# Patient Record
Sex: MALE | Race: WHITE | NOT HISPANIC OR LATINO | Employment: OTHER | URBAN - METROPOLITAN AREA
[De-identification: names, ages, dates, MRNs, and addresses within clinical notes are randomized per-mention and may not be internally consistent; named-entity substitution may affect disease eponyms.]

---

## 2017-02-09 ENCOUNTER — OFFICE VISIT (OUTPATIENT)
Dept: FAMILY MEDICINE | Facility: CLINIC | Age: 72
End: 2017-02-09
Payer: MEDICARE

## 2017-02-09 VITALS
BODY MASS INDEX: 34.4 KG/M2 | RESPIRATION RATE: 15 BRPM | HEART RATE: 68 BPM | TEMPERATURE: 100 F | HEIGHT: 70 IN | SYSTOLIC BLOOD PRESSURE: 140 MMHG | DIASTOLIC BLOOD PRESSURE: 86 MMHG | WEIGHT: 240.31 LBS

## 2017-02-09 DIAGNOSIS — J40 BRONCHITIS: ICD-10-CM

## 2017-02-09 DIAGNOSIS — E11.42 DIABETIC POLYNEUROPATHY ASSOCIATED WITH TYPE 2 DIABETES MELLITUS: Primary | ICD-10-CM

## 2017-02-09 PROCEDURE — 99499 UNLISTED E&M SERVICE: CPT | Mod: S$GLB,,, | Performed by: FAMILY MEDICINE

## 2017-02-09 PROCEDURE — 3046F HEMOGLOBIN A1C LEVEL >9.0%: CPT | Mod: S$GLB,,, | Performed by: FAMILY MEDICINE

## 2017-02-09 PROCEDURE — 4010F ACE/ARB THERAPY RXD/TAKEN: CPT | Mod: S$GLB,,, | Performed by: FAMILY MEDICINE

## 2017-02-09 PROCEDURE — 1159F MED LIST DOCD IN RCRD: CPT | Mod: S$GLB,,, | Performed by: FAMILY MEDICINE

## 2017-02-09 PROCEDURE — 1157F ADVNC CARE PLAN IN RCRD: CPT | Mod: S$GLB,,, | Performed by: FAMILY MEDICINE

## 2017-02-09 PROCEDURE — 1160F RVW MEDS BY RX/DR IN RCRD: CPT | Mod: S$GLB,,, | Performed by: FAMILY MEDICINE

## 2017-02-09 PROCEDURE — 2022F DILAT RTA XM EVC RTNOPTHY: CPT | Mod: S$GLB,,, | Performed by: FAMILY MEDICINE

## 2017-02-09 PROCEDURE — 99999 PR PBB SHADOW E&M-EST. PATIENT-LVL III: CPT | Mod: PBBFAC,,, | Performed by: FAMILY MEDICINE

## 2017-02-09 PROCEDURE — 1126F AMNT PAIN NOTED NONE PRSNT: CPT | Mod: S$GLB,,, | Performed by: FAMILY MEDICINE

## 2017-02-09 PROCEDURE — 3077F SYST BP >= 140 MM HG: CPT | Mod: S$GLB,,, | Performed by: FAMILY MEDICINE

## 2017-02-09 PROCEDURE — 3079F DIAST BP 80-89 MM HG: CPT | Mod: S$GLB,,, | Performed by: FAMILY MEDICINE

## 2017-02-09 PROCEDURE — 99214 OFFICE O/P EST MOD 30 MIN: CPT | Mod: 25,S$GLB,, | Performed by: FAMILY MEDICINE

## 2017-02-09 PROCEDURE — 96372 THER/PROPH/DIAG INJ SC/IM: CPT | Mod: S$GLB,,, | Performed by: FAMILY MEDICINE

## 2017-02-09 RX ORDER — FUROSEMIDE 40 MG/1
40 TABLET ORAL DAILY
COMMUNITY
Start: 2016-11-14 | End: 2021-06-08

## 2017-02-09 RX ORDER — BENZONATATE 100 MG/1
100 CAPSULE ORAL 3 TIMES DAILY PRN
Qty: 30 CAPSULE | Refills: 0 | Status: SHIPPED | OUTPATIENT
Start: 2017-02-09 | End: 2017-03-11

## 2017-02-09 RX ORDER — AZITHROMYCIN 250 MG/1
TABLET, FILM COATED ORAL
Qty: 6 TABLET | Refills: 0 | Status: SHIPPED | OUTPATIENT
Start: 2017-02-09 | End: 2017-02-14

## 2017-02-09 RX ORDER — METHYLPREDNISOLONE ACETATE 40 MG/ML
40 INJECTION, SUSPENSION INTRA-ARTICULAR; INTRALESIONAL; INTRAMUSCULAR; SOFT TISSUE
Status: COMPLETED | OUTPATIENT
Start: 2017-02-09 | End: 2017-02-09

## 2017-02-09 RX ORDER — FLUTICASONE PROPIONATE 50 MCG
2 SPRAY, SUSPENSION (ML) NASAL DAILY
Qty: 1 BOTTLE | Refills: 0 | Status: SHIPPED | OUTPATIENT
Start: 2017-02-09 | End: 2017-03-11

## 2017-02-09 RX ORDER — PANTOPRAZOLE SODIUM 40 MG/1
TABLET, DELAYED RELEASE ORAL
COMMUNITY
Start: 2016-11-21 | End: 2017-02-09

## 2017-02-09 RX ADMIN — METHYLPREDNISOLONE ACETATE 40 MG: 40 INJECTION, SUSPENSION INTRA-ARTICULAR; INTRALESIONAL; INTRAMUSCULAR; SOFT TISSUE at 03:02

## 2017-02-09 NOTE — PROGRESS NOTES
Subjective:       Patient ID: Nestor Garcia is a 72 y.o. male.    Chief Complaint: Cough (x last night) and Fever    HPI  72 year old male comes in with c/o cough/congestion and PND that started yesterday. He says that because of the cough last night he has some chest pain under his ribs. He has had temp up to 99.6. He has been using some zeina seltzer cold as well as robitussin cold med with an antihistamine which did help. He does have DM and his sugars have been running 120s in the morning. He is walking 2 miles daily on the [facility]. He notes that he did stop his lipitor because of muscle aching.    PMH, PSH, ALLERGIES, SH, FH reviewed in nurse's notes above  Medications reconciled in the nurse's notes      Review of Systems   Constitutional: Negative for chills and fever.   HENT: Positive for congestion and postnasal drip. Negative for ear pain, rhinorrhea, sore throat and trouble swallowing.    Eyes: Negative for redness and itching.   Respiratory: Positive for cough. Negative for shortness of breath and wheezing.    Cardiovascular: Negative for chest pain and palpitations.   Gastrointestinal: Negative for abdominal pain, diarrhea, nausea and vomiting.   Genitourinary: Negative for dysuria and frequency.   Skin: Negative for rash.   Neurological: Negative for weakness and headaches.       Objective:      Physical Exam   Constitutional: He is oriented to person, place, and time. He appears well-developed. No distress.   HENT:   Head: Normocephalic and atraumatic.   Eyes: Conjunctivae are normal. Pupils are equal, round, and reactive to light.   Neck: Normal range of motion. Neck supple. No thyromegaly present.   Cardiovascular: Normal rate, regular rhythm, normal heart sounds and intact distal pulses.    Pulmonary/Chest: Effort normal and breath sounds normal. No respiratory distress. He has no wheezes.   Abdominal: Soft. Bowel sounds are normal. There is no tenderness.   Musculoskeletal: Normal range of  motion. He exhibits no edema.   Lymphadenopathy:     He has no cervical adenopathy.   Neurological: He is alert and oriented to person, place, and time.   Skin: Skin is warm and dry. No rash noted.   Psychiatric: He has a normal mood and affect. His behavior is normal.   Nursing note and vitals reviewed.       Assessment/Plan:       Nestor was seen today for cough and fever.    Diagnoses and all orders for this visit:    Diabetic polyneuropathy associated with type 2 diabetes mellitus    Bronchitis  -     benzonatate (TESSALON) 100 MG capsule; Take 1 capsule (100 mg total) by mouth 3 (three) times daily as needed.  -     methylPREDNISolone acetate injection 40 mg; Inject 1 mL (40 mg total) into the muscle one time.  -     azithromycin (Z-RIMA) 250 MG tablet; Take 2 tablets by mouth on day 1; Take 1 tablet by mouth on days 2-5    Other orders  -     fluticasone (FLONASE) 50 mcg/actuation nasal spray; 2 sprays by Each Nare route once daily.    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 He stopped this.    RTC if condition acutely worsens or any other concerns, otherwise RTC as scheduled

## 2017-02-09 NOTE — MR AVS SNAPSHOT
[address] [address]  Phone: [phone]  Fax: [phone]                  Nestor Garcia   2017 3:30 PM   Office Visit    Description:  Male : 1945   Provider:  Estelle Melgar MD   Department:  [facility]           Reason for Visit     Cough     Fever           Diagnoses this Visit        Comments    Diabetic polyneuropathy associated with type 2 diabetes mellitus    -  Primary     Bronchitis                To Do List           Goals (5 Years of Data)     None       These Medications        Disp Refills Start End    benzonatate (TESSALON) 100 MG capsule 30 capsule 0 2017 3/11/2017    Take 1 capsule (100 mg total) by mouth 3 (three) times daily as needed. - Oral    Pharmacy: [facility] [address] [place] [address] [id number] Ph #: [phone]       azithromycin (Z-RIMA) 250 MG tablet 6 tablet 0 2017    Take 2 tablets by mouth on day 1; Take 1 tablet by mouth on days 2-5    Pharmacy: [facility] [address] [place] [address] [id number] Ph #: [phone]       fluticasone (FLONASE) 50 mcg/actuation nasal spray 1 Bottle 0 2017 3/11/2017    2 sprays by Each Nare route once daily. - Each Nare    Pharmacy: [address] Ph #: [phone]         [facility]     [facility] On Call Nurse Care Line -  Assistance  Registered nurses in the [facility] provide clinical advisement, health education, appointment booking, and other advisory services.  Call for this free service at [phone].             Medications           Message regarding Medications     Verify the changes and/or additions to your medication regime listed below are the same as discussed with your clinician today.  If any of these changes or additions are incorrect, please notify your healthcare provider.        START taking these NEW medications        Refills    benzonatate (TESSALON) 100  MG capsule 0    Sig: Take 1 capsule (100 mg total) by mouth 3 (three) times daily as needed.    Class: Normal    Route: Oral    azithromycin (Z-RIMA) 250 MG tablet 0    Sig: Take 2 tablets by mouth on day 1; Take 1 tablet by mouth on days 2-5    Class: Normal    fluticasone (FLONASE) 50 mcg/actuation nasal spray 0    Si sprays by Each Nare route once daily.    Class: Normal    Route: Each Nare      These medications were administered today        Dose Freq    methylPREDNISolone acetate injection 40 mg 40 mg Clinic/HOD 1 time    Sig: Inject 1 mL (40 mg total) into the muscle one time.    Class: Normal    Route: Intramuscular      STOP taking these medications     acidophilus (LACTINEX) 100 million cell packet Take 1 tablet by mouth 2 (two) times daily.     ascorbic acid (VITAMIN C) 500 MG tablet Take 500 mg by mouth once daily.      gemfibrozil (LOPID) 600 MG tablet Take 1 tablet (600 mg total) by mouth 2 (two) times daily before meals.    pantoprazole (PROTONIX) 40 MG tablet     atorvastatin (LIPITOR) 10 MG tablet Take 1 tablet (10 mg total) by mouth once daily.           Verify that the below list of medications is an accurate representation of the medications you are currently taking.  If none reported, the list may be blank. If incorrect, please contact your healthcare provider. Carry this list with you in case of emergency.           Current Medications     amlodipine (NORVASC) 10 MG tablet Take 1 tablet (10 mg total) by mouth once daily.    coenzyme Q10-vitamin E (CO Q-10) 100-5 mg-unit Cap Take by mouth.      docusate sodium (COLACE) 50 MG capsule Take by mouth every evening.      doxazosin (CARDURA) 2 MG tablet Take 2 tablets (4 mg total) by mouth once daily.    fish oil-omega-3 fatty acids 300-1,000 mg capsule Take 1 g by mouth once daily.     furosemide (LASIX) 40 MG tablet Take 40 mg by mouth daily as needed.     indapamide (LOZOL) 2.5 MG Tab Take 1 tablet (2.5 mg total) by mouth once daily.    insulin  "glargine (LANTUS) 100 unit/mL injection Inject 50 Units into the skin once daily.    insulin NPH (HUMULIN N) 100 unit/mL injection Inject 35 Units into the skin every evening.    insulin syringe-needle U-100 (BD INSULIN SYRINGE ULT-FINE II) 1/2 mL 31 x 5/16" Syrg Inject 1 each into the skin 5 (five) times daily.    lisinopril-hydrochlorothiazide (PRINZIDE,ZESTORETIC) 20-12.5 mg per tablet Take 2 tablets by mouth once daily.    metformin (GLUCOPHAGE-XR) 500 MG 24 hr tablet Take 1 tablet by mouth. For tablets daily    minoxidil (LONITEN) 2.5 MG tablet Take 2 tablets (5 mg total) by mouth once daily.    multivit-iron-min-folic acid (MULTIVITAMIN-IRON-MINERALS-FOLIC ACID) 3,500-18-0.4 unit-mg-mg Chew Take by mouth.      triamcinolone acetonide 0.1% (KENALOG) 0.1 % ointment Apply topically 2 (two) times daily.    azithromycin (Z-RIMA) 250 MG tablet Take 2 tablets by mouth on day 1; Take 1 tablet by mouth on days 2-5    benzonatate (TESSALON) 100 MG capsule Take 1 capsule (100 mg total) by mouth 3 (three) times daily as needed.    fluticasone (FLONASE) 50 mcg/actuation nasal spray 2 sprays by Each Nare route once daily.           Clinical Reference Information           Your Vitals Were     BP Pulse Temp Resp    140/86 (BP Location: Right arm, Patient Position: Sitting, BP Method: Manual) 68 99.5 °F (37.5 °C) (Tympanic) 15    Height Weight BMI    5' 10" (1.778 m) 109 kg (240 lb 4.8 oz) 34.48 kg/m2      Blood Pressure          Most Recent Value    BP  (!)  140/86      Allergies as of 2/9/2017     No Known Allergies      Immunizations Administered on Date of Encounter - 2/9/2017     None      Language Assistance Services     ATTENTION: Language assistance services are available, free of charge. Please call [phone].      ATENCIÓN: Si habla chase, tiene a calderon disposición servicios gratuitos de asistencia lingüística. Llame al [phone].     AFUA Ý: N?u b?n nói Ti?ng Vi?t, có các d?ch v? h? tr? ngôn ng? mi?n phí " dành cho b?n. G?i s? [phone].         [facility] complies with applicable Federal civil rights laws and does not discriminate on the basis of race, color, national origin, age, disability, or sex.

## 2017-05-29 DIAGNOSIS — E11.9 TYPE 2 DIABETES MELLITUS WITHOUT COMPLICATION: ICD-10-CM

## 2017-06-06 ENCOUNTER — OFFICE VISIT (OUTPATIENT)
Dept: FAMILY MEDICINE | Facility: CLINIC | Age: 72
End: 2017-06-06
Payer: MEDICARE

## 2017-06-06 VITALS
HEART RATE: 80 BPM | SYSTOLIC BLOOD PRESSURE: 140 MMHG | DIASTOLIC BLOOD PRESSURE: 80 MMHG | WEIGHT: 239.63 LBS | HEIGHT: 70 IN | RESPIRATION RATE: 18 BRPM | BODY MASS INDEX: 34.3 KG/M2

## 2017-06-06 DIAGNOSIS — Z12.5 SCREENING FOR PROSTATE CANCER: ICD-10-CM

## 2017-06-06 DIAGNOSIS — Z78.9 STATIN INTOLERANCE: ICD-10-CM

## 2017-06-06 DIAGNOSIS — I10 ESSENTIAL HYPERTENSION: Primary | ICD-10-CM

## 2017-06-06 DIAGNOSIS — E78.5 DYSLIPIDEMIA: ICD-10-CM

## 2017-06-06 DIAGNOSIS — E11.9 TYPE 2 DIABETES MELLITUS WITHOUT COMPLICATION, WITH LONG-TERM CURRENT USE OF INSULIN: ICD-10-CM

## 2017-06-06 DIAGNOSIS — Z79.4 TYPE 2 DIABETES MELLITUS WITHOUT COMPLICATION, WITH LONG-TERM CURRENT USE OF INSULIN: ICD-10-CM

## 2017-06-06 PROCEDURE — 99214 OFFICE O/P EST MOD 30 MIN: CPT | Mod: S$GLB,,, | Performed by: FAMILY MEDICINE

## 2017-06-06 PROCEDURE — 99999 PR PBB SHADOW E&M-EST. PATIENT-LVL II: CPT | Mod: PBBFAC,,, | Performed by: FAMILY MEDICINE

## 2017-06-06 PROCEDURE — 1159F MED LIST DOCD IN RCRD: CPT | Mod: S$GLB,,, | Performed by: FAMILY MEDICINE

## 2017-06-06 PROCEDURE — 4010F ACE/ARB THERAPY RXD/TAKEN: CPT | Mod: S$GLB,,, | Performed by: FAMILY MEDICINE

## 2017-06-06 PROCEDURE — 99499 UNLISTED E&M SERVICE: CPT | Mod: S$GLB,,, | Performed by: FAMILY MEDICINE

## 2017-06-06 RX ORDER — LISINOPRIL AND HYDROCHLOROTHIAZIDE 12.5; 2 MG/1; MG/1
2 TABLET ORAL DAILY
Qty: 180 TABLET | Refills: 1 | Status: SHIPPED | OUTPATIENT
Start: 2017-06-06 | End: 2017-12-11 | Stop reason: SDUPTHER

## 2017-06-06 RX ORDER — TRIAMCINOLONE ACETONIDE 1 MG/G
OINTMENT TOPICAL 2 TIMES DAILY
Qty: 1 TUBE | Refills: 2 | Status: ON HOLD | OUTPATIENT
Start: 2017-06-06 | End: 2021-12-22 | Stop reason: HOSPADM

## 2017-06-06 RX ORDER — DOXAZOSIN 2 MG/1
4 TABLET ORAL DAILY
Qty: 180 TABLET | Refills: 1 | Status: SHIPPED | OUTPATIENT
Start: 2017-06-06 | End: 2017-12-11 | Stop reason: SDUPTHER

## 2017-06-06 RX ORDER — AMLODIPINE BESYLATE 10 MG/1
10 TABLET ORAL DAILY
Qty: 90 TABLET | Refills: 1 | Status: SHIPPED | OUTPATIENT
Start: 2017-06-06 | End: 2017-12-11 | Stop reason: SDUPTHER

## 2017-06-06 RX ORDER — FLUCONAZOLE 100 MG/1
TABLET ORAL
COMMUNITY
Start: 2017-05-25 | End: 2017-06-06

## 2017-06-06 RX ORDER — INDAPAMIDE 2.5 MG/1
2.5 TABLET ORAL DAILY
Qty: 90 TABLET | Refills: 1 | Status: SHIPPED | OUTPATIENT
Start: 2017-06-06 | End: 2017-12-11 | Stop reason: SDUPTHER

## 2017-06-06 NOTE — PROGRESS NOTES
Subjective:       Patient ID: Nestor Garcia is a 72 y.o. male.    Chief Complaint: Follow-up    Pt is a 72 y.o. male who presents for evaluation and management of   Encounter Diagnoses   Name Primary?    Essential hypertension Yes    Type 2 diabetes mellitus without complication, with long-term current use of insulin     Statin intolerance     Dyslipidemia     Screening for prostate cancer    .Seeing endocrinology at  for his DM       Doing well on current meds. Denies any side effects. Prevention is up to date.  Review of Systems   Constitutional: Negative for fever.   Respiratory: Negative for shortness of breath.    Cardiovascular: Negative for chest pain.   Gastrointestinal: Negative for anal bleeding and blood in stool.   Genitourinary: Negative for dysuria.   Neurological: Negative for dizziness and light-headedness.       Objective:      Physical Exam   Constitutional: He is oriented to person, place, and time. He appears well-developed and well-nourished.   HENT:   Head: Normocephalic and atraumatic.   Right Ear: External ear normal.   Left Ear: External ear normal.   Nose: Nose normal.   Mouth/Throat: Oropharynx is clear and moist.   Eyes: Conjunctivae and EOM are normal. Pupils are equal, round, and reactive to light. Right eye exhibits no discharge. Left eye exhibits no discharge. No scleral icterus.   Neck: Normal range of motion. Neck supple. No JVD present. No tracheal deviation present. No thyromegaly present.   Cardiovascular: Normal rate, regular rhythm, normal heart sounds and intact distal pulses.    No murmur heard.  Pulmonary/Chest: Effort normal and breath sounds normal. No respiratory distress. He has no wheezes. He has no rales. He exhibits no tenderness.   Abdominal: Soft. Bowel sounds are normal. He exhibits no distension and no mass. There is no tenderness. There is no rebound and no guarding.   Musculoskeletal: Normal range of motion.   Lymphadenopathy:     He has no cervical  adenopathy.   Neurological: He is alert and oriented to person, place, and time. He has normal reflexes. He displays normal reflexes. No cranial nerve deficit. He exhibits normal muscle tone. Coordination normal.   Skin: Skin is warm and dry.   Psychiatric: He has a normal mood and affect. His behavior is normal. Judgment and thought content normal.       Assessment:       1. Essential hypertension    2. Type 2 diabetes mellitus without complication, with long-term current use of insulin    3. Statin intolerance    4. Dyslipidemia    5. Screening for prostate cancer        Plan:   Nestor was seen today for follow-up.    Diagnoses and all orders for this visit:    Essential hypertension  -     Comprehensive metabolic panel; Future  -     lisinopril-hydrochlorothiazide (PRINZIDE,ZESTORETIC) 20-12.5 mg per tablet; Take 2 tablets by mouth once daily.  -     amlodipine (NORVASC) 10 MG tablet; Take 1 tablet (10 mg total) by mouth once daily.  -     doxazosin (CARDURA) 2 MG tablet; Take 2 tablets (4 mg total) by mouth once daily.  -     indapamide (LOZOL) 2.5 MG Tab; Take 1 tablet (2.5 mg total) by mouth once daily.  -     Lipid panel; Future    Type 2 diabetes mellitus without complication, with long-term current use of insulin  -     Hemoglobin A1c; Future    Statin intolerance    Dyslipidemia  -     TSH; Future  -     Lipid panel; Future    Screening for prostate cancer  -     PSA, Screening; Future    Other orders  -     triamcinolone acetonide 0.1% (KENALOG) 0.1 % ointment; Apply topically 2 (two) times daily.    can't tolerate statins. REpatha?    Follow with DRU JUAREZ 6 months     No Follow-up on file.

## 2017-06-07 ENCOUNTER — TELEPHONE (OUTPATIENT)
Dept: FAMILY MEDICINE | Facility: CLINIC | Age: 72
End: 2017-06-07

## 2017-06-07 NOTE — TELEPHONE ENCOUNTER
----- Message from Edgardo Chang sent at 2017  2:44 PM CDT -----  Contact: Daughter - Rossy Garcia  MRN: [id number]  : 1945  PCP: Adithya Krishna  Home Phone      [phone]  Work Phone      Not on file.  Mobile          [phone]      MESSAGE: sees Endocinologist @ [facility] -- liver enzymes are elevated & was told he needed to see Gastroenterologist -- would like recommendation from Dr Krishna on Dr     Call [phone]    PCP: Tomi

## 2017-06-07 NOTE — TELEPHONE ENCOUNTER
I would like to see his labs before I refer him to GI doc. There are a lot of different patterns of liver enzyme elevation, which can represent different problems, thus different treatment. This will determine who I send him to and when. May need further workup here. Please get results to me soon. I leave for vacation tomoro afternoon

## 2017-06-07 NOTE — TELEPHONE ENCOUNTER
sees Endocinologist @ [facility] -- liver enzymes are elevated & was told he needed to see Gastroenterologist -- would like recommendation from Dr Krishna on Dr   (Noted pt has [employer]--she may have to contact them to see who is in network first.)

## 2017-06-08 ENCOUNTER — PATIENT MESSAGE (OUTPATIENT)
Dept: FAMILY MEDICINE | Facility: CLINIC | Age: 72
End: 2017-06-08

## 2017-06-08 DIAGNOSIS — R74.8 ELEVATED LIVER ENZYMES: Primary | ICD-10-CM

## 2017-06-13 ENCOUNTER — CLINICAL SUPPORT (OUTPATIENT)
Dept: FAMILY MEDICINE | Facility: CLINIC | Age: 72
End: 2017-06-13
Payer: MEDICARE

## 2017-06-13 DIAGNOSIS — E78.5 DYSLIPIDEMIA: ICD-10-CM

## 2017-06-13 DIAGNOSIS — I10 ESSENTIAL HYPERTENSION: ICD-10-CM

## 2017-06-13 DIAGNOSIS — R74.8 ELEVATED LIVER ENZYMES: ICD-10-CM

## 2017-06-13 DIAGNOSIS — Z79.4 TYPE 2 DIABETES MELLITUS WITHOUT COMPLICATION, WITH LONG-TERM CURRENT USE OF INSULIN: ICD-10-CM

## 2017-06-13 DIAGNOSIS — E11.9 TYPE 2 DIABETES MELLITUS WITHOUT COMPLICATION, WITH LONG-TERM CURRENT USE OF INSULIN: ICD-10-CM

## 2017-06-13 DIAGNOSIS — Z12.5 SCREENING FOR PROSTATE CANCER: ICD-10-CM

## 2017-06-13 LAB
ALBUMIN SERPL BCP-MCNC: 3.9 G/DL
ALP SERPL-CCNC: 46 U/L
ALT SERPL W/O P-5'-P-CCNC: 103 U/L
ANION GAP SERPL CALC-SCNC: 9 MMOL/L
AST SERPL-CCNC: 69 U/L
BILIRUB SERPL-MCNC: 0.6 MG/DL
BUN SERPL-MCNC: 19 MG/DL
CALCIUM SERPL-MCNC: 9.3 MG/DL
CHLORIDE SERPL-SCNC: 101 MMOL/L
CHOLEST/HDLC SERPL: 6 {RATIO}
CO2 SERPL-SCNC: 32 MMOL/L
COMPLEXED PSA SERPL-MCNC: 1.4 NG/ML
CREAT SERPL-MCNC: 0.9 MG/DL
EST. GFR  (AFRICAN AMERICAN): >60 ML/MIN/1.73 M^2
EST. GFR  (NON AFRICAN AMERICAN): >60 ML/MIN/1.73 M^2
GLUCOSE SERPL-MCNC: 98 MG/DL
HDL/CHOLESTEROL RATIO: 16.6 %
HDLC SERPL-MCNC: 175 MG/DL
HDLC SERPL-MCNC: 29 MG/DL
LDLC SERPL CALC-MCNC: 79.8 MG/DL
NONHDLC SERPL-MCNC: 146 MG/DL
POTASSIUM SERPL-SCNC: 3.7 MMOL/L
PROT SERPL-MCNC: 7.3 G/DL
SODIUM SERPL-SCNC: 142 MMOL/L
TRIGL SERPL-MCNC: 331 MG/DL
TSH SERPL DL<=0.005 MIU/L-ACNC: 1.59 UIU/ML

## 2017-06-13 PROCEDURE — 87340 HEPATITIS B SURFACE AG IA: CPT

## 2017-06-13 PROCEDURE — 80061 LIPID PANEL: CPT

## 2017-06-13 PROCEDURE — 80053 COMPREHEN METABOLIC PANEL: CPT

## 2017-06-13 PROCEDURE — 86704 HEP B CORE ANTIBODY TOTAL: CPT

## 2017-06-13 PROCEDURE — 84443 ASSAY THYROID STIM HORMONE: CPT

## 2017-06-13 PROCEDURE — 83036 HEMOGLOBIN GLYCOSYLATED A1C: CPT

## 2017-06-13 PROCEDURE — 86803 HEPATITIS C AB TEST: CPT

## 2017-06-13 PROCEDURE — 84153 ASSAY OF PSA TOTAL: CPT

## 2017-06-13 PROCEDURE — 36415 COLL VENOUS BLD VENIPUNCTURE: CPT | Mod: S$GLB,,, | Performed by: FAMILY MEDICINE

## 2017-06-14 LAB
ESTIMATED AVG GLUCOSE: 157 MG/DL
HBA1C MFR BLD HPLC: 7.1 %
HBV CORE AB SERPL QL IA: NEGATIVE
HBV SURFACE AG SERPL QL IA: NEGATIVE
HCV AB SERPL QL IA: NEGATIVE

## 2017-06-23 ENCOUNTER — HOSPITAL ENCOUNTER (OUTPATIENT)
Dept: RADIOLOGY | Facility: HOSPITAL | Age: 72
Discharge: HOME | End: 2017-06-23
Attending: FAMILY MEDICINE
Payer: MEDICARE

## 2017-06-23 DIAGNOSIS — R74.8 ELEVATED LIVER ENZYMES: ICD-10-CM

## 2017-06-23 PROCEDURE — 76705 ECHO EXAM OF ABDOMEN: CPT | Mod: 26,,, | Performed by: RADIOLOGY

## 2017-06-23 PROCEDURE — 76705 ECHO EXAM OF ABDOMEN: CPT | Mod: TC

## 2017-09-11 ENCOUNTER — OFFICE VISIT (OUTPATIENT)
Dept: FAMILY MEDICINE | Facility: CLINIC | Age: 72
End: 2017-09-11
Payer: MEDICARE

## 2017-09-11 VITALS
HEART RATE: 80 BPM | WEIGHT: 240.06 LBS | SYSTOLIC BLOOD PRESSURE: 170 MMHG | RESPIRATION RATE: 20 BRPM | HEIGHT: 70 IN | DIASTOLIC BLOOD PRESSURE: 82 MMHG | BODY MASS INDEX: 34.37 KG/M2

## 2017-09-11 DIAGNOSIS — J32.9 SINUSITIS, UNSPECIFIED CHRONICITY, UNSPECIFIED LOCATION: Primary | ICD-10-CM

## 2017-09-11 PROCEDURE — 1159F MED LIST DOCD IN RCRD: CPT | Mod: S$GLB,,, | Performed by: FAMILY MEDICINE

## 2017-09-11 PROCEDURE — 99213 OFFICE O/P EST LOW 20 MIN: CPT | Mod: 25,S$GLB,, | Performed by: FAMILY MEDICINE

## 2017-09-11 PROCEDURE — 3077F SYST BP >= 140 MM HG: CPT | Mod: S$GLB,,, | Performed by: FAMILY MEDICINE

## 2017-09-11 PROCEDURE — 99999 PR PBB SHADOW E&M-EST. PATIENT-LVL II: CPT | Mod: PBBFAC,,, | Performed by: FAMILY MEDICINE

## 2017-09-11 PROCEDURE — 3079F DIAST BP 80-89 MM HG: CPT | Mod: S$GLB,,, | Performed by: FAMILY MEDICINE

## 2017-09-11 PROCEDURE — 96372 THER/PROPH/DIAG INJ SC/IM: CPT | Mod: S$GLB,,, | Performed by: FAMILY MEDICINE

## 2017-09-11 PROCEDURE — 3008F BODY MASS INDEX DOCD: CPT | Mod: S$GLB,,, | Performed by: FAMILY MEDICINE

## 2017-09-11 RX ORDER — METHYLPREDNISOLONE ACETATE 40 MG/ML
40 INJECTION, SUSPENSION INTRA-ARTICULAR; INTRALESIONAL; INTRAMUSCULAR; SOFT TISSUE
Status: COMPLETED | OUTPATIENT
Start: 2017-09-11 | End: 2017-09-11

## 2017-09-11 RX ORDER — BENZONATATE 100 MG/1
200 CAPSULE ORAL 3 TIMES DAILY PRN
Qty: 30 CAPSULE | Refills: 1 | Status: SHIPPED | OUTPATIENT
Start: 2017-09-11 | End: 2017-09-21

## 2017-09-11 RX ORDER — IPRATROPIUM BROMIDE 42 UG/1
2 SPRAY, METERED NASAL 4 TIMES DAILY
Qty: 15 ML | Refills: 2 | Status: ON HOLD | OUTPATIENT
Start: 2017-09-11 | End: 2019-12-08 | Stop reason: HOSPADM

## 2017-09-11 RX ADMIN — METHYLPREDNISOLONE ACETATE 40 MG: 40 INJECTION, SUSPENSION INTRA-ARTICULAR; INTRALESIONAL; INTRAMUSCULAR; SOFT TISSUE at 07:09

## 2017-09-11 NOTE — PROGRESS NOTES
Subjective:       Patient ID: Nestor Garcia is a 72 y.o. male.    Chief Complaint: Nasal Congestion and Sinusitis    Pt is 72 y.o. male who c/o 5 day h/o sinus congestion and h/a. Pos PND and cough.  No relief with OTC antihistamine/decongestant cold preparations. Took a decongestant, despite his HTN.   Severity is moderate.  Review of Systems   Constitutional: Negative for fatigue and fever.   HENT: Positive for congestion, postnasal drip, rhinorrhea, sinus pressure and sneezing.    Eyes: Positive for itching.   Respiratory: Negative for cough and wheezing.    Cardiovascular: Negative for chest pain and palpitations.   Gastrointestinal: Negative for diarrhea, nausea and vomiting.   Genitourinary: Negative.    Musculoskeletal: Negative.    Skin: Negative.    Neurological: Negative.  Negative for headaches.   Hematological: Negative for adenopathy.   Psychiatric/Behavioral: Negative.        Objective:    Physical Exam   Constitutional: He is oriented to person, place, and time. He appears well-developed and well-nourished.   HENT:   Head: Normocephalic and atraumatic.   Right Ear: External ear normal.   Left Ear: External ear normal.   Nose: Nose normal.   Mouth/Throat: Oropharynx is clear and moist.   Clear rhinorrhea  Right ear with cerumen impaction      Eyes: Conjunctivae and EOM are normal. Pupils are equal, round, and reactive to light. Right eye exhibits no discharge. Left eye exhibits no discharge. No scleral icterus.   Clear watery d/c   Neck: Normal range of motion. Neck supple. No JVD present. No tracheal deviation present. No thyromegaly present.   Cardiovascular: Normal rate, regular rhythm, normal heart sounds and intact distal pulses.    No murmur heard.  Pulmonary/Chest: Effort normal and breath sounds normal. No respiratory distress. He has no wheezes. He has no rales. He exhibits no tenderness.   Abdominal: Soft. Bowel sounds are normal. He exhibits no distension and no mass. There is no  tenderness. There is no rebound and no guarding.   Genitourinary: Penis normal. No penile tenderness.   Musculoskeletal: Normal range of motion.   Lymphadenopathy:     He has no cervical adenopathy.   Neurological: He is alert and oriented to person, place, and time. He has normal reflexes. He displays normal reflexes. No cranial nerve deficit. He exhibits normal muscle tone. Coordination normal.   Skin: Skin is warm and dry.   Psychiatric: He has a normal mood and affect. His behavior is normal. Judgment and thought content normal.       Assessment:       1. Sinusitis, unspecified chronicity, unspecified location        Plan:   Nestor was seen today for nasal congestion and sinusitis.    Diagnoses and all orders for this visit:    Sinusitis, unspecified chronicity, unspecified location  -     methylPREDNISolone acetate injection 40 mg; Inject 1 mL (40 mg total) into the muscle one time.  -     ipratropium (ATROVENT) 0.06 % nasal spray; 2 sprays by Nasal route 4 (four) times daily.  -     benzonatate (TESSALON) 100 MG capsule; Take 2 capsules (200 mg total) by mouth 3 (three) times daily as needed for Cough.    Use flonase with above   NO DECONGESTANTS    No Follow-up on file.

## 2017-09-20 ENCOUNTER — TELEPHONE (OUTPATIENT)
Dept: FAMILY MEDICINE | Facility: CLINIC | Age: 72
End: 2017-09-20

## 2017-09-20 NOTE — TELEPHONE ENCOUNTER
----- Message from Sheila Perales sent at 2017  5:22 PM CDT -----  Contact: Self  Nestor Garcia  MRN: [id number]  : 1945  PCP: Adithya Krishna  Home Phone      [phone]  Work Phone      Not on file.  Mobile          [phone]    MESSAGE:   Requesting appointment for tomorrow 17.  He was seen recently for cold and is not getting any better.  Running low grade fever, congestion, and cough    Pharmacy:  [facility] in [place]    Phone:  [phone]

## 2017-12-11 ENCOUNTER — OFFICE VISIT (OUTPATIENT)
Dept: FAMILY MEDICINE | Facility: CLINIC | Age: 72
End: 2017-12-11
Payer: MEDICARE

## 2017-12-11 VITALS
WEIGHT: 240.75 LBS | HEIGHT: 70 IN | BODY MASS INDEX: 34.47 KG/M2 | SYSTOLIC BLOOD PRESSURE: 140 MMHG | DIASTOLIC BLOOD PRESSURE: 80 MMHG

## 2017-12-11 DIAGNOSIS — Z78.9 STATIN INTOLERANCE: ICD-10-CM

## 2017-12-11 DIAGNOSIS — E11.9 TYPE 2 DIABETES MELLITUS WITHOUT COMPLICATION, WITH LONG-TERM CURRENT USE OF INSULIN: Primary | ICD-10-CM

## 2017-12-11 DIAGNOSIS — I10 ESSENTIAL HYPERTENSION: ICD-10-CM

## 2017-12-11 DIAGNOSIS — E78.5 DYSLIPIDEMIA: ICD-10-CM

## 2017-12-11 DIAGNOSIS — Z79.4 TYPE 2 DIABETES MELLITUS WITHOUT COMPLICATION, WITH LONG-TERM CURRENT USE OF INSULIN: Primary | ICD-10-CM

## 2017-12-11 PROCEDURE — 99214 OFFICE O/P EST MOD 30 MIN: CPT | Mod: S$GLB,,, | Performed by: FAMILY MEDICINE

## 2017-12-11 PROCEDURE — 99499 UNLISTED E&M SERVICE: CPT | Mod: S$GLB,,, | Performed by: FAMILY MEDICINE

## 2017-12-11 PROCEDURE — 99999 PR PBB SHADOW E&M-EST. PATIENT-LVL II: CPT | Mod: PBBFAC,,, | Performed by: FAMILY MEDICINE

## 2017-12-11 RX ORDER — FENOFIBRATE 160 MG/1
160 TABLET ORAL DAILY
COMMUNITY
Start: 2017-11-28 | End: 2021-06-08

## 2017-12-11 RX ORDER — LISINOPRIL AND HYDROCHLOROTHIAZIDE 12.5; 2 MG/1; MG/1
2 TABLET ORAL DAILY
Qty: 180 TABLET | Refills: 1 | Status: ON HOLD | OUTPATIENT
Start: 2017-12-11 | End: 2019-12-08 | Stop reason: SDUPTHER

## 2017-12-11 RX ORDER — AMLODIPINE BESYLATE 10 MG/1
10 TABLET ORAL DAILY
Qty: 90 TABLET | Refills: 1 | Status: SHIPPED | OUTPATIENT
Start: 2017-12-11 | End: 2018-01-05

## 2017-12-11 RX ORDER — INDAPAMIDE 2.5 MG/1
2.5 TABLET ORAL DAILY
Qty: 90 TABLET | Refills: 1 | Status: ON HOLD | OUTPATIENT
Start: 2017-12-11 | End: 2019-12-08 | Stop reason: HOSPADM

## 2017-12-11 RX ORDER — DOXAZOSIN 2 MG/1
4 TABLET ORAL DAILY
Qty: 180 TABLET | Refills: 1 | Status: SHIPPED | OUTPATIENT
Start: 2017-12-11 | End: 2018-06-26 | Stop reason: SDUPTHER

## 2017-12-11 NOTE — PROGRESS NOTES
Subjective:       Patient ID: Nestor SAMUELS Garcia is a 72 y.o. male.    Chief Complaint: Nasal Congestion (Follow up) and Sinusitis    Pt is a 72 y.o. male who presents for evaluation and management of   Encounter Diagnoses   Name Primary?    Type 2 diabetes mellitus without complication, with long-term current use of insulin Yes    Essential hypertension     Dyslipidemia     Statin intolerance    Sees endo at ---Isaias Ulloa MD   A1c is 7.2 per pt   Recently started fenofibrate for elevated TRIGS, low HDL.    Doing well on current meds. Denies any side effects. Prevention is up to date.  Review of Systems   Constitutional: Negative for fever.   Respiratory: Negative for shortness of breath.    Cardiovascular: Negative for chest pain.   Gastrointestinal: Negative for anal bleeding and blood in stool.   Genitourinary: Negative for dysuria.   Neurological: Negative for dizziness and light-headedness.   Psychiatric/Behavioral: Negative for sleep disturbance.       Objective:      Physical Exam   Constitutional: He is oriented to person, place, and time. He appears well-developed and well-nourished.   HENT:   Head: Normocephalic and atraumatic.   Right Ear: External ear normal.   Left Ear: External ear normal.   Nose: Nose normal.   Mouth/Throat: Oropharynx is clear and moist.   Eyes: Conjunctivae and EOM are normal. Pupils are equal, round, and reactive to light. Right eye exhibits no discharge. Left eye exhibits no discharge. No scleral icterus.   Neck: Normal range of motion. Neck supple. No JVD present. No tracheal deviation present. No thyromegaly present.   Cardiovascular: Normal rate, regular rhythm, normal heart sounds and intact distal pulses.    No murmur heard.  Pulmonary/Chest: Effort normal and breath sounds normal. No respiratory distress. He has no wheezes. He has no rales. He exhibits no tenderness.   Abdominal: Soft. Bowel sounds are normal. He exhibits no distension and no mass. There is no  tenderness. There is no rebound and no guarding.   Musculoskeletal: Normal range of motion.   Lymphadenopathy:     He has no cervical adenopathy.   Neurological: He is alert and oriented to person, place, and time. He has normal reflexes. He displays normal reflexes. No cranial nerve deficit. He exhibits normal muscle tone. Coordination normal.   Skin: Skin is warm and dry.   Psychiatric: He has a normal mood and affect. His behavior is normal. Judgment and thought content normal.       Assessment:       1. Type 2 diabetes mellitus without complication, with long-term current use of insulin    2. Essential hypertension    3. Dyslipidemia    4. Statin intolerance        Plan:   Nestor was seen today for nasal congestion and sinusitis.    Diagnoses and all orders for this visit:    Type 2 diabetes mellitus without complication, with long-term current use of insulin    Essential hypertension  -     amLODIPine (NORVASC) 10 MG tablet; Take 1 tablet (10 mg total) by mouth once daily.  -     doxazosin (CARDURA) 2 MG tablet; Take 2 tablets (4 mg total) by mouth once daily.  -     indapamide (LOZOL) 2.5 MG Tab; Take 1 tablet (2.5 mg total) by mouth once daily.  -     lisinopril-hydrochlorothiazide (PRINZIDE,ZESTORETIC) 20-12.5 mg per tablet; Take 2 tablets by mouth once daily.    Dyslipidemia    Statin intolerance    continue current rx, see orders   Stop omega red and get High Potency Ocean Blue Fish Oil OTC online   RTC 6 months     No Follow-up on file.

## 2018-01-05 ENCOUNTER — OFFICE VISIT (OUTPATIENT)
Dept: FAMILY MEDICINE | Facility: CLINIC | Age: 73
End: 2018-01-05
Payer: MEDICARE

## 2018-01-05 ENCOUNTER — TELEPHONE (OUTPATIENT)
Dept: FAMILY MEDICINE | Facility: CLINIC | Age: 73
End: 2018-01-05

## 2018-01-05 VITALS
TEMPERATURE: 100 F | HEIGHT: 70 IN | RESPIRATION RATE: 18 BRPM | DIASTOLIC BLOOD PRESSURE: 80 MMHG | WEIGHT: 245 LBS | BODY MASS INDEX: 35.07 KG/M2 | SYSTOLIC BLOOD PRESSURE: 170 MMHG | HEART RATE: 86 BPM

## 2018-01-05 DIAGNOSIS — J32.9 SINUSITIS, UNSPECIFIED CHRONICITY, UNSPECIFIED LOCATION: ICD-10-CM

## 2018-01-05 DIAGNOSIS — J40 BRONCHITIS: ICD-10-CM

## 2018-01-05 DIAGNOSIS — R05.9 COUGH: ICD-10-CM

## 2018-01-05 DIAGNOSIS — R50.9 FEVER, UNSPECIFIED FEVER CAUSE: Primary | ICD-10-CM

## 2018-01-05 PROCEDURE — 96372 THER/PROPH/DIAG INJ SC/IM: CPT | Mod: S$GLB,,, | Performed by: FAMILY MEDICINE

## 2018-01-05 PROCEDURE — 99999 PR PBB SHADOW E&M-EST. PATIENT-LVL IV: CPT | Mod: PBBFAC,,, | Performed by: NURSE PRACTITIONER

## 2018-01-05 PROCEDURE — 99213 OFFICE O/P EST LOW 20 MIN: CPT | Mod: 25,S$GLB,, | Performed by: NURSE PRACTITIONER

## 2018-01-05 RX ORDER — METOPROLOL SUCCINATE 50 MG/1
TABLET, EXTENDED RELEASE ORAL
Status: ON HOLD | COMMUNITY
Start: 2017-12-27 | End: 2019-12-08 | Stop reason: HOSPADM

## 2018-01-05 RX ORDER — METHYLPREDNISOLONE ACETATE 40 MG/ML
40 INJECTION, SUSPENSION INTRA-ARTICULAR; INTRALESIONAL; INTRAMUSCULAR; SOFT TISSUE
Status: COMPLETED | OUTPATIENT
Start: 2018-01-05 | End: 2018-01-05

## 2018-01-05 RX ORDER — CEFTRIAXONE 250 MG/1
250 INJECTION, POWDER, FOR SOLUTION INTRAMUSCULAR; INTRAVENOUS
Status: COMPLETED | OUTPATIENT
Start: 2018-01-05 | End: 2018-01-05

## 2018-01-05 RX ORDER — BENZONATATE 100 MG/1
100 CAPSULE ORAL 3 TIMES DAILY PRN
Qty: 30 CAPSULE | Refills: 0 | Status: SHIPPED | OUTPATIENT
Start: 2018-01-05 | End: 2018-01-15

## 2018-01-05 RX ORDER — LEVOFLOXACIN 500 MG/1
500 TABLET, FILM COATED ORAL DAILY
Qty: 7 TABLET | Refills: 0 | Status: SHIPPED | OUTPATIENT
Start: 2018-01-05 | End: 2018-12-03 | Stop reason: ALTCHOICE

## 2018-01-05 RX ADMIN — METHYLPREDNISOLONE ACETATE 40 MG: 40 INJECTION, SUSPENSION INTRA-ARTICULAR; INTRALESIONAL; INTRAMUSCULAR; SOFT TISSUE at 10:01

## 2018-01-05 RX ADMIN — CEFTRIAXONE 250 MG: 250 INJECTION, POWDER, FOR SOLUTION INTRAMUSCULAR; INTRAVENOUS at 10:01

## 2018-01-05 NOTE — PROGRESS NOTES
Subjective:       Patient ID: Nestor Garcia is a 72 y.o. male.    Chief Complaint: Fever and Cough    Fever    The current episode started yesterday. The maximum temperature noted was 100 to 100.9 F. Associated symptoms include congestion, coughing and a sore throat. Pertinent negatives include no abdominal pain, diarrhea, ear pain, headaches, nausea, rash, vomiting or wheezing.   Cough   The current episode started yesterday. The cough is productive of sputum. Associated symptoms include a fever, nasal congestion, postnasal drip and a sore throat. Pertinent negatives include no ear pain, headaches, rash, shortness of breath or wheezing.     Review of Systems   Constitutional: Positive for appetite change, fatigue and fever.   HENT: Positive for congestion, postnasal drip and sore throat. Negative for ear pain.    Eyes: Negative.  Negative for visual disturbance.   Respiratory: Positive for cough. Negative for shortness of breath and wheezing.    Cardiovascular: Negative.    Gastrointestinal: Negative.  Negative for abdominal pain, diarrhea, nausea and vomiting.   Genitourinary: Negative.  Negative for difficulty urinating.   Musculoskeletal: Negative.    Skin: Negative.  Negative for rash.   Neurological: Negative.  Negative for headaches.   Psychiatric/Behavioral: Negative.  Negative for sleep disturbance. The patient is not nervous/anxious.    All other systems reviewed and are negative.      Objective:      Physical Exam   Constitutional: He appears well-developed and well-nourished.   HENT:   Head: Normocephalic and atraumatic.   Right Ear: External ear normal. A middle ear effusion is present.   Left Ear: External ear normal. A middle ear effusion is present.   Nose: Mucosal edema and rhinorrhea present.   Mouth/Throat: Uvula is midline. Posterior oropharyngeal erythema present.   Eyes: Conjunctivae are normal. Pupils are equal, round, and reactive to light.   Neck: Trachea normal and normal range of motion.  Neck supple. No thyromegaly present.   Cardiovascular: Normal rate, regular rhythm, S1 normal, S2 normal and normal heart sounds.    No murmur heard.  Pulmonary/Chest: Effort normal. No respiratory distress.   Rhonchi throughout, rough cough   Abdominal: Soft. Normal appearance.   Musculoskeletal: Normal range of motion.   Lymphadenopathy:     He has no cervical adenopathy.   Neurological: He is alert.   Skin: Skin is warm, dry and intact.   Psychiatric: He has a normal mood and affect. His speech is normal.   Nursing note and vitals reviewed.      Assessment:       1. Fever, unspecified fever cause    2. Cough    3. Bronchitis    4. Sinusitis, unspecified chronicity, unspecified location        Plan:   Nestor was seen today for fever and cough.    Diagnoses and all orders for this visit:    Fever, unspecified fever cause  -     POCT Influenza A/B  -     methylPREDNISolone acetate injection 40 mg; Inject 1 mL (40 mg total) into the muscle one time.  -     levoFLOXacin (LEVAQUIN) 500 MG tablet; Take 1 tablet (500 mg total) by mouth once daily.  -     cefTRIAXone injection 250 mg; Inject 250 mg into the muscle one time.    Cough  -     methylPREDNISolone acetate injection 40 mg; Inject 1 mL (40 mg total) into the muscle one time.  -     levoFLOXacin (LEVAQUIN) 500 MG tablet; Take 1 tablet (500 mg total) by mouth once daily.  -     benzonatate (TESSALON) 100 MG capsule; Take 1 capsule (100 mg total) by mouth 3 (three) times daily as needed for Cough.  -     cefTRIAXone injection 250 mg; Inject 250 mg into the muscle one time.    Bronchitis  -     methylPREDNISolone acetate injection 40 mg; Inject 1 mL (40 mg total) into the muscle one time.  -     levoFLOXacin (LEVAQUIN) 500 MG tablet; Take 1 tablet (500 mg total) by mouth once daily.  -     cefTRIAXone injection 250 mg; Inject 250 mg into the muscle one time.    Sinusitis, unspecified chronicity, unspecified location  -     methylPREDNISolone acetate injection 40 mg;  Inject 1 mL (40 mg total) into the muscle one time.  -     levoFLOXacin (LEVAQUIN) 500 MG tablet; Take 1 tablet (500 mg total) by mouth once daily.  -     cefTRIAXone injection 250 mg; Inject 250 mg into the muscle one time.    RTC 1 week to follow up with Dr. Krishna

## 2018-01-05 NOTE — TELEPHONE ENCOUNTER
----- Message from Edgardo Chang sent at 2018  9:21 AM CST -----  Contact: Patient  Nestor Garcia  MRN: [id number]  : 1945  PCP: Adithya Krishna  Home Phone      [phone]  Work Phone      Not on file.  Mobile          [phone]      MESSAGE: fever - cough -- requesting appt today    Call [phone]    PCP: Tomi

## 2018-02-09 DIAGNOSIS — E11.9 TYPE 2 DIABETES MELLITUS WITHOUT COMPLICATION: ICD-10-CM

## 2018-03-12 ENCOUNTER — PES CALL (OUTPATIENT)
Dept: ADMINISTRATIVE | Facility: CLINIC | Age: 73
End: 2018-03-12

## 2018-06-26 DIAGNOSIS — I10 ESSENTIAL HYPERTENSION: ICD-10-CM

## 2018-06-26 RX ORDER — DOXAZOSIN 2 MG/1
TABLET ORAL
Qty: 180 TABLET | Refills: 1 | Status: ON HOLD | OUTPATIENT
Start: 2018-06-26 | End: 2019-12-08 | Stop reason: SDUPTHER

## 2018-08-08 ENCOUNTER — PES CALL (OUTPATIENT)
Dept: ADMINISTRATIVE | Facility: CLINIC | Age: 73
End: 2018-08-08

## 2018-09-14 DIAGNOSIS — E11.9 TYPE 2 DIABETES MELLITUS WITHOUT COMPLICATION: ICD-10-CM

## 2018-12-03 ENCOUNTER — TELEPHONE (OUTPATIENT)
Dept: FAMILY MEDICINE | Facility: CLINIC | Age: 73
End: 2018-12-03

## 2018-12-04 ENCOUNTER — TELEPHONE (OUTPATIENT)
Dept: FAMILY MEDICINE | Facility: CLINIC | Age: 73
End: 2018-12-04

## 2018-12-04 NOTE — TELEPHONE ENCOUNTER
Spoke to pt about scheduling an appt for ER f/u. Pt states that he has an appt tomorrow--nothing in system to reflect that. Pt states that his daughter was to set it up. He will contact her about this

## 2019-05-31 ENCOUNTER — PES CALL (OUTPATIENT)
Dept: ADMINISTRATIVE | Facility: CLINIC | Age: 74
End: 2019-05-31

## 2019-06-14 ENCOUNTER — PES CALL (OUTPATIENT)
Dept: ADMINISTRATIVE | Facility: CLINIC | Age: 74
End: 2019-06-14

## 2019-10-25 ENCOUNTER — TELEPHONE (OUTPATIENT)
Dept: FAMILY MEDICINE | Facility: CLINIC | Age: 74
End: 2019-10-25

## 2019-12-08 PROBLEM — R06.02 SOB (SHORTNESS OF BREATH): Status: ACTIVE | Noted: 2019-12-08

## 2019-12-08 PROBLEM — R07.89 ATYPICAL CHEST PAIN: Status: ACTIVE | Noted: 2019-12-08

## 2019-12-08 PROBLEM — R07.89 CHEST HEAVINESS: Status: ACTIVE | Noted: 2019-12-08

## 2019-12-19 ENCOUNTER — TELEPHONE (OUTPATIENT)
Dept: GASTROENTEROLOGY | Facility: CLINIC | Age: 74
End: 2019-12-19

## 2019-12-19 NOTE — TELEPHONE ENCOUNTER
----- Message from Bud Gonzales sent at 12/19/2019  2:03 PM CST -----  Contact: Rudi medina/Dr Mcmahan office  [phone]  Rudi is calling to speak with you about a referral for the above patient   Please call back to assist at [phone]

## 2019-12-19 NOTE — TELEPHONE ENCOUNTER
Spoke with [facility] and no appointments scheduled with GI, explained to them that I will fax over appt info one it is scheduled.

## 2020-10-27 LAB
CHOLEST SERPL-MSCNC: 154 MG/DL (ref 0–200)
CHOLESTEROL/HDL RATIO SCREEN: 5
CREAT UR-MCNC: 80.7 MG/DL
HDLC SERPL-MCNC: 30 MG/DL (ref 35–70)
LDLC SERPL CALC-MCNC: 92 MG/DL (ref 0–160)
MICROALBUMIN URINE RANDOM: 84.9
MICROALBUMIN/CREATININE RATIO: 1052 UG/MG
NON HDL CHOL. (LDL+VLDL): 124
TRIGL SERPL-MCNC: 203 MG/DL (ref 40–160)

## 2021-01-18 LAB — HBA1C MFR BLD: 7.9 % (ref 4–6)

## 2021-02-18 LAB
LEFT EYE DM RETINOPATHY: NEGATIVE
RIGHT EYE DM RETINOPATHY: NEGATIVE

## 2021-04-13 LAB — HBA1C MFR BLD: 7.7 % (ref 4–6)

## 2021-05-31 ENCOUNTER — TELEPHONE (OUTPATIENT)
Dept: FAMILY MEDICINE | Facility: CLINIC | Age: 76
End: 2021-05-31

## 2021-06-08 ENCOUNTER — OFFICE VISIT (OUTPATIENT)
Dept: FAMILY MEDICINE | Facility: CLINIC | Age: 76
End: 2021-06-08
Payer: MEDICARE

## 2021-06-08 VITALS
HEART RATE: 60 BPM | WEIGHT: 241.19 LBS | RESPIRATION RATE: 20 BRPM | HEIGHT: 70 IN | BODY MASS INDEX: 34.53 KG/M2 | TEMPERATURE: 98 F | DIASTOLIC BLOOD PRESSURE: 72 MMHG | SYSTOLIC BLOOD PRESSURE: 130 MMHG

## 2021-06-08 DIAGNOSIS — G47.30 SLEEP APNEA, UNSPECIFIED TYPE: ICD-10-CM

## 2021-06-08 DIAGNOSIS — I10 ESSENTIAL HYPERTENSION: ICD-10-CM

## 2021-06-08 DIAGNOSIS — E11.42 DIABETIC POLYNEUROPATHY ASSOCIATED WITH TYPE 2 DIABETES MELLITUS: Primary | ICD-10-CM

## 2021-06-08 DIAGNOSIS — Z76.89 ENCOUNTER TO ESTABLISH CARE: ICD-10-CM

## 2021-06-08 PROCEDURE — 1126F AMNT PAIN NOTED NONE PRSNT: CPT | Mod: S$GLB,,, | Performed by: STUDENT IN AN ORGANIZED HEALTH CARE EDUCATION/TRAINING PROGRAM

## 2021-06-08 PROCEDURE — 99499 UNLISTED E&M SERVICE: CPT | Mod: S$GLB,,, | Performed by: STUDENT IN AN ORGANIZED HEALTH CARE EDUCATION/TRAINING PROGRAM

## 2021-06-08 PROCEDURE — 3288F FALL RISK ASSESSMENT DOCD: CPT | Mod: CPTII,S$GLB,, | Performed by: STUDENT IN AN ORGANIZED HEALTH CARE EDUCATION/TRAINING PROGRAM

## 2021-06-08 PROCEDURE — 1101F PR PT FALLS ASSESS DOC 0-1 FALLS W/OUT INJ PAST YR: ICD-10-PCS | Mod: CPTII,S$GLB,, | Performed by: STUDENT IN AN ORGANIZED HEALTH CARE EDUCATION/TRAINING PROGRAM

## 2021-06-08 PROCEDURE — 99204 PR OFFICE/OUTPT VISIT, NEW, LEVL IV, 45-59 MIN: ICD-10-PCS | Mod: S$GLB,,, | Performed by: STUDENT IN AN ORGANIZED HEALTH CARE EDUCATION/TRAINING PROGRAM

## 2021-06-08 PROCEDURE — 1159F MED LIST DOCD IN RCRD: CPT | Mod: S$GLB,,, | Performed by: STUDENT IN AN ORGANIZED HEALTH CARE EDUCATION/TRAINING PROGRAM

## 2021-06-08 PROCEDURE — 3075F SYST BP GE 130 - 139MM HG: CPT | Mod: CPTII,S$GLB,, | Performed by: STUDENT IN AN ORGANIZED HEALTH CARE EDUCATION/TRAINING PROGRAM

## 2021-06-08 PROCEDURE — 3078F DIAST BP <80 MM HG: CPT | Mod: CPTII,S$GLB,, | Performed by: STUDENT IN AN ORGANIZED HEALTH CARE EDUCATION/TRAINING PROGRAM

## 2021-06-08 PROCEDURE — 3075F PR MOST RECENT SYSTOLIC BLOOD PRESS GE 130-139MM HG: ICD-10-PCS | Mod: CPTII,S$GLB,, | Performed by: STUDENT IN AN ORGANIZED HEALTH CARE EDUCATION/TRAINING PROGRAM

## 2021-06-08 PROCEDURE — 3078F PR MOST RECENT DIASTOLIC BLOOD PRESSURE < 80 MM HG: ICD-10-PCS | Mod: CPTII,S$GLB,, | Performed by: STUDENT IN AN ORGANIZED HEALTH CARE EDUCATION/TRAINING PROGRAM

## 2021-06-08 PROCEDURE — 99499 RISK ADDL DX/OHS AUDIT: ICD-10-PCS | Mod: S$GLB,,, | Performed by: STUDENT IN AN ORGANIZED HEALTH CARE EDUCATION/TRAINING PROGRAM

## 2021-06-08 PROCEDURE — 3288F PR FALLS RISK ASSESSMENT DOCUMENTED: ICD-10-PCS | Mod: CPTII,S$GLB,, | Performed by: STUDENT IN AN ORGANIZED HEALTH CARE EDUCATION/TRAINING PROGRAM

## 2021-06-08 PROCEDURE — 99999 PR PBB SHADOW E&M-EST. PATIENT-LVL IV: CPT | Mod: PBBFAC,,, | Performed by: STUDENT IN AN ORGANIZED HEALTH CARE EDUCATION/TRAINING PROGRAM

## 2021-06-08 PROCEDURE — 99999 PR PBB SHADOW E&M-EST. PATIENT-LVL IV: ICD-10-PCS | Mod: PBBFAC,,, | Performed by: STUDENT IN AN ORGANIZED HEALTH CARE EDUCATION/TRAINING PROGRAM

## 2021-06-08 PROCEDURE — 1126F PR PAIN SEVERITY QUANTIFIED, NO PAIN PRESENT: ICD-10-PCS | Mod: S$GLB,,, | Performed by: STUDENT IN AN ORGANIZED HEALTH CARE EDUCATION/TRAINING PROGRAM

## 2021-06-08 PROCEDURE — 1101F PT FALLS ASSESS-DOCD LE1/YR: CPT | Mod: CPTII,S$GLB,, | Performed by: STUDENT IN AN ORGANIZED HEALTH CARE EDUCATION/TRAINING PROGRAM

## 2021-06-08 PROCEDURE — 99204 OFFICE O/P NEW MOD 45 MIN: CPT | Mod: S$GLB,,, | Performed by: STUDENT IN AN ORGANIZED HEALTH CARE EDUCATION/TRAINING PROGRAM

## 2021-06-08 PROCEDURE — 1159F PR MEDICATION LIST DOCUMENTED IN MEDICAL RECORD: ICD-10-PCS | Mod: S$GLB,,, | Performed by: STUDENT IN AN ORGANIZED HEALTH CARE EDUCATION/TRAINING PROGRAM

## 2021-06-08 RX ORDER — PREGABALIN 25 MG/1
25 CAPSULE ORAL 2 TIMES DAILY
Qty: 60 CAPSULE | Refills: 5 | Status: SHIPPED | OUTPATIENT
Start: 2021-06-08 | End: 2021-07-07 | Stop reason: SDUPTHER

## 2021-06-14 ENCOUNTER — HOSPITAL ENCOUNTER (OUTPATIENT)
Dept: SLEEP MEDICINE | Facility: HOSPITAL | Age: 76
Discharge: HOME | End: 2021-06-14
Attending: STUDENT IN AN ORGANIZED HEALTH CARE EDUCATION/TRAINING PROGRAM
Payer: MEDICARE

## 2021-06-14 DIAGNOSIS — G47.00 INSOMNIA WITH SLEEP APNEA: ICD-10-CM

## 2021-06-14 DIAGNOSIS — G47.30 INSOMNIA WITH SLEEP APNEA: ICD-10-CM

## 2021-06-14 PROCEDURE — 95810 PR POLYSOMNOGRAPHY, 4 OR MORE: ICD-10-PCS | Mod: 26,,, | Performed by: INTERNAL MEDICINE

## 2021-06-14 PROCEDURE — 95810 POLYSOM 6/> YRS 4/> PARAM: CPT | Mod: 26,,, | Performed by: INTERNAL MEDICINE

## 2021-06-14 PROCEDURE — 95810 POLYSOM 6/> YRS 4/> PARAM: CPT

## 2021-06-16 ENCOUNTER — PATIENT OUTREACH (OUTPATIENT)
Dept: ADMINISTRATIVE | Facility: HOSPITAL | Age: 76
End: 2021-06-16

## 2021-07-07 ENCOUNTER — OFFICE VISIT (OUTPATIENT)
Dept: FAMILY MEDICINE | Facility: CLINIC | Age: 76
End: 2021-07-07
Payer: MEDICARE

## 2021-07-07 VITALS
HEART RATE: 72 BPM | BODY MASS INDEX: 34.69 KG/M2 | SYSTOLIC BLOOD PRESSURE: 126 MMHG | DIASTOLIC BLOOD PRESSURE: 80 MMHG | WEIGHT: 242.31 LBS | RESPIRATION RATE: 20 BRPM | HEIGHT: 70 IN | TEMPERATURE: 98 F

## 2021-07-07 DIAGNOSIS — E11.42 DIABETIC POLYNEUROPATHY ASSOCIATED WITH TYPE 2 DIABETES MELLITUS: ICD-10-CM

## 2021-07-07 DIAGNOSIS — G47.30 SLEEP APNEA, UNSPECIFIED TYPE: Primary | ICD-10-CM

## 2021-07-07 PROCEDURE — 1101F PR PT FALLS ASSESS DOC 0-1 FALLS W/OUT INJ PAST YR: ICD-10-PCS | Mod: CPTII,S$GLB,, | Performed by: STUDENT IN AN ORGANIZED HEALTH CARE EDUCATION/TRAINING PROGRAM

## 2021-07-07 PROCEDURE — 1101F PT FALLS ASSESS-DOCD LE1/YR: CPT | Mod: CPTII,S$GLB,, | Performed by: STUDENT IN AN ORGANIZED HEALTH CARE EDUCATION/TRAINING PROGRAM

## 2021-07-07 PROCEDURE — 99499 RISK ADDL DX/OHS AUDIT: ICD-10-PCS | Mod: HCNC,S$GLB,, | Performed by: STUDENT IN AN ORGANIZED HEALTH CARE EDUCATION/TRAINING PROGRAM

## 2021-07-07 PROCEDURE — 99213 PR OFFICE/OUTPT VISIT, EST, LEVL III, 20-29 MIN: ICD-10-PCS | Mod: S$GLB,,, | Performed by: STUDENT IN AN ORGANIZED HEALTH CARE EDUCATION/TRAINING PROGRAM

## 2021-07-07 PROCEDURE — 1126F PR PAIN SEVERITY QUANTIFIED, NO PAIN PRESENT: ICD-10-PCS | Mod: S$GLB,,, | Performed by: STUDENT IN AN ORGANIZED HEALTH CARE EDUCATION/TRAINING PROGRAM

## 2021-07-07 PROCEDURE — 3051F HG A1C>EQUAL 7.0%<8.0%: CPT | Mod: CPTII,S$GLB,, | Performed by: STUDENT IN AN ORGANIZED HEALTH CARE EDUCATION/TRAINING PROGRAM

## 2021-07-07 PROCEDURE — 3288F FALL RISK ASSESSMENT DOCD: CPT | Mod: CPTII,S$GLB,, | Performed by: STUDENT IN AN ORGANIZED HEALTH CARE EDUCATION/TRAINING PROGRAM

## 2021-07-07 PROCEDURE — 1159F MED LIST DOCD IN RCRD: CPT | Mod: S$GLB,,, | Performed by: STUDENT IN AN ORGANIZED HEALTH CARE EDUCATION/TRAINING PROGRAM

## 2021-07-07 PROCEDURE — 99999 PR PBB SHADOW E&M-EST. PATIENT-LVL IV: ICD-10-PCS | Mod: PBBFAC,,, | Performed by: STUDENT IN AN ORGANIZED HEALTH CARE EDUCATION/TRAINING PROGRAM

## 2021-07-07 PROCEDURE — 99213 OFFICE O/P EST LOW 20 MIN: CPT | Mod: S$GLB,,, | Performed by: STUDENT IN AN ORGANIZED HEALTH CARE EDUCATION/TRAINING PROGRAM

## 2021-07-07 PROCEDURE — 1159F PR MEDICATION LIST DOCUMENTED IN MEDICAL RECORD: ICD-10-PCS | Mod: S$GLB,,, | Performed by: STUDENT IN AN ORGANIZED HEALTH CARE EDUCATION/TRAINING PROGRAM

## 2021-07-07 PROCEDURE — 3288F PR FALLS RISK ASSESSMENT DOCUMENTED: ICD-10-PCS | Mod: CPTII,S$GLB,, | Performed by: STUDENT IN AN ORGANIZED HEALTH CARE EDUCATION/TRAINING PROGRAM

## 2021-07-07 PROCEDURE — 1126F AMNT PAIN NOTED NONE PRSNT: CPT | Mod: S$GLB,,, | Performed by: STUDENT IN AN ORGANIZED HEALTH CARE EDUCATION/TRAINING PROGRAM

## 2021-07-07 PROCEDURE — 99499 UNLISTED E&M SERVICE: CPT | Mod: HCNC,S$GLB,, | Performed by: STUDENT IN AN ORGANIZED HEALTH CARE EDUCATION/TRAINING PROGRAM

## 2021-07-07 PROCEDURE — 99999 PR PBB SHADOW E&M-EST. PATIENT-LVL IV: CPT | Mod: PBBFAC,,, | Performed by: STUDENT IN AN ORGANIZED HEALTH CARE EDUCATION/TRAINING PROGRAM

## 2021-07-07 PROCEDURE — 3051F PR MOST RECENT HEMOGLOBIN A1C LEVEL 7.0 - < 8.0%: ICD-10-PCS | Mod: CPTII,S$GLB,, | Performed by: STUDENT IN AN ORGANIZED HEALTH CARE EDUCATION/TRAINING PROGRAM

## 2021-07-07 RX ORDER — TORSEMIDE 20 MG/1
40 TABLET ORAL 2 TIMES DAILY
Status: ON HOLD | COMMUNITY
Start: 2021-06-24 | End: 2021-12-22 | Stop reason: HOSPADM

## 2021-07-07 RX ORDER — METOPROLOL SUCCINATE 25 MG/1
100 TABLET, EXTENDED RELEASE ORAL DAILY
Status: ON HOLD | COMMUNITY
Start: 2021-06-24 | End: 2022-08-23 | Stop reason: HOSPADM

## 2021-07-07 RX ORDER — ASPIRIN 81 MG/1
81 TABLET ORAL DAILY
COMMUNITY
Start: 2021-01-01

## 2021-07-07 RX ORDER — AMLODIPINE BESYLATE 5 MG/1
TABLET ORAL
COMMUNITY
Start: 2021-06-24 | End: 2022-07-01

## 2021-07-07 RX ORDER — LINAGLIPTIN 5 MG/1
5 TABLET, FILM COATED ORAL DAILY
Status: ON HOLD | COMMUNITY
Start: 2021-04-24 | End: 2021-12-21

## 2021-07-07 RX ORDER — PREGABALIN 50 MG/1
50 CAPSULE ORAL 2 TIMES DAILY
Qty: 60 CAPSULE | Refills: 5 | Status: ON HOLD | OUTPATIENT
Start: 2021-07-07 | End: 2021-12-21

## 2021-07-07 RX ORDER — DOXAZOSIN 8 MG/1
TABLET ORAL EVERY 12 HOURS
Status: ON HOLD | COMMUNITY
Start: 2021-06-24 | End: 2023-04-15 | Stop reason: HOSPADM

## 2021-07-07 RX ORDER — VITAMIN B COMPLEX
1 CAPSULE ORAL DAILY
Status: ON HOLD | COMMUNITY
End: 2021-12-21

## 2021-07-07 RX ORDER — LACTOBACILL 46/B.ANIMAL/INULIN 10B-100 MG
CAPSULE ORAL
Status: ON HOLD | COMMUNITY
Start: 2021-05-24 | End: 2021-12-22 | Stop reason: HOSPADM

## 2021-07-09 ENCOUNTER — PATIENT MESSAGE (OUTPATIENT)
Dept: FAMILY MEDICINE | Facility: CLINIC | Age: 76
End: 2021-07-09

## 2021-07-13 LAB
CHOL/HDLC RATIO: 6
CHOLESTEROL, TOTAL: 169
HDLC SERPL-MCNC: 28 MG/DL (ref 35–70)
LDLC SERPL CALC-MCNC: 102 MG/DL (ref 0–160)
NON HDL CHOL. (LDL+VLDL): 141
TRIGL SERPL-MCNC: 242 MG/DL

## 2021-07-14 ENCOUNTER — HOSPITAL ENCOUNTER (OUTPATIENT)
Dept: SLEEP MEDICINE | Facility: HOSPITAL | Age: 76
Discharge: HOME | End: 2021-07-14
Attending: STUDENT IN AN ORGANIZED HEALTH CARE EDUCATION/TRAINING PROGRAM
Payer: MEDICARE

## 2021-07-14 DIAGNOSIS — G47.00 INSOMNIA WITH SLEEP APNEA: ICD-10-CM

## 2021-07-14 DIAGNOSIS — G47.30 INSOMNIA WITH SLEEP APNEA: ICD-10-CM

## 2021-07-14 PROCEDURE — 95811 POLYSOM 6/>YRS CPAP 4/> PARM: CPT | Mod: 26,,, | Performed by: INTERNAL MEDICINE

## 2021-07-14 PROCEDURE — 95811 PR POLYSOMNOGRAPHY W/CPAP: ICD-10-PCS | Mod: 26,,, | Performed by: INTERNAL MEDICINE

## 2021-07-14 PROCEDURE — 95811 POLYSOM 6/>YRS CPAP 4/> PARM: CPT

## 2021-07-22 ENCOUNTER — PATIENT MESSAGE (OUTPATIENT)
Dept: FAMILY MEDICINE | Facility: CLINIC | Age: 76
End: 2021-07-22

## 2021-07-23 ENCOUNTER — TELEPHONE (OUTPATIENT)
Dept: FAMILY MEDICINE | Facility: CLINIC | Age: 76
End: 2021-07-23

## 2021-07-29 ENCOUNTER — TELEPHONE (OUTPATIENT)
Dept: FAMILY MEDICINE | Facility: CLINIC | Age: 76
End: 2021-07-29

## 2021-12-21 ENCOUNTER — HOSPITAL ENCOUNTER (OUTPATIENT)
Facility: HOSPITAL | Age: 76
Discharge: HOME | End: 2021-12-22
Attending: SURGERY | Admitting: INTERNAL MEDICINE
Payer: MEDICARE

## 2021-12-21 DIAGNOSIS — R07.9 CHEST PAIN: ICD-10-CM

## 2021-12-21 DIAGNOSIS — E87.6 HYPOKALEMIA: Primary | ICD-10-CM

## 2021-12-21 DIAGNOSIS — E11.9 DM (DIABETES MELLITUS): ICD-10-CM

## 2021-12-21 DIAGNOSIS — N17.9 AKI (ACUTE KIDNEY INJURY): ICD-10-CM

## 2021-12-21 DIAGNOSIS — I25.10 CARDIOVASCULAR DISEASE: ICD-10-CM

## 2021-12-21 DIAGNOSIS — E86.0 DEHYDRATION: ICD-10-CM

## 2021-12-21 LAB
ALBUMIN SERPL BCP-MCNC: 3.3 G/DL (ref 3.5–5.2)
ALP SERPL-CCNC: 63 U/L (ref 55–135)
ALT SERPL W/O P-5'-P-CCNC: 20 U/L (ref 10–44)
ANION GAP SERPL CALC-SCNC: 13 MMOL/L (ref 8–16)
AST SERPL-CCNC: 19 U/L (ref 10–40)
BASOPHILS # BLD AUTO: 0.05 K/UL (ref 0–0.2)
BASOPHILS NFR BLD: 0.6 % (ref 0–1.9)
BILIRUB SERPL-MCNC: 0.6 MG/DL (ref 0.1–1)
BNP SERPL-MCNC: 86 PG/ML (ref 0–99)
BUN SERPL-MCNC: 26 MG/DL (ref 8–23)
CALCIUM SERPL-MCNC: 9.1 MG/DL (ref 8.7–10.5)
CHLORIDE SERPL-SCNC: 91 MMOL/L (ref 95–110)
CK MB SERPL-MCNC: 2.3 NG/ML (ref 0.1–6.5)
CK MB SERPL-RTO: 3.2 % (ref 0–5)
CK SERPL-CCNC: 72 U/L (ref 20–200)
CK SERPL-CCNC: 72 U/L (ref 20–200)
CO2 SERPL-SCNC: 34 MMOL/L (ref 23–29)
CREAT SERPL-MCNC: 1.8 MG/DL (ref 0.5–1.4)
D DIMER PPP IA.FEU-MCNC: 1.1 MG/L FEU
DIFFERENTIAL METHOD: ABNORMAL
EOSINOPHIL # BLD AUTO: 0.2 K/UL (ref 0–0.5)
EOSINOPHIL NFR BLD: 2.9 % (ref 0–8)
ERYTHROCYTE [DISTWIDTH] IN BLOOD BY AUTOMATED COUNT: 12.9 % (ref 11.5–14.5)
EST. GFR  (AFRICAN AMERICAN): 41 ML/MIN/1.73 M^2
EST. GFR  (NON AFRICAN AMERICAN): 35 ML/MIN/1.73 M^2
GLUCOSE SERPL-MCNC: 117 MG/DL (ref 70–110)
HCT VFR BLD AUTO: 41.4 % (ref 40–54)
HGB BLD-MCNC: 14.7 G/DL (ref 14–18)
IMM GRANULOCYTES # BLD AUTO: 0.04 K/UL (ref 0–0.04)
IMM GRANULOCYTES NFR BLD AUTO: 0.5 % (ref 0–0.5)
LYMPHOCYTES # BLD AUTO: 1.4 K/UL (ref 1–4.8)
LYMPHOCYTES NFR BLD: 17.7 % (ref 18–48)
MCH RBC QN AUTO: 31.1 PG (ref 27–31)
MCHC RBC AUTO-ENTMCNC: 35.5 G/DL (ref 32–36)
MCV RBC AUTO: 88 FL (ref 82–98)
MONOCYTES # BLD AUTO: 1 K/UL (ref 0.3–1)
MONOCYTES NFR BLD: 13.2 % (ref 4–15)
NEUTROPHILS # BLD AUTO: 5.1 K/UL (ref 1.8–7.7)
NEUTROPHILS NFR BLD: 65.1 % (ref 38–73)
NRBC BLD-RTO: 0 /100 WBC
PLATELET # BLD AUTO: 218 K/UL (ref 150–450)
PMV BLD AUTO: 11.4 FL (ref 9.2–12.9)
POCT GLUCOSE: 167 MG/DL (ref 70–110)
POCT GLUCOSE: 175 MG/DL (ref 70–110)
POCT GLUCOSE: 95 MG/DL (ref 70–110)
POTASSIUM SERPL-SCNC: 3.2 MMOL/L (ref 3.5–5.1)
PROT SERPL-MCNC: 6.4 G/DL (ref 6–8.4)
RBC # BLD AUTO: 4.72 M/UL (ref 4.6–6.2)
SARS-COV-2 RDRP RESP QL NAA+PROBE: NEGATIVE
SODIUM SERPL-SCNC: 138 MMOL/L (ref 136–145)
TROPONIN I SERPL DL<=0.01 NG/ML-MCNC: 0.01 NG/ML (ref 0–0.03)
TROPONIN I SERPL DL<=0.01 NG/ML-MCNC: 0.02 NG/ML (ref 0–0.03)
TROPONIN I SERPL DL<=0.01 NG/ML-MCNC: 0.03 NG/ML (ref 0–0.03)
WBC # BLD AUTO: 7.85 K/UL (ref 3.9–12.7)

## 2021-12-21 PROCEDURE — 84484 ASSAY OF TROPONIN QUANT: CPT | Mod: 91,HCNC | Performed by: SURGERY

## 2021-12-21 PROCEDURE — 93010 ELECTROCARDIOGRAM REPORT: CPT | Mod: HCNC,,, | Performed by: INTERNAL MEDICINE

## 2021-12-21 PROCEDURE — G0378 HOSPITAL OBSERVATION PER HR: HCPCS | Mod: HCNC

## 2021-12-21 PROCEDURE — 25000003 PHARM REV CODE 250: Mod: HCNC | Performed by: SURGERY

## 2021-12-21 PROCEDURE — U0002 COVID-19 LAB TEST NON-CDC: HCPCS | Mod: HCNC | Performed by: SURGERY

## 2021-12-21 PROCEDURE — 94760 N-INVAS EAR/PLS OXIMETRY 1: CPT | Mod: HCNC

## 2021-12-21 PROCEDURE — 82550 ASSAY OF CK (CPK): CPT | Mod: HCNC | Performed by: SURGERY

## 2021-12-21 PROCEDURE — 96365 THER/PROPH/DIAG IV INF INIT: CPT | Mod: HCNC

## 2021-12-21 PROCEDURE — 36415 COLL VENOUS BLD VENIPUNCTURE: CPT | Mod: HCNC | Performed by: SURGERY

## 2021-12-21 PROCEDURE — 83880 ASSAY OF NATRIURETIC PEPTIDE: CPT | Mod: HCNC | Performed by: SURGERY

## 2021-12-21 PROCEDURE — 85025 COMPLETE CBC W/AUTO DIFF WBC: CPT | Mod: HCNC | Performed by: SURGERY

## 2021-12-21 PROCEDURE — C9399 UNCLASSIFIED DRUGS OR BIOLOG: HCPCS | Mod: HCNC | Performed by: SURGERY

## 2021-12-21 PROCEDURE — 83036 HEMOGLOBIN GLYCOSYLATED A1C: CPT | Mod: HCNC | Performed by: SURGERY

## 2021-12-21 PROCEDURE — 63600175 PHARM REV CODE 636 W HCPCS: Mod: HCNC | Performed by: SURGERY

## 2021-12-21 PROCEDURE — 85379 FIBRIN DEGRADATION QUANT: CPT | Mod: HCNC | Performed by: SURGERY

## 2021-12-21 PROCEDURE — 93010 EKG 12-LEAD: ICD-10-PCS | Mod: HCNC,,, | Performed by: INTERNAL MEDICINE

## 2021-12-21 PROCEDURE — 93005 ELECTROCARDIOGRAM TRACING: CPT | Mod: HCNC

## 2021-12-21 PROCEDURE — 99291 CRITICAL CARE FIRST HOUR: CPT | Mod: 25,HCNC

## 2021-12-21 PROCEDURE — 96372 THER/PROPH/DIAG INJ SC/IM: CPT | Mod: 59

## 2021-12-21 PROCEDURE — 82962 GLUCOSE BLOOD TEST: CPT | Mod: HCNC

## 2021-12-21 PROCEDURE — 94761 N-INVAS EAR/PLS OXIMETRY MLT: CPT | Mod: HCNC

## 2021-12-21 PROCEDURE — 96372 THER/PROPH/DIAG INJ SC/IM: CPT | Mod: 59,HCNC

## 2021-12-21 PROCEDURE — 80053 COMPREHEN METABOLIC PANEL: CPT | Mod: HCNC | Performed by: SURGERY

## 2021-12-21 PROCEDURE — 96361 HYDRATE IV INFUSION ADD-ON: CPT | Mod: HCNC

## 2021-12-21 RX ORDER — ACETAMINOPHEN 325 MG/1
650 TABLET ORAL EVERY 8 HOURS PRN
Status: DISCONTINUED | OUTPATIENT
Start: 2021-12-21 | End: 2021-12-22 | Stop reason: HOSPADM

## 2021-12-21 RX ORDER — INSULIN ASPART 100 [IU]/ML
0-5 INJECTION, SOLUTION INTRAVENOUS; SUBCUTANEOUS
Status: DISCONTINUED | OUTPATIENT
Start: 2021-12-21 | End: 2021-12-22 | Stop reason: HOSPADM

## 2021-12-21 RX ORDER — SODIUM CHLORIDE 0.9 % (FLUSH) 0.9 %
10 SYRINGE (ML) INJECTION
Status: DISCONTINUED | OUTPATIENT
Start: 2021-12-21 | End: 2021-12-22 | Stop reason: HOSPADM

## 2021-12-21 RX ORDER — SODIUM CHLORIDE 9 MG/ML
INJECTION, SOLUTION INTRAVENOUS
Status: COMPLETED | OUTPATIENT
Start: 2021-12-21 | End: 2021-12-21

## 2021-12-21 RX ORDER — GLUCAGON 1 MG
1 KIT INJECTION
Status: DISCONTINUED | OUTPATIENT
Start: 2021-12-21 | End: 2021-12-22 | Stop reason: HOSPADM

## 2021-12-21 RX ORDER — ASPIRIN 325 MG
325 TABLET ORAL
Status: COMPLETED | OUTPATIENT
Start: 2021-12-21 | End: 2021-12-21

## 2021-12-21 RX ORDER — SODIUM CHLORIDE AND POTASSIUM CHLORIDE 150; 900 MG/100ML; MG/100ML
INJECTION, SOLUTION INTRAVENOUS CONTINUOUS
Status: DISCONTINUED | OUTPATIENT
Start: 2021-12-21 | End: 2021-12-22 | Stop reason: HOSPADM

## 2021-12-21 RX ORDER — POTASSIUM CHLORIDE 20 MEQ/1
40 TABLET, EXTENDED RELEASE ORAL
Status: COMPLETED | OUTPATIENT
Start: 2021-12-21 | End: 2021-12-21

## 2021-12-21 RX ORDER — FUROSEMIDE 10 MG/ML
40 INJECTION INTRAMUSCULAR; INTRAVENOUS
Status: DISCONTINUED | OUTPATIENT
Start: 2021-12-21 | End: 2021-12-21

## 2021-12-21 RX ORDER — TALC
6 POWDER (GRAM) TOPICAL NIGHTLY PRN
Status: DISCONTINUED | OUTPATIENT
Start: 2021-12-21 | End: 2021-12-22 | Stop reason: HOSPADM

## 2021-12-21 RX ORDER — INSULIN ASPART 100 [IU]/ML
7 INJECTION, SOLUTION INTRAVENOUS; SUBCUTANEOUS
Status: DISCONTINUED | OUTPATIENT
Start: 2021-12-21 | End: 2021-12-22 | Stop reason: HOSPADM

## 2021-12-21 RX ORDER — IBUPROFEN 200 MG
16 TABLET ORAL
Status: DISCONTINUED | OUTPATIENT
Start: 2021-12-21 | End: 2021-12-22 | Stop reason: HOSPADM

## 2021-12-21 RX ORDER — IBUPROFEN 200 MG
24 TABLET ORAL
Status: DISCONTINUED | OUTPATIENT
Start: 2021-12-21 | End: 2021-12-22 | Stop reason: HOSPADM

## 2021-12-21 RX ORDER — POTASSIUM CHLORIDE 7.45 MG/ML
10 INJECTION INTRAVENOUS
Status: COMPLETED | OUTPATIENT
Start: 2021-12-21 | End: 2021-12-21

## 2021-12-21 RX ORDER — ONDANSETRON 2 MG/ML
4 INJECTION INTRAMUSCULAR; INTRAVENOUS EVERY 8 HOURS PRN
Status: DISCONTINUED | OUTPATIENT
Start: 2021-12-21 | End: 2021-12-22 | Stop reason: HOSPADM

## 2021-12-21 RX ADMIN — SODIUM CHLORIDE: 0.9 INJECTION, SOLUTION INTRAVENOUS at 07:12

## 2021-12-21 RX ADMIN — ASPIRIN 325 MG: 325 TABLET ORAL at 05:12

## 2021-12-21 RX ADMIN — POTASSIUM CHLORIDE 40 MEQ: 1500 TABLET, EXTENDED RELEASE ORAL at 07:12

## 2021-12-21 RX ADMIN — INSULIN ASPART 7 UNITS: 100 INJECTION, SOLUTION INTRAVENOUS; SUBCUTANEOUS at 11:12

## 2021-12-21 RX ADMIN — SODIUM CHLORIDE AND POTASSIUM CHLORIDE: .9; .15 SOLUTION INTRAVENOUS at 10:12

## 2021-12-21 RX ADMIN — POTASSIUM CHLORIDE 10 MEQ: 7.46 INJECTION, SOLUTION INTRAVENOUS at 08:12

## 2021-12-21 RX ADMIN — INSULIN ASPART 7 UNITS: 100 INJECTION, SOLUTION INTRAVENOUS; SUBCUTANEOUS at 05:12

## 2021-12-21 RX ADMIN — INSULIN DETEMIR 15 UNITS: 100 INJECTION, SOLUTION SUBCUTANEOUS at 08:12

## 2021-12-21 NOTE — ED NOTES
Received verbal report from Felisha BLACK.  Pt  lying in stretcher.  Stretcher is in low, locked position, side rails up x2.  Call bell within reach of pt, pt instructed on use, pt verbalizes understanding of call bell use, Pt on cardiac monitor.  Family at Bedside.  Awaiting [facility] consult.  Patient updated on plan of care.  Verbalized understanding.  Will continue to monitor.    The patient is Alert, A&Ox4, and in NAD.

## 2021-12-21 NOTE — CONSULTS
"[facility] - Emergency Dept  Cardiology  Consult Note    Patient Name: Nestor Garcia  MRN: [id number]  Admission Date: 12/21/2021  Hospital Length of Stay: 0 days  Code Status: Prior   Attending Provider: Genaro Marsh MD   Consulting Provider: Alyssa Porter NP  Primary Care Physician: Kashmir Conn MD  Principal Problem:<principal problem not specified>      Consults  Subjective:     Chief Complaint:  CP    HPI: Pt is a 76 year old male with a PMH of chronic diastolic CHF, T2DM and HTN who presents to the ER with complaints of CP. BP/HR stable. Labs pertinent for H/H 14/41, BUN/Cr 26/1.8, potassium 3.2, CE negative. Recent increase in metolazone per cardiologist. CXR unremarkable. EKG reveals NSR with PVC's and LBBB. [facility] asked to evaluate.     Past Medical History:   Diagnosis Date    Diabetes mellitus type II     Hypertension        Past Surgical History:   Procedure Laterality Date    APPENDECTOMY      BACK SURGERY      CHOLECYSTECTOMY      INNER EAR SURGERY      KNEE SURGERY         Review of patient's allergies indicates:   Allergen Reactions    Hydralazine analogues Itching       No current facility-administered medications on file prior to encounter.     Current Outpatient Medications on File Prior to Encounter   Medication Sig    amLODIPine (NORVASC) 5 MG tablet     aspirin (ECOTRIN) 81 MG EC tablet Take 81 mg by mouth.    b complex vitamins capsule Take 1 capsule by mouth once daily.    BD INSULIN SYRINGE ULTRA-FINE 1/2 mL 31 gauge x 15/64" Syrg     coenzyme Q10-vitamin E (CO Q-10) 100-5 mg-unit Cap Take by mouth.      doxazosin (CARDURA) 8 MG Tab     fish oil-omega-3 fatty acids 300-1,000 mg capsule Take 1 g by mouth once daily.     insulin glargine (LANTUS) 100 unit/mL injection Inject 45 Units into the skin every evening.    insulin NPH (HUMULIN N NPH U-100 INSULIN) 100 unit/mL injection Inject 50 Units into the skin every evening.    Lactobacill 46-B.animal-inulin (PROBIOTIC-10, WITH " INULIN,) 10 billion cell -100 mg Cap     metoprolol succinate (TOPROL-XL) 25 MG 24 hr tablet     pregabalin (LYRICA) 50 MG capsule Take 1 capsule (50 mg total) by mouth 2 (two) times daily.    torsemide (DEMADEX) 20 MG Tab     TRADJENTA 5 mg Tab tablet Take 5 mg by mouth once daily.    triamcinolone acetonide 0.1% (KENALOG) 0.1 % ointment Apply topically 2 (two) times daily. (Patient taking differently: Apply topically as needed. )     Family History     Problem Relation (Age of Onset)    Alzheimer's disease Mother    Heart disease Father        Tobacco Use    Smoking status: Never Smoker    Smokeless tobacco: Never Used   Substance and Sexual Activity    Alcohol use: No    Drug use: No    Sexual activity: Not on file     ROS     Constitutional : Negative  EENT : Negative  CV : CP  Respiratory : Negative  Gastrointestinal: Negative   Genitourinary: Negative  Musculoskeletal: Negative  Skin : Negative  Neurological : AAO x 3     Objective:     Vital Signs (Most Recent):  Temp: 96.7 °F (35.9 °C) (12/21/21 0534)  Pulse: (!) 58 (12/21/21 0651)  Resp: 14 (12/21/21 0651)  BP: (!) 142/60 (12/21/21 0649)  SpO2: 97 % (12/21/21 0651) Vital Signs (24h Range):  Temp:  [96.7 °F (35.9 °C)] 96.7 °F (35.9 °C)  Pulse:  [57-65] 58  Resp:  [11-22] 14  SpO2:  [94 %-97 %] 97 %  BP: (132-148)/(60-70) 142/60        There is no height or weight on file to calculate BMI.    SpO2: 97 %  O2 Device (Oxygen Therapy): room air    No intake or output data in the 24 hours ending 12/21/21 0854    Lines/Drains/Airways     Peripheral Intravenous Line                 Peripheral IV - Single Lumen 12/21/21 0548 20 G Right Antecubital <1 day                Physical Exam     General appearance: alert, appears stated age and cooperative  Head: Normocephalic, without obvious abnormality, atraumatic  Eyes: conjunctivae/corneas clear. PERRL  Neck: no carotid bruit, no JVD and supple, symmetrical, trachea midline  Lungs: clear to auscultation  bilaterally, normal respiratory effort  Chest Wall: no tenderness  Heart: regular rate and rhythm, S1, S2 normal, no murmur, click, rub or gallop  Abdomen: soft, non-tender; bowel sounds normal; no masses,  no organomegaly  Extremities: Extremities normal, atraumatic, no cyanosis, clubbing, or edema  Pulses: Dorsalis Pedis R: 2+ (normal)/L: 2+ (normal)  Skin: Skin color, texture, turgor normal. No rashes or lesions  Neurologic: Normal mood and affect  Alert and oriented X 3    Significant Labs:   BMP:   Recent Labs   Lab 12/21/21  0544   *      K 3.2*   CL 91*   CO2 34*   BUN 26*   CREATININE 1.8*   CALCIUM 9.1   , CMP   Recent Labs   Lab 12/21/21  0544      K 3.2*   CL 91*   CO2 34*   *   BUN 26*   CREATININE 1.8*   CALCIUM 9.1   PROT 6.4   ALBUMIN 3.3*   BILITOT 0.6   ALKPHOS 63   AST 19   ALT 20   ANIONGAP 13   ESTGFRAFRICA 41*   EGFRNONAA 35*   , CBC   Recent Labs   Lab 12/21/21  0544   WBC 7.85   HGB 14.7   HCT 41.4       and Troponin   Recent Labs   Lab 12/21/21  0544   TROPONINI 0.012       Significant Imaging: X-Ray: CXR: X-Ray Chest 1 View (CXR):   Results for orders placed or performed during the hospital encounter of 12/21/21   X-Ray Chest 1 View    Narrative    EXAMINATION:  XR CHEST 1 VIEW    CLINICAL HISTORY:  CP;.    TECHNIQUE:  Portable chest dated December 21, 2021.    COMPARISON:  July 29, 2020.    FINDINGS:  The cardiac silhouette is within normal limits considering portable technique.  Atherosclerotic changes of the aorta.  No focal infiltrate or effusion.  Degenerative changes of the spine.      Impression    No active lung disease.      Electronically signed by: Fran Jara MD  Date:    12/21/2021  Time:    07:47    and X-Ray Chest PA and Lateral (CXR): No results found for this visit on 12/21/21.  Assessment and Plan:     There are no hospital problems to display for this patient.      VTE Risk Mitigation (From admission, onward)    None        Current  "Facility-Administered Medications   Medication    furosemide injection 40 mg    potassium chloride 10 mEq in 100 mL IVPB     Current Outpatient Medications   Medication Sig    amLODIPine (NORVASC) 5 MG tablet     aspirin (ECOTRIN) 81 MG EC tablet Take 81 mg by mouth.    b complex vitamins capsule Take 1 capsule by mouth once daily.    BD INSULIN SYRINGE ULTRA-FINE 1/2 mL 31 gauge x 15/64" Syrg     coenzyme Q10-vitamin E (CO Q-10) 100-5 mg-unit Cap Take by mouth.      doxazosin (CARDURA) 8 MG Tab     fish oil-omega-3 fatty acids 300-1,000 mg capsule Take 1 g by mouth once daily.     insulin glargine (LANTUS) 100 unit/mL injection Inject 45 Units into the skin every evening.    insulin NPH (HUMULIN N NPH U-100 INSULIN) 100 unit/mL injection Inject 50 Units into the skin every evening.    Lactobacill 46-B.animal-inulin (PROBIOTIC-10, WITH INULIN,) 10 billion cell -100 mg Cap     metoprolol succinate (TOPROL-XL) 25 MG 24 hr tablet     pregabalin (LYRICA) 50 MG capsule Take 1 capsule (50 mg total) by mouth 2 (two) times daily.    torsemide (DEMADEX) 20 MG Tab     TRADJENTA 5 mg Tab tablet Take 5 mg by mouth once daily.    triamcinolone acetonide 0.1% (KENALOG) 0.1 % ointment Apply topically 2 (two) times daily. (Patient taking differently: Apply topically as needed. )     DX: Weakness and cramps due to acute kidney injury and hypokalemia from recent use of Metolazone.    CP denied by pt- CE negative, EKG without acute ischemic changes. MPI December 2019 normal    Chronic diastolic CHF metolazone recently added given worsening LE edema. Echo December 2020 LVEF 50-55%, grade I DD, hypokinesis of basal inferior wall segments, moderate pulmonary hypertension     HTN    T2DM    Carotid stenosis mild     Plan:hydrate and replace K  DC Metolazone and hold torsemide  Continue amlodipine, asa,  Doxazosin and Metoprolol        Alyssa Porter NP for Dr. Charles  Cardiology   [facility] - Emergency Dept    I attest that " I have personally seen and examined this patient. I have reviewed and discussed the management in detail as outlined above.

## 2021-12-22 VITALS
WEIGHT: 247.56 LBS | HEART RATE: 71 BPM | HEIGHT: 70 IN | SYSTOLIC BLOOD PRESSURE: 166 MMHG | TEMPERATURE: 97 F | RESPIRATION RATE: 18 BRPM | BODY MASS INDEX: 35.44 KG/M2 | DIASTOLIC BLOOD PRESSURE: 74 MMHG | OXYGEN SATURATION: 91 %

## 2021-12-22 PROBLEM — E86.0 DEHYDRATION: Status: ACTIVE | Noted: 2021-12-22

## 2021-12-22 PROBLEM — E87.6 HYPOKALEMIA: Status: ACTIVE | Noted: 2021-12-22

## 2021-12-22 PROBLEM — I50.32 CHRONIC DIASTOLIC HEART FAILURE: Status: ACTIVE | Noted: 2021-12-22

## 2021-12-22 PROBLEM — R79.89 POSITIVE D DIMER: Status: ACTIVE | Noted: 2021-12-22

## 2021-12-22 LAB
ALBUMIN SERPL BCP-MCNC: 3 G/DL (ref 3.5–5.2)
ALP SERPL-CCNC: 56 U/L (ref 55–135)
ALT SERPL W/O P-5'-P-CCNC: 20 U/L (ref 10–44)
ANION GAP SERPL CALC-SCNC: 14 MMOL/L (ref 8–16)
AST SERPL-CCNC: 20 U/L (ref 10–40)
BASOPHILS # BLD AUTO: 0.04 K/UL (ref 0–0.2)
BASOPHILS NFR BLD: 0.7 % (ref 0–1.9)
BILIRUB SERPL-MCNC: 0.6 MG/DL (ref 0.1–1)
BUN SERPL-MCNC: 32 MG/DL (ref 8–23)
CALCIUM SERPL-MCNC: 8.9 MG/DL (ref 8.7–10.5)
CHLORIDE SERPL-SCNC: 96 MMOL/L (ref 95–110)
CO2 SERPL-SCNC: 30 MMOL/L (ref 23–29)
CREAT SERPL-MCNC: 1.6 MG/DL (ref 0.5–1.4)
DIFFERENTIAL METHOD: ABNORMAL
EOSINOPHIL # BLD AUTO: 0.2 K/UL (ref 0–0.5)
EOSINOPHIL NFR BLD: 3.1 % (ref 0–8)
ERYTHROCYTE [DISTWIDTH] IN BLOOD BY AUTOMATED COUNT: 13.2 % (ref 11.5–14.5)
EST. GFR  (AFRICAN AMERICAN): 47 ML/MIN/1.73 M^2
EST. GFR  (NON AFRICAN AMERICAN): 41 ML/MIN/1.73 M^2
ESTIMATED AVG GLUCOSE: 160 MG/DL (ref 68–131)
GLUCOSE SERPL-MCNC: 176 MG/DL (ref 70–110)
HBA1C MFR BLD: 7.2 % (ref 4–5.6)
HCT VFR BLD AUTO: 40.2 % (ref 40–54)
HGB BLD-MCNC: 13.8 G/DL (ref 14–18)
IMM GRANULOCYTES # BLD AUTO: 0.04 K/UL (ref 0–0.04)
IMM GRANULOCYTES NFR BLD AUTO: 0.7 % (ref 0–0.5)
LYMPHOCYTES # BLD AUTO: 1.1 K/UL (ref 1–4.8)
LYMPHOCYTES NFR BLD: 18 % (ref 18–48)
MCH RBC QN AUTO: 30.8 PG (ref 27–31)
MCHC RBC AUTO-ENTMCNC: 34.3 G/DL (ref 32–36)
MCV RBC AUTO: 90 FL (ref 82–98)
MONOCYTES # BLD AUTO: 0.9 K/UL (ref 0.3–1)
MONOCYTES NFR BLD: 14.1 % (ref 4–15)
NEUTROPHILS # BLD AUTO: 3.9 K/UL (ref 1.8–7.7)
NEUTROPHILS NFR BLD: 63.4 % (ref 38–73)
NRBC BLD-RTO: 0 /100 WBC
PLATELET # BLD AUTO: 203 K/UL (ref 150–450)
PMV BLD AUTO: 11.5 FL (ref 9.2–12.9)
POCT GLUCOSE: 193 MG/DL (ref 70–110)
POTASSIUM SERPL-SCNC: 3.7 MMOL/L (ref 3.5–5.1)
PROT SERPL-MCNC: 5.7 G/DL (ref 6–8.4)
RBC # BLD AUTO: 4.48 M/UL (ref 4.6–6.2)
SODIUM SERPL-SCNC: 140 MMOL/L (ref 136–145)
WBC # BLD AUTO: 6.1 K/UL (ref 3.9–12.7)

## 2021-12-22 PROCEDURE — G0378 HOSPITAL OBSERVATION PER HR: HCPCS | Mod: HCNC

## 2021-12-22 PROCEDURE — 36415 COLL VENOUS BLD VENIPUNCTURE: CPT | Mod: HCNC | Performed by: SURGERY

## 2021-12-22 PROCEDURE — 25000003 PHARM REV CODE 250: Mod: HCNC | Performed by: NURSE PRACTITIONER

## 2021-12-22 PROCEDURE — 99220 PR INITIAL OBSERVATION CARE,LEVL III: CPT | Mod: HCNC,,, | Performed by: FAMILY MEDICINE

## 2021-12-22 PROCEDURE — 93005 ELECTROCARDIOGRAM TRACING: CPT | Mod: HCNC

## 2021-12-22 PROCEDURE — 80053 COMPREHEN METABOLIC PANEL: CPT | Mod: HCNC | Performed by: SURGERY

## 2021-12-22 PROCEDURE — 96372 THER/PROPH/DIAG INJ SC/IM: CPT | Mod: 59

## 2021-12-22 PROCEDURE — 85025 COMPLETE CBC W/AUTO DIFF WBC: CPT | Mod: HCNC | Performed by: SURGERY

## 2021-12-22 PROCEDURE — 99220 PR INITIAL OBSERVATION CARE,LEVL III: ICD-10-PCS | Mod: HCNC,,, | Performed by: FAMILY MEDICINE

## 2021-12-22 RX ORDER — AMLODIPINE BESYLATE 5 MG/1
5 TABLET ORAL DAILY
Status: DISCONTINUED | OUTPATIENT
Start: 2021-12-22 | End: 2021-12-22 | Stop reason: HOSPADM

## 2021-12-22 RX ORDER — DOXAZOSIN 2 MG/1
8 TABLET ORAL EVERY 12 HOURS
Status: DISCONTINUED | OUTPATIENT
Start: 2021-12-22 | End: 2021-12-22 | Stop reason: HOSPADM

## 2021-12-22 RX ORDER — METOPROLOL SUCCINATE 25 MG/1
25 TABLET, EXTENDED RELEASE ORAL DAILY
Status: DISCONTINUED | OUTPATIENT
Start: 2021-12-22 | End: 2021-12-22 | Stop reason: HOSPADM

## 2021-12-22 RX ORDER — INSULIN GLARGINE 100 [IU]/ML
40 INJECTION, SOLUTION SUBCUTANEOUS NIGHTLY
Status: ON HOLD
Start: 2021-12-22 | End: 2023-04-15 | Stop reason: HOSPADM

## 2021-12-22 RX ORDER — ASPIRIN 81 MG/1
81 TABLET ORAL DAILY
Status: DISCONTINUED | OUTPATIENT
Start: 2021-12-22 | End: 2021-12-22 | Stop reason: HOSPADM

## 2021-12-22 RX ADMIN — AMLODIPINE BESYLATE 5 MG: 5 TABLET ORAL at 08:12

## 2021-12-22 RX ADMIN — ASPIRIN 81 MG: 81 TABLET, COATED ORAL at 08:12

## 2021-12-22 RX ADMIN — METOPROLOL SUCCINATE 25 MG: 25 TABLET, EXTENDED RELEASE ORAL at 08:12

## 2021-12-22 RX ADMIN — DOXAZOSIN 8 MG: 2 TABLET ORAL at 08:12

## 2021-12-22 RX ADMIN — INSULIN ASPART 7 UNITS: 100 INJECTION, SOLUTION INTRAVENOUS; SUBCUTANEOUS at 08:12

## 2021-12-22 NOTE — ASSESSMENT & PLAN NOTE
Was given 500ml bolus in ER and now on gentle fluids NS + 20meq KCL at 40ml/hr. Creat 1.8>>1.6 GFR 35>>41  The last creat we have on chart is from over 1 year ago was 1.3 with GFR 51

## 2021-12-22 NOTE — HPI
75yo male patient with hx of CHF, DM and HTN. Presented to ER yesterday with chest pain that started at 10pm Monday evening. BP/HR stable. Labs pertinent for H/H 14/41, BUN/Cr 26/1.8, potassium 3.2, CE negative. Recent increase in metolazone per cis. CXR unremarkable. EKG reveals NSR with PVC's and LBBB. [facility] asked to evaluate. Dr Charles rec admission for hydration and replacing K. Holding diuretics. He was given 40KCL po and 10KCL IV in ER with 500ml NS bolus then placed in observation on NS + 20KCL at 40ml./hr. This am creat 1.6/GFR 41 and K+ 3.7

## 2021-12-22 NOTE — PLAN OF CARE
Problem: Adult Inpatient Plan of Care  Goal: Optimal Comfort and Wellbeing  Outcome: Ongoing, Progressing     Problem: Diabetes Comorbidity  Goal: Blood Glucose Level Within Targeted Range  Outcome: Ongoing, Progressing

## 2021-12-22 NOTE — SUBJECTIVE & OBJECTIVE
"Past Medical History:   Diagnosis Date    CHF (congestive heart failure)     Diabetes mellitus type II     Hypertension        Past Surgical History:   Procedure Laterality Date    APPENDECTOMY      BACK SURGERY      CHOLECYSTECTOMY      INNER EAR SURGERY      KNEE SURGERY         Review of patient's allergies indicates:   Allergen Reactions    Hydralazine analogues Itching       No current facility-administered medications on file prior to encounter.     Current Outpatient Medications on File Prior to Encounter   Medication Sig    amLODIPine (NORVASC) 5 MG tablet     aspirin (ECOTRIN) 81 MG EC tablet Take 81 mg by mouth.    coenzyme Q10-vitamin E 100-5 mg-unit Cap Take by mouth.    doxazosin (CARDURA) 8 MG Tab every 12 (twelve) hours.    fish oil-omega-3 fatty acids 300-1,000 mg capsule Take 1 g by mouth once daily.    metoprolol succinate (TOPROL-XL) 25 MG 24 hr tablet Take 25 mg by mouth once daily.    [DISCONTINUED] insulin glargine (LANTUS) 100 unit/mL injection Inject 45 Units into the skin every evening. (Patient taking differently: Inject 40 Units into the skin every evening.)    [DISCONTINUED] Lactobacill 46-B.animal-inulin (PROBIOTIC-10, WITH INULIN,) 10 billion cell -100 mg Cap     [DISCONTINUED] torsemide (DEMADEX) 20 MG Tab 40 mg 2 (two) times a day.    BD INSULIN SYRINGE ULTRA-FINE 1/2 mL 31 gauge x 15/64" Syrg     [DISCONTINUED] insulin NPH (HUMULIN N NPH U-100 INSULIN) 100 unit/mL injection Inject 50 Units into the skin every evening. (Patient taking differently: Inject 40 Units into the skin every evening.)    [DISCONTINUED] triamcinolone acetonide 0.1% (KENALOG) 0.1 % ointment Apply topically 2 (two) times daily. (Patient taking differently: Apply topically as needed.)     Family History     Problem Relation (Age of Onset)    Alzheimer's disease Mother    Heart disease Father        Tobacco Use    Smoking status: Never Smoker    Smokeless tobacco: Never Used   Substance and " Sexual Activity    Alcohol use: No    Drug use: No    Sexual activity: Not on file     Review of Systems   Constitutional: Negative for activity change, chills, fatigue, fever and unexpected weight change.   HENT: Negative for sore throat and trouble swallowing.    Respiratory: Negative for cough, chest tightness and shortness of breath.    Cardiovascular: Negative for chest pain and leg swelling.   Gastrointestinal: Negative for abdominal pain.   Endocrine: Negative for cold intolerance and heat intolerance.   Genitourinary: Negative for difficulty urinating.   Musculoskeletal: Negative for back pain and joint swelling.   Skin: Negative for rash.   Neurological: Negative for numbness.   Hematological: Negative for adenopathy.   Psychiatric/Behavioral: Negative for decreased concentration.     Objective:     Vital Signs (Most Recent):  Temp: 97 °F (36.1 °C) (12/22/21 0802)  Pulse: 71 (12/22/21 1000)  Resp: 18 (12/22/21 0802)  BP: (!) 166/74 (12/22/21 0802)  SpO2: (!) 91 % (12/22/21 0802) Vital Signs (24h Range):  Temp:  [97 °F (36.1 °C)-98.5 °F (36.9 °C)] 97 °F (36.1 °C)  Pulse:  [58-91] 71  Resp:  [18-19] 18  SpO2:  [91 %-94 %] 91 %  BP: (118-169)/(54-74) 166/74     Weight: 112.3 kg (247 lb 9.2 oz)  Body mass index is 35.52 kg/m².    Physical Exam  Constitutional:       Appearance: He is well-developed and well-nourished. He is obese.   HENT:      Head: Normocephalic and atraumatic.      Right Ear: Tympanic membrane, ear canal and external ear normal.      Left Ear: Tympanic membrane, ear canal and external ear normal.      Nose: Nose normal.      Mouth/Throat:      Mouth: Oropharynx is clear and moist.   Eyes:      Extraocular Movements: EOM normal.      Conjunctiva/sclera: Conjunctivae normal.      Pupils: Pupils are equal, round, and reactive to light.   Neck:      Thyroid: No thyromegaly.      Trachea: No tracheal deviation.   Cardiovascular:      Rate and Rhythm: Normal rate and regular rhythm.      Pulses:  Intact distal pulses.      Heart sounds: Normal heart sounds. No murmur heard.  No gallop.    Pulmonary:      Effort: Pulmonary effort is normal.      Breath sounds: Normal breath sounds.   Abdominal:      General: Bowel sounds are normal. There is no distension.      Palpations: Abdomen is soft. There is no mass.      Tenderness: There is no abdominal tenderness. There is no guarding or rebound.      Hernia: There is no hernia in the right inguinal area or left inguinal area.   Genitourinary:     Prostate: Normal.      Rectum: Normal.   Musculoskeletal:         General: Normal range of motion.      Cervical back: Normal range of motion and neck supple.      Right lower leg: No edema.      Left lower leg: No edema.   Skin:     General: Skin is warm and dry.   Neurological:      General: No focal deficit present.      Mental Status: He is alert and oriented to person, place, and time.      Cranial Nerves: No cranial nerve deficit.      Motor: No weakness.      Gait: Gait normal.      Deep Tendon Reflexes: Reflexes are normal and symmetric.   Psychiatric:         Mood and Affect: Mood and affect and mood normal.         Behavior: Behavior normal.         Thought Content: Thought content normal.         Judgment: Judgment normal.           CRANIAL NERVES     CN III, IV, VI   Pupils are equal, round, and reactive to light.  Extraocular motions are normal.        Significant Labs:   covid screen negative   A1C:   Recent Labs   Lab 12/21/21  1255   HGBA1C 7.2*     CBC:   Recent Labs   Lab 12/21/21  0544 12/22/21  0559   WBC 7.85 6.10   HGB 14.7 13.8*   HCT 41.4 40.2    203     CMP:   Recent Labs   Lab 12/21/21  0544 12/22/21  0559    140   K 3.2* 3.7   CL 91* 96   CO2 34* 30*   * 176*   BUN 26* 32*   CREATININE 1.8* 1.6*   CALCIUM 9.1 8.9   PROT 6.4 5.7*   ALBUMIN 3.3* 3.0*   BILITOT 0.6 0.6   ALKPHOS 63 56   AST 19 20   ALT 20 20   ANIONGAP 13 14   EGFRNONAA 35* 41*     Cardiac Markers:   Recent Labs    Lab 12/21/21  0544   BNP 86     Lab Results   Component Value Date    DDIMER 1.10 (H) 12/21/2021     Troponin:   Recent Labs   Lab 12/21/21  0544 12/21/21  1255 12/21/21  1919   TROPONINI 0.012 0.023 0.030*       Significant Imaging:     CXR No active lung disease    EKG Sinus rhythm with Premature supraventricular complexes  Left bundle branch block  Abnormal ECG  When compared with ECG of 29-JUL-2020 05:36,  Premature supraventricular complexes are now Present  Left bundle branch block is now Present  Confirmed by Isaias Callahan MD ([id number]) on 12/21/2021 8:10:21 AM

## 2021-12-22 NOTE — DISCHARGE SUMMARY
[facility]urg ([facility])  [facility] Medicine  Discharge Summary      Patient Name: Nestor Garcia  MRN: [id number]  Patient Class: OP- Observation  Admission Date: 12/21/2021  Hospital Length of Stay: 0 days  Discharge Date and Time:  12/22/2021 10:32 AM  Attending Physician: Thong Godwin MD   Discharging Provider: Jacinta Manzanares NP  Primary Care Provider: Kashmir Conn MD      HPI:   75yo male patient with hx of CHF, DM and HTN. Presented to ER yesterday with chest pain that started at 10pm Monday evening. BP/HR stable. Labs pertinent for H/H 14/41, BUN/Cr 26/1.8, potassium 3.2, CE negative. Recent increase in metolazone per cis. CXR unremarkable. EKG reveals NSR with PVC's and LBBB. [facility] asked to evaluate. Dr Charles rec admission for hydration and replacing K. Holding diuretics. He was given 40KCL po and 10KCL IV in ER with 500ml NS bolus then placed in observation on NS + 20KCL at 40ml./hr. This am creat 1.6/GFR 41 and K+ 3.7      * No surgery found *      Hospital Course:   No notes on file     Goals of Care Treatment Preferences:  Code Status: Full Code      Consults:   Consults (From admission, onward)        Status Ordering Provider     Inpatient consult to Cardiology-[facility]  Once        Provider:  Kevin Charles MD    Acknowledged [facility] [employer]          * Dehydration  Was given 500ml bolus in ER and now on gentle fluids NS + 20meq KCL at 40ml/hr. Creat 1.8>>1.6 GFR 35>>41  The last creat we have on chart is from over 1 year ago was 1.3 with GFR 51    Positive D dimer  U/s lower ext       Hypokalemia  Diuretic induced. Holding diuretics, given 40meq po in ER with 10meq IV   Now on Ns + 20 KCL at 40ml/hr K 3.2>3.7      Chronic diastolic heart failure  Cont metoprolol, holding diuretics  Dr Charles following      Statin intolerance        Type 2 diabetes mellitus without complication        Essential hypertension  Resume amlodipine and doxazosin       Diabetic polyneuropathy associated with type 2 diabetes  "mellitus  On levemir 40 unist at home with 40units aspart with meals. Here has been ordered 15 units levemir and 7 units aspart with meals.  this am. -   Lab Results   Component Value Date    HGBA1C 7.2 (H) 12/21/2021             Final Active Diagnoses:    Diagnosis Date Noted POA    PRINCIPAL PROBLEM:  Dehydration [E86.0] 12/22/2021 Yes    Chronic diastolic heart failure [I50.32] 12/22/2021 Yes    Hypokalemia [E87.6] 12/22/2021 Yes    Positive D dimer [R79.89] 12/22/2021 Yes    Statin intolerance [Z78.9] 06/06/2017 Yes    Essential hypertension [I10] 08/04/2015 Yes    Diabetic polyneuropathy associated with type 2 diabetes mellitus [E11.42] 08/04/2015 Yes      Problems Resolved During this Admission:       Discharged Condition: good    Disposition: Home or Self Care    Follow Up:   Follow-up Information     Kashmir Conn MD In 1 week.    Specialty: Family Medicine  Contact information:  [address] [facility] [id number] [phone]             Juan Mcmahan MD In 1 day.    Specialties: INTERVENTIONAL CARDIOLOGY, Cardiology  Why: bmp 12/23  Contact information:  [address] [facility] [address]  [phone]                       Patient Instructions:   No discharge procedures on file.    Significant Diagnostic Studies:     Pending Diagnostic Studies:     Procedure Component Value Units Date/Time    US Lower Extremity Veins Bilateral [[id number]] Resulted: 12/22/21 1014    Order Status: Sent Lab Status: In process Updated: 12/22/21 1029         Medications:  Reconciled Home Medications:      Medication List      CONTINUE taking these medications    amLODIPine 5 MG tablet  Commonly known as: NORVASC     aspirin 81 MG EC tablet  Commonly known as: ECOTRIN  Take 81 mg by mouth.     BD INSULIN SYRINGE ULTRA-FINE 1/2 mL 31 gauge x 15/64" Syrg  Generic drug: insulin syringe-needle U-100     coenzyme Q10-vitamin E 100-5 mg-unit Cap  Take by mouth.     doxazosin 8 MG Tab  Commonly known as: " CARDURA  every 12 (twelve) hours.     fish oil-omega-3 fatty acids 300-1,000 mg capsule  Take 1 g by mouth once daily.     insulin glargine 100 unit/mL injection  Commonly known as: Lantus  Inject 40 Units into the skin every evening.     metoprolol succinate 25 MG 24 hr tablet  Commonly known as: TOPROL-XL  Take 25 mg by mouth once daily.        STOP taking these medications    b complex vitamins capsule     insulin  unit/mL injection  Commonly known as: HumuLIN N NPH U-100 Insulin     pregabalin 50 MG capsule  Commonly known as: LYRICA     PROBIOTIC-10 (WITH INULIN) 10 billion cell -100 mg Cap  Generic drug: Lactobacill 46-B.animal-inulin     torsemide 20 MG Tab  Commonly known as: DEMADEX     TRADJENTA 5 mg Tab tablet  Generic drug: linaGLIPtin     triamcinolone acetonide 0.1% 0.1 % ointment  Commonly known as: KENALOG            Indwelling Lines/Drains at time of discharge:   Lines/Drains/Airways     None                 Time spent on the discharge of patient: 20 minutes         Jacinta Manzanares NP  Department of Hospital Medicine  [place] [facility] Surg (3rd Fl)

## 2021-12-22 NOTE — H&P
"[facility] ([address] [place] [address] Medicine  History & Physical    Patient Name: Nestor Garcia  MRN: [id number]  Patient Class: OP- Observation  Admission Date: 12/21/2021  Attending Physician: Thong Godwin MD   Primary Care Provider: Kashmir Conn MD         Patient information was obtained from patient, relative(s) and ER records.     Subjective:     Principal Problem:Dehydration    Chief Complaint:   Chief Complaint   Patient presents with    Chest Pain     C/o "chest pressure" and sob that started last night around 10pm; states he has CHF and took lasix last night and thinks that he is dehydrated; EKG at bedside        HPI: 77yo male patient with hx of CHF, DM and HTN. Presented to ER yesterday with chest pain that started at 10pm Monday evening. BP/HR stable. Labs pertinent for H/H 14/41, BUN/Cr 26/1.8, potassium 3.2, CE negative. Recent increase in metolazone per cis. CXR unremarkable. EKG reveals NSR with PVC's and LBBB. [facility] asked to evaluate. Dr Charles rec admission for hydration and replacing K. Holding diuretics. He was given 40KCL po and 10KCL IV in ER with 500ml NS bolus then placed in observation on NS + 20KCL at 40ml./hr. This am creat 1.6/GFR 41 and K+ 3.7      Past Medical History:   Diagnosis Date    CHF (congestive heart failure)     Diabetes mellitus type II     Hypertension        Past Surgical History:   Procedure Laterality Date    APPENDECTOMY      BACK SURGERY      CHOLECYSTECTOMY      INNER EAR SURGERY      KNEE SURGERY         Review of patient's allergies indicates:   Allergen Reactions    Hydralazine analogues Itching       No current facility-administered medications on file prior to encounter.     Current Outpatient Medications on File Prior to Encounter   Medication Sig    amLODIPine (NORVASC) 5 MG tablet     aspirin (ECOTRIN) 81 MG EC tablet Take 81 mg by mouth.    coenzyme Q10-vitamin E 100-5 mg-unit Cap Take by mouth.    doxazosin (CARDURA) 8 MG Tab every 12 " "(twelve) hours.    fish oil-omega-3 fatty acids 300-1,000 mg capsule Take 1 g by mouth once daily.    metoprolol succinate (TOPROL-XL) 25 MG 24 hr tablet Take 25 mg by mouth once daily.    [DISCONTINUED] insulin glargine (LANTUS) 100 unit/mL injection Inject 45 Units into the skin every evening. (Patient taking differently: Inject 40 Units into the skin every evening.)    [DISCONTINUED] Lactobacill 46-B.animal-inulin (PROBIOTIC-10, WITH INULIN,) 10 billion cell -100 mg Cap     [DISCONTINUED] torsemide (DEMADEX) 20 MG Tab 40 mg 2 (two) times a day.    BD INSULIN SYRINGE ULTRA-FINE 1/2 mL 31 gauge x 15/64" Syrg     [DISCONTINUED] insulin NPH (HUMULIN N NPH U-100 INSULIN) 100 unit/mL injection Inject 50 Units into the skin every evening. (Patient taking differently: Inject 40 Units into the skin every evening.)    [DISCONTINUED] triamcinolone acetonide 0.1% (KENALOG) 0.1 % ointment Apply topically 2 (two) times daily. (Patient taking differently: Apply topically as needed.)     Family History     Problem Relation (Age of Onset)    Alzheimer's disease Mother    Heart disease Father        Tobacco Use    Smoking status: Never Smoker    Smokeless tobacco: Never Used   Substance and Sexual Activity    Alcohol use: No    Drug use: No    Sexual activity: Not on file     Review of Systems   Constitutional: Negative for activity change, chills, fatigue, fever and unexpected weight change.   HENT: Negative for sore throat and trouble swallowing.    Respiratory: Negative for cough, chest tightness and shortness of breath.    Cardiovascular: Negative for chest pain and leg swelling.   Gastrointestinal: Negative for abdominal pain.   Endocrine: Negative for cold intolerance and heat intolerance.   Genitourinary: Negative for difficulty urinating.   Musculoskeletal: Negative for back pain and joint swelling.   Skin: Negative for rash.   Neurological: Negative for numbness.   Hematological: Negative for adenopathy. "   Psychiatric/Behavioral: Negative for decreased concentration.     Objective:     Vital Signs (Most Recent):  Temp: 97 °F (36.1 °C) (12/22/21 0802)  Pulse: 71 (12/22/21 1000)  Resp: 18 (12/22/21 0802)  BP: (!) 166/74 (12/22/21 0802)  SpO2: (!) 91 % (12/22/21 0802) Vital Signs (24h Range):  Temp:  [97 °F (36.1 °C)-98.5 °F (36.9 °C)] 97 °F (36.1 °C)  Pulse:  [58-91] 71  Resp:  [18-19] 18  SpO2:  [91 %-94 %] 91 %  BP: (118-169)/(54-74) 166/74     Weight: 112.3 kg (247 lb 9.2 oz)  Body mass index is 35.52 kg/m².    Physical Exam  Constitutional:       Appearance: He is well-developed and well-nourished. He is obese.   HENT:      Head: Normocephalic and atraumatic.      Right Ear: Tympanic membrane, ear canal and external ear normal.      Left Ear: Tympanic membrane, ear canal and external ear normal.      Nose: Nose normal.      Mouth/Throat:      Mouth: Oropharynx is clear and moist.   Eyes:      Extraocular Movements: EOM normal.      Conjunctiva/sclera: Conjunctivae normal.      Pupils: Pupils are equal, round, and reactive to light.   Neck:      Thyroid: No thyromegaly.      Trachea: No tracheal deviation.   Cardiovascular:      Rate and Rhythm: Normal rate and regular rhythm.      Pulses: Intact distal pulses.      Heart sounds: Normal heart sounds. No murmur heard.  No gallop.    Pulmonary:      Effort: Pulmonary effort is normal.      Breath sounds: Normal breath sounds.   Abdominal:      General: Bowel sounds are normal. There is no distension.      Palpations: Abdomen is soft. There is no mass.      Tenderness: There is no abdominal tenderness. There is no guarding or rebound.      Hernia: There is no hernia in the right inguinal area or left inguinal area.   Genitourinary:     Prostate: Normal.      Rectum: Normal.   Musculoskeletal:         General: Normal range of motion.      Cervical back: Normal range of motion and neck supple.      Right lower leg: No edema.      Left lower leg: No edema.   Skin:      General: Skin is warm and dry.   Neurological:      General: No focal deficit present.      Mental Status: He is alert and oriented to person, place, and time.      Cranial Nerves: No cranial nerve deficit.      Motor: No weakness.      Gait: Gait normal.      Deep Tendon Reflexes: Reflexes are normal and symmetric.   Psychiatric:         Mood and Affect: Mood and affect and mood normal.         Behavior: Behavior normal.         Thought Content: Thought content normal.         Judgment: Judgment normal.           CRANIAL NERVES     CN III, IV, VI   Pupils are equal, round, and reactive to light.  Extraocular motions are normal.        Significant Labs:   covid screen negative   A1C:   Recent Labs   Lab 12/21/21  1255   HGBA1C 7.2*     CBC:   Recent Labs   Lab 12/21/21  0544 12/22/21  0559   WBC 7.85 6.10   HGB 14.7 13.8*   HCT 41.4 40.2    203     CMP:   Recent Labs   Lab 12/21/21  0544 12/22/21  0559    140   K 3.2* 3.7   CL 91* 96   CO2 34* 30*   * 176*   BUN 26* 32*   CREATININE 1.8* 1.6*   CALCIUM 9.1 8.9   PROT 6.4 5.7*   ALBUMIN 3.3* 3.0*   BILITOT 0.6 0.6   ALKPHOS 63 56   AST 19 20   ALT 20 20   ANIONGAP 13 14   EGFRNONAA 35* 41*     Cardiac Markers:   Recent Labs   Lab 12/21/21  0544   BNP 86     Lab Results   Component Value Date    DDIMER 1.10 (H) 12/21/2021     Troponin:   Recent Labs   Lab 12/21/21  0544 12/21/21  1255 12/21/21  1919   TROPONINI 0.012 0.023 0.030*       Significant Imaging:     CXR No active lung disease    EKG Sinus rhythm with Premature supraventricular complexes  Left bundle branch block  Abnormal ECG  When compared with ECG of 29-JUL-2020 05:36,  Premature supraventricular complexes are now Present  Left bundle branch block is now Present  Confirmed by Isaias Callahan MD ([id number]) on 12/21/2021 8:10:21 AM    Assessment/Plan:     * Dehydration  Was given 500ml bolus in ER and now on gentle fluids NS + 20meq KCL at 40ml/hr. Creat 1.8>>1.6 GFR 35>>41  The last creat we  have on chart is from over 1 year ago was 1.3 with GFR 51    Positive D dimer  U/s lower ext.  Doubt PE.      Hypokalemia  Diuretic induced. Holding diuretics, given 40meq po in ER with 10meq IV   Now on Ns + 20 KCL at 40ml/hr K 3.2>3.7  Get out patient BMP in am      Chronic diastolic heart failure  Cont metoprolol, holding diuretics  Dr Charles following  D/C home with close follow up      Statin intolerance  Avoid statins      Type 2 diabetes mellitus without complication        Essential hypertension  Resume amlodipine and doxazosin       Diabetic polyneuropathy associated with type 2 diabetes mellitus  On levemir 40 unist at home with 40units aspart with meals. Here has been ordered 15 units levemir and 7 units aspart with meals.  this am. -   Lab Results   Component Value Date    HGBA1C 7.2 (H) 12/21/2021             VTE Risk Mitigation (From admission, onward)         Ordered     IP VTE HIGH RISK PATIENT  Once         12/21/21 0949     Place sequential compression device  Until discontinued         12/21/21 0949                   Thong Godwin MD  Department of Hospital Medicine   [place] [facility] (3rd Fl)

## 2021-12-22 NOTE — ASSESSMENT & PLAN NOTE
On levemir 40 unist at home with 40units aspart with meals. Here has been ordered 15 units levemir and 7 units aspart with meals.  this am. -   Lab Results   Component Value Date    HGBA1C 7.2 (H) 12/21/2021

## 2021-12-22 NOTE — ASSESSMENT & PLAN NOTE
Diuretic induced. Holding diuretics, given 40meq po in ER with 10meq IV   Now on Ns + 20 KCL at 40ml/hr K 3.2>3.7  Get out patient BMP in am

## 2021-12-22 NOTE — ASSESSMENT & PLAN NOTE
Diuretic induced. Holding diuretics, given 40meq po in ER with 10meq IV   Now on Ns + 20 KCL at 40ml/hr K 3.2>3.7

## 2021-12-22 NOTE — PLAN OF CARE
CM spoke with patient's daughter at bedside. CM educated her on Dx and treatment while here. CM also educated patient on treatment at home, and what SS to look for that would require patient to come back to the ED. Daughter verbalized understanding.       Patient set to discharge today.

## 2021-12-22 NOTE — DISCHARGE INSTRUCTIONS
Patient Education       Hypokalemia Discharge Instructions   About this topic   A low level of potassium in the blood is hypokalemia. Potassium is a mineral that is needed for your heart and muscle function in your body. It helps your body:  · Keep blood pressure normal  · Keep muscles, like the heart, working the right way  · Stop too much calcium from being lost through your urine  · Control the normal flow of nutrients between body fluids and cells  Your kidneys normally get rid of extra potassium. If they are not working well enough, the level of potassium in your blood will be too low. Some health problems or their treatment can cause problems with your potassium level. These include:  · Kidney failure  · Certain drugs or supplements  · Serious illnesses like diabetes, some syndromes, or too much hormones  · Too much throwing up, having loose stools, or passing too much urine  · Sweating too much  · Bleeding in the stomach  People who have a low blood potassium may have some signs or they may have none at all. You may notice:  · Muscle pain or weakness  · Not able to have a bowel movement  · Heart that beats in an unusual pattern  · Tiredness  · Paralysis that may affect the lungs  Your doctor will work to treat any hidden cause. You may need to have drugs help raise the potassium in your blood. You may be in the hospital until your levels are back to normal. You may be hooked to a heart monitor until your levels go back to a safe normal.  What care is needed at home?   · Ask your doctor what you need to do when you go home. Make sure you ask questions if you do not understand what the doctor says. This way you will know what you need to do.  · Your doctor may tell you to stop taking drugs that might be causing your low potassium levels. Do not stop taking drugs on your own.  · Stay in a cool room to avoid too much sweating.  · Drink plenty of fluids when you are thirsty or as directed by your doctor.  · Drink  sports drinks that have potassium after a heavy workout.  · Your doctor will talk to you about other care needed for the cause of your low potassium. Follow your doctor's orders with care.  What follow-up care is needed?   Your doctor may ask you to make visits to the office to check on your progress. Be sure to keep these visits. You may need another blood test to check the potassium level.  What drugs may be needed?   Your doctor may order drugs to raise the amount of potassium in your blood. Take them as ordered. You may be able to take pills or liquid medicine by mouth to raise your potassium level. But if it is too low, your doctor may need to give you medicine through a tube into your vein, an IV.  Will physical activity be limited?   Rest for the first few days. Avoid activities like heavy lifting and hard exercise.  What changes to diet are needed?   Talk to your doctor or dietitian about your own diet plan. Ask if you need to eat more of foods high in potassium. Some of them include bananas, Brazil nuts, broccoli, papaya, peanuts, potato skin, raisins, tomatoes, and sports drinks.  When do I need to call the doctor?   · Throwing up  · Feeling tired  · Loose stools  · Abnormal heartbeat  · Trouble breathing  · Chest pain  · Cramping or twitching of the muscles  · Weakness  · Paralysis  · Confusion  Teach Back: Helping You Understand   The Teach Back Method helps you understand the information we are giving you. After you talk with the staff, tell them in your own words what you learned. This helps to make sure the staff has described each thing clearly. It also helps to explain things that may have been confusing. Before going home, make sure you can do these:  · I can tell you about my condition.  · I can tell you what I will do to keep my potassium level up.  · I can tell you what I will do if I have cramping or twitching of my muscles.  Last Reviewed Date   2021-09-13  Consumer Information Use and  Disclaimer   This information is not specific medical advice and does not replace information you receive from your health care provider. This is only a brief summary of general information. It does NOT include all information about conditions, illnesses, injuries, tests, procedures, treatments, therapies, discharge instructions or life-style choices that may apply to you. You must talk with your health care provider for complete information about your health and treatment options. This information should not be used to decide whether or not to accept your health care providers advice, instructions or recommendations. Only your health care provider has the knowledge and training to provide advice that is right for you.  Copyright   Copyright © 2021 UpToDate, Inc. and its affiliates and/or licensors. All rights reserved.    Patient Education       Dehydration Discharge Instructions, Adult   About this topic   Dehydration happens when you lose too much water and salt. This can happen when you throw up too much or have too many loose stools. Sweating too much or passing too much urine can also cause this problem. Your body needs a certain amount of fluid to work normally. You may lose more water than you take by eating and drinking. If you cannot make up for these losses by drinking more, the doctors will need to replace the fluids you have lost.  What care is needed at home?   · Ask your doctor what you need to do when you go home. Make sure you ask questions if you do not understand what the doctor says.  · Drink small amounts of fluid every 15 to 30 minutes. Good fluids to drink are water, broth, sports drinks, and oral electrolyte solutions. It is also important to eat if you are able to keep food down.  · Avoid beer, wine, and mixed drinks.  · Check your blood sugar more often if you have high blood sugar.  · If you are throwing up, try to drink if you can until you are able to eat small amounts of food.  · If  you cannot keep fluids down, suck on ice chips.  · If you have loose stools, try to drink extra fluids to replace the water your body has lost.  · If you are breastfeeding, keep feeding your baby as you normally would.  What follow-up care is needed?   Some more tests may be needed based on your condition. Your doctor may ask you to make visits to the office to check on your progress. Be sure to keep these visits.  What drugs may be needed?   The doctor may order drugs based on your condition. Take your drugs as ordered.  Will physical activity be limited?   You may feel weak if you are still dehydrated. It is best to rest until you fully recover.  What changes to diet are needed?   If you have a heart or kidney problem and you are dehydrated, ask your doctor about how much liquid you can drink each day.  What can be done to prevent this health problem?   · Drink 6 to 8 glasses of liquid throughout the day, not just when you feel thirsty. Avoid drinks with caffeine like coffee or soda pop. These may make you pass urine more often.  · Eat foods high in water content such as fruits and vegetables.  · When you work out or play sports, drink water 30 minutes before starting. Drink small amounts of water during workouts and keep drinking liquids after working out.  · Drink more fluids when the weather is hot. Try to stay out of the heat if possible.  · If you are breastfeeding, you need to drink more fluids.  When do I need to call the doctor?   · You feel very weak; like you cant stand up; and your skin is cool, clammy, or looks blue or gray.  · You have severe abdominal pain.  · You have chest pain or trouble breathing.  · You have signs of severe fluid loss, such as:  ? No urine for more than 8 hours.  ? You feel very light-headed or like you are going to pass out.  ? You feel weak like you are going to fall.  · You are not able to keep any fluids down.  · You develop early signs of fluid loss again, such as:  ? Your  urine is very dark-colored.  ? Your mouth is dry.  ? You have muscle cramps.  ? You have a lack of energy.  ? You feel light-headed when you get up.     Teach Back: Helping You Understand   The Teach Back Method helps you understand the information we are giving you. After you talk with the staff, tell them in your own words what you learned. This helps to make sure the staff has described each thing clearly. It also helps to explain things that may have been confusing. Before going home, make sure you can do these:  · I can tell you about my condition.  · I can tell you how often I should try to drink fluids and good kinds of fluids to drink.  · I can tell you what I will do if I have trouble keeping fluids down.  Where can I learn more?   NHS Choices  http://www.nhs.uk/conditions/dehydration/Pages/Introduction.aspx   Last Reviewed Date   2021-06-07  Consumer Information Use and Disclaimer   This information is not specific medical advice and does not replace information you receive from your health care provider. This is only a brief summary of general information. It does NOT include all information about conditions, illnesses, injuries, tests, procedures, treatments, therapies, discharge instructions or life-style choices that may apply to you. You must talk with your health care provider for complete information about your health and treatment options. This information should not be used to decide whether or not to accept your health care providers advice, instructions or recommendations. Only your health care provider has the knowledge and training to provide advice that is right for you.  Copyright   Copyright © 2021 UpToDate, Inc. and its affiliates and/or licensors. All rights reserved.

## 2021-12-22 NOTE — PLAN OF CARE
[facility] - Med Surg (3rd Fl)  Initial Discharge Assessment       Primary Care Provider: Kashmir Conn MD    Admission Diagnosis: Hypokalemia [E87.6]  Cardiovascular disease [I25.10]  KRISTI (acute kidney injury) [N17.9]  Chest pain [R07.9]    Admission Date: 12/21/2021  Expected Discharge Date:     Discharge Barriers Identified: None    Payor: [employer] MANAGED MEDICARE / Plan: [employer] MEDICARE HMO / Product Type: Capitation /     Extended Emergency Contact Information  Primary Emergency Contact: Dena Palomino   [facility]  Home Phone: [phone]  Mobile Phone: [phone]  Relation: Daughter  Secondary Emergency Contact: DARBY HWANG  Mobile Phone: [phone]  Relation: Daughter    Discharge Plan A: Home  Discharge Plan B: Home with family      [facility] - [place], [place] - [address]  [address]  [place] [address]  Phone: [phone] Fax: [phone]      Initial Assessment (most recent)     Adult Discharge Assessment - 12/22/21 1029        Discharge Assessment    Assessment Type Discharge Planning Assessment     Confirmed/corrected address, phone number and insurance Yes     Confirmed Demographics Correct on Facesheet     Source of Information patient     Communicated LESLIE with patient/caregiver Yes     Reason For Admission Hypokalemia     Lives With alone     Facility Arrived From: Home     Do you expect to return to your current living situation? Yes     Do you have help at home or someone to help you manage your care at home? Yes     Who are your caregiver(s) and their phone number(s)? Dena Palomino (Daughter) [phone]     Prior to hospitilization cognitive status: Alert/Oriented     Current cognitive status: Alert/Oriented     Walking or Climbing Stairs Difficulty none     Dressing/Bathing Difficulty none     Equipment Currently Used at Home glucometer;CPAP   CPAP not in use    Readmission within 30 days? No     Patient currently being followed by outpatient case  management? No     Do you currently have service(s) that help you manage your care at home? No     Do you take prescription medications? Yes     Do you have prescription coverage? Yes     Coverage [employer]     Do you have any problems affording any of your prescribed medications? No     How do you get to doctors appointments? car, drives self     Are you on dialysis? No     Do you take coumadin? No     Discharge Plan A Home     Discharge Plan B Home with family     DME Needed Upon Discharge  none     Discharge Plan discussed with: Adult children     Discharge Barriers Identified None                      Discharge assessment completed with patient's daughter as patient was in Radiology getting an ultrasound. Denies any post-acute care needs at this time. SW to remain available.

## 2021-12-22 NOTE — SUBJECTIVE & OBJECTIVE
"Past Medical History:   Diagnosis Date    CHF (congestive heart failure)     Diabetes mellitus type II     Hypertension        Past Surgical History:   Procedure Laterality Date    APPENDECTOMY      BACK SURGERY      CHOLECYSTECTOMY      INNER EAR SURGERY      KNEE SURGERY         Review of patient's allergies indicates:   Allergen Reactions    Hydralazine analogues Itching       No current facility-administered medications on file prior to encounter.     Current Outpatient Medications on File Prior to Encounter   Medication Sig    amLODIPine (NORVASC) 5 MG tablet     aspirin (ECOTRIN) 81 MG EC tablet Take 81 mg by mouth.    coenzyme Q10-vitamin E 100-5 mg-unit Cap Take by mouth.    doxazosin (CARDURA) 8 MG Tab every 12 (twelve) hours.    fish oil-omega-3 fatty acids 300-1,000 mg capsule Take 1 g by mouth once daily.    insulin glargine (LANTUS) 100 unit/mL injection Inject 45 Units into the skin every evening. (Patient taking differently: Inject 40 Units into the skin every evening.)    Lactobacill 46-B.animal-inulin (PROBIOTIC-10, WITH INULIN,) 10 billion cell -100 mg Cap     metoprolol succinate (TOPROL-XL) 25 MG 24 hr tablet Take 25 mg by mouth once daily.    torsemide (DEMADEX) 20 MG Tab 40 mg 2 (two) times a day.    BD INSULIN SYRINGE ULTRA-FINE 1/2 mL 31 gauge x 15/64" Syrg     insulin NPH (HUMULIN N NPH U-100 INSULIN) 100 unit/mL injection Inject 50 Units into the skin every evening. (Patient taking differently: Inject 40 Units into the skin every evening.)    triamcinolone acetonide 0.1% (KENALOG) 0.1 % ointment Apply topically 2 (two) times daily. (Patient taking differently: Apply topically as needed.)     Family History     Problem Relation (Age of Onset)    Alzheimer's disease Mother    Heart disease Father        Tobacco Use    Smoking status: Never Smoker    Smokeless tobacco: Never Used   Substance and Sexual Activity    Alcohol use: No    Drug use: No    Sexual activity: " Not on file     Review of Systems  Objective:     Vital Signs (Most Recent):  Temp: 97.8 °F (36.6 °C) (12/22/21 0334)  Pulse: 88 (12/22/21 0600)  Resp: 19 (12/22/21 0334)  BP: (!) 169/72 (12/22/21 0334)  SpO2: (!) 93 % (12/22/21 0334) Vital Signs (24h Range):  Temp:  [97.4 °F (36.3 °C)-98.5 °F (36.9 °C)] 97.8 °F (36.6 °C)  Pulse:  [58-91] 88  Resp:  [14-19] 19  SpO2:  [92 %-97 %] 93 %  BP: (118-169)/(54-72) 169/72     Weight: 112.3 kg (247 lb 9.2 oz)  Body mass index is 35.52 kg/m².    Physical Exam        Significant Labs:   covid screen negative   A1C:   Recent Labs   Lab 12/21/21  1255   HGBA1C 7.2*     CBC:   Recent Labs   Lab 12/21/21  0544 12/22/21  0559   WBC 7.85 6.10   HGB 14.7 13.8*   HCT 41.4 40.2    203     CMP:   Recent Labs   Lab 12/21/21  0544 12/22/21  0559    140   K 3.2* 3.7   CL 91* 96   CO2 34* 30*   * 176*   BUN 26* 32*   CREATININE 1.8* 1.6*   CALCIUM 9.1 8.9   PROT 6.4 5.7*   ALBUMIN 3.3* 3.0*   BILITOT 0.6 0.6   ALKPHOS 63 56   AST 19 20   ALT 20 20   ANIONGAP 13 14   EGFRNONAA 35* 41*     Cardiac Markers:   Recent Labs   Lab 12/21/21  0544   BNP 86     Lab Results   Component Value Date    DDIMER 1.10 (H) 12/21/2021     Troponin:   Recent Labs   Lab 12/21/21  0544 12/21/21  1255 12/21/21  1919   TROPONINI 0.012 0.023 0.030*       Significant Imaging:     CXR No active lung disease    EKG Sinus rhythm with Premature supraventricular complexes  Left bundle branch block  Abnormal ECG  When compared with ECG of 29-JUL-2020 05:36,  Premature supraventricular complexes are now Present  Left bundle branch block is now Present  Confirmed by Isaias Callahan MD ([id number]) on 12/21/2021 8:10:21 AM

## 2021-12-22 NOTE — PLAN OF CARE
12/22/21 1106   OLMEDO Message   Medicare Outpatient and Observation Notification regarding financial responsibility Given to patient/caregiver;Explained to patient/caregiver;Signed/date by patient/caregiver   Date OLMEDO was signed 12/22/21   Time OLMEDO was signed 1032     Completed with patient daughter at bedside.

## 2021-12-22 NOTE — PLAN OF CARE
[facility] - Med Surg (3rd Fl)  Discharge Final Note    Primary Care Provider: Kashmir Conn MD    Expected Discharge Date: 12/22/2021    Final Discharge Note (most recent)     Final Note - 12/22/21 1047        Final Note    Assessment Type Final Discharge Note     Anticipated Discharge Disposition Home or Self Care     What phone number can be called within the next 1-3 days to see how you are doing after discharge? [phone]     Hospital Resources/Appts/Education Provided Appointments scheduled and added to AVS        Post-Acute Status    Post-Acute Authorization Other     Other Status No Post-Acute Service Needs     Discharge Delays None known at this time                 Important Message from Medicare             Contact Info     Kashmir Conn MD   Specialty: Family Medicine   Relationship: PCP - General    [address] [facility] [address]   Phone: [phone]       Next Steps: Follow up in 1 week(s)    Juan Mcmahan MD   Specialty: INTERVENTIONAL CARDIOLOGY, Cardiology   Relationship: Consulting Physician    [address] [facility] [address]   Phone: [phone]       Next Steps: Follow up in 1 day(s)    Instructions: bmp 12/23

## 2021-12-22 NOTE — PROGRESS NOTES
[place] - [facility] Surg ([facility])  Cardiology  Progress Note    Patient Name: Nestor Garcia  MRN: [id number]  Admission Date: 12/21/2021  Hospital Length of Stay: 0 days  Code Status: Full Code   Attending Physician: Katey Polanco MD   Primary Care Physician: Kashmir Conn MD  Expected Discharge Date:   Principal Problem:Dehydration    Subjective:     Hospital Course: Pt is a 76 year old male with a PMH of chronic diastolic CHF, T2DM and HTN who presents to the ER with complaints of CP. BP/HR stable. Labs pertinent for H/H 14/41, BUN/Cr 26/1.8, potassium 3.2, CE negative. Recent increase in metolazone per cardiologist. CXR unremarkable. EKG reveals NSR with PVC's and LBBB. CIS asked to evaluate.      Interval History: Renal function has mildly improved with BUN/Cr 32/1.6.    ROS   Constitutional : Negative  EENT : Negative  CV : Negative  Respiratory : Negative  Gastrointestinal: Negative   Genitourinary: Negative  Musculoskeletal: Negative  Skin : Negative  Neurological : AAO x 3     Objective:     Vital Signs (Most Recent):  Temp: 97.8 °F (36.6 °C) (12/22/21 0334)  Pulse: 65 (12/22/21 0802)  Resp: 18 (12/22/21 0802)  BP: (!) 166/74 (12/22/21 0802)  SpO2: (!) 91 % (12/22/21 0802) Vital Signs (24h Range):  Temp:  [97.4 °F (36.3 °C)-98.5 °F (36.9 °C)] 97.8 °F (36.6 °C)  Pulse:  [58-91] 65  Resp:  [14-19] 18  SpO2:  [91 %-97 %] 91 %  BP: (118-169)/(54-74) 166/74     Weight: 112.3 kg (247 lb 9.2 oz)  Body mass index is 35.52 kg/m².    SpO2: (!) 91 %  O2 Device (Oxygen Therapy): room air      Intake/Output Summary (Last 24 hours) at 12/22/2021 0812  Last data filed at 12/21/2021 1700  Gross per 24 hour   Intake 1339.81 ml   Output --   Net 1339.81 ml       Lines/Drains/Airways     Peripheral Intravenous Line                 Peripheral IV - Single Lumen 12/21/21 0548 20 G Right Antecubital 1 day                Physical Exam     General appearance: alert, appears stated age and cooperative  Head: Normocephalic, without obvious  abnormality, atraumatic  Eyes: conjunctivae/corneas clear. PERRL  Neck: no carotid bruit, no JVD and supple, symmetrical, trachea midline  Lungs: clear to auscultation bilaterally, normal respiratory effort  Chest Wall: no tenderness  Heart: regular rate and rhythm, S1, S2 normal, no murmur, click, rub or gallop  Abdomen: soft, non-tender; bowel sounds normal; no masses,  no organomegaly  Extremities: Extremities normal, atraumatic, no cyanosis, clubbing, or edema  Pulses: Dorsalis Pedis R: 2+ (normal)/L: 2+ (normal)  Skin: Skin color, texture, turgor normal. No rashes or lesions  Neurologic: Normal mood and affect  Alert and oriented X 3      Significant Labs:   BMP:   Recent Labs   Lab 12/21/21  0544 12/22/21  0559   * 176*    140   K 3.2* 3.7   CL 91* 96   CO2 34* 30*   BUN 26* 32*   CREATININE 1.8* 1.6*   CALCIUM 9.1 8.9   , CMP   Recent Labs   Lab 12/21/21  0544 12/22/21  0559    140   K 3.2* 3.7   CL 91* 96   CO2 34* 30*   * 176*   BUN 26* 32*   CREATININE 1.8* 1.6*   CALCIUM 9.1 8.9   PROT 6.4 5.7*   ALBUMIN 3.3* 3.0*   BILITOT 0.6 0.6   ALKPHOS 63 56   AST 19 20   ALT 20 20   ANIONGAP 13 14   ESTGFRAFRICA 41* 47*   EGFRNONAA 35* 41*   , CBC   Recent Labs   Lab 12/21/21  0544 12/21/21  0544 12/22/21  0559   WBC 7.85  --  6.10   HGB 14.7  --  13.8*   HCT 41.4   < > 40.2     --  203    < > = values in this interval not displayed.    and Troponin   Recent Labs   Lab 12/21/21  0544 12/21/21  1255 12/21/21  1919   TROPONINI 0.012 0.023 0.030*       Significant Imaging: X-Ray: CXR: X-Ray Chest 1 View (CXR):   Results for orders placed or performed during the hospital encounter of 12/21/21   X-Ray Chest 1 View    Narrative    EXAMINATION:  XR CHEST 1 VIEW    CLINICAL HISTORY:  CP;.    TECHNIQUE:  Portable chest dated December 21, 2021.    COMPARISON:  July 29, 2020.    FINDINGS:  The cardiac silhouette is within normal limits considering portable technique.  Atherosclerotic changes  of the aorta.  No focal infiltrate or effusion.  Degenerative changes of the spine.      Impression    No active lung disease.      Electronically signed by: Fran Jara MD  Date:    12/21/2021  Time:    07:47     Assessment and Plan:       Active Diagnoses:    Diagnosis Date Noted POA    PRINCIPAL PROBLEM:  Dehydration [E86.0] 12/22/2021 Yes    Chronic diastolic heart failure [I50.32] 12/22/2021 Yes    Hypokalemia [E87.6] 12/22/2021 Yes    Positive D dimer [R79.89] 12/22/2021 Yes    Statin intolerance [Z78.9] 06/06/2017 Yes    Essential hypertension [I10] 08/04/2015 Yes    Diabetic polyneuropathy associated with type 2 diabetes mellitus [E11.42] 08/04/2015 Yes      Problems Resolved During this Admission:       VTE Risk Mitigation (From admission, onward)         Ordered     IP VTE HIGH RISK PATIENT  Once         12/21/21 0949     Place sequential compression device  Until discontinued         12/21/21 0949              Current Facility-Administered Medications   Medication    0.9 % NaCl with KCl 20 mEq infusion    acetaminophen tablet 650 mg    amLODIPine tablet 5 mg    aspirin EC tablet 81 mg    dextrose 50% injection 12.5 g    dextrose 50% injection 25 g    doxazosin tablet 8 mg    glucagon (human recombinant) injection 1 mg    glucose chewable tablet 16 g    glucose chewable tablet 24 g    insulin aspart U-100 pen 0-5 Units    insulin aspart U-100 pen 7 Units    insulin detemir U-100 pen 15 Units    melatonin tablet 6 mg    metoprolol succinate (TOPROL-XL) 24 hr tablet 25 mg    ondansetron injection 4 mg    sodium chloride 0.9% flush 10 mL     DX: Weakness and cramps due to acute kidney injury and hypokalemia from recent use of Metolazone. Feels Much better today.     CP denied by pt- CE negative, EKG without acute ischemic changes. MPI December 2019 normal     Chronic diastolic CHF metolazone recently added given worsening LE edema. Echo December 2020 LVEF 50-55%, grade I DD,  hypokinesis of basal inferior wall segments, moderate pulmonary hypertension      HTN     T2DM     Carotid stenosis mild      Plan:continue to hydrate and replace K  DC Metolazone and hold torsemide  Restart home BP Meds amlodipine, asa,  Doxazosin and Metoprolol  May manage as outpt with close BMP f/u         Telma Aleman NP for Dr. Charles  Cardiology  [place] - [facility] Surg (3rd Fl)    I attest that I have personally seen and examined this patient. I have reviewed and discussed the management in detail as outlined above.

## 2021-12-29 ENCOUNTER — HOSPITAL ENCOUNTER (EMERGENCY)
Facility: HOSPITAL | Age: 76
Discharge: HOME | End: 2021-12-29
Attending: SURGERY
Payer: MEDICARE

## 2021-12-29 VITALS
OXYGEN SATURATION: 96 % | BODY MASS INDEX: 36.3 KG/M2 | SYSTOLIC BLOOD PRESSURE: 185 MMHG | RESPIRATION RATE: 19 BRPM | HEART RATE: 55 BPM | WEIGHT: 253 LBS | TEMPERATURE: 98 F | DIASTOLIC BLOOD PRESSURE: 77 MMHG

## 2021-12-29 DIAGNOSIS — I50.43 ACUTE ON CHRONIC COMBINED SYSTOLIC AND DIASTOLIC CONGESTIVE HEART FAILURE: ICD-10-CM

## 2021-12-29 DIAGNOSIS — R06.02 SOB (SHORTNESS OF BREATH): Primary | ICD-10-CM

## 2021-12-29 LAB
ALBUMIN SERPL BCP-MCNC: 3 G/DL (ref 3.5–5.2)
ALLENS TEST: ABNORMAL
ALP SERPL-CCNC: 57 U/L (ref 55–135)
ALT SERPL W/O P-5'-P-CCNC: 20 U/L (ref 10–44)
ANION GAP SERPL CALC-SCNC: 9 MMOL/L (ref 8–16)
AST SERPL-CCNC: 15 U/L (ref 10–40)
BASOPHILS # BLD AUTO: 0.05 K/UL (ref 0–0.2)
BASOPHILS NFR BLD: 0.8 % (ref 0–1.9)
BILIRUB SERPL-MCNC: 0.2 MG/DL (ref 0.1–1)
BNP SERPL-MCNC: 87 PG/ML (ref 0–99)
BUN SERPL-MCNC: 18 MG/DL (ref 8–23)
CALCIUM SERPL-MCNC: 8.3 MG/DL (ref 8.7–10.5)
CHLORIDE SERPL-SCNC: 103 MMOL/L (ref 95–110)
CK MB SERPL-MCNC: 2.1 NG/ML (ref 0.1–6.5)
CK MB SERPL-RTO: 3.1 % (ref 0–5)
CK SERPL-CCNC: 67 U/L (ref 20–200)
CK SERPL-CCNC: 67 U/L (ref 20–200)
CO2 SERPL-SCNC: 26 MMOL/L (ref 23–29)
CREAT SERPL-MCNC: 1.3 MG/DL (ref 0.5–1.4)
D DIMER PPP IA.FEU-MCNC: 1.2 MG/L FEU
DELSYS: ABNORMAL
DIFFERENTIAL METHOD: ABNORMAL
EOSINOPHIL # BLD AUTO: 0.2 K/UL (ref 0–0.5)
EOSINOPHIL NFR BLD: 3 % (ref 0–8)
ERYTHROCYTE [DISTWIDTH] IN BLOOD BY AUTOMATED COUNT: 13.2 % (ref 11.5–14.5)
EST. GFR  (AFRICAN AMERICAN): >60 ML/MIN/1.73 M^2
EST. GFR  (NON AFRICAN AMERICAN): 52 ML/MIN/1.73 M^2
GLUCOSE SERPL-MCNC: 147 MG/DL (ref 70–110)
HCO3 UR-SCNC: 29.2 MMOL/L (ref 22–26)
HCT VFR BLD AUTO: 37.2 % (ref 40–54)
HGB BLD-MCNC: 12.9 G/DL (ref 14–18)
IMM GRANULOCYTES # BLD AUTO: 0.04 K/UL (ref 0–0.04)
IMM GRANULOCYTES NFR BLD AUTO: 0.6 % (ref 0–0.5)
LYMPHOCYTES # BLD AUTO: 1.4 K/UL (ref 1–4.8)
LYMPHOCYTES NFR BLD: 20.9 % (ref 18–48)
MCH RBC QN AUTO: 31.2 PG (ref 27–31)
MCHC RBC AUTO-ENTMCNC: 34.7 G/DL (ref 32–36)
MCV RBC AUTO: 90 FL (ref 82–98)
MONOCYTES # BLD AUTO: 0.8 K/UL (ref 0.3–1)
MONOCYTES NFR BLD: 12.7 % (ref 4–15)
NEUTROPHILS # BLD AUTO: 4.1 K/UL (ref 1.8–7.7)
NEUTROPHILS NFR BLD: 62 % (ref 38–73)
NRBC BLD-RTO: 0 /100 WBC
PCO2 BLDA: 46 MMHG (ref 35–45)
PH SMN: 7.41 [PH] (ref 7.35–7.45)
PLATELET # BLD AUTO: 199 K/UL (ref 150–450)
PMV BLD AUTO: 10.9 FL (ref 9.2–12.9)
PO2 BLDA: 64 MMHG (ref 75–100)
POC BE: 3.8 MMOL/L (ref -2–2)
POC COHB: 0.9 % (ref 0–3)
POC METHB: 0.7 % (ref 0–1.5)
POC O2HB ARTERIAL: 92 % (ref 94–100)
POC SATURATED O2: 93.5 % (ref 90–100)
POC TCO2: 30.6 MMOL/L
POC THB: 12.7 G/DL (ref 12–18)
POTASSIUM SERPL-SCNC: 4.4 MMOL/L (ref 3.5–5.1)
PROT SERPL-MCNC: 5.9 G/DL (ref 6–8.4)
RBC # BLD AUTO: 4.14 M/UL (ref 4.6–6.2)
SARS-COV-2 RDRP RESP QL NAA+PROBE: NEGATIVE
SITE: ABNORMAL
SODIUM SERPL-SCNC: 138 MMOL/L (ref 136–145)
TROPONIN I SERPL DL<=0.01 NG/ML-MCNC: <0.006 NG/ML (ref 0–0.03)
WBC # BLD AUTO: 6.64 K/UL (ref 3.9–12.7)

## 2021-12-29 PROCEDURE — 85379 FIBRIN DEGRADATION QUANT: CPT | Mod: HCNC | Performed by: SURGERY

## 2021-12-29 PROCEDURE — 25500020 PHARM REV CODE 255: Mod: HCNC | Performed by: SURGERY

## 2021-12-29 PROCEDURE — 80053 COMPREHEN METABOLIC PANEL: CPT | Mod: HCNC | Performed by: NURSE PRACTITIONER

## 2021-12-29 PROCEDURE — U0002 COVID-19 LAB TEST NON-CDC: HCPCS | Mod: HCNC | Performed by: NURSE PRACTITIONER

## 2021-12-29 PROCEDURE — 82803 BLOOD GASES ANY COMBINATION: CPT | Mod: HCNC | Performed by: SURGERY

## 2021-12-29 PROCEDURE — 36415 COLL VENOUS BLD VENIPUNCTURE: CPT | Mod: HCNC | Performed by: NURSE PRACTITIONER

## 2021-12-29 PROCEDURE — 96374 THER/PROPH/DIAG INJ IV PUSH: CPT | Mod: HCNC

## 2021-12-29 PROCEDURE — 85025 COMPLETE CBC W/AUTO DIFF WBC: CPT | Mod: HCNC | Performed by: NURSE PRACTITIONER

## 2021-12-29 PROCEDURE — 99285 EMERGENCY DEPT VISIT HI MDM: CPT | Mod: 25,HCNC

## 2021-12-29 PROCEDURE — 93005 ELECTROCARDIOGRAM TRACING: CPT | Mod: HCNC

## 2021-12-29 PROCEDURE — 82550 ASSAY OF CK (CPK): CPT | Mod: HCNC | Performed by: SURGERY

## 2021-12-29 PROCEDURE — 63600175 PHARM REV CODE 636 W HCPCS: Mod: HCNC | Performed by: SURGERY

## 2021-12-29 PROCEDURE — 93010 EKG 12-LEAD: ICD-10-PCS | Mod: HCNC,,, | Performed by: INTERNAL MEDICINE

## 2021-12-29 PROCEDURE — 84484 ASSAY OF TROPONIN QUANT: CPT | Mod: HCNC | Performed by: NURSE PRACTITIONER

## 2021-12-29 PROCEDURE — 83880 ASSAY OF NATRIURETIC PEPTIDE: CPT | Mod: HCNC | Performed by: NURSE PRACTITIONER

## 2021-12-29 PROCEDURE — 99900035 HC TECH TIME PER 15 MIN (STAT): Mod: HCNC

## 2021-12-29 PROCEDURE — 93010 ELECTROCARDIOGRAM REPORT: CPT | Mod: HCNC,,, | Performed by: INTERNAL MEDICINE

## 2021-12-29 PROCEDURE — 36600 WITHDRAWAL OF ARTERIAL BLOOD: CPT | Mod: HCNC

## 2021-12-29 RX ORDER — FUROSEMIDE 10 MG/ML
40 INJECTION INTRAMUSCULAR; INTRAVENOUS
Status: COMPLETED | OUTPATIENT
Start: 2021-12-29 | End: 2021-12-29

## 2021-12-29 RX ADMIN — FUROSEMIDE 40 MG: 10 INJECTION, SOLUTION INTRAMUSCULAR; INTRAVENOUS at 06:12

## 2021-12-29 RX ADMIN — IOHEXOL 100 ML: 350 INJECTION, SOLUTION INTRAVENOUS at 08:12

## 2021-12-30 NOTE — ED PROVIDER NOTES
[facility]                                                 I reviewed the ER triage nurse's note before evaluating the patient    Chief Complaint  76 y.o. male with Shortness of Breath    History of Present Illness  Nestor Garcia presents to the emergency room with shortness of breath today  Patient has had a 12 pound weight gain with recent stopping of Lasix medication  Patient with longstanding history of congestive heart failure, recently hospitalized  Patient was diuresed and had acute kidney injury after that diuresis per daughter  Diuretic were stopped, patient then had weight gain and SOB in the last week  Denies any chest pain, daughter states this is his typical CHF issue today    The history is provided by the patient  Previous medical records were obtained from eTelemetry  Previous records are summarized from prior ER visits and hospitalizations  Medical history significant for diabetes and hypertension  Surgeries significant for appendectomy, gallbladder, inner ear, knee  Patient is allergic to hydralazine  No significant family history  No significant social history, nonsmoker    I have reviewed all of this patient's past medical, surgical, family, and social   histories as well as active allergies and medications documented in the  electronic medical record    Review of Systems and Physical Exam      Review of Systems (all other ROS are otherwise negative)  -- Constitution - no fever, denies fatigue, no weakness, no chills, night sweats  -- Eyes - no tearing or redness, no visual disturbance  -- Ear, Nose - no tinnitus or earache, no nasal congestion or discharge  -- Mouth,Throat - no sore throat, no toothache, normal voice, normal swallowing  -- Respiratory - shortness of breath, no ORTIZ, no cough or congestion  -- Cardiovascular - denies chest pain, no palpitations, denies claudication  -- Gastrointestinal - denies abdominal pain, nausea, vomiting, or diarrhea  -- Genitourinary - no  dysuria, no hematuria, no flank pain, no bladder pain  -- Musculoskeletal - denies back pain, negative for trauma or injury  -- Neurological - no headache, no numbness, tingling, seizure, balance issues  -- Hematologic- no bruising, no bleeding, nose bleed, bleeding disorders  -- Lymphatics - no leg swelling, no tender nodes, no swollen nodes or LAD    Vital Signs (reviewed by the physician)  His oral temperature is 98.2 °F (36.8 °C).   His blood pressure is 185/77 and his pulse is 55  His respiration is 19 and oxygen saturation is 96%.     Physical Exam  -- Nursing note and vitals reviewed  -- Constitutional: Appears well-developed and well-nourished  -- Head: Atraumatic. Normocephalic. No obvious abnormality  -- Eyes: Pupils are equal and reactive to light. Normal conjunctiva and lids  -- Nose: Nose normal in appearance, nares grossly normal. No discharge  -- Throat: Mucous membranes moist, pharynx normal, normal tonsils. No lesions   -- Ears: External ears and TM normal bilaterally. Normal hearing and no drainage  -- Neck: Normal range of motion. Neck supple. No masses, trachea midline  -- Cardiac: Normal rate, regular rhythm and normal heart sounds  -- Respiratory: Normal respiratory effort, breath sounds clear to auscultation  -- Gastrointestinal: Soft, no tenderness. Normal bowel sounds. Normal liver edge  -- Musculoskeletal: Normal range of motion, no effusions. Joints stable   -- Neurological: No focal deficits. Showed good interaction with staff  -- Vascular: Posterior tibial, dorsalis pedis and radial pulses 2+ bilaterally      Emergency Room Course      Lab Results (reviewed by the physician)     K 4.4      CO2 26   BUN 18   CREATININE 1.3    (H)   ALKPHOS 57   AST 15   ALT 20   BILITOT 0.2   ALBUMIN 3.0 (L)   PROT 5.9 (L)   WBC 6.64   HGB 12.9 (L)   HCT 37.2 (L)      CPK 67   CPK 67   CPKMB 2.1   TROPONINI <0.006   BNP 87   DDIMER 1.20 (H)     EKG (interpreted by the  physician)  Overall Interpretation: normal rate and rhythm with no obvious ischemic changes  Normal sinus rhythm @ 55 bpm  No ST elevation or depression  No arrhythmia or QRS change noted  Similar when compared to previous EKG    CT PE study (images visualized & reports reviewed by the physician)  1. No evidence of pulmonary embolism.   2. Mild cardiomegaly.   3. Few nonspecific mildly enlarged mediastinal lymph node is adjacent to the descending thoracic aorta.   4. Coronary atherosclerosis.   5. Minimal ground-glass changes in the left lower lobe could represent minimal infiltrate or edema.     Medications Given  furosemide injection 40 mg (40 mg Intravenous Given 12/29/21 1826)   iohexoL (OMNIPAQUE 350) injection 100 mL (100 mLs Intravenous Given 12/29/21 2047)     Medical Decision Making     ED Course/ED Management  -- patient with shortness of breath after stopping his diuretics in last week  -- longstanding issues of congestive heart failure, hypoxic on arrival today  -- daughter reports 14 pound weight gain; IV Lasix given the ER today  -- patient feels much better with good diuresis and no more SOB noted  -- patient states he does not want stay in the hospital, sees his [facility] MD tomorrow  -- patient sees Dr. Mcmahan tomorrow and will get re-evaluated for CHF  -- negative workup, negative CT PE study, feeling 100% better today  -- extensively counseled return to the ER with any concerns on discharge    Assessment, Disposition, & Plan      Diagnosis  [R06.02] SOB (shortness of breath) (Primary)  [I50.43] Acute on chronic combined systolic and diastolic congestive heart failure    Disposition and Plan  -- Disposition: home  -- Condition: stable  -- Follow-up: Patient to follow up with Cardiology tomorrow morning  -- I advised the patient that we have found no life threatening condition today  -- At this time, I believe the patient is clinically stable for discharge.   -- Pt understands that the visit today is to  address immediate concerns   -- Further workup and evaluation as an outpatient may be required  -- The patient acknowledges that close follow up with a MD is required   -- Patient agrees to comply with all instruction and direction given in the ER    This note is dictated on M*Modal word recognition program.  There are word recognition mistakes that are occasionally missed on review.         Genaro Marsh MD  12/29/21 2222       Genaro Marsh MD  12/29/21 2232

## 2022-01-04 ENCOUNTER — OFFICE VISIT (OUTPATIENT)
Dept: FAMILY MEDICINE | Facility: CLINIC | Age: 77
End: 2022-01-04
Payer: MEDICARE

## 2022-01-04 VITALS
WEIGHT: 250.56 LBS | BODY MASS INDEX: 35.87 KG/M2 | SYSTOLIC BLOOD PRESSURE: 130 MMHG | HEIGHT: 70 IN | DIASTOLIC BLOOD PRESSURE: 86 MMHG | HEART RATE: 70 BPM | RESPIRATION RATE: 16 BRPM

## 2022-01-04 DIAGNOSIS — K21.9 GASTROESOPHAGEAL REFLUX DISEASE WITHOUT ESOPHAGITIS: Primary | ICD-10-CM

## 2022-01-04 DIAGNOSIS — I50.32 CHRONIC DIASTOLIC HEART FAILURE: ICD-10-CM

## 2022-01-04 PROCEDURE — 1101F PT FALLS ASSESS-DOCD LE1/YR: CPT | Mod: HCNC,CPTII,S$GLB, | Performed by: STUDENT IN AN ORGANIZED HEALTH CARE EDUCATION/TRAINING PROGRAM

## 2022-01-04 PROCEDURE — 3288F PR FALLS RISK ASSESSMENT DOCUMENTED: ICD-10-PCS | Mod: HCNC,CPTII,S$GLB, | Performed by: STUDENT IN AN ORGANIZED HEALTH CARE EDUCATION/TRAINING PROGRAM

## 2022-01-04 PROCEDURE — 3075F SYST BP GE 130 - 139MM HG: CPT | Mod: HCNC,CPTII,S$GLB, | Performed by: STUDENT IN AN ORGANIZED HEALTH CARE EDUCATION/TRAINING PROGRAM

## 2022-01-04 PROCEDURE — 99999 PR PBB SHADOW E&M-EST. PATIENT-LVL IV: CPT | Mod: PBBFAC,HCNC,, | Performed by: STUDENT IN AN ORGANIZED HEALTH CARE EDUCATION/TRAINING PROGRAM

## 2022-01-04 PROCEDURE — 1159F MED LIST DOCD IN RCRD: CPT | Mod: HCNC,CPTII,S$GLB, | Performed by: STUDENT IN AN ORGANIZED HEALTH CARE EDUCATION/TRAINING PROGRAM

## 2022-01-04 PROCEDURE — 1126F AMNT PAIN NOTED NONE PRSNT: CPT | Mod: HCNC,CPTII,S$GLB, | Performed by: STUDENT IN AN ORGANIZED HEALTH CARE EDUCATION/TRAINING PROGRAM

## 2022-01-04 PROCEDURE — 3075F PR MOST RECENT SYSTOLIC BLOOD PRESS GE 130-139MM HG: ICD-10-PCS | Mod: HCNC,CPTII,S$GLB, | Performed by: STUDENT IN AN ORGANIZED HEALTH CARE EDUCATION/TRAINING PROGRAM

## 2022-01-04 PROCEDURE — 3288F FALL RISK ASSESSMENT DOCD: CPT | Mod: HCNC,CPTII,S$GLB, | Performed by: STUDENT IN AN ORGANIZED HEALTH CARE EDUCATION/TRAINING PROGRAM

## 2022-01-04 PROCEDURE — 99213 OFFICE O/P EST LOW 20 MIN: CPT | Mod: HCNC,S$GLB,, | Performed by: STUDENT IN AN ORGANIZED HEALTH CARE EDUCATION/TRAINING PROGRAM

## 2022-01-04 PROCEDURE — 1126F PR PAIN SEVERITY QUANTIFIED, NO PAIN PRESENT: ICD-10-PCS | Mod: HCNC,CPTII,S$GLB, | Performed by: STUDENT IN AN ORGANIZED HEALTH CARE EDUCATION/TRAINING PROGRAM

## 2022-01-04 PROCEDURE — 1159F PR MEDICATION LIST DOCUMENTED IN MEDICAL RECORD: ICD-10-PCS | Mod: HCNC,CPTII,S$GLB, | Performed by: STUDENT IN AN ORGANIZED HEALTH CARE EDUCATION/TRAINING PROGRAM

## 2022-01-04 PROCEDURE — 1101F PR PT FALLS ASSESS DOC 0-1 FALLS W/OUT INJ PAST YR: ICD-10-PCS | Mod: HCNC,CPTII,S$GLB, | Performed by: STUDENT IN AN ORGANIZED HEALTH CARE EDUCATION/TRAINING PROGRAM

## 2022-01-04 PROCEDURE — 99213 PR OFFICE/OUTPT VISIT, EST, LEVL III, 20-29 MIN: ICD-10-PCS | Mod: HCNC,S$GLB,, | Performed by: STUDENT IN AN ORGANIZED HEALTH CARE EDUCATION/TRAINING PROGRAM

## 2022-01-04 PROCEDURE — 1160F RVW MEDS BY RX/DR IN RCRD: CPT | Mod: HCNC,CPTII,S$GLB, | Performed by: STUDENT IN AN ORGANIZED HEALTH CARE EDUCATION/TRAINING PROGRAM

## 2022-01-04 PROCEDURE — 3079F PR MOST RECENT DIASTOLIC BLOOD PRESSURE 80-89 MM HG: ICD-10-PCS | Mod: HCNC,CPTII,S$GLB, | Performed by: STUDENT IN AN ORGANIZED HEALTH CARE EDUCATION/TRAINING PROGRAM

## 2022-01-04 PROCEDURE — 99499 RISK ADDL DX/OHS AUDIT: ICD-10-PCS | Mod: S$GLB,,, | Performed by: STUDENT IN AN ORGANIZED HEALTH CARE EDUCATION/TRAINING PROGRAM

## 2022-01-04 PROCEDURE — 3079F DIAST BP 80-89 MM HG: CPT | Mod: HCNC,CPTII,S$GLB, | Performed by: STUDENT IN AN ORGANIZED HEALTH CARE EDUCATION/TRAINING PROGRAM

## 2022-01-04 PROCEDURE — 99999 PR PBB SHADOW E&M-EST. PATIENT-LVL IV: ICD-10-PCS | Mod: PBBFAC,HCNC,, | Performed by: STUDENT IN AN ORGANIZED HEALTH CARE EDUCATION/TRAINING PROGRAM

## 2022-01-04 PROCEDURE — 1160F PR REVIEW ALL MEDS BY PRESCRIBER/CLIN PHARMACIST DOCUMENTED: ICD-10-PCS | Mod: HCNC,CPTII,S$GLB, | Performed by: STUDENT IN AN ORGANIZED HEALTH CARE EDUCATION/TRAINING PROGRAM

## 2022-01-04 PROCEDURE — 99499 UNLISTED E&M SERVICE: CPT | Mod: S$GLB,,, | Performed by: STUDENT IN AN ORGANIZED HEALTH CARE EDUCATION/TRAINING PROGRAM

## 2022-01-04 RX ORDER — ERYTHROMYCIN 5 MG/G
OINTMENT OPHTHALMIC
COMMUNITY
Start: 2021-10-10 | End: 2022-08-18

## 2022-01-04 RX ORDER — PANTOPRAZOLE SODIUM 40 MG/1
40 TABLET, DELAYED RELEASE ORAL DAILY
Qty: 30 TABLET | Refills: 11 | Status: SHIPPED | OUTPATIENT
Start: 2022-01-04 | End: 2023-01-12

## 2022-01-04 RX ORDER — SYRING-NEEDL,DISP,INSUL,0.3 ML 31GX15/64"
SYRINGE, EMPTY DISPOSABLE MISCELLANEOUS
COMMUNITY
Start: 2021-07-14 | End: 2022-08-12 | Stop reason: SDUPTHER

## 2022-01-04 RX ORDER — TORSEMIDE 20 MG/1
20 TABLET ORAL 2 TIMES DAILY
COMMUNITY
End: 2022-08-18 | Stop reason: ALTCHOICE

## 2022-01-04 RX ORDER — METOLAZONE 5 MG/1
5 TABLET ORAL DAILY
COMMUNITY
Start: 2021-12-19 | End: 2022-05-17

## 2022-01-04 NOTE — PROGRESS NOTES
Subjective:       Patient ID: Nestor Garcia is a 76 y.o. male.    Chief Complaint: Hospital Follow Up and GI Problem (Pt states that every time he eats he gets gassy. This has been having for the past couple of months. Pt states that he eats as normal no loss in appetite. )    Pt here for hospital follow-up. Recent ER visit for hear failure exacerbation. He had recent follow-up with Dr. Mcmahan at [facility] and was placed on torsemide 20mg BID (he was previously taken off of diuretics due to KRISTI).     He states that he is having issues with excessive gas after every meal for the last couple of months or longer. He reports normal BMs with intermittent diarrhea scattered between. Sometimes will just have one loose stool here and there, but he has gas often. No nausea or vomiting. No blood in stool. He has been taking OTC gas pills and if he does not he will have flatulence and belching. He started prilosec about 2 weeks ago which helped some. Last C-scope 9/23/14 and was normal. Pt was referred by Dr. Mcmahan to Dr. Parker but missed the appt, requesting referral.    Pt also with insomnia. States he has issues falling asleep. Usually only able to sleep about 5 hours. He does have TARA on CPAP. He has tried melatonin 10mg at night but feels groggy the next day.    Review of Systems   Constitutional: Negative for chills and fever.   HENT: Negative for congestion and sore throat.    Respiratory: Negative for cough and shortness of breath.    Cardiovascular: Negative for chest pain.   Gastrointestinal: Positive for abdominal pain (cramping) and diarrhea. Negative for blood in stool, nausea and vomiting.   Genitourinary: Negative for dysuria and hematuria.       Objective:      Physical Exam   Constitutional: He is oriented to person, place, and time. No distress.   HENT:   Head: Normocephalic and atraumatic.   Eyes: Conjunctivae are normal.   Cardiovascular: Normal rate and regular rhythm.   No murmur heard.  Pulmonary/Chest:  Effort normal and breath sounds normal. No respiratory distress.   Musculoskeletal:      Right lower leg: Edema (1+) present.      Left lower leg: Edema (1+) present.   Neurological: He is alert and oriented to person, place, and time.   Vitals reviewed.      Assessment:       1. Gastroesophageal reflux disease without esophagitis    2. Chronic diastolic heart failure        Plan:       Gastroesophageal reflux disease without esophagitis  -     pantoprazole (PROTONIX) 40 MG tablet; Take 1 tablet (40 mg total) by mouth once daily.  Dispense: 30 tablet; Refill: 11  -     Ambulatory referral/consult to Gastroenterology; Future; Expected date: 01/11/2022    Chronic diastolic heart failure    Change prilosec to protonix  Refer GI, Dr. Parker  Cont current meds  Follow-up Dr. Mcmahan as scheduled  RTC for worsening symptoms or failure to improve, or RTC PRN/as scheduled

## 2022-01-06 ENCOUNTER — HOSPITAL ENCOUNTER (EMERGENCY)
Facility: HOSPITAL | Age: 77
Discharge: HOME | End: 2022-01-07
Attending: STUDENT IN AN ORGANIZED HEALTH CARE EDUCATION/TRAINING PROGRAM
Payer: MEDICARE

## 2022-01-06 DIAGNOSIS — E16.2 HYPOGLYCEMIA: ICD-10-CM

## 2022-01-06 DIAGNOSIS — R07.9 ACUTE CHEST PAIN: Primary | ICD-10-CM

## 2022-01-06 DIAGNOSIS — N17.9 AKI (ACUTE KIDNEY INJURY): ICD-10-CM

## 2022-01-06 DIAGNOSIS — R07.9 CHEST PAIN: ICD-10-CM

## 2022-01-06 LAB
ANION GAP SERPL CALC-SCNC: 12 MMOL/L (ref 8–16)
BASOPHILS # BLD AUTO: 0.06 K/UL (ref 0–0.2)
BASOPHILS NFR BLD: 0.9 % (ref 0–1.9)
BUN SERPL-MCNC: 29 MG/DL (ref 8–23)
CALCIUM SERPL-MCNC: 8.6 MG/DL (ref 8.7–10.5)
CHLORIDE SERPL-SCNC: 103 MMOL/L (ref 95–110)
CO2 SERPL-SCNC: 25 MMOL/L (ref 23–29)
CREAT SERPL-MCNC: 1.8 MG/DL (ref 0.5–1.4)
D DIMER PPP IA.FEU-MCNC: 1.95 MG/L FEU
DIFFERENTIAL METHOD: ABNORMAL
EOSINOPHIL # BLD AUTO: 0.2 K/UL (ref 0–0.5)
EOSINOPHIL NFR BLD: 3.4 % (ref 0–8)
ERYTHROCYTE [DISTWIDTH] IN BLOOD BY AUTOMATED COUNT: 13.2 % (ref 11.5–14.5)
EST. GFR  (AFRICAN AMERICAN): 41 ML/MIN/1.73 M^2
EST. GFR  (NON AFRICAN AMERICAN): 35 ML/MIN/1.73 M^2
GLUCOSE SERPL-MCNC: 67 MG/DL (ref 70–110)
HCT VFR BLD AUTO: 39.2 % (ref 40–54)
HGB BLD-MCNC: 13.5 G/DL (ref 14–18)
IMM GRANULOCYTES # BLD AUTO: 0.06 K/UL (ref 0–0.04)
IMM GRANULOCYTES NFR BLD AUTO: 0.9 % (ref 0–0.5)
LYMPHOCYTES # BLD AUTO: 1.6 K/UL (ref 1–4.8)
LYMPHOCYTES NFR BLD: 25.2 % (ref 18–48)
MCH RBC QN AUTO: 30.9 PG (ref 27–31)
MCHC RBC AUTO-ENTMCNC: 34.4 G/DL (ref 32–36)
MCV RBC AUTO: 90 FL (ref 82–98)
MONOCYTES # BLD AUTO: 1 K/UL (ref 0.3–1)
MONOCYTES NFR BLD: 15.5 % (ref 4–15)
NEUTROPHILS # BLD AUTO: 3.5 K/UL (ref 1.8–7.7)
NEUTROPHILS NFR BLD: 54.1 % (ref 38–73)
NRBC BLD-RTO: 0 /100 WBC
PLATELET # BLD AUTO: 204 K/UL (ref 150–450)
PMV BLD AUTO: 10.9 FL (ref 9.2–12.9)
POTASSIUM SERPL-SCNC: 3.7 MMOL/L (ref 3.5–5.1)
RBC # BLD AUTO: 4.37 M/UL (ref 4.6–6.2)
SARS-COV-2 RDRP RESP QL NAA+PROBE: NEGATIVE
SODIUM SERPL-SCNC: 140 MMOL/L (ref 136–145)
TROPONIN I SERPL DL<=0.01 NG/ML-MCNC: 0.01 NG/ML (ref 0–0.03)
WBC # BLD AUTO: 6.52 K/UL (ref 3.9–12.7)

## 2022-01-06 PROCEDURE — 36415 COLL VENOUS BLD VENIPUNCTURE: CPT | Mod: HCNC | Performed by: STUDENT IN AN ORGANIZED HEALTH CARE EDUCATION/TRAINING PROGRAM

## 2022-01-06 PROCEDURE — 93010 ELECTROCARDIOGRAM REPORT: CPT | Mod: HCNC,,, | Performed by: INTERNAL MEDICINE

## 2022-01-06 PROCEDURE — U0002 COVID-19 LAB TEST NON-CDC: HCPCS | Mod: HCNC | Performed by: STUDENT IN AN ORGANIZED HEALTH CARE EDUCATION/TRAINING PROGRAM

## 2022-01-06 PROCEDURE — 84484 ASSAY OF TROPONIN QUANT: CPT | Mod: HCNC | Performed by: STUDENT IN AN ORGANIZED HEALTH CARE EDUCATION/TRAINING PROGRAM

## 2022-01-06 PROCEDURE — 80048 BASIC METABOLIC PNL TOTAL CA: CPT | Mod: HCNC | Performed by: STUDENT IN AN ORGANIZED HEALTH CARE EDUCATION/TRAINING PROGRAM

## 2022-01-06 PROCEDURE — 83880 ASSAY OF NATRIURETIC PEPTIDE: CPT | Mod: HCNC | Performed by: STUDENT IN AN ORGANIZED HEALTH CARE EDUCATION/TRAINING PROGRAM

## 2022-01-06 PROCEDURE — 93010 EKG 12-LEAD: ICD-10-PCS | Mod: HCNC,,, | Performed by: INTERNAL MEDICINE

## 2022-01-06 PROCEDURE — 93005 ELECTROCARDIOGRAM TRACING: CPT | Mod: HCNC

## 2022-01-06 PROCEDURE — 85379 FIBRIN DEGRADATION QUANT: CPT | Mod: HCNC | Performed by: STUDENT IN AN ORGANIZED HEALTH CARE EDUCATION/TRAINING PROGRAM

## 2022-01-06 PROCEDURE — 25000003 PHARM REV CODE 250: Mod: HCNC | Performed by: STUDENT IN AN ORGANIZED HEALTH CARE EDUCATION/TRAINING PROGRAM

## 2022-01-06 PROCEDURE — 85025 COMPLETE CBC W/AUTO DIFF WBC: CPT | Mod: HCNC | Performed by: STUDENT IN AN ORGANIZED HEALTH CARE EDUCATION/TRAINING PROGRAM

## 2022-01-06 PROCEDURE — 99291 CRITICAL CARE FIRST HOUR: CPT | Mod: HCNC

## 2022-01-06 RX ORDER — ASPIRIN 325 MG
325 TABLET ORAL
Status: COMPLETED | OUTPATIENT
Start: 2022-01-06 | End: 2022-01-06

## 2022-01-06 RX ORDER — SODIUM CHLORIDE 0.9 % (FLUSH) 0.9 %
10 SYRINGE (ML) INJECTION
Status: DISCONTINUED | OUTPATIENT
Start: 2022-01-06 | End: 2022-01-07 | Stop reason: HOSPADM

## 2022-01-06 RX ORDER — ONDANSETRON 2 MG/ML
4 INJECTION INTRAMUSCULAR; INTRAVENOUS EVERY 4 HOURS PRN
Status: DISCONTINUED | OUTPATIENT
Start: 2022-01-06 | End: 2022-01-07 | Stop reason: HOSPADM

## 2022-01-06 RX ADMIN — ASPIRIN 325 MG: 325 TABLET ORAL at 11:01

## 2022-01-07 VITALS
TEMPERATURE: 98 F | BODY MASS INDEX: 35.52 KG/M2 | HEART RATE: 52 BPM | DIASTOLIC BLOOD PRESSURE: 67 MMHG | WEIGHT: 247.56 LBS | OXYGEN SATURATION: 96 % | SYSTOLIC BLOOD PRESSURE: 148 MMHG | RESPIRATION RATE: 19 BRPM

## 2022-01-07 LAB
BNP SERPL-MCNC: 55 PG/ML (ref 0–99)
POCT GLUCOSE: 90 MG/DL (ref 70–110)
TROPONIN I SERPL DL<=0.01 NG/ML-MCNC: 0.01 NG/ML (ref 0–0.03)

## 2022-01-07 PROCEDURE — 36415 COLL VENOUS BLD VENIPUNCTURE: CPT | Mod: HCNC | Performed by: STUDENT IN AN ORGANIZED HEALTH CARE EDUCATION/TRAINING PROGRAM

## 2022-01-07 PROCEDURE — 84484 ASSAY OF TROPONIN QUANT: CPT | Mod: HCNC | Performed by: STUDENT IN AN ORGANIZED HEALTH CARE EDUCATION/TRAINING PROGRAM

## 2022-01-07 PROCEDURE — 25000003 PHARM REV CODE 250: Mod: HCNC | Performed by: STUDENT IN AN ORGANIZED HEALTH CARE EDUCATION/TRAINING PROGRAM

## 2022-01-07 RX ORDER — CLONIDINE HYDROCHLORIDE 0.1 MG/1
0.1 TABLET ORAL
Status: COMPLETED | OUTPATIENT
Start: 2022-01-07 | End: 2022-01-07

## 2022-01-07 RX ADMIN — CLONIDINE HYDROCHLORIDE 0.1 MG: 0.1 TABLET ORAL at 01:01

## 2022-01-07 NOTE — ED TRIAGE NOTES
Pt presents with chest pain that started this afternoon and SOB with activity after cutting grass and lumber. Denies fever, chills, nausea, vomiting, diarrhea. Took two tylenol at 9pm. Endorses midsternal chest pain that is a dull aching pain and congestion. Endorses being exposed to covid on Sunday.

## 2022-01-07 NOTE — PROGRESS NOTES
Patient, Nestor Garcia (MRN #[id number]), presented with a recorded BMI of 35.95 kg/m^2 and a documented comorbidity(s):  - Atrial Fibrillation  to which the severe obesity is a contributing factor. This is consistent with the definition of severe obesity (BMI 35.0-39.9) with comorbidity (ICD-10 E66.01, Z68.35). The patient's severe obesity was monitored, evaluated, addressed and/or treated. This addendum to the medical record is made on 01/07/2022.

## 2022-01-07 NOTE — ED PROVIDER NOTES
Encounter Date: 1/6/2022    This document was partially completed using speech recognition software and may contain misspellings, grammatical errors, and/or unexpected word substitutions.       History     Chief Complaint   Patient presents with    Chest Pain     76-year-old male with a history of diabetes, hypertension, CHF presents to the emergency department with his wife with left-sided chest achiness.  It started after he was mowing grass and cutting plywood today. States it is intermittent and not associated with a particular pattern, position, exertion. Symptoms have resolved in the ED.        Review of patient's allergies indicates:   Allergen Reactions    Hydralazine analogues Itching     Past Medical History:   Diagnosis Date    CHF (congestive heart failure)     Diabetes mellitus type II     Hypertension      Past Surgical History:   Procedure Laterality Date    APPENDECTOMY      BACK SURGERY      CHOLECYSTECTOMY      INNER EAR SURGERY      KNEE SURGERY       Family History   Problem Relation Age of Onset    Alzheimer's disease Mother     Heart disease Father      Social History     Tobacco Use    Smoking status: Never Smoker    Smokeless tobacco: Never Used   Substance Use Topics    Alcohol use: No    Drug use: No     Review of Systems   Constitutional: Negative for chills and fever.   HENT: Negative for congestion, rhinorrhea and sneezing.    Eyes: Negative for discharge and redness.   Respiratory: Positive for chest tightness and shortness of breath. Negative for cough.    Cardiovascular: Positive for chest pain. Negative for palpitations.   Gastrointestinal: Negative for abdominal pain, diarrhea and vomiting.   Genitourinary: Negative for difficulty urinating, flank pain and urgency.   Musculoskeletal: Negative for back pain and neck pain.   Skin: Negative for rash and wound.   Neurological: Negative for weakness, numbness and headaches.       Physical Exam     Initial Vitals [01/06/22  2256]   BP Pulse Resp Temp SpO2   (!) 183/82 (!) 56 (!) 24 98.3 °F (36.8 °C) 96 %      MAP       --         Physical Exam    Constitutional: He appears well-developed. He is not diaphoretic. No distress.   HENT:   Head: Normocephalic and atraumatic.   Right Ear: External ear normal.   Left Ear: External ear normal.   Nose: Nose normal.   Eyes: Conjunctivae are normal. Right eye exhibits no discharge. Left eye exhibits no discharge. No scleral icterus.   Cardiovascular: Regular rhythm. Bradycardia present.    Pulmonary/Chest: Breath sounds normal. No stridor. No respiratory distress. He has no wheezes. He has no rhonchi. He has no rales.   Abdominal: Abdomen is soft. He exhibits no distension. There is no abdominal tenderness. There is no guarding.   Musculoskeletal:         General: No edema.     Neurological: He is alert and oriented to person, place, and time. GCS score is 15. GCS eye subscore is 4. GCS verbal subscore is 5. GCS motor subscore is 6.   Skin: Skin is warm and dry. Capillary refill takes less than 2 seconds.   Psychiatric: He has a normal mood and affect.         ED Course   Critical Care    Date/Time: 1/7/2022 2:32 AM  Performed by: Willam Crowe DO  Authorized by: Willam Crowe DO   Direct patient critical care time: 24 minutes  Additional history critical care time: 4 minutes  Ordering / reviewing critical care time: 3 minutes  Documentation critical care time: 3 minutes  Consult with family critical care time: 2 minutes  Total critical care time (exclusive of procedural time) : 36 minutes  Critical care time was exclusive of separately billable procedures and treating other patients and teaching time.  Critical care was necessary to treat or prevent imminent or life-threatening deterioration of the following conditions: endocrine crisis.  Critical care was time spent personally by me on the following activities: development of treatment plan with patient or surrogate, evaluation of patient's  response to treatment, examination of patient, obtaining history from patient or surrogate, ordering and review of laboratory studies, ordering and review of radiographic studies, ordering and performing treatments and interventions and re-evaluation of patient's condition.        Labs Reviewed   BASIC METABOLIC PANEL - Abnormal; Notable for the following components:       Result Value    Glucose 67 (*)     BUN 29 (*)     Creatinine 1.8 (*)     Calcium 8.6 (*)     eGFR if  41 (*)     eGFR if non  35 (*)     All other components within normal limits   CBC W/ AUTO DIFFERENTIAL - Abnormal; Notable for the following components:    RBC 4.37 (*)     Hemoglobin 13.5 (*)     Hematocrit 39.2 (*)     Immature Granulocytes 0.9 (*)     Immature Grans (Abs) 0.06 (*)     Mono % 15.5 (*)     All other components within normal limits   D DIMER, QUANTITATIVE - Abnormal; Notable for the following components:    D-Dimer 1.95 (*)     All other components within normal limits   TROPONIN I   TROPONIN I   SARS-COV-2 RNA AMPLIFICATION, QUAL   B-TYPE NATRIURETIC PEPTIDE   B-TYPE NATRIURETIC PEPTIDE   TROPONIN I   POCT GLUCOSE     EKG Readings: (Independently Interpreted)   Sinus willian at 58 bpm. LAD.  ms. No STEMI.       Imaging Results          US Lower Extremity Veins Bilateral (In process)                X-Ray Chest AP Single View (Final result)  Result time 01/06/22 23:27:28    Final result by Edson Milligan MD (01/06/22 23:27:28)                 Impression:      No significant detrimental interval change compared to 12/29/2021.      Electronically signed by: Edson Milligan MD  Date:    01/06/2022  Time:    23:27             Narrative:    EXAMINATION:  XR CHEST 1 VIEW    CLINICAL HISTORY:  acute chest pain;    TECHNIQUE:  Single frontal view of the chest was performed.    COMPARISON:  Chest radiograph, CTA chest 12/29/2021    FINDINGS:  Cardiac silhouette is mildly prominent, similar to prior  examination.  There is mild prominence of the central pulmonary vasculature.  Lungs are symmetrically expanded with mild bibasilar atelectasis.  There is a calcified granuloma projecting over the left lower lung zone.  No new large confluent airspace consolidation appreciated.  No significant volume of pleural fluid or pneumothorax appreciated.  Osseous structures demonstrate degenerative changes.                                 Medications   sodium chloride 0.9% flush 10 mL (has no administration in time range)   ondansetron injection 4 mg (has no administration in time range)   sodium chloride 0.9% bolus 500 mL (500 mLs Intravenous Not Given 1/7/22 0130)   aspirin tablet 325 mg (325 mg Oral Given 1/6/22 2328)   cloNIDine tablet 0.1 mg (0.1 mg Oral Given 1/7/22 0107)     Medical Decision Making:   Differential Diagnosis:   Differential considerations include (in particular order): ACS, Pneumonia, Pneumothorax, PE, Aortic dissection, Pericarditis, Zoster rash, Cardiac tamponade, Myocarditis  ED Management:  Based on the patient's evaluation, I discussed with the patient and wife plan to admit. However, patient states he feels find and wants to go home. Asymptomatic - patient thinks he's having stomach problems and is scheduled for GI f/u soon. Glucose 67, given OJ and glucose improved. As a compromise, I kept the patient for a repeat troponin 3 hours later which was negative. Mild increase in BUN and Cr given 500cc of fluids. He still endorses no symptoms/no chest pain. Therefore, will discharge home with close cardiology follow up. Return precautions given. Patient is in agreement with the plan.                      Clinical Impression:   Final diagnoses:  [R07.9] Chest pain  [R07.9] Acute chest pain (Primary)  [N17.9] KRISTI (acute kidney injury)  [E16.2] Hypoglycemia          ED Disposition Condition    Discharge Stable        ED Prescriptions     None        Follow-up Information     Follow up With Specialties  Details Why Contact Info    Kashmir Conn MD Family Medicine Schedule an appointment as soon as possible for a visit in 3 days  [address] [facility] [id number] [phone]      [facility] Emergency Dept Emergency Medicine Go to  If symptoms worsen [address] [facility] [id number] [phone]    Juan Mcmahan MD INTERVENTIONAL CARDIOLOGY, Cardiology Schedule an appointment as soon as possible for a visit in 1 week  [address] [facility] [address]  [phone]             Willam Crowe,   01/07/22 0234       Willam Crowe DO  01/07/22 0234

## 2022-01-11 ENCOUNTER — PATIENT OUTREACH (OUTPATIENT)
Dept: ADMINISTRATIVE | Facility: OTHER | Age: 77
End: 2022-01-11
Payer: MEDICARE

## 2022-01-11 NOTE — PROGRESS NOTES
Health Maintenance Due   Topic Date Due    Shingles Vaccine (1 of 2) Never done    Foot Exam  05/23/2018    Diabetes Urine Screening  10/27/2021     Updates were requested from care everywhere.  Chart was reviewed for overdue Proactive [facility] Encounters (JOSLYN) topics (CRS, Breast Cancer Screening, Eye exam)  Health Maintenance has been updated.  LINKS immunization registry triggered.  Immunizations were reconciled.

## 2022-01-12 ENCOUNTER — OFFICE VISIT (OUTPATIENT)
Dept: GASTROENTEROLOGY | Facility: CLINIC | Age: 77
End: 2022-01-12
Payer: MEDICARE

## 2022-01-12 VITALS — OXYGEN SATURATION: 98 % | HEART RATE: 61 BPM | HEIGHT: 70 IN | BODY MASS INDEX: 35.1 KG/M2 | WEIGHT: 245.19 LBS

## 2022-01-12 DIAGNOSIS — K21.9 GASTROESOPHAGEAL REFLUX DISEASE WITHOUT ESOPHAGITIS: Primary | ICD-10-CM

## 2022-01-12 PROCEDURE — 1160F PR REVIEW ALL MEDS BY PRESCRIBER/CLIN PHARMACIST DOCUMENTED: ICD-10-PCS | Mod: HCNC,CPTII,S$GLB, | Performed by: INTERNAL MEDICINE

## 2022-01-12 PROCEDURE — 99999 PR PBB SHADOW E&M-EST. PATIENT-LVL V: ICD-10-PCS | Mod: PBBFAC,HCNC,, | Performed by: INTERNAL MEDICINE

## 2022-01-12 PROCEDURE — 3288F FALL RISK ASSESSMENT DOCD: CPT | Mod: HCNC,CPTII,S$GLB, | Performed by: INTERNAL MEDICINE

## 2022-01-12 PROCEDURE — 99204 OFFICE O/P NEW MOD 45 MIN: CPT | Mod: HCNC,S$GLB,, | Performed by: INTERNAL MEDICINE

## 2022-01-12 PROCEDURE — 1159F MED LIST DOCD IN RCRD: CPT | Mod: HCNC,CPTII,S$GLB, | Performed by: INTERNAL MEDICINE

## 2022-01-12 PROCEDURE — 1159F PR MEDICATION LIST DOCUMENTED IN MEDICAL RECORD: ICD-10-PCS | Mod: HCNC,CPTII,S$GLB, | Performed by: INTERNAL MEDICINE

## 2022-01-12 PROCEDURE — 1126F PR PAIN SEVERITY QUANTIFIED, NO PAIN PRESENT: ICD-10-PCS | Mod: HCNC,CPTII,S$GLB, | Performed by: INTERNAL MEDICINE

## 2022-01-12 PROCEDURE — 1160F RVW MEDS BY RX/DR IN RCRD: CPT | Mod: HCNC,CPTII,S$GLB, | Performed by: INTERNAL MEDICINE

## 2022-01-12 PROCEDURE — 99999 PR PBB SHADOW E&M-EST. PATIENT-LVL V: CPT | Mod: PBBFAC,HCNC,, | Performed by: INTERNAL MEDICINE

## 2022-01-12 PROCEDURE — 99204 PR OFFICE/OUTPT VISIT, NEW, LEVL IV, 45-59 MIN: ICD-10-PCS | Mod: HCNC,S$GLB,, | Performed by: INTERNAL MEDICINE

## 2022-01-12 PROCEDURE — 3288F PR FALLS RISK ASSESSMENT DOCUMENTED: ICD-10-PCS | Mod: HCNC,CPTII,S$GLB, | Performed by: INTERNAL MEDICINE

## 2022-01-12 PROCEDURE — 1101F PR PT FALLS ASSESS DOC 0-1 FALLS W/OUT INJ PAST YR: ICD-10-PCS | Mod: HCNC,CPTII,S$GLB, | Performed by: INTERNAL MEDICINE

## 2022-01-12 PROCEDURE — 1126F AMNT PAIN NOTED NONE PRSNT: CPT | Mod: HCNC,CPTII,S$GLB, | Performed by: INTERNAL MEDICINE

## 2022-01-12 PROCEDURE — 1101F PT FALLS ASSESS-DOCD LE1/YR: CPT | Mod: HCNC,CPTII,S$GLB, | Performed by: INTERNAL MEDICINE

## 2022-01-12 NOTE — PROGRESS NOTES
"Subjective:       Patient ID: Nestor Garcia is a 76 y.o. male.    Chief Complaint: Gas (Trapped gas makes his chest hurt)    75 yo M referred for GERD. He states that he gets gas in his upper belly which causes chest pain, and when he takes Gas-X he belches and then feels much better.  However, he just went to the ED a few days ago with exertional chest pain and the ED wanted to admit him, but he refused b/c he felt the chest pain was GI in origin.  They gave him PPI which he feels has made things a little better.    He just saw the cardiology NP last week who "planned to do a blood test and then have me see Dr. Mcmahan on 1/26/22."  He states his last echo and stress test/angiogram have both been "a while" but he is not sure exactly when.  The chart has CHF listed but the pt states that his last EF was 55%.  He states that he did not have "blockages" on his last stress/cath (he doesn't remember how this was evaluated).    Review of Systems   Constitutional: Negative for appetite change and unexpected weight change.   Eyes: Negative for photophobia and visual disturbance.   Respiratory: Positive for chest tightness. Negative for shortness of breath and wheezing.    Cardiovascular: Positive for chest pain. Negative for palpitations and leg swelling.   Gastrointestinal: Positive for reflux.   Genitourinary: Negative for dysuria, flank pain and hematuria.   Musculoskeletal: Negative for joint swelling and myalgias.   Integumentary:  Negative for color change and rash.   Neurological: Negative for dizziness and speech difficulty.   Psychiatric/Behavioral: Negative for confusion and hallucinations.     Objective:       Pulse 61   Ht 5' 10" (1.778 m)   Wt 111.2 kg (245 lb 3.2 oz)   SpO2 98%   BMI 35.18 kg/m²     Physical Exam  Constitutional:       Appearance: Normal appearance. He is well-developed.   HENT:      Head: Normocephalic and atraumatic.   Eyes:      Extraocular Movements: Extraocular movements intact.      " Pupils: Pupils are equal, round, and reactive to light.   Pulmonary:      Effort: Pulmonary effort is normal. No respiratory distress.   Abdominal:      General: There is no distension.      Palpations: Abdomen is soft.      Tenderness: There is no abdominal tenderness.   Musculoskeletal:         General: No signs of injury. Normal range of motion.      Cervical back: Normal range of motion and neck supple.   Skin:     General: Skin is warm and dry.   Neurological:      General: No focal deficit present.      Mental Status: He is alert and oriented to person, place, and time.   Psychiatric:         Mood and Affect: Mood normal.         Behavior: Behavior normal.       Lab Results   Component Value Date    WBC 6.52 01/06/2022    HGB 13.5 (L) 01/06/2022    HCT 39.2 (L) 01/06/2022    MCV 90 01/06/2022     01/06/2022     BMP  Lab Results   Component Value Date     01/06/2022    K 3.7 01/06/2022     01/06/2022    CO2 25 01/06/2022    BUN 29 (H) 01/06/2022    CREATININE 1.8 (H) 01/06/2022    CALCIUM 8.6 (L) 01/06/2022    ANIONGAP 12 01/06/2022    ESTGFRAFRICA 41 (A) 01/06/2022    EGFRNONAA 35 (A) 01/06/2022     CXR was independently visualized and reviewed by me and showed prominence of vasculature, no overt edema or effusion.    Assessment:       Problem List Items Addressed This Visit        GI    Gastroesophageal reflux disease without esophagitis - Primary    Relevant Orders    FL Upper GI          Plan:       Gastroesophageal reflux disease without esophagitis  -     FL Upper GI; Future; Expected date: 01/12/2022  -     Cont PPI.  ED gave him a year's worth on Rx.    He will need cardiac workup before endoscopy can be considered.  The chest pain at ED visit was exertional and thus should not be GI in origin.  I do believe he has reflux, but he could also have cardiac sources for pain.

## 2022-01-25 ENCOUNTER — TELEPHONE (OUTPATIENT)
Dept: GASTROENTEROLOGY | Facility: CLINIC | Age: 77
End: 2022-01-25
Payer: MEDICARE

## 2022-01-25 NOTE — TELEPHONE ENCOUNTER
----- Message from Natalya Parker MD sent at 1/24/2022 12:05 PM CST -----  UGI series is normal.  Continue PPI.  Ask him to call our office once FULL cardiac evaluation has been completed by Dr. Mcmahan.  We will then obtain the records and schedule EGD.

## 2022-02-01 ENCOUNTER — PATIENT MESSAGE (OUTPATIENT)
Dept: GASTROENTEROLOGY | Facility: CLINIC | Age: 77
End: 2022-02-01
Payer: MEDICARE

## 2022-02-01 ENCOUNTER — TELEPHONE (OUTPATIENT)
Dept: GASTROENTEROLOGY | Facility: CLINIC | Age: 77
End: 2022-02-01
Payer: MEDICARE

## 2022-02-09 ENCOUNTER — PATIENT MESSAGE (OUTPATIENT)
Dept: FAMILY MEDICINE | Facility: CLINIC | Age: 77
End: 2022-02-09
Payer: MEDICARE

## 2022-02-10 DIAGNOSIS — F41.9 ANXIETY: Primary | ICD-10-CM

## 2022-02-10 RX ORDER — ALPRAZOLAM 0.25 MG/1
0.25 TABLET ORAL NIGHTLY PRN
Qty: 30 TABLET | Refills: 2 | Status: SHIPPED | OUTPATIENT
Start: 2022-02-10 | End: 2022-08-18 | Stop reason: ALTCHOICE

## 2022-03-03 ENCOUNTER — PATIENT MESSAGE (OUTPATIENT)
Dept: FAMILY MEDICINE | Facility: CLINIC | Age: 77
End: 2022-03-03
Payer: MEDICARE

## 2022-03-03 DIAGNOSIS — E11.42 DIABETIC POLYNEUROPATHY ASSOCIATED WITH TYPE 2 DIABETES MELLITUS: ICD-10-CM

## 2022-03-03 RX ORDER — PREGABALIN 50 MG/1
50 CAPSULE ORAL 2 TIMES DAILY
Qty: 60 CAPSULE | Refills: 5 | Status: SHIPPED | OUTPATIENT
Start: 2022-03-03 | End: 2022-05-17

## 2022-03-08 ENCOUNTER — PATIENT MESSAGE (OUTPATIENT)
Dept: FAMILY MEDICINE | Facility: CLINIC | Age: 77
End: 2022-03-08
Payer: MEDICARE

## 2022-03-08 DIAGNOSIS — E11.42 DIABETIC POLYNEUROPATHY ASSOCIATED WITH TYPE 2 DIABETES MELLITUS: Primary | ICD-10-CM

## 2022-03-08 RX ORDER — DULOXETIN HYDROCHLORIDE 20 MG/1
20 CAPSULE, DELAYED RELEASE ORAL DAILY
Qty: 30 CAPSULE | Refills: 5 | Status: ON HOLD | OUTPATIENT
Start: 2022-03-08 | End: 2022-08-18

## 2022-03-22 LAB — HBA1C MFR BLD: 7.3 % (ref 4–6)

## 2022-04-05 ENCOUNTER — OFFICE VISIT (OUTPATIENT)
Dept: FAMILY MEDICINE | Facility: CLINIC | Age: 77
End: 2022-04-05
Payer: MEDICARE

## 2022-04-05 ENCOUNTER — PATIENT OUTREACH (OUTPATIENT)
Dept: ADMINISTRATIVE | Facility: HOSPITAL | Age: 77
End: 2022-04-05
Payer: MEDICARE

## 2022-04-05 VITALS
HEART RATE: 72 BPM | WEIGHT: 247.81 LBS | SYSTOLIC BLOOD PRESSURE: 136 MMHG | RESPIRATION RATE: 20 BRPM | BODY MASS INDEX: 35.48 KG/M2 | DIASTOLIC BLOOD PRESSURE: 88 MMHG | HEIGHT: 70 IN

## 2022-04-05 DIAGNOSIS — Z12.5 SCREENING PSA (PROSTATE SPECIFIC ANTIGEN): ICD-10-CM

## 2022-04-05 DIAGNOSIS — I10 ESSENTIAL HYPERTENSION: Primary | ICD-10-CM

## 2022-04-05 DIAGNOSIS — E11.42 DIABETIC POLYNEUROPATHY ASSOCIATED WITH TYPE 2 DIABETES MELLITUS: ICD-10-CM

## 2022-04-05 DIAGNOSIS — E11.42 TYPE 2 DIABETES MELLITUS WITH DIABETIC POLYNEUROPATHY, WITH LONG-TERM CURRENT USE OF INSULIN: ICD-10-CM

## 2022-04-05 DIAGNOSIS — Z79.4 TYPE 2 DIABETES MELLITUS WITH DIABETIC POLYNEUROPATHY, WITH LONG-TERM CURRENT USE OF INSULIN: ICD-10-CM

## 2022-04-05 PROCEDURE — 1126F PR PAIN SEVERITY QUANTIFIED, NO PAIN PRESENT: ICD-10-PCS | Mod: CPTII,S$GLB,, | Performed by: STUDENT IN AN ORGANIZED HEALTH CARE EDUCATION/TRAINING PROGRAM

## 2022-04-05 PROCEDURE — 1101F PR PT FALLS ASSESS DOC 0-1 FALLS W/OUT INJ PAST YR: ICD-10-PCS | Mod: CPTII,S$GLB,, | Performed by: STUDENT IN AN ORGANIZED HEALTH CARE EDUCATION/TRAINING PROGRAM

## 2022-04-05 PROCEDURE — 3051F PR MOST RECENT HEMOGLOBIN A1C LEVEL 7.0 - < 8.0%: ICD-10-PCS | Mod: CPTII,S$GLB,, | Performed by: STUDENT IN AN ORGANIZED HEALTH CARE EDUCATION/TRAINING PROGRAM

## 2022-04-05 PROCEDURE — 1159F PR MEDICATION LIST DOCUMENTED IN MEDICAL RECORD: ICD-10-PCS | Mod: CPTII,S$GLB,, | Performed by: STUDENT IN AN ORGANIZED HEALTH CARE EDUCATION/TRAINING PROGRAM

## 2022-04-05 PROCEDURE — 3075F SYST BP GE 130 - 139MM HG: CPT | Mod: CPTII,S$GLB,, | Performed by: STUDENT IN AN ORGANIZED HEALTH CARE EDUCATION/TRAINING PROGRAM

## 2022-04-05 PROCEDURE — 99499 UNLISTED E&M SERVICE: CPT | Mod: HCNC,S$GLB,, | Performed by: STUDENT IN AN ORGANIZED HEALTH CARE EDUCATION/TRAINING PROGRAM

## 2022-04-05 PROCEDURE — 1159F MED LIST DOCD IN RCRD: CPT | Mod: CPTII,S$GLB,, | Performed by: STUDENT IN AN ORGANIZED HEALTH CARE EDUCATION/TRAINING PROGRAM

## 2022-04-05 PROCEDURE — 99499 RISK ADDL DX/OHS AUDIT: ICD-10-PCS | Mod: HCNC,S$GLB,, | Performed by: STUDENT IN AN ORGANIZED HEALTH CARE EDUCATION/TRAINING PROGRAM

## 2022-04-05 PROCEDURE — 3075F PR MOST RECENT SYSTOLIC BLOOD PRESS GE 130-139MM HG: ICD-10-PCS | Mod: CPTII,S$GLB,, | Performed by: STUDENT IN AN ORGANIZED HEALTH CARE EDUCATION/TRAINING PROGRAM

## 2022-04-05 PROCEDURE — 99213 PR OFFICE/OUTPT VISIT, EST, LEVL III, 20-29 MIN: ICD-10-PCS | Mod: S$GLB,,, | Performed by: STUDENT IN AN ORGANIZED HEALTH CARE EDUCATION/TRAINING PROGRAM

## 2022-04-05 PROCEDURE — 3288F FALL RISK ASSESSMENT DOCD: CPT | Mod: CPTII,S$GLB,, | Performed by: STUDENT IN AN ORGANIZED HEALTH CARE EDUCATION/TRAINING PROGRAM

## 2022-04-05 PROCEDURE — 1101F PT FALLS ASSESS-DOCD LE1/YR: CPT | Mod: CPTII,S$GLB,, | Performed by: STUDENT IN AN ORGANIZED HEALTH CARE EDUCATION/TRAINING PROGRAM

## 2022-04-05 PROCEDURE — 99999 PR PBB SHADOW E&M-EST. PATIENT-LVL IV: ICD-10-PCS | Mod: PBBFAC,,, | Performed by: STUDENT IN AN ORGANIZED HEALTH CARE EDUCATION/TRAINING PROGRAM

## 2022-04-05 PROCEDURE — 3079F DIAST BP 80-89 MM HG: CPT | Mod: CPTII,S$GLB,, | Performed by: STUDENT IN AN ORGANIZED HEALTH CARE EDUCATION/TRAINING PROGRAM

## 2022-04-05 PROCEDURE — 3079F PR MOST RECENT DIASTOLIC BLOOD PRESSURE 80-89 MM HG: ICD-10-PCS | Mod: CPTII,S$GLB,, | Performed by: STUDENT IN AN ORGANIZED HEALTH CARE EDUCATION/TRAINING PROGRAM

## 2022-04-05 PROCEDURE — 1160F PR REVIEW ALL MEDS BY PRESCRIBER/CLIN PHARMACIST DOCUMENTED: ICD-10-PCS | Mod: CPTII,S$GLB,, | Performed by: STUDENT IN AN ORGANIZED HEALTH CARE EDUCATION/TRAINING PROGRAM

## 2022-04-05 PROCEDURE — 99213 OFFICE O/P EST LOW 20 MIN: CPT | Mod: S$GLB,,, | Performed by: STUDENT IN AN ORGANIZED HEALTH CARE EDUCATION/TRAINING PROGRAM

## 2022-04-05 PROCEDURE — 99999 PR PBB SHADOW E&M-EST. PATIENT-LVL IV: CPT | Mod: PBBFAC,,, | Performed by: STUDENT IN AN ORGANIZED HEALTH CARE EDUCATION/TRAINING PROGRAM

## 2022-04-05 PROCEDURE — 3051F HG A1C>EQUAL 7.0%<8.0%: CPT | Mod: CPTII,S$GLB,, | Performed by: STUDENT IN AN ORGANIZED HEALTH CARE EDUCATION/TRAINING PROGRAM

## 2022-04-05 PROCEDURE — 1160F RVW MEDS BY RX/DR IN RCRD: CPT | Mod: CPTII,S$GLB,, | Performed by: STUDENT IN AN ORGANIZED HEALTH CARE EDUCATION/TRAINING PROGRAM

## 2022-04-05 PROCEDURE — 1126F AMNT PAIN NOTED NONE PRSNT: CPT | Mod: CPTII,S$GLB,, | Performed by: STUDENT IN AN ORGANIZED HEALTH CARE EDUCATION/TRAINING PROGRAM

## 2022-04-05 PROCEDURE — 3288F PR FALLS RISK ASSESSMENT DOCUMENTED: ICD-10-PCS | Mod: CPTII,S$GLB,, | Performed by: STUDENT IN AN ORGANIZED HEALTH CARE EDUCATION/TRAINING PROGRAM

## 2022-04-05 NOTE — PROGRESS NOTES
Chart reviewed, immunization record updated.  No new results noted on [facility]  Site.  Uploaded HBA1C collected on 3/22/2022 from [facility], updated in .  Uploaded Lipid Panel collected on 7/13/2021 from [facility], updated in .  Care Everywhere updated.   Patient care coordination note updated.  GRECIA sent to Dr. Bauer for Foot exam and Urine Micro albumin.  GRECIA sent to [facility] for diabetic eye exam.

## 2022-04-05 NOTE — LETTER
AUTHORIZATION FOR RELEASE OF   CONFIDENTIAL INFORMATION    Dear Dr. Isaias Bauer,    We are seeing Nestor Garcia, date of birth 1945, in the clinic at [facility]. Kashmir Conn MD is the patient's PCP. Nestor Garcia has an outstanding lab/procedure at the time we reviewed his chart. In order to help keep his health information updated, he has authorized us to request the following medical record(s):          ( X )  FOOT EXAM   (Diabetic Foot Exam)  (X) Urine Micro albumin if available.                                 Please fax records to [facility], Michael Bacon, MD Laura Rogers, LPN  Clinical Care Coordinator  [facility]  Phone: [phone]  Fax: [phone]            Patient Name: Nestor Garcia  : 1945  Patient Phone #: [phone]

## 2022-04-05 NOTE — LETTER
AUTHORIZATION FOR RELEASE OF   CONFIDENTIAL INFORMATION    Dear [facility],     We are seeing Nestor Garica, date of birth 1945, in the clinic at [facility]. Kashmir Conn MD is the patient's PCP. Nestor Garcia has an outstanding lab/procedure at the time we reviewed his chart. In order to help keep his health information updated, he has authorized us to request the following medical record(s):             ( X )  EYE EXAM (Diabetic Eye Exam)        Please fax records to [facility], Michael Bacon, MD Laura Rogers, LPN  Clinical Care Coordinator  [facility]  Phone: [phone]  Fax: [phone]          Patient Name: Nestor Garcia  : 1945  Patient Phone #: [phone]

## 2022-04-05 NOTE — PROGRESS NOTES
Subjective:       Patient ID: Nestor Garcia is a 77 y.o. male.    Chief Complaint: Follow-up (Pt here for 3 month checkup no current concerns ) and Spot (Pt states noticed a spot on back side of his neck on right side. Pt states doesn't hurt. )    Pt here for follow-up.     DM2: Pt on lantus 40u nightly. Last A1c was 7.2. He is due for eye exam. He is followed by Dr. Bauer and has eye exams at .    Pt also with spot to right posterior neck.     He is followed by Dr. Mcmahan, cardiology. They have extended follow-up to q6 month.     Review of Systems   Constitutional: Negative for chills and fever.   HENT: Negative for congestion and sore throat.    Respiratory: Negative for cough and shortness of breath.    Cardiovascular: Negative for chest pain.   Gastrointestinal: Negative for abdominal pain, blood in stool, diarrhea, nausea and vomiting.   Genitourinary: Negative for dysuria and hematuria.       Objective:      Physical Exam   Constitutional: He is oriented to person, place, and time. No distress.   HENT:   Head: Normocephalic and atraumatic.   Cardiovascular: Normal rate, regular rhythm and normal heart sounds.   No murmur heard.  Pulmonary/Chest: Effort normal and breath sounds normal. No respiratory distress.   Musculoskeletal:      Cervical back: Neck supple.   Neurological: He is alert and oriented to person, place, and time.        Skin:   SK to posterior right neck   Psychiatric: His behavior is normal. Mood normal.   Vitals reviewed.      Assessment:       1. Essential hypertension    2. Diabetic polyneuropathy associated with type 2 diabetes mellitus    3. Screening PSA (prostate specific antigen)    4. Type 2 diabetes mellitus with diabetic polyneuropathy, with long-term current use of insulin        Plan:       Essential hypertension    Diabetic polyneuropathy associated with type 2 diabetes mellitus    Screening PSA (prostate specific antigen)    Type 2 diabetes mellitus with diabetic  polyneuropathy, with long-term current use of insulin    Cont current meds  Follow-up Dr. Mcmahan as scheduled  Follow-up endo as scheduled  RTC 6 months or sooner if needed

## 2022-04-14 NOTE — PROGRESS NOTES
Patient, Nestor Garcia (MRN #[id number]), presented with a recent Estimated Glumerular Filtration Rate (EGFR) between 30 and 45 consistent with the definition of chronic kidney disease stage 3 - moderate (ICD10 - N18.3).    eGFR if non    Date Value Ref Range Status   01/06/2022 35 (A) >60 mL/min/1.73 m^2 Final     Comment:     Calculation used to obtain the estimated glomerular filtration  rate (eGFR) is the CKD-EPI equation.          The patient's chronic kidney disease stage 3 was monitored, evaluated, addressed and/or treated. This addendum to the medical record is made on 04/14/2022.

## 2022-05-10 LAB
LEFT EYE DM RETINOPATHY: NEGATIVE
RIGHT EYE DM RETINOPATHY: NEGATIVE

## 2022-05-13 ENCOUNTER — HOSPITAL ENCOUNTER (EMERGENCY)
Facility: HOSPITAL | Age: 77
Discharge: HOME | End: 2022-05-13
Attending: SURGERY
Payer: MEDICARE

## 2022-05-13 VITALS
HEART RATE: 68 BPM | TEMPERATURE: 98 F | DIASTOLIC BLOOD PRESSURE: 88 MMHG | SYSTOLIC BLOOD PRESSURE: 196 MMHG | RESPIRATION RATE: 20 BRPM | OXYGEN SATURATION: 92 %

## 2022-05-13 DIAGNOSIS — L03.90 CELLULITIS, UNSPECIFIED CELLULITIS SITE: Primary | ICD-10-CM

## 2022-05-13 LAB
ALBUMIN SERPL BCP-MCNC: 3.1 G/DL (ref 3.5–5.2)
ALP SERPL-CCNC: 74 U/L (ref 55–135)
ALT SERPL W/O P-5'-P-CCNC: 23 U/L (ref 10–44)
ANION GAP SERPL CALC-SCNC: 12 MMOL/L (ref 8–16)
AST SERPL-CCNC: 21 U/L (ref 10–40)
BASOPHILS # BLD AUTO: 0.07 K/UL (ref 0–0.2)
BASOPHILS NFR BLD: 1.1 % (ref 0–1.9)
BILIRUB SERPL-MCNC: 0.4 MG/DL (ref 0.1–1)
BUN SERPL-MCNC: 27 MG/DL (ref 8–23)
CALCIUM SERPL-MCNC: 8.4 MG/DL (ref 8.7–10.5)
CHLORIDE SERPL-SCNC: 101 MMOL/L (ref 95–110)
CO2 SERPL-SCNC: 27 MMOL/L (ref 23–29)
CREAT SERPL-MCNC: 2 MG/DL (ref 0.5–1.4)
DIFFERENTIAL METHOD: ABNORMAL
EOSINOPHIL # BLD AUTO: 0.2 K/UL (ref 0–0.5)
EOSINOPHIL NFR BLD: 3.6 % (ref 0–8)
ERYTHROCYTE [DISTWIDTH] IN BLOOD BY AUTOMATED COUNT: 12.8 % (ref 11.5–14.5)
EST. GFR  (AFRICAN AMERICAN): 36 ML/MIN/1.73 M^2
EST. GFR  (NON AFRICAN AMERICAN): 31 ML/MIN/1.73 M^2
GLUCOSE SERPL-MCNC: 197 MG/DL (ref 70–110)
HCT VFR BLD AUTO: 38.8 % (ref 40–54)
HGB BLD-MCNC: 13.1 G/DL (ref 14–18)
IMM GRANULOCYTES # BLD AUTO: 0.04 K/UL (ref 0–0.04)
IMM GRANULOCYTES NFR BLD AUTO: 0.6 % (ref 0–0.5)
LACTATE SERPL-SCNC: 1.3 MMOL/L (ref 0.5–2.2)
LYMPHOCYTES # BLD AUTO: 1.3 K/UL (ref 1–4.8)
LYMPHOCYTES NFR BLD: 19.5 % (ref 18–48)
MCH RBC QN AUTO: 30.3 PG (ref 27–31)
MCHC RBC AUTO-ENTMCNC: 33.8 G/DL (ref 32–36)
MCV RBC AUTO: 90 FL (ref 82–98)
MONOCYTES # BLD AUTO: 0.7 K/UL (ref 0.3–1)
MONOCYTES NFR BLD: 10.7 % (ref 4–15)
NEUTROPHILS # BLD AUTO: 4.3 K/UL (ref 1.8–7.7)
NEUTROPHILS NFR BLD: 64.5 % (ref 38–73)
NRBC BLD-RTO: 0 /100 WBC
PLATELET # BLD AUTO: 213 K/UL (ref 150–450)
PMV BLD AUTO: 11.3 FL (ref 9.2–12.9)
POTASSIUM SERPL-SCNC: 4 MMOL/L (ref 3.5–5.1)
PROT SERPL-MCNC: 6.3 G/DL (ref 6–8.4)
RBC # BLD AUTO: 4.32 M/UL (ref 4.6–6.2)
SODIUM SERPL-SCNC: 140 MMOL/L (ref 136–145)
WBC # BLD AUTO: 6.65 K/UL (ref 3.9–12.7)

## 2022-05-13 PROCEDURE — 85025 COMPLETE CBC W/AUTO DIFF WBC: CPT | Performed by: SURGERY

## 2022-05-13 PROCEDURE — 80053 COMPREHEN METABOLIC PANEL: CPT | Performed by: SURGERY

## 2022-05-13 PROCEDURE — 25000003 PHARM REV CODE 250: Performed by: SURGERY

## 2022-05-13 PROCEDURE — 99284 EMERGENCY DEPT VISIT MOD MDM: CPT | Mod: 25

## 2022-05-13 PROCEDURE — 87040 BLOOD CULTURE FOR BACTERIA: CPT | Performed by: SURGERY

## 2022-05-13 PROCEDURE — 83605 ASSAY OF LACTIC ACID: CPT | Performed by: SURGERY

## 2022-05-13 PROCEDURE — 96372 THER/PROPH/DIAG INJ SC/IM: CPT | Performed by: SURGERY

## 2022-05-13 RX ORDER — CLINDAMYCIN HYDROCHLORIDE 300 MG/1
300 CAPSULE ORAL 4 TIMES DAILY
Qty: 28 CAPSULE | Refills: 0 | Status: SHIPPED | OUTPATIENT
Start: 2022-05-13 | End: 2022-05-20 | Stop reason: SDUPTHER

## 2022-05-13 RX ORDER — MUPIROCIN 20 MG/G
OINTMENT TOPICAL 3 TIMES DAILY
Qty: 15 G | Refills: 0 | Status: SHIPPED | OUTPATIENT
Start: 2022-05-13 | End: 2022-05-23

## 2022-05-13 RX ORDER — CLINDAMYCIN PHOSPHATE 150 MG/ML
600 INJECTION, SOLUTION INTRAVENOUS
Status: COMPLETED | OUTPATIENT
Start: 2022-05-13 | End: 2022-05-13

## 2022-05-13 RX ADMIN — CLINDAMYCIN PHOSPHATE 600 MG: 150 INJECTION, SOLUTION INTRAVENOUS at 06:05

## 2022-05-13 NOTE — ED TRIAGE NOTES
77 y.o. male presents to ER Room/bed info not found   Chief Complaint   Patient presents with    Arm Pain   .   C/o left arm redness and swelling since Tuesday

## 2022-05-14 NOTE — ED PROVIDER NOTES
Encounter Date: 5/13/2022       History     Chief Complaint   Patient presents with    Arm Pain     Nestor Garcia is a 77 y.o. male presents with left antecubital redness  He was working outside this weekend when he started to have redness  Patient states he might have contact dermatitis, might be an insect bite  Patient on triage shows no fever, patient has no signs of spread on exam  Daughter states she thought she noticed insect bite on his distal forearm  Patient states he cannot have steroids for itching due to his diabetes        Review of patient's allergies indicates:   Allergen Reactions    Hydralazine analogues Itching     Past Medical History:   Diagnosis Date    CHF (congestive heart failure)     Diabetes mellitus type II     Hypertension      Past Surgical History:   Procedure Laterality Date    APPENDECTOMY      BACK SURGERY      CHOLECYSTECTOMY      INNER EAR SURGERY      KNEE SURGERY       Family History   Problem Relation Age of Onset    Alzheimer's disease Mother     Heart disease Father      Social History     Tobacco Use    Smoking status: Never Smoker    Smokeless tobacco: Never Used   Substance Use Topics    Alcohol use: No    Drug use: No     Review of Systems   Constitutional: Negative for activity change, appetite change, fatigue, fever and unexpected weight change.   HENT: Negative for congestion, ear pain, mouth sores, nosebleeds, rhinorrhea, sinus pressure, sneezing and sore throat.    Eyes: Negative for pain, discharge, redness and itching.   Respiratory: Negative for apnea, cough, chest tightness and shortness of breath.    Cardiovascular: Negative for chest pain, palpitations and leg swelling.   Gastrointestinal: Negative for abdominal distention, abdominal pain, anal bleeding, constipation, diarrhea, nausea and vomiting.   Endocrine: Negative.    Genitourinary: Negative for dysuria, enuresis, flank pain and frequency.   Musculoskeletal: Negative for arthralgias, back  pain, neck pain and neck stiffness.   Skin: Positive for wound. Negative for color change.   Allergic/Immunologic: Negative.    Neurological: Negative for dizziness, tremors, syncope, facial asymmetry, speech difficulty, weakness, light-headedness, numbness and headaches.   Hematological: Negative for adenopathy. Does not bruise/bleed easily.   Psychiatric/Behavioral: Negative for agitation, behavioral problems, hallucinations, self-injury and suicidal ideas. The patient is not nervous/anxious.        Physical Exam     Initial Vitals [05/13/22 1723]   BP Pulse Resp Temp SpO2   (!) 196/88 68 20 97.6 °F (36.4 °C) (!) 92 %      MAP       --         Physical Exam    Constitutional: Vital signs are normal. He appears well-developed and well-nourished. He is cooperative.   HENT:   Head: Normocephalic and atraumatic.   Right Ear: Hearing, tympanic membrane, external ear and ear canal normal.   Left Ear: Hearing, tympanic membrane, external ear and ear canal normal.   Nose: Nose normal.   Mouth/Throat: Uvula is midline, oropharynx is clear and moist and mucous membranes are normal.   Eyes: Conjunctivae, EOM and lids are normal. Pupils are equal, round, and reactive to light.   Neck: Neck supple. No JVD present.   Normal range of motion.   Full passive range of motion without pain.     Cardiovascular: Normal rate, regular rhythm, S1 normal, S2 normal, normal heart sounds, intact distal pulses and normal pulses.   Pulmonary/Chest: Effort normal and breath sounds normal.   Abdominal: Abdomen is soft and flat. Bowel sounds are normal.   Musculoskeletal:         General: Normal range of motion.      Cervical back: Full passive range of motion without pain, normal range of motion and neck supple.     Neurological: He is alert and oriented to person, place, and time. He has normal strength.   Skin: Skin is intact. Capillary refill takes less than 2 seconds.   Left ventral forearm erythema with mild antecubital involved  No  abscess, no drainage, no leakage of fluid         ED Course   Procedures  Labs Reviewed   COMPREHENSIVE METABOLIC PANEL - Abnormal; Notable for the following components:       Result Value    Glucose 197 (*)     BUN 27 (*)     Creatinine 2.0 (*)     Calcium 8.4 (*)     Albumin 3.1 (*)     eGFR if  36 (*)     eGFR if non  31 (*)     All other components within normal limits   CBC W/ AUTO DIFFERENTIAL - Abnormal; Notable for the following components:    RBC 4.32 (*)     Hemoglobin 13.1 (*)     Hematocrit 38.8 (*)     Immature Granulocytes 0.6 (*)     All other components within normal limits   CULTURE, BLOOD   CULTURE, BLOOD   LACTIC ACID, PLASMA   LACTIC ACID, PLASMA          Imaging Results    None          Medications   clindamycin injection 600 mg (600 mg Intramuscular Given 5/13/22 1832)     Medical Decision Making:   Initial Assessment:   Left arm redness after working outside yesterday  Daughter concerned about an insect bite possible cellulitis  Patient could not rule out contact dermatitis from outside work    Differential Diagnosis:   Cellulitis, dermatitis, contact dermatitis, abscess, folliculitis, redness NOS    Clinical Tests:   Lab Tests: Ordered and Reviewed    ED Management:  Patient with left arm redness with likely contact dermatitis  Patient declines steroids due to his diabetes issues  Injection clindamycin given with a prescription on discharge  Clean 3 times a day with Bactroban afterwards for 7 days  Over-the-counter Benadryl at home for itching as needed  Carefully counseled return to the ER with any concerns  Patient will follow-up with his primary care physician Monday                       Clinical Impression:   Final diagnoses:  [L03.90] Cellulitis, unspecified cellulitis site (Primary)          ED Disposition Condition    Discharge Stable        ED Prescriptions     Medication Sig Dispense Start Date End Date Auth. Provider    mupirocin (BACTROBAN) 2 %  ointment Apply topically 3 (three) times daily. for 10 days 15 g 5/13/2022 5/23/2022 Genaro Marsh MD    clindamycin (CLEOCIN) 300 MG capsule Take 1 capsule (300 mg total) by mouth 4 (four) times daily. for 7 days 28 capsule 5/13/2022 5/20/2022 Genaro Marsh MD        Follow-up Information     Follow up With Specialties Details Why Contact Info    Kashmir Conn MD Family Medicine Schedule an appointment as soon as possible for a visit in 2 days  [address] [place], [place] [id number]  [phone]             Genaro Marsh MD  05/13/22 3278

## 2022-05-17 ENCOUNTER — OFFICE VISIT (OUTPATIENT)
Dept: FAMILY MEDICINE | Facility: CLINIC | Age: 77
End: 2022-05-17
Payer: MEDICARE

## 2022-05-17 VITALS
RESPIRATION RATE: 20 BRPM | BODY MASS INDEX: 36.28 KG/M2 | HEART RATE: 76 BPM | SYSTOLIC BLOOD PRESSURE: 132 MMHG | WEIGHT: 253.44 LBS | HEIGHT: 70 IN | DIASTOLIC BLOOD PRESSURE: 70 MMHG

## 2022-05-17 DIAGNOSIS — L03.114 CELLULITIS OF LEFT ARM: Primary | ICD-10-CM

## 2022-05-17 PROCEDURE — 1126F PR PAIN SEVERITY QUANTIFIED, NO PAIN PRESENT: ICD-10-PCS | Mod: CPTII,S$GLB,, | Performed by: STUDENT IN AN ORGANIZED HEALTH CARE EDUCATION/TRAINING PROGRAM

## 2022-05-17 PROCEDURE — 3078F PR MOST RECENT DIASTOLIC BLOOD PRESSURE < 80 MM HG: ICD-10-PCS | Mod: CPTII,S$GLB,, | Performed by: STUDENT IN AN ORGANIZED HEALTH CARE EDUCATION/TRAINING PROGRAM

## 2022-05-17 PROCEDURE — 3075F SYST BP GE 130 - 139MM HG: CPT | Mod: CPTII,S$GLB,, | Performed by: STUDENT IN AN ORGANIZED HEALTH CARE EDUCATION/TRAINING PROGRAM

## 2022-05-17 PROCEDURE — 1159F MED LIST DOCD IN RCRD: CPT | Mod: CPTII,S$GLB,, | Performed by: STUDENT IN AN ORGANIZED HEALTH CARE EDUCATION/TRAINING PROGRAM

## 2022-05-17 PROCEDURE — 99999 PR PBB SHADOW E&M-EST. PATIENT-LVL IV: ICD-10-PCS | Mod: PBBFAC,,, | Performed by: STUDENT IN AN ORGANIZED HEALTH CARE EDUCATION/TRAINING PROGRAM

## 2022-05-17 PROCEDURE — 3288F FALL RISK ASSESSMENT DOCD: CPT | Mod: CPTII,S$GLB,, | Performed by: STUDENT IN AN ORGANIZED HEALTH CARE EDUCATION/TRAINING PROGRAM

## 2022-05-17 PROCEDURE — 1101F PT FALLS ASSESS-DOCD LE1/YR: CPT | Mod: CPTII,S$GLB,, | Performed by: STUDENT IN AN ORGANIZED HEALTH CARE EDUCATION/TRAINING PROGRAM

## 2022-05-17 PROCEDURE — 1101F PR PT FALLS ASSESS DOC 0-1 FALLS W/OUT INJ PAST YR: ICD-10-PCS | Mod: CPTII,S$GLB,, | Performed by: STUDENT IN AN ORGANIZED HEALTH CARE EDUCATION/TRAINING PROGRAM

## 2022-05-17 PROCEDURE — 1126F AMNT PAIN NOTED NONE PRSNT: CPT | Mod: CPTII,S$GLB,, | Performed by: STUDENT IN AN ORGANIZED HEALTH CARE EDUCATION/TRAINING PROGRAM

## 2022-05-17 PROCEDURE — 99213 PR OFFICE/OUTPT VISIT, EST, LEVL III, 20-29 MIN: ICD-10-PCS | Mod: S$GLB,,, | Performed by: STUDENT IN AN ORGANIZED HEALTH CARE EDUCATION/TRAINING PROGRAM

## 2022-05-17 PROCEDURE — 1159F PR MEDICATION LIST DOCUMENTED IN MEDICAL RECORD: ICD-10-PCS | Mod: CPTII,S$GLB,, | Performed by: STUDENT IN AN ORGANIZED HEALTH CARE EDUCATION/TRAINING PROGRAM

## 2022-05-17 PROCEDURE — 3078F DIAST BP <80 MM HG: CPT | Mod: CPTII,S$GLB,, | Performed by: STUDENT IN AN ORGANIZED HEALTH CARE EDUCATION/TRAINING PROGRAM

## 2022-05-17 PROCEDURE — 99213 OFFICE O/P EST LOW 20 MIN: CPT | Mod: S$GLB,,, | Performed by: STUDENT IN AN ORGANIZED HEALTH CARE EDUCATION/TRAINING PROGRAM

## 2022-05-17 PROCEDURE — 3075F PR MOST RECENT SYSTOLIC BLOOD PRESS GE 130-139MM HG: ICD-10-PCS | Mod: CPTII,S$GLB,, | Performed by: STUDENT IN AN ORGANIZED HEALTH CARE EDUCATION/TRAINING PROGRAM

## 2022-05-17 PROCEDURE — 3288F PR FALLS RISK ASSESSMENT DOCUMENTED: ICD-10-PCS | Mod: CPTII,S$GLB,, | Performed by: STUDENT IN AN ORGANIZED HEALTH CARE EDUCATION/TRAINING PROGRAM

## 2022-05-17 PROCEDURE — 99999 PR PBB SHADOW E&M-EST. PATIENT-LVL IV: CPT | Mod: PBBFAC,,, | Performed by: STUDENT IN AN ORGANIZED HEALTH CARE EDUCATION/TRAINING PROGRAM

## 2022-05-17 NOTE — PROGRESS NOTES
Subjective:       Patient ID: Nestor Garcia is a 77 y.o. male.    Chief Complaint: Follow-up (Pt here for er f/u for cellulitis in left arm. PT states was given antibiotics and swelling has gone down a little. Pt states is warm to touch and a little sore)    Pt here for ER follow-up.    He was seen 5/13/22 for left antecubital redness. He was prescribed clindamycin and topical bactroban. Pt states redness has improved but the swelling persists. No fever/chills.    Review of Systems   Constitutional: Negative for chills and fever.   HENT: Negative for congestion and sore throat.    Respiratory: Negative for cough and shortness of breath.    Cardiovascular: Negative for chest pain.   Gastrointestinal: Negative for abdominal pain, blood in stool, diarrhea, nausea and vomiting.   Genitourinary: Negative for dysuria and hematuria.   Skin: Positive for color change.       Objective:      Physical Exam   Constitutional: He is oriented to person, place, and time. No distress.   HENT:   Head: Normocephalic and atraumatic.   Mouth/Throat: Mucous membranes are moist.   Eyes: Conjunctivae are normal.   Cardiovascular: Normal rate, regular rhythm and normal heart sounds.   No murmur heard.  Pulmonary/Chest: Effort normal and breath sounds normal. No respiratory distress.   Musculoskeletal:      Cervical back: Neck supple.      Comments: Swelling of the left arm from hand to elbow   Neurological: He is alert and oriented to person, place, and time.        Skin: There is erythema.   Psychiatric: His behavior is normal. Mood normal.   Vitals reviewed.          Assessment:       1. Cellulitis of left arm        Plan:       Cellulitis of left arm    Cont clindamycin  RTC for worsening symptoms or failure to improve, or RTC PRN/as scheduled

## 2022-05-19 LAB
BACTERIA BLD CULT: NORMAL
BACTERIA BLD CULT: NORMAL

## 2022-05-20 ENCOUNTER — PATIENT MESSAGE (OUTPATIENT)
Dept: FAMILY MEDICINE | Facility: CLINIC | Age: 77
End: 2022-05-20
Payer: MEDICARE

## 2022-05-20 DIAGNOSIS — K21.9 GASTROESOPHAGEAL REFLUX DISEASE WITHOUT ESOPHAGITIS: Primary | ICD-10-CM

## 2022-05-20 DIAGNOSIS — L03.114 CELLULITIS OF LEFT ARM: Primary | ICD-10-CM

## 2022-05-20 RX ORDER — SUCRALFATE 1 G/10ML
1 SUSPENSION ORAL
Qty: 414 ML | Refills: 0 | Status: SHIPPED | OUTPATIENT
Start: 2022-05-20 | End: 2022-08-18 | Stop reason: ALTCHOICE

## 2022-05-20 RX ORDER — CIPROFLOXACIN 500 MG/1
500 TABLET ORAL 2 TIMES DAILY
Qty: 10 TABLET | Refills: 0 | Status: SHIPPED | OUTPATIENT
Start: 2022-05-20 | End: 2022-05-25

## 2022-05-20 RX ORDER — CLINDAMYCIN HYDROCHLORIDE 300 MG/1
300 CAPSULE ORAL 4 TIMES DAILY
Qty: 20 CAPSULE | Refills: 0 | Status: SHIPPED | OUTPATIENT
Start: 2022-05-20 | End: 2022-05-25

## 2022-07-01 ENCOUNTER — PATIENT MESSAGE (OUTPATIENT)
Dept: FAMILY MEDICINE | Facility: CLINIC | Age: 77
End: 2022-07-01

## 2022-07-01 ENCOUNTER — OFFICE VISIT (OUTPATIENT)
Dept: FAMILY MEDICINE | Facility: CLINIC | Age: 77
End: 2022-07-01
Payer: MEDICARE

## 2022-07-01 ENCOUNTER — LAB VISIT (OUTPATIENT)
Dept: LAB | Facility: HOSPITAL | Age: 77
End: 2022-07-01
Attending: STUDENT IN AN ORGANIZED HEALTH CARE EDUCATION/TRAINING PROGRAM
Payer: MEDICARE

## 2022-07-01 ENCOUNTER — PATIENT MESSAGE (OUTPATIENT)
Dept: ADMINISTRATIVE | Facility: OTHER | Age: 77
End: 2022-07-01
Payer: MEDICARE

## 2022-07-01 VITALS
BODY MASS INDEX: 36.02 KG/M2 | WEIGHT: 251.56 LBS | DIASTOLIC BLOOD PRESSURE: 94 MMHG | RESPIRATION RATE: 18 BRPM | HEART RATE: 55 BPM | OXYGEN SATURATION: 96 % | HEIGHT: 70 IN | SYSTOLIC BLOOD PRESSURE: 142 MMHG

## 2022-07-01 DIAGNOSIS — N18.32 STAGE 3B CHRONIC KIDNEY DISEASE: ICD-10-CM

## 2022-07-01 DIAGNOSIS — I10 ESSENTIAL HYPERTENSION: ICD-10-CM

## 2022-07-01 DIAGNOSIS — I50.9 HEART FAILURE, UNSPECIFIED HF CHRONICITY, UNSPECIFIED HEART FAILURE TYPE: ICD-10-CM

## 2022-07-01 DIAGNOSIS — I10 ESSENTIAL HYPERTENSION: Primary | ICD-10-CM

## 2022-07-01 LAB
ANION GAP SERPL CALC-SCNC: 10 MMOL/L (ref 8–16)
BUN SERPL-MCNC: 31 MG/DL (ref 8–23)
CALCIUM SERPL-MCNC: 8.3 MG/DL (ref 8.7–10.5)
CHLORIDE SERPL-SCNC: 98 MMOL/L (ref 95–110)
CO2 SERPL-SCNC: 32 MMOL/L (ref 23–29)
CREAT SERPL-MCNC: 2.1 MG/DL (ref 0.5–1.4)
EST. GFR  (AFRICAN AMERICAN): 34 ML/MIN/1.73 M^2
EST. GFR  (NON AFRICAN AMERICAN): 29 ML/MIN/1.73 M^2
GLUCOSE SERPL-MCNC: 140 MG/DL (ref 70–110)
POTASSIUM SERPL-SCNC: 4.2 MMOL/L (ref 3.5–5.1)
SODIUM SERPL-SCNC: 140 MMOL/L (ref 136–145)

## 2022-07-01 PROCEDURE — 99499 RISK ADDL DX/OHS AUDIT: ICD-10-PCS | Mod: HCNC,S$GLB,, | Performed by: STUDENT IN AN ORGANIZED HEALTH CARE EDUCATION/TRAINING PROGRAM

## 2022-07-01 PROCEDURE — 1126F AMNT PAIN NOTED NONE PRSNT: CPT | Mod: CPTII,S$GLB,, | Performed by: STUDENT IN AN ORGANIZED HEALTH CARE EDUCATION/TRAINING PROGRAM

## 2022-07-01 PROCEDURE — 1159F MED LIST DOCD IN RCRD: CPT | Mod: CPTII,S$GLB,, | Performed by: STUDENT IN AN ORGANIZED HEALTH CARE EDUCATION/TRAINING PROGRAM

## 2022-07-01 PROCEDURE — 3080F DIAST BP >= 90 MM HG: CPT | Mod: CPTII,S$GLB,, | Performed by: STUDENT IN AN ORGANIZED HEALTH CARE EDUCATION/TRAINING PROGRAM

## 2022-07-01 PROCEDURE — 99499 UNLISTED E&M SERVICE: CPT | Mod: HCNC,S$GLB,, | Performed by: STUDENT IN AN ORGANIZED HEALTH CARE EDUCATION/TRAINING PROGRAM

## 2022-07-01 PROCEDURE — 1160F RVW MEDS BY RX/DR IN RCRD: CPT | Mod: CPTII,S$GLB,, | Performed by: STUDENT IN AN ORGANIZED HEALTH CARE EDUCATION/TRAINING PROGRAM

## 2022-07-01 PROCEDURE — 99213 OFFICE O/P EST LOW 20 MIN: CPT | Mod: S$GLB,,, | Performed by: STUDENT IN AN ORGANIZED HEALTH CARE EDUCATION/TRAINING PROGRAM

## 2022-07-01 PROCEDURE — 1101F PR PT FALLS ASSESS DOC 0-1 FALLS W/OUT INJ PAST YR: ICD-10-PCS | Mod: CPTII,S$GLB,, | Performed by: STUDENT IN AN ORGANIZED HEALTH CARE EDUCATION/TRAINING PROGRAM

## 2022-07-01 PROCEDURE — 1101F PT FALLS ASSESS-DOCD LE1/YR: CPT | Mod: CPTII,S$GLB,, | Performed by: STUDENT IN AN ORGANIZED HEALTH CARE EDUCATION/TRAINING PROGRAM

## 2022-07-01 PROCEDURE — 3288F FALL RISK ASSESSMENT DOCD: CPT | Mod: CPTII,S$GLB,, | Performed by: STUDENT IN AN ORGANIZED HEALTH CARE EDUCATION/TRAINING PROGRAM

## 2022-07-01 PROCEDURE — 3288F PR FALLS RISK ASSESSMENT DOCUMENTED: ICD-10-PCS | Mod: CPTII,S$GLB,, | Performed by: STUDENT IN AN ORGANIZED HEALTH CARE EDUCATION/TRAINING PROGRAM

## 2022-07-01 PROCEDURE — 3077F PR MOST RECENT SYSTOLIC BLOOD PRESSURE >= 140 MM HG: ICD-10-PCS | Mod: CPTII,S$GLB,, | Performed by: STUDENT IN AN ORGANIZED HEALTH CARE EDUCATION/TRAINING PROGRAM

## 2022-07-01 PROCEDURE — 1159F PR MEDICATION LIST DOCUMENTED IN MEDICAL RECORD: ICD-10-PCS | Mod: CPTII,S$GLB,, | Performed by: STUDENT IN AN ORGANIZED HEALTH CARE EDUCATION/TRAINING PROGRAM

## 2022-07-01 PROCEDURE — 99999 PR PBB SHADOW E&M-EST. PATIENT-LVL IV: ICD-10-PCS | Mod: PBBFAC,,, | Performed by: STUDENT IN AN ORGANIZED HEALTH CARE EDUCATION/TRAINING PROGRAM

## 2022-07-01 PROCEDURE — 80048 BASIC METABOLIC PNL TOTAL CA: CPT | Performed by: STUDENT IN AN ORGANIZED HEALTH CARE EDUCATION/TRAINING PROGRAM

## 2022-07-01 PROCEDURE — 1126F PR PAIN SEVERITY QUANTIFIED, NO PAIN PRESENT: ICD-10-PCS | Mod: CPTII,S$GLB,, | Performed by: STUDENT IN AN ORGANIZED HEALTH CARE EDUCATION/TRAINING PROGRAM

## 2022-07-01 PROCEDURE — 3077F SYST BP >= 140 MM HG: CPT | Mod: CPTII,S$GLB,, | Performed by: STUDENT IN AN ORGANIZED HEALTH CARE EDUCATION/TRAINING PROGRAM

## 2022-07-01 PROCEDURE — 36415 COLL VENOUS BLD VENIPUNCTURE: CPT | Performed by: STUDENT IN AN ORGANIZED HEALTH CARE EDUCATION/TRAINING PROGRAM

## 2022-07-01 PROCEDURE — 1160F PR REVIEW ALL MEDS BY PRESCRIBER/CLIN PHARMACIST DOCUMENTED: ICD-10-PCS | Mod: CPTII,S$GLB,, | Performed by: STUDENT IN AN ORGANIZED HEALTH CARE EDUCATION/TRAINING PROGRAM

## 2022-07-01 PROCEDURE — 99999 PR PBB SHADOW E&M-EST. PATIENT-LVL IV: CPT | Mod: PBBFAC,,, | Performed by: STUDENT IN AN ORGANIZED HEALTH CARE EDUCATION/TRAINING PROGRAM

## 2022-07-01 PROCEDURE — 99213 PR OFFICE/OUTPT VISIT, EST, LEVL III, 20-29 MIN: ICD-10-PCS | Mod: S$GLB,,, | Performed by: STUDENT IN AN ORGANIZED HEALTH CARE EDUCATION/TRAINING PROGRAM

## 2022-07-01 PROCEDURE — 3080F PR MOST RECENT DIASTOLIC BLOOD PRESSURE >= 90 MM HG: ICD-10-PCS | Mod: CPTII,S$GLB,, | Performed by: STUDENT IN AN ORGANIZED HEALTH CARE EDUCATION/TRAINING PROGRAM

## 2022-07-01 RX ORDER — SPIRONOLACTONE 25 MG/1
12.5 TABLET ORAL DAILY
Qty: 15 TABLET | Refills: 1 | Status: SHIPPED | OUTPATIENT
Start: 2022-07-01 | End: 2022-08-18 | Stop reason: ALTCHOICE

## 2022-07-01 NOTE — PROGRESS NOTES
"Subjective:       Patient ID: Nestor Garcia is a 77 y.o. male.    Chief Complaint: Blister ("Water' blisters on maribell legs, x1 month)    Pt here for concerns of maribell leg swelling and some blistering to his legs which he noticed about a week ago. He was seen by cardiology recently who increased his torsemide and gave him permission to increase dosing as needed for swelling.     Review of Systems   Constitutional: Negative for activity change and unexpected weight change.   HENT: Negative for hearing loss, rhinorrhea and trouble swallowing.    Eyes: Negative for discharge and visual disturbance.   Respiratory: Negative for chest tightness, shortness of breath and wheezing.    Cardiovascular: Positive for leg swelling. Negative for chest pain and palpitations.   Gastrointestinal: Negative for blood in stool, constipation, diarrhea and vomiting.   Endocrine: Negative for polydipsia and polyuria.   Genitourinary: Negative for difficulty urinating, hematuria and urgency.   Musculoskeletal: Negative for arthralgias, joint swelling and neck pain.   Neurological: Negative for weakness and headaches.   Psychiatric/Behavioral: Negative for confusion and dysphoric mood.       Objective:      Physical Exam   Constitutional: He is oriented to person, place, and time. No distress.   HENT:   Head: Normocephalic and atraumatic.   Mouth/Throat: Mucous membranes are moist.   Eyes: Conjunctivae are normal.   Cardiovascular: Normal rate, regular rhythm and normal heart sounds.   No murmur heard.  Pulmonary/Chest: Effort normal and breath sounds normal. No respiratory distress.   Musculoskeletal:      Cervical back: Neck supple.      Right lower leg: Edema (2+) present.      Left lower leg: Edema (2+) present.   Neurological: He is alert and oriented to person, place, and time.        Psychiatric: His behavior is normal. Mood normal.   Vitals reviewed.      Assessment:       1. Essential hypertension    2. Heart failure, unspecified HF " chronicity, unspecified heart failure type    3. Stage 3b chronic kidney disease        Plan:       Essential hypertension  -     spironolactone (ALDACTONE) 25 MG tablet; Take 0.5 tablets (12.5 mg total) by mouth once daily.  Dispense: 15 tablet; Refill: 1  -     Basic Metabolic Panel; Future; Expected date: 07/01/2022    Heart failure, unspecified HF chronicity, unspecified heart failure type  -     Basic Metabolic Panel; Future; Expected date: 07/01/2022    Stage 3b chronic kidney disease           Cr clearance 38  Will stop amlodipine as this may be contributing to his AMISHA  Start spironolactone 12.5mg daily; needs close monitoring of his renal function  BMP today and repeat in 3-5 days after starting spironolactone  Compression socks  RTC 2-3 weeks

## 2022-08-05 ENCOUNTER — HOSPITAL ENCOUNTER (EMERGENCY)
Facility: HOSPITAL | Age: 77
Discharge: HOME | End: 2022-08-05
Attending: SURGERY
Payer: MEDICARE

## 2022-08-05 VITALS
RESPIRATION RATE: 17 BRPM | HEART RATE: 59 BPM | OXYGEN SATURATION: 96 % | SYSTOLIC BLOOD PRESSURE: 173 MMHG | TEMPERATURE: 98 F | DIASTOLIC BLOOD PRESSURE: 71 MMHG

## 2022-08-05 DIAGNOSIS — R53.83 FATIGUE: ICD-10-CM

## 2022-08-05 DIAGNOSIS — N17.9 AKI (ACUTE KIDNEY INJURY): ICD-10-CM

## 2022-08-05 DIAGNOSIS — I95.9 HYPOTENSION, UNSPECIFIED HYPOTENSION TYPE: Primary | ICD-10-CM

## 2022-08-05 LAB
ALBUMIN SERPL BCP-MCNC: 2.8 G/DL (ref 3.5–5.2)
ALP SERPL-CCNC: 62 U/L (ref 55–135)
ALT SERPL W/O P-5'-P-CCNC: 20 U/L (ref 10–44)
ANION GAP SERPL CALC-SCNC: 12 MMOL/L (ref 8–16)
ANION GAP SERPL CALC-SCNC: 9 MMOL/L (ref 8–16)
AST SERPL-CCNC: 17 U/L (ref 10–40)
BASOPHILS # BLD AUTO: 0.05 K/UL (ref 0–0.2)
BASOPHILS NFR BLD: 0.7 % (ref 0–1.9)
BILIRUB SERPL-MCNC: 0.4 MG/DL (ref 0.1–1)
BNP SERPL-MCNC: 117 PG/ML (ref 0–99)
BUN SERPL-MCNC: 39 MG/DL (ref 8–23)
BUN SERPL-MCNC: 40 MG/DL (ref 8–23)
CALCIUM SERPL-MCNC: 7.7 MG/DL (ref 8.7–10.5)
CALCIUM SERPL-MCNC: 7.8 MG/DL (ref 8.7–10.5)
CHLORIDE SERPL-SCNC: 101 MMOL/L (ref 95–110)
CHLORIDE SERPL-SCNC: 99 MMOL/L (ref 95–110)
CK MB SERPL-MCNC: 1.5 NG/ML (ref 0.1–6.5)
CK MB SERPL-RTO: 2.7 % (ref 0–5)
CK SERPL-CCNC: 55 U/L (ref 20–200)
CK SERPL-CCNC: 55 U/L (ref 20–200)
CO2 SERPL-SCNC: 25 MMOL/L (ref 23–29)
CO2 SERPL-SCNC: 26 MMOL/L (ref 23–29)
CREAT SERPL-MCNC: 2.5 MG/DL (ref 0.5–1.4)
CREAT SERPL-MCNC: 2.6 MG/DL (ref 0.5–1.4)
DIFFERENTIAL METHOD: ABNORMAL
EOSINOPHIL # BLD AUTO: 0.2 K/UL (ref 0–0.5)
EOSINOPHIL NFR BLD: 2.5 % (ref 0–8)
ERYTHROCYTE [DISTWIDTH] IN BLOOD BY AUTOMATED COUNT: 12.7 % (ref 11.5–14.5)
EST. GFR  (NO RACE VARIABLE): 24 ML/MIN/1.73 M^2
EST. GFR  (NO RACE VARIABLE): 25 ML/MIN/1.73 M^2
GLUCOSE SERPL-MCNC: 179 MG/DL (ref 70–110)
GLUCOSE SERPL-MCNC: 246 MG/DL (ref 70–110)
HCT VFR BLD AUTO: 37 % (ref 40–54)
HGB BLD-MCNC: 12.6 G/DL (ref 14–18)
IMM GRANULOCYTES # BLD AUTO: 0.03 K/UL (ref 0–0.04)
IMM GRANULOCYTES NFR BLD AUTO: 0.4 % (ref 0–0.5)
LYMPHOCYTES # BLD AUTO: 1.3 K/UL (ref 1–4.8)
LYMPHOCYTES NFR BLD: 18.8 % (ref 18–48)
MCH RBC QN AUTO: 30.8 PG (ref 27–31)
MCHC RBC AUTO-ENTMCNC: 34.1 G/DL (ref 32–36)
MCV RBC AUTO: 91 FL (ref 82–98)
MONOCYTES # BLD AUTO: 0.8 K/UL (ref 0.3–1)
MONOCYTES NFR BLD: 11.8 % (ref 4–15)
NEUTROPHILS # BLD AUTO: 4.5 K/UL (ref 1.8–7.7)
NEUTROPHILS NFR BLD: 65.8 % (ref 38–73)
NRBC BLD-RTO: 0 /100 WBC
PLATELET # BLD AUTO: 217 K/UL (ref 150–450)
PMV BLD AUTO: 11.4 FL (ref 9.2–12.9)
POTASSIUM SERPL-SCNC: 4.5 MMOL/L (ref 3.5–5.1)
POTASSIUM SERPL-SCNC: 5.1 MMOL/L (ref 3.5–5.1)
PROT SERPL-MCNC: 5.6 G/DL (ref 6–8.4)
RBC # BLD AUTO: 4.09 M/UL (ref 4.6–6.2)
SODIUM SERPL-SCNC: 136 MMOL/L (ref 136–145)
SODIUM SERPL-SCNC: 136 MMOL/L (ref 136–145)
TROPONIN I SERPL DL<=0.01 NG/ML-MCNC: 0.01 NG/ML (ref 0–0.03)
WBC # BLD AUTO: 6.88 K/UL (ref 3.9–12.7)

## 2022-08-05 PROCEDURE — 93010 EKG 12-LEAD: ICD-10-PCS | Mod: ,,, | Performed by: INTERNAL MEDICINE

## 2022-08-05 PROCEDURE — 25000003 PHARM REV CODE 250: Performed by: SURGERY

## 2022-08-05 PROCEDURE — 82553 CREATINE MB FRACTION: CPT | Performed by: SURGERY

## 2022-08-05 PROCEDURE — 80048 BASIC METABOLIC PNL TOTAL CA: CPT | Mod: XB | Performed by: SURGERY

## 2022-08-05 PROCEDURE — 96360 HYDRATION IV INFUSION INIT: CPT

## 2022-08-05 PROCEDURE — 85025 COMPLETE CBC W/AUTO DIFF WBC: CPT | Performed by: SURGERY

## 2022-08-05 PROCEDURE — 99285 EMERGENCY DEPT VISIT HI MDM: CPT | Mod: 25

## 2022-08-05 PROCEDURE — 96361 HYDRATE IV INFUSION ADD-ON: CPT | Mod: 59

## 2022-08-05 PROCEDURE — 84484 ASSAY OF TROPONIN QUANT: CPT | Performed by: SURGERY

## 2022-08-05 PROCEDURE — 93005 ELECTROCARDIOGRAM TRACING: CPT

## 2022-08-05 PROCEDURE — 80053 COMPREHEN METABOLIC PANEL: CPT | Performed by: SURGERY

## 2022-08-05 PROCEDURE — 36415 COLL VENOUS BLD VENIPUNCTURE: CPT | Performed by: SURGERY

## 2022-08-05 PROCEDURE — 93010 ELECTROCARDIOGRAM REPORT: CPT | Mod: ,,, | Performed by: INTERNAL MEDICINE

## 2022-08-05 PROCEDURE — 83880 ASSAY OF NATRIURETIC PEPTIDE: CPT | Performed by: SURGERY

## 2022-08-05 RX ORDER — SODIUM CHLORIDE 9 MG/ML
1000 INJECTION, SOLUTION INTRAVENOUS
Status: COMPLETED | OUTPATIENT
Start: 2022-08-05 | End: 2022-08-05

## 2022-08-05 RX ORDER — SODIUM CHLORIDE 9 MG/ML
INJECTION, SOLUTION INTRAVENOUS
Status: COMPLETED | OUTPATIENT
Start: 2022-08-05 | End: 2022-08-05

## 2022-08-05 RX ORDER — NIFEDIPINE 20 MG/1
60 CAPSULE ORAL EVERY 8 HOURS
COMMUNITY
End: 2022-08-18 | Stop reason: ALTCHOICE

## 2022-08-05 RX ORDER — CLOPIDOGREL BISULFATE 75 MG/1
75 TABLET ORAL DAILY
Status: ON HOLD | COMMUNITY
Start: 2022-07-21 | End: 2023-04-15 | Stop reason: HOSPADM

## 2022-08-05 RX ORDER — BUMETANIDE 2 MG/1
2 TABLET ORAL 2 TIMES DAILY
Status: ON HOLD | COMMUNITY
End: 2022-08-23 | Stop reason: HOSPADM

## 2022-08-05 RX ADMIN — SODIUM CHLORIDE: 0.9 INJECTION, SOLUTION INTRAVENOUS at 10:08

## 2022-08-05 RX ADMIN — SODIUM CHLORIDE 1000 ML: 0.9 INJECTION, SOLUTION INTRAVENOUS at 11:08

## 2022-08-05 RX ADMIN — SODIUM CHLORIDE 1000 ML: 0.9 INJECTION, SOLUTION INTRAVENOUS at 09:08

## 2022-08-05 NOTE — ED PROVIDER NOTES
Encounter Date: 8/5/2022       History     Chief Complaint   Patient presents with    Hypotension     77-year-old male presents with weakness and fatigue today  Patient with no obvious signs of distress, hypotensive today  Patient was recently changed on his dose of Procardia by [facility]  Patient took 60 milligrams of XR Procardia this morning PTA  Patient however took 30 milligrams last night, 6 hours ago  Patient presents with mild hypotension with a negative ROS        Review of patient's allergies indicates:   Allergen Reactions    Hydralazine analogues Itching     Past Medical History:   Diagnosis Date    CHF (congestive heart failure)     Diabetes mellitus type II     Hypertension      Past Surgical History:   Procedure Laterality Date    APPENDECTOMY      BACK SURGERY      CHOLECYSTECTOMY      INNER EAR SURGERY      KNEE SURGERY       Family History   Problem Relation Age of Onset    Alzheimer's disease Mother     Heart disease Father      Social History     Tobacco Use    Smoking status: Never Smoker    Smokeless tobacco: Never Used   Substance Use Topics    Alcohol use: No    Drug use: No     Review of Systems   Constitutional: Positive for fatigue. Negative for activity change, appetite change, fever and unexpected weight change.   HENT: Negative for congestion, ear pain, mouth sores, nosebleeds, rhinorrhea, sinus pressure, sneezing and sore throat.    Eyes: Negative for pain, discharge, redness and itching.   Respiratory: Negative for apnea, cough, chest tightness and shortness of breath.    Cardiovascular: Negative for chest pain, palpitations and leg swelling.   Gastrointestinal: Negative for abdominal distention, abdominal pain, anal bleeding, constipation, diarrhea, nausea and vomiting.   Endocrine: Negative.    Genitourinary: Negative for dysuria, enuresis, flank pain and frequency.   Musculoskeletal: Negative for arthralgias, back pain, neck pain and neck stiffness.   Skin: Negative for  color change and wound.   Allergic/Immunologic: Negative.    Neurological: Positive for weakness. Negative for dizziness, tremors, syncope, facial asymmetry, speech difficulty, light-headedness, numbness and headaches.   Hematological: Negative for adenopathy. Does not bruise/bleed easily.   Psychiatric/Behavioral: Negative for agitation, behavioral problems, hallucinations, self-injury and suicidal ideas. The patient is not nervous/anxious.        Physical Exam     Initial Vitals [08/05/22 0904]   BP Pulse Resp Temp SpO2   (!) 96/55 (!) 42 16 98 °F (36.7 °C) 95 %      MAP       --         Physical Exam    Nursing note and vitals reviewed.  Constitutional: Vital signs are normal. He appears well-developed and well-nourished. He is cooperative.   HENT:   Head: Normocephalic and atraumatic.   Right Ear: Hearing, tympanic membrane, external ear and ear canal normal.   Left Ear: Hearing, tympanic membrane, external ear and ear canal normal.   Nose: Nose normal.   Mouth/Throat: Uvula is midline, oropharynx is clear and moist and mucous membranes are normal.   Eyes: Conjunctivae, EOM and lids are normal. Pupils are equal, round, and reactive to light.   Neck: Neck supple. No JVD present.   Normal range of motion.   Full passive range of motion without pain.     Cardiovascular: Normal rate, regular rhythm, S1 normal, S2 normal, normal heart sounds, intact distal pulses and normal pulses.   Pulmonary/Chest: Effort normal and breath sounds normal.   Abdominal: Abdomen is soft and flat. Bowel sounds are normal.   Musculoskeletal:         General: Normal range of motion.      Cervical back: Full passive range of motion without pain, normal range of motion and neck supple.     Neurological: He is alert and oriented to person, place, and time. He has normal strength.   Skin: Skin is warm, dry and intact. Capillary refill takes less than 2 seconds.         ED Course   Procedures  Labs Reviewed   COMPREHENSIVE METABOLIC PANEL -  Abnormal; Notable for the following components:       Result Value    Glucose 246 (*)     BUN 40 (*)     Creatinine 2.6 (*)     Calcium 7.8 (*)     Total Protein 5.6 (*)     Albumin 2.8 (*)     eGFR 24 (*)     All other components within normal limits   CBC W/ AUTO DIFFERENTIAL - Abnormal; Notable for the following components:    RBC 4.09 (*)     Hemoglobin 12.6 (*)     Hematocrit 37.0 (*)     All other components within normal limits   B-TYPE NATRIURETIC PEPTIDE - Abnormal; Notable for the following components:     (*)     All other components within normal limits   BASIC METABOLIC PANEL - Abnormal; Notable for the following components:    Glucose 179 (*)     BUN 39 (*)     Creatinine 2.5 (*)     Calcium 7.7 (*)     eGFR 25 (*)     All other components within normal limits   TROPONIN I   CK-MB   CK     EKG Readings: (Independently Interpreted)   Initial Reading: No STEMI. Rhythm: Normal Sinus Rhythm. Heart Rate: 60. Ectopy: No Ectopy. ST Segments: Normal ST Segments. T Waves: Normal. Axis: Normal.     ECG Results          EKG 12-lead (Final result)  Result time 08/05/22 14:30:03    Final result by Interface, Lab In [facility] (08/05/22 14:30:03)                 Narrative:    Test Reason : R53.83    Vent. Rate : 040 BPM     Atrial Rate : 250 BPM     P-R Int : 000 ms          QRS Dur : 136 ms      QT Int : 538 ms       P-R-T Axes : 000 -46 -37 degrees     QTc Int : 438 ms    Junctional rhythm  Left axis deviation  LVH with QRS widening  Septal infarct ,age undetermined  T wave abnormality, consider inferolateral ischemia  Abnormal ECG  When compared with ECG of 06-JAN-2022 22:50,  Junctional rhythm has replaced Sinus rhythm  T wave inversion now evident in Inferolateral leads  Confirmed by Db Mckeon MD ([id number]) on 8/5/2022 2:29:55 PM    Referred By: [email or web address]   SELF           Confirmed By:Db Mckeon MD                            Imaging Results          X-Ray Chest 1 View (Final result)  Result time 08/05/22  09:26:07    Final result by Yi Dc MD (08/05/22 09:26:07)                 Impression:      As above.      Electronically signed by: Yi Dc MD  Date:    08/05/2022  Time:    09:26             Narrative:    EXAMINATION:  XR CHEST 1 VIEW    CLINICAL HISTORY:  CP;    TECHNIQUE:  Single frontal view of the chest was performed.    COMPARISON:  01/06/2022    FINDINGS:  The heart is mildly enlarged.  There is mild prominence of the central pulmonary vascularity.  No consolidation.  No pleural effusions.  Skeletal structures are intact.                                 Medications   sodium chloride 0.9% bolus 1,000 mL (0 mLs Intravenous Stopped 8/5/22 1016)   0.9%  NaCl infusion ( Intravenous Stopped 8/5/22 1056)   0.9%  NaCl infusion (0 mLs Intravenous Stopped 8/5/22 1255)     Medical Decision Making:   Initial Assessment:   Patient with hypotension home after taking Procardia today  Was switched from 30 milligrams b.i.d. to 60 mg in the morning  Patient has a negative review of systems, daughter at bedside    Differential Diagnosis:   Hypotension, medication reaction, dehydration, syncope, near syncope, metabolic process    Clinical Tests:   Lab Tests: Ordered and Reviewed  Radiological Study: Ordered and Reviewed  Medical Tests: Ordered and Reviewed    ED Management:  Patient with hypotension on arrival with IV fluids on initial intervention  Creatinine of 2.6 with IV fluids given, hypotension is dissipated now  Patient feeling fine, negative workup with troponins in the ER today  Repeat creatinine is 2.5, outpatient BMP ordered for next week  Daughter will decrease the patient's Bumex dosing, [facility] follow-up  Daughter has active discussion with [facility] NP regarding medications  Counseled to normal BP on discharge, return with any concerns                      Clinical Impression:   Final diagnoses:  [R53.83] Fatigue  [I95.9] Hypotension, unspecified hypotension type (Primary)  [N17.9] KRISTI (acute kidney  injury)          ED Disposition Condition    Discharge Stable           Genaro Marsh MD  08/05/22 8432

## 2022-08-09 ENCOUNTER — LAB VISIT (OUTPATIENT)
Dept: LAB | Facility: HOSPITAL | Age: 77
End: 2022-08-09
Attending: SURGERY
Payer: MEDICARE

## 2022-08-09 DIAGNOSIS — N17.9 AKI (ACUTE KIDNEY INJURY): ICD-10-CM

## 2022-08-09 LAB
ANION GAP SERPL CALC-SCNC: 12 MMOL/L (ref 8–16)
BUN SERPL-MCNC: 39 MG/DL (ref 8–23)
CALCIUM SERPL-MCNC: 8.4 MG/DL (ref 8.7–10.5)
CHLORIDE SERPL-SCNC: 100 MMOL/L (ref 95–110)
CO2 SERPL-SCNC: 26 MMOL/L (ref 23–29)
CREAT SERPL-MCNC: 2.4 MG/DL (ref 0.5–1.4)
EST. GFR  (NO RACE VARIABLE): 27 ML/MIN/1.73 M^2
GLUCOSE SERPL-MCNC: 135 MG/DL (ref 70–110)
POTASSIUM SERPL-SCNC: 4.6 MMOL/L (ref 3.5–5.1)
SODIUM SERPL-SCNC: 138 MMOL/L (ref 136–145)

## 2022-08-09 PROCEDURE — 80048 BASIC METABOLIC PNL TOTAL CA: CPT | Performed by: SURGERY

## 2022-08-09 PROCEDURE — 36415 COLL VENOUS BLD VENIPUNCTURE: CPT | Performed by: SURGERY

## 2022-08-11 ENCOUNTER — PATIENT MESSAGE (OUTPATIENT)
Dept: FAMILY MEDICINE | Facility: CLINIC | Age: 77
End: 2022-08-11
Payer: MEDICARE

## 2022-08-11 DIAGNOSIS — E11.42 TYPE 2 DIABETES MELLITUS WITH DIABETIC POLYNEUROPATHY, WITH LONG-TERM CURRENT USE OF INSULIN: Primary | ICD-10-CM

## 2022-08-11 DIAGNOSIS — Z79.4 TYPE 2 DIABETES MELLITUS WITH DIABETIC POLYNEUROPATHY, WITH LONG-TERM CURRENT USE OF INSULIN: Primary | ICD-10-CM

## 2022-08-12 ENCOUNTER — PATIENT MESSAGE (OUTPATIENT)
Dept: FAMILY MEDICINE | Facility: CLINIC | Age: 77
End: 2022-08-12
Payer: MEDICARE

## 2022-08-12 RX ORDER — SYRING-NEEDL,DISP,INSUL,0.3 ML 31GX15/64"
SYRINGE, EMPTY DISPOSABLE MISCELLANEOUS
Qty: 450 EACH | Refills: 5 | Status: SHIPPED | OUTPATIENT
Start: 2022-08-12

## 2022-08-12 NOTE — TELEPHONE ENCOUNTER
We need to make sure we have all the records from his endocrinologist. Otherwise not much to do. I will send refill.  MB

## 2022-08-18 ENCOUNTER — HOSPITAL ENCOUNTER (INPATIENT)
Facility: HOSPITAL | Age: 77
LOS: 5 days | Discharge: HOME HEALTH | DRG: 304 | End: 2022-08-23
Attending: STUDENT IN AN ORGANIZED HEALTH CARE EDUCATION/TRAINING PROGRAM | Admitting: INTERNAL MEDICINE
Payer: MEDICARE

## 2022-08-18 DIAGNOSIS — R79.89 ELEVATED D-DIMER: ICD-10-CM

## 2022-08-18 DIAGNOSIS — R06.02 SHORTNESS OF BREATH: ICD-10-CM

## 2022-08-18 DIAGNOSIS — I50.30 DIASTOLIC CHF: ICD-10-CM

## 2022-08-18 DIAGNOSIS — E83.39 HYPERPHOSPHATEMIA: ICD-10-CM

## 2022-08-18 DIAGNOSIS — I50.33 ACUTE ON CHRONIC DIASTOLIC HEART FAILURE: ICD-10-CM

## 2022-08-18 DIAGNOSIS — I50.9 CHF (CONGESTIVE HEART FAILURE): ICD-10-CM

## 2022-08-18 DIAGNOSIS — I16.1 HYPERTENSIVE EMERGENCY: Primary | ICD-10-CM

## 2022-08-18 PROBLEM — G45.9 TIA (TRANSIENT ISCHEMIC ATTACK): Status: ACTIVE | Noted: 2022-08-18

## 2022-08-18 PROBLEM — R00.1 BRADYCARDIA: Status: ACTIVE | Noted: 2022-08-18

## 2022-08-18 PROBLEM — J96.01 ACUTE RESPIRATORY FAILURE WITH HYPOXIA: Status: ACTIVE | Noted: 2022-08-18

## 2022-08-18 PROBLEM — N18.4 CKD (CHRONIC KIDNEY DISEASE) STAGE 4, GFR 15-29 ML/MIN: Status: ACTIVE | Noted: 2022-08-18

## 2022-08-18 PROBLEM — G47.33 OSA ON CPAP: Status: ACTIVE | Noted: 2022-08-18

## 2022-08-18 LAB
ALBUMIN SERPL BCP-MCNC: 2.9 G/DL (ref 3.5–5.2)
ALP SERPL-CCNC: 67 U/L (ref 55–135)
ALT SERPL W/O P-5'-P-CCNC: 18 U/L (ref 10–44)
ANION GAP SERPL CALC-SCNC: 10 MMOL/L (ref 8–16)
AST SERPL-CCNC: 16 U/L (ref 10–40)
BASOPHILS # BLD AUTO: 0.03 K/UL (ref 0–0.2)
BASOPHILS NFR BLD: 0.5 % (ref 0–1.9)
BILIRUB SERPL-MCNC: 0.5 MG/DL (ref 0.1–1)
BNP SERPL-MCNC: 435 PG/ML (ref 0–99)
BUN SERPL-MCNC: 36 MG/DL (ref 8–23)
CALCIUM SERPL-MCNC: 8.4 MG/DL (ref 8.7–10.5)
CHLORIDE SERPL-SCNC: 100 MMOL/L (ref 95–110)
CO2 SERPL-SCNC: 28 MMOL/L (ref 23–29)
CREAT SERPL-MCNC: 2.3 MG/DL (ref 0.5–1.4)
D DIMER PPP IA.FEU-MCNC: 1.45 MG/L FEU
DIFFERENTIAL METHOD: ABNORMAL
EOSINOPHIL # BLD AUTO: 0.2 K/UL (ref 0–0.5)
EOSINOPHIL NFR BLD: 3.1 % (ref 0–8)
ERYTHROCYTE [DISTWIDTH] IN BLOOD BY AUTOMATED COUNT: 12.8 % (ref 11.5–14.5)
EST. GFR  (NO RACE VARIABLE): 28 ML/MIN/1.73 M^2
GLUCOSE SERPL-MCNC: 138 MG/DL (ref 70–110)
HCT VFR BLD AUTO: 36.9 % (ref 40–54)
HGB BLD-MCNC: 12.9 G/DL (ref 14–18)
IMM GRANULOCYTES # BLD AUTO: 0.03 K/UL (ref 0–0.04)
IMM GRANULOCYTES NFR BLD AUTO: 0.5 % (ref 0–0.5)
LYMPHOCYTES # BLD AUTO: 1.2 K/UL (ref 1–4.8)
LYMPHOCYTES NFR BLD: 21.1 % (ref 18–48)
MAGNESIUM SERPL-MCNC: 1.8 MG/DL (ref 1.6–2.6)
MCH RBC QN AUTO: 31.5 PG (ref 27–31)
MCHC RBC AUTO-ENTMCNC: 35 G/DL (ref 32–36)
MCV RBC AUTO: 90 FL (ref 82–98)
MONOCYTES # BLD AUTO: 0.8 K/UL (ref 0.3–1)
MONOCYTES NFR BLD: 15.3 % (ref 4–15)
NEUTROPHILS # BLD AUTO: 3.3 K/UL (ref 1.8–7.7)
NEUTROPHILS NFR BLD: 59.5 % (ref 38–73)
NRBC BLD-RTO: 0 /100 WBC
PHOSPHATE SERPL-MCNC: 4.6 MG/DL (ref 2.7–4.5)
PLATELET # BLD AUTO: 204 K/UL (ref 150–450)
PMV BLD AUTO: 11.1 FL (ref 9.2–12.9)
POCT GLUCOSE: 136 MG/DL (ref 70–110)
POTASSIUM SERPL-SCNC: 4.6 MMOL/L (ref 3.5–5.1)
PROT SERPL-MCNC: 5.7 G/DL (ref 6–8.4)
RBC # BLD AUTO: 4.1 M/UL (ref 4.6–6.2)
SARS-COV-2 RDRP RESP QL NAA+PROBE: NEGATIVE
SODIUM SERPL-SCNC: 138 MMOL/L (ref 136–145)
TROPONIN I SERPL DL<=0.01 NG/ML-MCNC: 0.01 NG/ML (ref 0–0.03)
WBC # BLD AUTO: 5.5 K/UL (ref 3.9–12.7)

## 2022-08-18 PROCEDURE — 99223 1ST HOSP IP/OBS HIGH 75: CPT | Mod: AI,,, | Performed by: STUDENT IN AN ORGANIZED HEALTH CARE EDUCATION/TRAINING PROGRAM

## 2022-08-18 PROCEDURE — 84484 ASSAY OF TROPONIN QUANT: CPT | Performed by: STUDENT IN AN ORGANIZED HEALTH CARE EDUCATION/TRAINING PROGRAM

## 2022-08-18 PROCEDURE — 93010 ELECTROCARDIOGRAM REPORT: CPT | Mod: ,,, | Performed by: INTERNAL MEDICINE

## 2022-08-18 PROCEDURE — 96361 HYDRATE IV INFUSION ADD-ON: CPT

## 2022-08-18 PROCEDURE — 83880 ASSAY OF NATRIURETIC PEPTIDE: CPT | Performed by: STUDENT IN AN ORGANIZED HEALTH CARE EDUCATION/TRAINING PROGRAM

## 2022-08-18 PROCEDURE — 63600175 PHARM REV CODE 636 W HCPCS

## 2022-08-18 PROCEDURE — U0002 COVID-19 LAB TEST NON-CDC: HCPCS | Performed by: STUDENT IN AN ORGANIZED HEALTH CARE EDUCATION/TRAINING PROGRAM

## 2022-08-18 PROCEDURE — 93005 ELECTROCARDIOGRAM TRACING: CPT

## 2022-08-18 PROCEDURE — 25000003 PHARM REV CODE 250

## 2022-08-18 PROCEDURE — 25000003 PHARM REV CODE 250: Performed by: NURSE PRACTITIONER

## 2022-08-18 PROCEDURE — 25000003 PHARM REV CODE 250: Performed by: STUDENT IN AN ORGANIZED HEALTH CARE EDUCATION/TRAINING PROGRAM

## 2022-08-18 PROCEDURE — 96375 TX/PRO/DX INJ NEW DRUG ADDON: CPT

## 2022-08-18 PROCEDURE — 99291 CRITICAL CARE FIRST HOUR: CPT | Mod: 25

## 2022-08-18 PROCEDURE — 63600175 PHARM REV CODE 636 W HCPCS: Performed by: STUDENT IN AN ORGANIZED HEALTH CARE EDUCATION/TRAINING PROGRAM

## 2022-08-18 PROCEDURE — 93010 EKG 12-LEAD: ICD-10-PCS | Mod: ,,, | Performed by: INTERNAL MEDICINE

## 2022-08-18 PROCEDURE — 96365 THER/PROPH/DIAG IV INF INIT: CPT

## 2022-08-18 PROCEDURE — 20000000 HC ICU ROOM

## 2022-08-18 PROCEDURE — 84100 ASSAY OF PHOSPHORUS: CPT | Performed by: STUDENT IN AN ORGANIZED HEALTH CARE EDUCATION/TRAINING PROGRAM

## 2022-08-18 PROCEDURE — 99223 PR INITIAL HOSPITAL CARE,LEVL III: ICD-10-PCS | Mod: AI,,, | Performed by: STUDENT IN AN ORGANIZED HEALTH CARE EDUCATION/TRAINING PROGRAM

## 2022-08-18 PROCEDURE — 85025 COMPLETE CBC W/AUTO DIFF WBC: CPT | Performed by: STUDENT IN AN ORGANIZED HEALTH CARE EDUCATION/TRAINING PROGRAM

## 2022-08-18 PROCEDURE — 94760 N-INVAS EAR/PLS OXIMETRY 1: CPT

## 2022-08-18 PROCEDURE — 80053 COMPREHEN METABOLIC PANEL: CPT | Performed by: STUDENT IN AN ORGANIZED HEALTH CARE EDUCATION/TRAINING PROGRAM

## 2022-08-18 PROCEDURE — 83735 ASSAY OF MAGNESIUM: CPT | Performed by: STUDENT IN AN ORGANIZED HEALTH CARE EDUCATION/TRAINING PROGRAM

## 2022-08-18 PROCEDURE — C9399 UNCLASSIFIED DRUGS OR BIOLOG: HCPCS | Performed by: STUDENT IN AN ORGANIZED HEALTH CARE EDUCATION/TRAINING PROGRAM

## 2022-08-18 PROCEDURE — 85379 FIBRIN DEGRADATION QUANT: CPT | Performed by: STUDENT IN AN ORGANIZED HEALTH CARE EDUCATION/TRAINING PROGRAM

## 2022-08-18 RX ORDER — SODIUM CHLORIDE 0.9 % (FLUSH) 0.9 %
10 SYRINGE (ML) INJECTION
Status: DISCONTINUED | OUTPATIENT
Start: 2022-08-18 | End: 2022-08-23 | Stop reason: HOSPADM

## 2022-08-18 RX ORDER — IBUPROFEN 200 MG
24 TABLET ORAL
Status: DISCONTINUED | OUTPATIENT
Start: 2022-08-18 | End: 2022-08-23 | Stop reason: HOSPADM

## 2022-08-18 RX ORDER — CLOPIDOGREL BISULFATE 75 MG/1
75 TABLET ORAL DAILY
Status: DISCONTINUED | OUTPATIENT
Start: 2022-08-19 | End: 2022-08-23 | Stop reason: HOSPADM

## 2022-08-18 RX ORDER — NAPROXEN SODIUM 220 MG/1
243 TABLET, FILM COATED ORAL
Status: COMPLETED | OUTPATIENT
Start: 2022-08-18 | End: 2022-08-18

## 2022-08-18 RX ORDER — GLUCAGON 1 MG
1 KIT INJECTION
Status: DISCONTINUED | OUTPATIENT
Start: 2022-08-18 | End: 2022-08-23 | Stop reason: HOSPADM

## 2022-08-18 RX ORDER — ATROPINE SULFATE 0.1 MG/ML
INJECTION INTRAVENOUS
Status: COMPLETED
Start: 2022-08-18 | End: 2022-08-18

## 2022-08-18 RX ORDER — CLONIDINE HYDROCHLORIDE 0.1 MG/1
0.1 TABLET ORAL 2 TIMES DAILY
Status: ON HOLD | COMMUNITY
Start: 2022-08-13 | End: 2022-08-23 | Stop reason: HOSPADM

## 2022-08-18 RX ORDER — ENALAPRILAT 1.25 MG/ML
1.25 INJECTION INTRAVENOUS
Status: DISCONTINUED | OUTPATIENT
Start: 2022-08-18 | End: 2022-08-18

## 2022-08-18 RX ORDER — NITROGLYCERIN 20 MG/100ML
INJECTION INTRAVENOUS
Status: COMPLETED
Start: 2022-08-18 | End: 2022-08-18

## 2022-08-18 RX ORDER — IBUPROFEN 200 MG
16 TABLET ORAL
Status: DISCONTINUED | OUTPATIENT
Start: 2022-08-18 | End: 2022-08-23 | Stop reason: HOSPADM

## 2022-08-18 RX ORDER — PANTOPRAZOLE SODIUM 40 MG/1
40 TABLET, DELAYED RELEASE ORAL DAILY
Status: DISCONTINUED | OUTPATIENT
Start: 2022-08-19 | End: 2022-08-23 | Stop reason: HOSPADM

## 2022-08-18 RX ORDER — SERTRALINE HYDROCHLORIDE 25 MG/1
25 TABLET, FILM COATED ORAL DAILY
Status: DISCONTINUED | OUTPATIENT
Start: 2022-08-19 | End: 2022-08-20

## 2022-08-18 RX ORDER — TALC
6 POWDER (GRAM) TOPICAL NIGHTLY PRN
Status: DISCONTINUED | OUTPATIENT
Start: 2022-08-18 | End: 2022-08-23 | Stop reason: HOSPADM

## 2022-08-18 RX ORDER — ATROPINE SULFATE 0.1 MG/ML
INJECTION INTRAVENOUS
Status: DISCONTINUED
Start: 2022-08-18 | End: 2022-08-23 | Stop reason: HOSPADM

## 2022-08-18 RX ORDER — ATROPINE SULFATE 1 MG/ML
1 INJECTION, SOLUTION INTRAMUSCULAR; INTRAVENOUS; SUBCUTANEOUS
Status: COMPLETED | OUTPATIENT
Start: 2022-08-18 | End: 2022-08-18

## 2022-08-18 RX ORDER — NAPROXEN SODIUM 220 MG/1
324 TABLET, FILM COATED ORAL
Status: DISCONTINUED | OUTPATIENT
Start: 2022-08-18 | End: 2022-08-18

## 2022-08-18 RX ORDER — ACETAMINOPHEN 325 MG/1
650 TABLET ORAL EVERY 6 HOURS PRN
Status: DISCONTINUED | OUTPATIENT
Start: 2022-08-18 | End: 2022-08-23 | Stop reason: HOSPADM

## 2022-08-18 RX ORDER — ISOSORBIDE DINITRATE 20 MG/1
40 TABLET ORAL 3 TIMES DAILY
Status: DISCONTINUED | OUTPATIENT
Start: 2022-08-18 | End: 2022-08-23 | Stop reason: HOSPADM

## 2022-08-18 RX ORDER — NITROGLYCERIN 20 MG/100ML
5 INJECTION INTRAVENOUS CONTINUOUS
Status: DISCONTINUED | OUTPATIENT
Start: 2022-08-18 | End: 2022-08-18

## 2022-08-18 RX ORDER — DULOXETIN HYDROCHLORIDE 20 MG/1
20 CAPSULE, DELAYED RELEASE ORAL DAILY
Status: DISCONTINUED | OUTPATIENT
Start: 2022-08-19 | End: 2022-08-18

## 2022-08-18 RX ORDER — MUPIROCIN 20 MG/G
OINTMENT TOPICAL 2 TIMES DAILY
Status: COMPLETED | OUTPATIENT
Start: 2022-08-18 | End: 2022-08-23

## 2022-08-18 RX ORDER — DOXAZOSIN 2 MG/1
8 TABLET ORAL EVERY 12 HOURS
Status: DISCONTINUED | OUTPATIENT
Start: 2022-08-18 | End: 2022-08-23 | Stop reason: HOSPADM

## 2022-08-18 RX ORDER — ASPIRIN 81 MG/1
81 TABLET ORAL DAILY
Status: DISCONTINUED | OUTPATIENT
Start: 2022-08-19 | End: 2022-08-23 | Stop reason: HOSPADM

## 2022-08-18 RX ORDER — TALC
6 POWDER (GRAM) TOPICAL NIGHTLY PRN
Status: DISCONTINUED | OUTPATIENT
Start: 2022-08-18 | End: 2022-08-18

## 2022-08-18 RX ORDER — NIFEDIPINE 30 MG/1
60 TABLET, EXTENDED RELEASE ORAL DAILY
Status: DISCONTINUED | OUTPATIENT
Start: 2022-08-18 | End: 2022-08-23 | Stop reason: HOSPADM

## 2022-08-18 RX ORDER — INSULIN ASPART 100 [IU]/ML
0-5 INJECTION, SOLUTION INTRAVENOUS; SUBCUTANEOUS
Status: DISCONTINUED | OUTPATIENT
Start: 2022-08-18 | End: 2022-08-23 | Stop reason: HOSPADM

## 2022-08-18 RX ORDER — FUROSEMIDE 10 MG/ML
40 INJECTION INTRAMUSCULAR; INTRAVENOUS
Status: COMPLETED | OUTPATIENT
Start: 2022-08-18 | End: 2022-08-18

## 2022-08-18 RX ADMIN — ATROPINE SULFATE 1 MG: 0.1 INJECTION INTRAVENOUS at 08:08

## 2022-08-18 RX ADMIN — FUROSEMIDE 40 MG: 10 INJECTION, SOLUTION INTRAMUSCULAR; INTRAVENOUS at 01:08

## 2022-08-18 RX ADMIN — SODIUM CHLORIDE 250 ML: 0.9 INJECTION, SOLUTION INTRAVENOUS at 07:08

## 2022-08-18 RX ADMIN — ASPIRIN 243 MG: 81 TABLET, CHEWABLE ORAL at 07:08

## 2022-08-18 RX ADMIN — DOXAZOSIN 8 MG: 2 TABLET ORAL at 09:08

## 2022-08-18 RX ADMIN — NITROGLYCERIN 5 MCG/MIN: 20 INJECTION INTRAVENOUS at 07:08

## 2022-08-18 RX ADMIN — ATROPINE SULFATE 1 MG: 1 INJECTION, SOLUTION INTRAMUSCULAR; INTRAVENOUS; SUBCUTANEOUS at 07:08

## 2022-08-18 RX ADMIN — MUPIROCIN: 20 OINTMENT TOPICAL at 09:08

## 2022-08-18 RX ADMIN — ISOSORBIDE DINITRATE 40 MG: 20 TABLET ORAL at 09:08

## 2022-08-18 RX ADMIN — INSULIN DETEMIR 15 UNITS: 100 INJECTION, SOLUTION SUBCUTANEOUS at 09:08

## 2022-08-18 RX ADMIN — NIFEDIPINE 60 MG: 60 TABLET, FILM COATED, EXTENDED RELEASE ORAL at 02:08

## 2022-08-18 RX ADMIN — ISOSORBIDE DINITRATE 40 MG: 20 TABLET ORAL at 02:08

## 2022-08-18 NOTE — ASSESSMENT & PLAN NOTE
Pt with left sided numbness and tingling prior to arrival that self resolved  CT Head negative  Check MRI brain in AM

## 2022-08-18 NOTE — ED NOTES
Patient lying in bed, resting, responsive to voice, NADN, RR even and unlabored, call bell within reach, bed in lowest position and locked.  family at bedside, instructed to call for needs, family verbalized understanding

## 2022-08-18 NOTE — ED NOTES
Verbal order received to only give furosemide once BP has rebounded.  Will discuss with MD prior to administration.

## 2022-08-18 NOTE — ASSESSMENT & PLAN NOTE
Patient's FSGs are controlled on current medication regimen.  Last A1c reviewed-   Lab Results   Component Value Date    HGBA1C 7.3 (A) 03/22/2022     Most recent fingerstick glucose reviewed- No results for input(s): POCTGLUCOSE in the last 24 hours.  Current correctional scale  Low    Hold Oral hypoglycemics while patient is in the hospital.

## 2022-08-18 NOTE — SUBJECTIVE & OBJECTIVE
"Past Medical History:   Diagnosis Date    CHF (congestive heart failure)     Diabetes mellitus type II     Hypertension        Past Surgical History:   Procedure Laterality Date    APPENDECTOMY      BACK SURGERY      CHOLECYSTECTOMY      INNER EAR SURGERY      KNEE SURGERY         Review of patient's allergies indicates:   Allergen Reactions    Hydralazine analogues Shortness Of Breath, Itching and Swelling    Procardia [nifedipine] Swelling     edema    Statins-hmg-coa reductase inhibitors Other (See Comments)     pain       No current facility-administered medications on file prior to encounter.     Current Outpatient Medications on File Prior to Encounter   Medication Sig    aspirin (ECOTRIN) 81 MG EC tablet Take 81 mg by mouth.    BD INSULIN SYRINGE ULTRA-FINE 1/2 mL 31 gauge x 15/64" Syrg     BD VEO INSULIN SYRINGE UF 1 mL 31 gauge x 15/64" Syrg USE SYRINGE FIVE TIMES DAILY SINGLE USE    bumetanide (BUMEX) 2 MG tablet Take 2 mg by mouth 2 (two) times daily.    clopidogreL (PLAVIX) 75 mg tablet Take 75 mg by mouth once daily.    coenzyme Q10-vitamin E 100-5 mg-unit Cap Take by mouth.    doxazosin (CARDURA) 8 MG Tab every 12 (twelve) hours.    DULoxetine (CYMBALTA) 20 MG capsule Take 1 capsule (20 mg total) by mouth once daily.    fish oil-omega-3 fatty acids 300-1,000 mg capsule Take 1 g by mouth once daily.    insulin glargine (LANTUS) 100 unit/mL injection Inject 40 Units into the skin every evening.    insulin regular 100 unit/mL Inj injection Inject into the skin 3 (three) times daily before meals.    metoprolol succinate (TOPROL-XL) 25 MG 24 hr tablet Take 100 mg by mouth once daily.    pantoprazole (PROTONIX) 40 MG tablet Take 1 tablet (40 mg total) by mouth once daily.    [DISCONTINUED] ALPRAZolam (XANAX) 0.25 MG tablet Take 1 tablet (0.25 mg total) by mouth nightly as needed for Anxiety.    cloNIDine (CATAPRES) 0.1 MG tablet Take 0.1 mg by mouth 2 (two) times daily.    [DISCONTINUED] erythromycin " (ROMYCIN) ophthalmic ointment Place into both eyes.    [DISCONTINUED] NIFEdipine (PROCARDIA) 20 MG Cap Take 60 mg by mouth every 8 (eight) hours.    [DISCONTINUED] spironolactone (ALDACTONE) 25 MG tablet Take 0.5 tablets (12.5 mg total) by mouth once daily.    [DISCONTINUED] sucralfate (CARAFATE) 100 mg/mL suspension Take 10 mLs (1 g total) by mouth 4 (four) times daily before meals and nightly.    [DISCONTINUED] torsemide (DEMADEX) 20 MG Tab Take 20 mg by mouth 2 (two) times a day.     Family History       Problem Relation (Age of Onset)    Alzheimer's disease Mother    Heart disease Father          Tobacco Use    Smoking status: Never Smoker    Smokeless tobacco: Never Used   Substance and Sexual Activity    Alcohol use: No    Drug use: No    Sexual activity: Not on file     Review of Systems   Constitutional: Negative.   HENT: Negative.     Eyes: Negative.    Cardiovascular:  Positive for leg swelling.   Respiratory:  Positive for shortness of breath.    Endocrine: Negative.    Hematologic/Lymphatic: Negative.    Skin: Negative.    Musculoskeletal: Negative.    Gastrointestinal: Negative.    Genitourinary: Negative.    Neurological:  Positive for numbness (right facial and right arm) and weakness.   Psychiatric/Behavioral: Negative.     Allergic/Immunologic: Negative.    Objective:     Vital Signs (Most Recent):  Temp: 96.7 °F (35.9 °C) (08/18/22 0654)  Pulse: (!) 38 (08/18/22 0912)  Resp: 14 (08/18/22 0912)  BP: (!) 141/64 (08/18/22 0912)  SpO2: 99 % (08/18/22 0912)   Vital Signs (24h Range):  Temp:  [96.7 °F (35.9 °C)] 96.7 °F (35.9 °C)  Pulse:  [37-51] 38  Resp:  [13-20] 14  SpO2:  [95 %-99 %] 99 %  BP: ()/() 141/64     Weight: 115.7 kg (255 lb)  Body mass index is 36.59 kg/m².    SpO2: 99 %  O2 Device (Oxygen Therapy): room air      Intake/Output Summary (Last 24 hours) at 8/18/2022 0914  Last data filed at 8/18/2022 0838  Gross per 24 hour   Intake 250 ml   Output --   Net 250 ml        Lines/Drains/Airways       Peripheral Intravenous Line  Duration                  Peripheral IV - Single Lumen 08/18/22 0650 20 G Left Antecubital <1 day         Peripheral IV - Single Lumen 08/18/22 0657 18 G Right Wrist <1 day                    Physical Exam  Constitutional:       General: He is not in acute distress.     Appearance: Normal appearance. He is not toxic-appearing or diaphoretic.   Cardiovascular:      Rate and Rhythm: Regular rhythm. Bradycardia present.      Pulses: Normal pulses.      Heart sounds: Normal heart sounds.   Pulmonary:      Effort: Pulmonary effort is normal.      Breath sounds: Normal breath sounds.   Abdominal:      General: Abdomen is flat. Bowel sounds are normal.      Palpations: Abdomen is soft.   Musculoskeletal:      Cervical back: Normal range of motion and neck supple.      Right lower leg: Edema present.      Left lower leg: Edema present.   Skin:     General: Skin is warm and dry.      Capillary Refill: Capillary refill takes less than 2 seconds.   Neurological:      Mental Status: He is alert and oriented to person, place, and time. Mental status is at baseline.   Psychiatric:         Mood and Affect: Mood normal.         Behavior: Behavior normal.         Thought Content: Thought content normal.         Judgment: Judgment normal.       Significant Labs:   Recent Lab Results         08/18/22  0703        Albumin 2.9       Alkaline Phosphatase 67       ALT 18       Anion Gap 10       AST 16       Baso # 0.03       Basophil % 0.5       BILIRUBIN TOTAL 0.5  Comment: For infants and newborns, interpretation of results should be based  on gestational age, weight and in agreement with clinical  observations.    Premature Infant recommended reference ranges:  Up to 24 hours.............<8.0 mg/dL  Up to 48 hours............<12.0 mg/dL  3-5 days..................<15.0 mg/dL  6-29 days.................<15.0 mg/dL           Comment: Values of less than 100 pg/ml are  consistent with non-CHF populations.       BUN 36       Calcium 8.4       Chloride 100       CO2 28       Creatinine 2.3       D-Dimer 1.45  Comment: The quantitative D-dimer assay should be used as an aid in   the diagnosis of deep vein thrombosis and pulmonary embolism  in patients with the appropriate presentation and clinical  history. The upper limit of the reference interval and the clinical   cut off   point are identical. Causes of a positive (>0.50 mg/L FEU) D-Dimer   test  include, but are not limited to: DVT, PE, DIC, thrombolytic   therapy, anticoagulant therapy, recent surgery, trauma, or   pregnancy, disseminated malignancy, aortic aneurysm, cirrhosis,  and severe infection. False negative results may occur in   patients with distal DVT.  ANTHONY^612^^7^  LOT^610^DDiSup^[id number]^[id number]         Differential Method Automated       eGFR 28       Eos # 0.2       Eosinophil % 3.1       Glucose 138       Gran # (ANC) 3.3       Gran % 59.5       Hematocrit 36.9       Hemoglobin 12.9       Immature Grans (Abs) 0.03  Comment: Mild elevation in immature granulocytes is non specific and   can be seen in a variety of conditions including stress response,   acute inflammation, trauma and pregnancy. Correlation with other   laboratory and clinical findings is essential.         Immature Granulocytes 0.5       Lymph # 1.2       Lymph % 21.1       Magnesium 1.8       MCH 31.5       MCHC 35.0       MCV 90       Mono # 0.8       Mono % 15.3       MPV 11.1       nRBC 0       Phosphorus 4.6       Platelets 204       Potassium 4.6       PROTEIN TOTAL 5.7       RBC 4.10       RDW 12.8       Sodium 138       Troponin I 0.015  Comment: The reference interval for Troponin I represents the 99th percentile   cutoff   for our facility and is consistent with 3rd generation assay   performance.         WBC 5.50               Significant Imaging: CT scan: CT ABDOMEN PELVIS WITH CONTRAST: No results found for this  visit on 08/18/22. and CT ABDOMEN PELVIS WITHOUT CONTRAST: No results found for this visit on 08/18/22., Echocardiogram: 2D echo with color flow doppler: No results found for this or any previous visit. and Transthoracic echo (TTE) complete (Cupid Only): No results found for this or any previous visit., and X-Ray: CXR: X-Ray Chest 1 View (CXR):   Results for orders placed or performed during the hospital encounter of 08/18/22   X-Ray Chest 1 View    Narrative    EXAMINATION:  XR CHEST 1 VIEW    CLINICAL HISTORY:  Shortness of breath    TECHNIQUE:  Single frontal view of the chest was performed.    COMPARISON:  08/05/2022    FINDINGS:  The lungs are under expanded.  There are patchy mixed opacity seen throughout both lungs which could reflect atelectasis, aspiration or pneumonia.  Heart is enlarged.  Skeletal structures are intact.      Impression    As above.      Electronically signed by: Yi Dc MD  Date:    08/18/2022  Time:    07:46    and X-Ray Chest PA and Lateral (CXR): No results found for this visit on 08/18/22. and KUB: X-Ray Abdomen AP 1 View (KUB): No results found for this visit on 08/18/22.

## 2022-08-18 NOTE — ED NOTES
Patient lying in bed, resting, responsive to voice,  NADN, RR even and unlabored, call bell within reach, bed in lowest position and locked, family at bedside, instructed to call for needs, family verbalized understanding

## 2022-08-18 NOTE — SUBJECTIVE & OBJECTIVE
"Past Medical History:   Diagnosis Date    CHF (congestive heart failure)     Diabetes mellitus type II     Hypertension        Past Surgical History:   Procedure Laterality Date    APPENDECTOMY      BACK SURGERY      CHOLECYSTECTOMY      INNER EAR SURGERY      KNEE SURGERY         Review of patient's allergies indicates:   Allergen Reactions    Hydralazine analogues Shortness Of Breath, Itching and Swelling    Procardia [nifedipine] Swelling     edema    Statins-hmg-coa reductase inhibitors Other (See Comments)     pain       No current facility-administered medications on file prior to encounter.     Current Outpatient Medications on File Prior to Encounter   Medication Sig    aspirin (ECOTRIN) 81 MG EC tablet Take 81 mg by mouth.    BD INSULIN SYRINGE ULTRA-FINE 1/2 mL 31 gauge x 15/64" Syrg     BD VEO INSULIN SYRINGE UF 1 mL 31 gauge x 15/64" Syrg USE SYRINGE FIVE TIMES DAILY SINGLE USE    bumetanide (BUMEX) 2 MG tablet Take 2 mg by mouth 2 (two) times daily.    clopidogreL (PLAVIX) 75 mg tablet Take 75 mg by mouth once daily.    coenzyme Q10-vitamin E 100-5 mg-unit Cap Take by mouth.    doxazosin (CARDURA) 8 MG Tab every 12 (twelve) hours.    DULoxetine (CYMBALTA) 20 MG capsule Take 1 capsule (20 mg total) by mouth once daily.    fish oil-omega-3 fatty acids 300-1,000 mg capsule Take 1 g by mouth once daily.    insulin glargine (LANTUS) 100 unit/mL injection Inject 40 Units into the skin every evening.    insulin regular 100 unit/mL Inj injection Inject into the skin 3 (three) times daily before meals.    metoprolol succinate (TOPROL-XL) 25 MG 24 hr tablet Take 100 mg by mouth once daily.    pantoprazole (PROTONIX) 40 MG tablet Take 1 tablet (40 mg total) by mouth once daily.    [DISCONTINUED] ALPRAZolam (XANAX) 0.25 MG tablet Take 1 tablet (0.25 mg total) by mouth nightly as needed for Anxiety.    cloNIDine (CATAPRES) 0.1 MG tablet Take 0.1 mg by mouth 2 (two) times daily.    [DISCONTINUED] erythromycin " (ROMYCIN) ophthalmic ointment Place into both eyes.    [DISCONTINUED] NIFEdipine (PROCARDIA) 20 MG Cap Take 60 mg by mouth every 8 (eight) hours.    [DISCONTINUED] spironolactone (ALDACTONE) 25 MG tablet Take 0.5 tablets (12.5 mg total) by mouth once daily.    [DISCONTINUED] sucralfate (CARAFATE) 100 mg/mL suspension Take 10 mLs (1 g total) by mouth 4 (four) times daily before meals and nightly.    [DISCONTINUED] torsemide (DEMADEX) 20 MG Tab Take 20 mg by mouth 2 (two) times a day.     Family History       Problem Relation (Age of Onset)    Alzheimer's disease Mother    Heart disease Father          Tobacco Use    Smoking status: Never Smoker    Smokeless tobacco: Never Used   Substance and Sexual Activity    Alcohol use: No    Drug use: No    Sexual activity: Not on file     Review of Systems   Constitutional:  Positive for activity change. Negative for appetite change, chills and fever.   HENT:  Negative for congestion and sore throat.    Eyes:  Negative for visual disturbance.   Respiratory:  Negative for cough and shortness of breath.    Cardiovascular:  Positive for chest pain and leg swelling (at baseline).   Gastrointestinal:  Negative for abdominal pain, diarrhea, nausea and vomiting.   Genitourinary:  Positive for difficulty urinating. Negative for dysuria and hematuria.   Musculoskeletal:  Negative for arthralgias and myalgias.   Skin:  Negative for wound.   Neurological:  Positive for numbness. Negative for facial asymmetry and speech difficulty.   Psychiatric/Behavioral:  Negative for confusion.    Objective:     Vital Signs (Most Recent):  Temp: 96.7 °F (35.9 °C) (08/18/22 1630)  Pulse: (!) 45 (08/18/22 1730)  Resp: (!) 22 (08/18/22 1730)  BP: (!) 150/67 (08/18/22 1730)  SpO2: 97 % (08/18/22 1730)   Vital Signs (24h Range):  Temp:  [96.7 °F (35.9 °C)] 96.7 °F (35.9 °C)  Pulse:  [36-52] 45  Resp:  [12-23] 22  SpO2:  [95 %-100 %] 97 %  BP: ()/() 150/67     Weight: 115.7 kg (255 lb 1.2  oz)  Body mass index is 36.6 kg/m².    Physical Exam  Vitals and nursing note reviewed.   Constitutional:       General: He is not in acute distress.     Appearance: He is obese.   HENT:      Head: Normocephalic and atraumatic.      Right Ear: External ear normal.      Left Ear: External ear normal.      Mouth/Throat:      Mouth: Mucous membranes are moist.   Eyes:      Extraocular Movements: Extraocular movements intact.      Conjunctiva/sclera: Conjunctivae normal.      Pupils: Pupils are equal, round, and reactive to light.   Cardiovascular:      Rate and Rhythm: Regular rhythm. Bradycardia present.      Heart sounds: Normal heart sounds. No murmur heard.  Pulmonary:      Effort: Pulmonary effort is normal. No respiratory distress.      Breath sounds: Normal breath sounds.   Abdominal:      General: Bowel sounds are normal. There is no distension.      Palpations: Abdomen is soft.      Tenderness: There is no abdominal tenderness.   Musculoskeletal:      Cervical back: Neck supple.      Right lower leg: Edema (pitting to mid shin) present.      Left lower leg: Edema (pitting to mid-shin) present.   Neurological:      General: No focal deficit present.      Mental Status: He is alert and oriented to person, place, and time.   Psychiatric:         Mood and Affect: Mood normal.         Behavior: Behavior normal.         CRANIAL NERVES     CN III, IV, VI   Pupils are equal, round, and reactive to light.     Significant Labs: All pertinent labs within the past 24 hours have been reviewed.  A1C:   Recent Labs   Lab 03/22/22  0000   HGBA1C 7.3*     Blood Culture: No results for input(s): LABBLOO in the last 48 hours.  BMP:   Recent Labs   Lab 08/18/22  0703   *      K 4.6      CO2 28   BUN 36*   CREATININE 2.3*   CALCIUM 8.4*   MG 1.8     CBC:   Recent Labs   Lab 08/18/22  0703   WBC 5.50   HGB 12.9*   HCT 36.9*        CMP:   Recent Labs   Lab 08/18/22  0703      K 4.6      CO2 28    *   BUN 36*   CREATININE 2.3*   CALCIUM 8.4*   PROT 5.7*   ALBUMIN 2.9*   BILITOT 0.5   ALKPHOS 67   AST 16   ALT 18   ANIONGAP 10     Cardiac Markers:   Recent Labs   Lab 08/18/22  0703   *     Lipid Panel: No results for input(s): CHOL, HDL, LDLCALC, TRIG, CHOLHDL in the last 48 hours.  Magnesium:   Recent Labs   Lab 08/18/22  0703   MG 1.8     POCT Glucose: No results for input(s): POCTGLUCOSE in the last 48 hours.  Troponin:   Recent Labs   Lab 08/18/22  0703   TROPONINI 0.015     TSH: No results for input(s): TSH in the last 4320 hours.  Urine Studies: No results for input(s): COLORU, APPEARANCEUA, PHUR, SPECGRAV, PROTEINUA, GLUCUA, KETONESU, BILIRUBINUA, OCCULTUA, NITRITE, UROBILINOGEN, LEUKOCYTESUR, RBCUA, WBCUA, BACTERIA, SQUAMEPITHEL, HYALINECASTS in the last 48 hours.    Invalid input(s): WRIGHTSUR    Significant Imaging: I have reviewed all pertinent imaging results/findings within the past 24 hours.    LE US: No evidence of deep venous thrombosis in either lower extremity.    CT head: No acute intracranial findings.     Age-appropriate cerebral volume loss with moderate severe patchy decreased attenuation supratentorial white matter while nonspecific suggestive for chronic ischemic change.     No evidence for acute intracranial hemorrhage.  Clinical correlation and further evaluation as warranted.    CXR: The lungs are under expanded.  There are patchy mixed opacity seen throughout both lungs which could reflect atelectasis, aspiration or pneumonia.  Heart is enlarged.  Skeletal structures are intact.

## 2022-08-18 NOTE — HPI
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 Patient stated symptoms began approximately 30 minutes prior to arrival. CIS consulted for bradycardia with HR in 30s. EKG reveals sinus bradycardia with first degree AVB, HR 47 bpm.

## 2022-08-18 NOTE — ED NOTES
Patient states he took 81mg aspirin. EDMD notified.  Order changed for 243mg chewable asa.    Defib pads placed on patient per EDMD request.  Defib on monitor setting at this time.

## 2022-08-18 NOTE — ASSESSMENT & PLAN NOTE
Patient's FSGs are controlled on current medication regimen.  Last A1c reviewed-   Lab Results   Component Value Date    HGBA1C 7.3 (A) 03/22/2022     Most recent fingerstick glucose reviewed- No results for input(s): POCTGLUCOSE in the last 24 hours.  Current correctional scale  Low  Maintain anti-hyperglycemic dose as follows-   Antihyperglycemics (From admission, onward)            Start     Stop Route Frequency Ordered    08/18/22 2100  insulin detemir U-100 pen 15 Units         -- SubQ Nightly 08/18/22 1649    08/18/22 1749  insulin aspart U-100 pen 0-5 Units         -- SubQ Before meals & nightly PRN 08/18/22 1649        Hold Oral hypoglycemics while patient is in the hospital.

## 2022-08-18 NOTE — ED NOTES
Family presents with written timeline of patient events as follows  8/17 @ 7pm  /77, Pulse 52, and at 9pm  Patient took Xanax 0.125mg  8/18 @ 4am /90, Pulse 42  Family states patient took his scheduled home medications at 5 am, except for his metoprolol  @ 6:20 patient shuffled to kitchen and felt like he was going to pass out, /125 pulse 44  @ 6:25 /85, Pulse 44

## 2022-08-18 NOTE — ASSESSMENT & PLAN NOTE
Patient has a current diagnosis of Hypertensive emergency with end organ damage evidenced by hypertensive encephalopathy and acute heart failure which is controlled.  Latest blood pressure and vitals reviewed-   Temp:  [96.7 °F (35.9 °C)]   Pulse:  [36-52]   Resp:  [12-23]   BP: ()/()   SpO2:  [95 %-100 %] .   Patient currently off IV antihypertensives.   Home meds for hypertension were reviewed and noted below.   Hypertension Medications             bumetanide (BUMEX) 2 MG tablet Take 2 mg by mouth 2 (two) times daily.    doxazosin (CARDURA) 8 MG Tab every 12 (twelve) hours.    metoprolol succinate (TOPROL-XL) 25 MG 24 hr tablet Take 100 mg by mouth once daily.    cloNIDine (CATAPRES) 0.1 MG tablet Take 0.1 mg by mouth 2 (two) times daily.          Medication adjustment for hospital antihypertensives is as follows- Continue Doxazosin 8 bid, Start Isordil 40 tid and Procardia 60mg daily ( expect swelling as documented in past)    Will aim for controlled BP reduction by medications noted above. Monitor and mitigate end organ damage as indicated.

## 2022-08-18 NOTE — H&P
[facility] Medicine  History & Physical    Patient Name: Nestor Garcia  MRN: [id number]  Patient Class: IP- Inpatient  Admission Date: 8/18/2022  Attending Physician: Kashmir Conn MD  Primary Care Provider: Kashmir Conn MD         Patient information was obtained from patient and ER records.     Subjective:     Principal Problem:Hypertensive emergency    Chief Complaint:   Chief Complaint   Patient presents with    Weakness     Patient presents Via POV with weakness         HPI: 77 y.o. male with PMHx of HFpEF, DMII, HTN, HLD, GERD, TARA, hearing impairment presented to ER with generalized weakness and SOB that was present upon awaking today. Pt states he was in his usual state of health prior. He denies congestion, sore throat, chest pain, palpitations, abd pain, N/V/D, dysuria, hematuria, and fever/chills. Per ER records, daughter reported patient complained of R facial and RUE numbness near the onset of symptoms, which lasted about 3-4 min and self-resolved. Pt states his cardiologist recently changed him from torsemide to bumex and also increased his toprol dose for BP control. In ER, pt found to be extremely hypertensive with sysolics over 200 and started on a nitro gtt. He was also given IV lasix 40mg x1. He was also bradycardic and required atropine.       Past Medical History:   Diagnosis Date    CHF (congestive heart failure)     Diabetes mellitus type II     Hypertension        Past Surgical History:   Procedure Laterality Date    APPENDECTOMY      BACK SURGERY      CHOLECYSTECTOMY      INNER EAR SURGERY      KNEE SURGERY         Review of patient's allergies indicates:   Allergen Reactions    Hydralazine analogues Shortness Of Breath, Itching and Swelling    Procardia [nifedipine] Swelling     edema    Statins-hmg-coa reductase inhibitors Other (See Comments)     pain       No current facility-administered medications on file prior to encounter.     Current Outpatient  "Medications on File Prior to Encounter   Medication Sig    aspirin (ECOTRIN) 81 MG EC tablet Take 81 mg by mouth.    BD INSULIN SYRINGE ULTRA-FINE 1/2 mL 31 gauge x 15/64" Syrg     BD VEO INSULIN SYRINGE UF 1 mL 31 gauge x 15/64" Syrg USE SYRINGE FIVE TIMES DAILY SINGLE USE    bumetanide (BUMEX) 2 MG tablet Take 2 mg by mouth 2 (two) times daily.    clopidogreL (PLAVIX) 75 mg tablet Take 75 mg by mouth once daily.    coenzyme Q10-vitamin E 100-5 mg-unit Cap Take by mouth.    doxazosin (CARDURA) 8 MG Tab every 12 (twelve) hours.    DULoxetine (CYMBALTA) 20 MG capsule Take 1 capsule (20 mg total) by mouth once daily.    fish oil-omega-3 fatty acids 300-1,000 mg capsule Take 1 g by mouth once daily.    insulin glargine (LANTUS) 100 unit/mL injection Inject 40 Units into the skin every evening.    insulin regular 100 unit/mL Inj injection Inject into the skin 3 (three) times daily before meals.    metoprolol succinate (TOPROL-XL) 25 MG 24 hr tablet Take 100 mg by mouth once daily.    pantoprazole (PROTONIX) 40 MG tablet Take 1 tablet (40 mg total) by mouth once daily.    [DISCONTINUED] ALPRAZolam (XANAX) 0.25 MG tablet Take 1 tablet (0.25 mg total) by mouth nightly as needed for Anxiety.    cloNIDine (CATAPRES) 0.1 MG tablet Take 0.1 mg by mouth 2 (two) times daily.    [DISCONTINUED] erythromycin (ROMYCIN) ophthalmic ointment Place into both eyes.    [DISCONTINUED] NIFEdipine (PROCARDIA) 20 MG Cap Take 60 mg by mouth every 8 (eight) hours.    [DISCONTINUED] spironolactone (ALDACTONE) 25 MG tablet Take 0.5 tablets (12.5 mg total) by mouth once daily.    [DISCONTINUED] sucralfate (CARAFATE) 100 mg/mL suspension Take 10 mLs (1 g total) by mouth 4 (four) times daily before meals and nightly.    [DISCONTINUED] torsemide (DEMADEX) 20 MG Tab Take 20 mg by mouth 2 (two) times a day.     Family History       Problem Relation (Age of Onset)    Alzheimer's disease Mother    Heart disease Father          Tobacco " Use    Smoking status: Never Smoker    Smokeless tobacco: Never Used   Substance and Sexual Activity    Alcohol use: No    Drug use: No    Sexual activity: Not on file     Review of Systems   Constitutional:  Positive for activity change. Negative for appetite change, chills and fever.   HENT:  Negative for congestion and sore throat.    Eyes:  Negative for visual disturbance.   Respiratory:  Negative for cough and shortness of breath.    Cardiovascular:  Positive for chest pain and leg swelling (at baseline).   Gastrointestinal:  Negative for abdominal pain, diarrhea, nausea and vomiting.   Genitourinary:  Positive for difficulty urinating. Negative for dysuria and hematuria.   Musculoskeletal:  Negative for arthralgias and myalgias.   Skin:  Negative for wound.   Neurological:  Positive for numbness. Negative for facial asymmetry and speech difficulty.   Psychiatric/Behavioral:  Negative for confusion.    Objective:     Vital Signs (Most Recent):  Temp: 96.7 °F (35.9 °C) (08/18/22 1630)  Pulse: (!) 45 (08/18/22 1730)  Resp: (!) 22 (08/18/22 1730)  BP: (!) 150/67 (08/18/22 1730)  SpO2: 97 % (08/18/22 1730)   Vital Signs (24h Range):  Temp:  [96.7 °F (35.9 °C)] 96.7 °F (35.9 °C)  Pulse:  [36-52] 45  Resp:  [12-23] 22  SpO2:  [95 %-100 %] 97 %  BP: ()/() 150/67     Weight: 115.7 kg (255 lb 1.2 oz)  Body mass index is 36.6 kg/m².    Physical Exam  Vitals and nursing note reviewed.   Constitutional:       General: He is not in acute distress.     Appearance: He is obese.   HENT:      Head: Normocephalic and atraumatic.      Right Ear: External ear normal.      Left Ear: External ear normal.      Mouth/Throat:      Mouth: Mucous membranes are moist.   Eyes:      Extraocular Movements: Extraocular movements intact.      Conjunctiva/sclera: Conjunctivae normal.      Pupils: Pupils are equal, round, and reactive to light.   Cardiovascular:      Rate and Rhythm: Regular rhythm. Bradycardia present.       Heart sounds: Normal heart sounds. No murmur heard.  Pulmonary:      Effort: Pulmonary effort is normal. No respiratory distress.      Breath sounds: Normal breath sounds.   Abdominal:      General: Bowel sounds are normal. There is no distension.      Palpations: Abdomen is soft.      Tenderness: There is no abdominal tenderness.   Musculoskeletal:      Cervical back: Neck supple.      Right lower leg: Edema (pitting to mid shin) present.      Left lower leg: Edema (pitting to mid-shin) present.   Neurological:      General: No focal deficit present.      Mental Status: He is alert and oriented to person, place, and time.   Psychiatric:         Mood and Affect: Mood normal.         Behavior: Behavior normal.         CRANIAL NERVES     CN III, IV, VI   Pupils are equal, round, and reactive to light.     Significant Labs: All pertinent labs within the past 24 hours have been reviewed.  A1C:   Recent Labs   Lab 03/22/22  0000   HGBA1C 7.3*     Blood Culture: No results for input(s): LABBLOO in the last 48 hours.  BMP:   Recent Labs   Lab 08/18/22  0703   *      K 4.6      CO2 28   BUN 36*   CREATININE 2.3*   CALCIUM 8.4*   MG 1.8     CBC:   Recent Labs   Lab 08/18/22  0703   WBC 5.50   HGB 12.9*   HCT 36.9*        CMP:   Recent Labs   Lab 08/18/22  0703      K 4.6      CO2 28   *   BUN 36*   CREATININE 2.3*   CALCIUM 8.4*   PROT 5.7*   ALBUMIN 2.9*   BILITOT 0.5   ALKPHOS 67   AST 16   ALT 18   ANIONGAP 10     Cardiac Markers:   Recent Labs   Lab 08/18/22  0703   *     Lipid Panel: No results for input(s): CHOL, HDL, LDLCALC, TRIG, CHOLHDL in the last 48 hours.  Magnesium:   Recent Labs   Lab 08/18/22  0703   MG 1.8     POCT Glucose: No results for input(s): POCTGLUCOSE in the last 48 hours.  Troponin:   Recent Labs   Lab 08/18/22  0703   TROPONINI 0.015     TSH: No results for input(s): TSH in the last 4320 hours.  Urine Studies: No results for input(s): COLORU,  APPEARANCEUA, PHUR, SPECGRAV, PROTEINUA, GLUCUA, KETONESU, BILIRUBINUA, OCCULTUA, NITRITE, UROBILINOGEN, LEUKOCYTESUR, RBCUA, WBCUA, BACTERIA, SQUAMEPITHEL, HYALINECASTS in the last 48 hours.    Invalid input(s): ALEXY    Significant Imaging: I have reviewed all pertinent imaging results/findings within the past 24 hours.    LE US: No evidence of deep venous thrombosis in either lower extremity.    CT head: No acute intracranial findings.     Age-appropriate cerebral volume loss with moderate severe patchy decreased attenuation supratentorial white matter while nonspecific suggestive for chronic ischemic change.     No evidence for acute intracranial hemorrhage.  Clinical correlation and further evaluation as warranted.    CXR: The lungs are under expanded.  There are patchy mixed opacity seen throughout both lungs which could reflect atelectasis, aspiration or pneumonia.  Heart is enlarged.  Skeletal structures are intact.    Assessment/Plan:     * Hypertensive emergency  Patient has a current diagnosis of Hypertensive emergency with end organ damage evidenced by hypertensive encephalopathy and acute heart failure which is controlled.  Latest blood pressure and vitals reviewed-   Temp:  [96.7 °F (35.9 °C)]   Pulse:  [36-52]   Resp:  [12-23]   BP: ()/()   SpO2:  [95 %-100 %] .   Patient currently off IV antihypertensives.   Home meds for hypertension were reviewed and noted below.   Hypertension Medications             bumetanide (BUMEX) 2 MG tablet Take 2 mg by mouth 2 (two) times daily.    doxazosin (CARDURA) 8 MG Tab every 12 (twelve) hours.    metoprolol succinate (TOPROL-XL) 25 MG 24 hr tablet Take 100 mg by mouth once daily.    cloNIDine (CATAPRES) 0.1 MG tablet Take 0.1 mg by mouth 2 (two) times daily.          Medication adjustment for hospital antihypertensives is as follows- Continue Doxazosin 8 bid, Start Isordil 40 tid and Procardia 60mg daily ( expect swelling as documented in  past)    Will aim for controlled BP reduction by medications noted above. Monitor and mitigate end organ damage as indicated.    CKD (chronic kidney disease) stage 4, GFR 15-29 ml/min  Renal function seems to be declining recently; will need nephrology outpatient  Avoid nephrotoxic meds; will need to DC cymbalta      Acute respiratory failure with hypoxia  Likely 2/2 HFpEF exacerbation  2L NC; wean as tolerated      Bradycardia  S/p atropine x2 in ER  Hold toprol  Tele  Cards consulted; appreciate recs        TIA (transient ischemic attack)  Pt with left sided numbness and tingling prior to arrival that self resolved  CT Head negative  Check MRI brain in AM      TARA (obstructive sleep apnea)  Does not tolerate CPAP  Likely contributing to bradycardia      Acute on chronic diastolic heart failure  S/p lasix 40mg IV in ER  At home he takes bumex 2mg BID        Gastroesophageal reflux disease without esophagitis  Cont home PPI      Type 2 diabetes mellitus without complication  Patient's FSGs are controlled on current medication regimen.  Last A1c reviewed-   Lab Results   Component Value Date    HGBA1C 7.3 (A) 03/22/2022     Most recent fingerstick glucose reviewed- No results for input(s): POCTGLUCOSE in the last 24 hours.  Current correctional scale  Low  Maintain anti-hyperglycemic dose as follows-   Antihyperglycemics (From admission, onward)            Start     Stop Route Frequency Ordered    08/18/22 2100  insulin detemir U-100 pen 15 Units         -- SubQ Nightly 08/18/22 1649    08/18/22 1749  insulin aspart U-100 pen 0-5 Units         -- SubQ Before meals & nightly PRN 08/18/22 1649        Hold Oral hypoglycemics while patient is in the hospital.    Essential hypertension  Hold home metoprolol and clonidine  Cont home doxazosin 8mg BID  Start Isordil 40 TID and  Procardia 60mg daily ( expect swelling as documented in past) per cardiology  Note allergy to hydralazine      Dyslipidemia  Statin intolerance;  takes fish oil at home        VTE Risk Mitigation (From admission, onward)         Ordered     IP VTE HIGH RISK PATIENT  Once         08/18/22 1642     Place sequential compression device  Until discontinued         08/18/22 1642              Critical care time spent on the evaluation and treatment of severe organ dysfunction, review of pertinent labs and imaging studies, discussions with consulting providers and discussions with patient/family: 48 minutes.     Kashmir Conn MD  Department of Hospital Medicine   [facility] [facility] - Intensive Care

## 2022-08-18 NOTE — ED TRIAGE NOTES
77 y.o. male presents to ER   Chief Complaint   Patient presents with    Weakness     Patient presents Via POV with weakness    . No acute distress noted.    Patient c/o weakness and and SOB this morning with HA.  Patient c/o numbness to RUE on car ride to ED but stated that it resolved upon arrival.    EDMD at bedside.    Bed in low and locked position.  2 siderails up.  Call bell in reach.  Voiced understanding of use.  Even and unlabored breathing.       Patient placed on continuous cardiac monitoring, continuous pulse oximetry, and automatic NIBP.

## 2022-08-18 NOTE — ASSESSMENT & PLAN NOTE
Hold home metoprolol and clonidine  Cont home doxazosin 8mg BID  Start Isordil 40 TID and  Procardia 60mg daily ( expect swelling as documented in past) per cardiology  Note allergy to hydralazine

## 2022-08-18 NOTE — ED NOTES
"Called [facility] for an update on the ETA of consult that was initiated at 0844 this morning, Spoke to Marlys Ko [facility] and was told someone would come see the patient around lunch time, verbalized concerns bout patient condition and she stated she would notify the physician and " let him know"   "

## 2022-08-18 NOTE — ASSESSMENT & PLAN NOTE
Renal function seems to be declining recently; will need nephrology outpatient  Avoid nephrotoxic meds; will need to DC cymbalta

## 2022-08-18 NOTE — ED PROVIDER NOTES
[facility]                                                  Chief Complaint     Chief Complaint   Patient presents with    Weakness     Patient presents Via POV with weakness        History of Present Illness  77 y.o. male with PMHx of CHF, DMII and HTN who presents with generalized weakness and shortness of breath. Patient was brought in by his daughter who stated that his symptoms began approximately 30 minutes prior to arrival. He endorses associated BLE edema. Per daughter, patient complained of R facial and RUE numbness near the onset of symptoms, which has since resolved.  Patient denies chest pain, numbness, paresthesias or focal weakness.     History obtained from:  Patient    Review of patient's allergies indicates:   Allergen Reactions    Hydralazine analogues Shortness Of Breath, Itching and Swelling    Procardia [nifedipine] Swelling     edema    Statins-hmg-coa reductase inhibitors Other (See Comments)     pain     Past Medical History:   Diagnosis Date    CHF (congestive heart failure)     Diabetes mellitus type II     Hypertension      Past Surgical History:   Procedure Laterality Date    APPENDECTOMY      BACK SURGERY      CHOLECYSTECTOMY      INNER EAR SURGERY      KNEE SURGERY        Family History   Problem Relation Age of Onset    Alzheimer's disease Mother     Heart disease Father      Social History     Tobacco Use    Smoking status: Never Smoker    Smokeless tobacco: Never Used   Substance Use Topics    Alcohol use: No    Drug use: No          Review of Systems and Physical Exam     Review of Systems      + shortness of breath, generalized weakness    All other symptoms negative as stated below    --Constitutional - Denies fever, appetite change, chills  --Eyes - Denies eye discharge, eye pain, eye redness or visual disturbance  --HENT- Denies congestion, sore throat, drooling, ear discharge, rhinorrhea or trouble swallowing  --Respiratory - Denies cough,  wheezing  --Cardiovascular - Denies chest pain, leg swelling, palpitations  --Gastrointestinal - Denies abdominal pain, abdominal distension, constipation, diarrhea, nausea or vomiting  --Genitourinary - Denies difficulty urinating, dysuria, hematuria, urgency  --Musculoskeletal - Denies arthralgias, myalgias  --Neurological - Denies dizziness, headaches, lightheadedness  --Hematologic - Denies easy bruising or easy bleeding  --Skin - Denies rash, wound or pallor  --Psychiatric- Denies dysphoric mood, nervousness/anxiety    Vital Signs   weight is 115.7 kg (255 lb). His tympanic temperature is 96.7 °F (35.9 °C). His blood pressure is 199/86 (abnormal) and his pulse is 50 (abnormal). His respiration is 18 and oxygen saturation is 98%.      Physical Exam   Nursing note and vitals reviewed  --Constitutional:  Well developed, well nourished. In no acute distress.  --HENT: Normocephalic, atraumatic. No rhinorrhea. Moist oral mucosa. No oropharyngeal edema, erythema or exudates.   --Eyes: PERRL. Extraocular movements intact. No periorbital swelling. Normal conjunctiva.  --Neck: Normal range of motion. Neck supple. No adenopathy  --Cardiac: Regular rhythm, normal S1, normal S2, no murmur, bradycardic, intact distal pulses  --Pulmonary: Normal respiratory effort, breath sounds normal and equal bilaterally, no accessory muscle use, no respiratory distress  --Abdominal: Soft, normal bowel sounds, no tenderness, no guarding, no rebound  --Musculoskeletal: 1+ BLE edema. Normal range of motion. No deformity, tenderness.  --Neurological:  Alert and oriented x 4. Follows commands appropriately.  Sensation to light touch grossly intact. 5/5 motor strength in BUE and BLE.  --Skin: Warm and dry. No rash, pallor, cyanosis or jaundice.  --Psych: Normal mood    ED Course   Critical Care    Date/Time: 8/18/2022 7:06 AM  Performed by: Fito Grubbs MD  Authorized by: Fito Grubbs MD   Direct patient critical care time: 9  minutes  Additional history critical care time: 8 minutes  Ordering / reviewing critical care time: 8 minutes  Documentation critical care time: 9 minutes  Total critical care time (exclusive of procedural time) : 34 minutes  Critical care time was exclusive of separately billable procedures and treating other patients.  Critical care was necessary to treat or prevent imminent or life-threatening deterioration of the following conditions: cardiac failure (Atropinegiven for symptomatic bradycardia).      EKG (interpreted by me)  Rate: 47 bpm  Rhythm: Sinus bradycardia with 1st degree AV block  Axis: Left axis deviation  ST-segments: No elevation or depression  Overall Interpretation: Sinus bradycardia with 1st degree AV block but no signs of ischemia or infarction    This EKG is similar to prior EKG from 8/5/2022    Lab Results (reviewed by me)  Labs Reviewed   D DIMER, QUANTITATIVE - Abnormal; Notable for the following components:       Result Value    D-Dimer 1.45 (*)     All other components within normal limits   B-TYPE NATRIURETIC PEPTIDE - Abnormal; Notable for the following components:     (*)     All other components within normal limits   COMPREHENSIVE METABOLIC PANEL - Abnormal; Notable for the following components:    Glucose 138 (*)     BUN 36 (*)     Creatinine 2.3 (*)     Calcium 8.4 (*)     Total Protein 5.7 (*)     Albumin 2.9 (*)     eGFR 28 (*)     All other components within normal limits   CBC W/ AUTO DIFFERENTIAL - Abnormal; Notable for the following components:    RBC 4.10 (*)     Hemoglobin 12.9 (*)     Hematocrit 36.9 (*)     MCH 31.5 (*)     Mono % 15.3 (*)     All other components within normal limits   PHOSPHORUS - Abnormal; Notable for the following components:    Phosphorus 4.6 (*)     All other components within normal limits   TROPONIN I   MAGNESIUM   SARS-COV-2 RNA AMPLIFICATION, QUAL       Radiology (images visualized & reports reviewed by me)  US Lower Extremity Veins  Bilateral   Final Result      No evidence of deep venous thrombosis in either lower extremity.         Electronically signed by: Yi Dc MD   Date:    08/18/2022   Time:    09:39      CT Head Without Contrast   Final Result      No acute intracranial findings.      Age-appropriate cerebral volume loss with moderate severe patchy decreased attenuation supratentorial white matter while nonspecific suggestive for chronic ischemic change.      No evidence for acute intracranial hemorrhage.  Clinical correlation and further evaluation as warranted.         Electronically signed by: Yi Dc MD   Date:    08/18/2022   Time:    08:22      X-Ray Chest 1 View   Final Result      As above.         Electronically signed by: Yi Dc MD   Date:    08/18/2022   Time:    07:46          Medications Given  Medications   atropine 0.1 mg/mL injection (has no administration in time range)   nitroGLYCERIN 50 mg in dextrose 5 % 250 mL infusion (TITRATING) (0 mcg/min Intravenous Stopped 8/18/22 0735)   NIFEdipine 24 hr tablet 60 mg (has no administration in time range)   isosorbide dinitrate tablet 40 mg (has no administration in time range)   doxazosin tablet 8 mg (has no administration in time range)   atropine 1 mg/ml injection 1 mg (1 mg Intravenous Given 8/18/22 0715)   aspirin chewable tablet 243 mg (243 mg Oral Given 8/18/22 0712)   sodium chloride 0.9% bolus 250 mL (0 mLs Intravenous Stopped 8/18/22 0838)   furosemide injection 40 mg (40 mg Intravenous Given 8/18/22 1355)   atropine 0.1 mg/mL injection (1 mg  Given 8/18/22 0837)   atropine 1 mg/ml injection 1 mg (0 mg Intravenous Override Pull 8/18/22 0845)       Differential Diagnosis:  Asthma, COPD, pulmonary embolism, Covid, Influenza, cardiogenic pulmonary edema, noncardiogenic pulmonary edema, pneumonia, myocardial infarction, cardiac tamponade    Clinical Tests:  Lab Tests: Ordered and reviewed  Radiological Study: Ordered and reviewed  Medical Tests:  Ordered and reviewed    ED Management     weight is 115.7 kg (255 lb). His tympanic temperature is 96.7 °F (35.9 °C). His blood pressure is 199/86 (abnormal) and his pulse is 50 (abnormal). His respiration is 18 and oxygen saturation is 98%.     Physical exam with bradycardia and 1+ BLE edema, with normal work of breathing and no accessory muscle use. Labs revealed elevated BNP, hyperphosphatemia and elevated D-dimer. CXR revealed patchy mixed opacities throughout both lungs. CT-head revealed no acute intracranial findings. EKG revealed sinus bradycardia with 1st degree AV block but no signs of ischemia or infarction. ASA given, in case the SOB is an anginal equivalent. Atropine x 2 given with only improvement in HR. Nitroglycerin ggt initiated with significant improvement in blood pressure..  Cardiology was consulted, recommending continuing doxazosin and initiating Isordil along with Procardia.  Will also hold metoprolol and clonidine per Cardiology recs.  Patient will be admitted to hospital medicine.    Diagnosis  The primary encounter diagnosis was Hypertensive emergency. Diagnoses of Shortness of breath, Elevated d-dimer, Elevated d-dimer, Acute on chronic diastolic heart failure, and Hyperphosphatemia were also pertinent to this visit.    Disposition and Plan  Condition: Stable  Disposition:  Admit to hospital medicine              Fito Grubbs MD  08/18/22 2802

## 2022-08-18 NOTE — CONSULTS
"[facility] - Emergency Dept  Cardiology  Consult Note    Patient Name: Nestor Garcia  MRN: [id number]  Admission Date: 8/18/2022  Hospital Length of Stay: 0 days  Code Status: Prior   Attending Provider: Fito Grubbs MD   Consulting Provider: Jason Malave NP  Primary Care Physician: Kashmir Conn MD  Principal Problem:<principal problem not specified>    Patient information was obtained from patient, past medical records and ER records.     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 Patient stated symptoms began approximately 30 minutes prior to arrival. CIS consulted for bradycardia with HR in 30s. EKG reveals sinus bradycardia with first degree AVB, HR 47 bpm.       Past Medical History:   Diagnosis Date    CHF (congestive heart failure)     Diabetes mellitus type II     Hypertension        Past Surgical History:   Procedure Laterality Date    APPENDECTOMY      BACK SURGERY      CHOLECYSTECTOMY      INNER EAR SURGERY      KNEE SURGERY         Review of patient's allergies indicates:   Allergen Reactions    Hydralazine analogues Shortness Of Breath, Itching and Swelling    Procardia [nifedipine] Swelling     edema    Statins-hmg-coa reductase inhibitors Other (See Comments)     pain       No current facility-administered medications on file prior to encounter.     Current Outpatient Medications on File Prior to Encounter   Medication Sig    aspirin (ECOTRIN) 81 MG EC tablet Take 81 mg by mouth.    BD INSULIN SYRINGE ULTRA-FINE 1/2 mL 31 gauge x 15/64" Syrg     BD VEO INSULIN SYRINGE UF 1 mL 31 gauge x 15/64" Syrg USE SYRINGE FIVE TIMES DAILY SINGLE USE    bumetanide (BUMEX) 2 MG tablet Take 2 mg by " mouth 2 (two) times daily.    clopidogreL (PLAVIX) 75 mg tablet Take 75 mg by mouth once daily.    coenzyme Q10-vitamin E 100-5 mg-unit Cap Take by mouth.    doxazosin (CARDURA) 8 MG Tab every 12 (twelve) hours.    DULoxetine (CYMBALTA) 20 MG capsule Take 1 capsule (20 mg total) by mouth once daily.    fish oil-omega-3 fatty acids 300-1,000 mg capsule Take 1 g by mouth once daily.    insulin glargine (LANTUS) 100 unit/mL injection Inject 40 Units into the skin every evening.    insulin regular 100 unit/mL Inj injection Inject into the skin 3 (three) times daily before meals.    metoprolol succinate (TOPROL-XL) 25 MG 24 hr tablet Take 100 mg by mouth once daily.    pantoprazole (PROTONIX) 40 MG tablet Take 1 tablet (40 mg total) by mouth once daily.    [DISCONTINUED] ALPRAZolam (XANAX) 0.25 MG tablet Take 1 tablet (0.25 mg total) by mouth nightly as needed for Anxiety.    cloNIDine (CATAPRES) 0.1 MG tablet Take 0.1 mg by mouth 2 (two) times daily.    [DISCONTINUED] erythromycin (ROMYCIN) ophthalmic ointment Place into both eyes.    [DISCONTINUED] NIFEdipine (PROCARDIA) 20 MG Cap Take 60 mg by mouth every 8 (eight) hours.    [DISCONTINUED] spironolactone (ALDACTONE) 25 MG tablet Take 0.5 tablets (12.5 mg total) by mouth once daily.    [DISCONTINUED] sucralfate (CARAFATE) 100 mg/mL suspension Take 10 mLs (1 g total) by mouth 4 (four) times daily before meals and nightly.    [DISCONTINUED] torsemide (DEMADEX) 20 MG Tab Take 20 mg by mouth 2 (two) times a day.     Family History       Problem Relation (Age of Onset)    Alzheimer's disease Mother    Heart disease Father          Tobacco Use    Smoking status: Never Smoker    Smokeless tobacco: Never Used   Substance and Sexual Activity    Alcohol use: No    Drug use: No    Sexual activity: Not on file     Review of Systems   Constitutional: Negative.   HENT: Negative.     Eyes: Negative.    Cardiovascular:  Positive for leg swelling.   Respiratory:   Positive for shortness of breath.    Endocrine: Negative.    Hematologic/Lymphatic: Negative.    Skin: Negative.    Musculoskeletal: Negative.    Gastrointestinal: Negative.    Genitourinary: Negative.    Neurological:  Positive for numbness (right facial and right arm) and weakness.   Psychiatric/Behavioral: Negative.     Allergic/Immunologic: Negative.    Objective:     Vital Signs (Most Recent):  Temp: 96.7 °F (35.9 °C) (08/18/22 0654)  Pulse: (!) 38 (08/18/22 0912)  Resp: 14 (08/18/22 0912)  BP: (!) 141/64 (08/18/22 0912)  SpO2: 99 % (08/18/22 0912)   Vital Signs (24h Range):  Temp:  [96.7 °F (35.9 °C)] 96.7 °F (35.9 °C)  Pulse:  [37-51] 38  Resp:  [13-20] 14  SpO2:  [95 %-99 %] 99 %  BP: ()/() 141/64     Weight: 115.7 kg (255 lb)  Body mass index is 36.59 kg/m².    SpO2: 99 %  O2 Device (Oxygen Therapy): room air      Intake/Output Summary (Last 24 hours) at 8/18/2022 0914  Last data filed at 8/18/2022 0838  Gross per 24 hour   Intake 250 ml   Output --   Net 250 ml       Lines/Drains/Airways       Peripheral Intravenous Line  Duration                  Peripheral IV - Single Lumen 08/18/22 0650 20 G Left Antecubital <1 day         Peripheral IV - Single Lumen 08/18/22 0657 18 G Right Wrist <1 day                    Physical Exam  Constitutional:       General: He is not in acute distress.     Appearance: Normal appearance. He is not toxic-appearing or diaphoretic.   Cardiovascular:      Rate and Rhythm: Regular rhythm. Bradycardia present.      Pulses: Normal pulses.      Heart sounds: Normal heart sounds.   Pulmonary:      Effort: Pulmonary effort is normal.      Breath sounds: Normal breath sounds.   Abdominal:      General: Abdomen is flat. Bowel sounds are normal.      Palpations: Abdomen is soft.   Musculoskeletal:      Cervical back: Normal range of motion and neck supple.      Right lower leg: Edema present.      Left lower leg: Edema present.   Skin:     General: Skin is warm and dry.       Capillary Refill: Capillary refill takes less than 2 seconds.   Neurological:      Mental Status: He is alert and oriented to person, place, and time. Mental status is at baseline.   Psychiatric:         Mood and Affect: Mood normal.         Behavior: Behavior normal.         Thought Content: Thought content normal.         Judgment: Judgment normal.       Significant Labs:   Recent Lab Results         08/18/22  0703        Albumin 2.9       Alkaline Phosphatase 67       ALT 18       Anion Gap 10       AST 16       Baso # 0.03       Basophil % 0.5       BILIRUBIN TOTAL 0.5  Comment: For infants and newborns, interpretation of results should be based  on gestational age, weight and in agreement with clinical  observations.    Premature Infant recommended reference ranges:  Up to 24 hours.............<8.0 mg/dL  Up to 48 hours............<12.0 mg/dL  3-5 days..................<15.0 mg/dL  6-29 days.................<15.0 mg/dL           Comment: Values of less than 100 pg/ml are consistent with non-CHF populations.       BUN 36       Calcium 8.4       Chloride 100       CO2 28       Creatinine 2.3       D-Dimer 1.45  Comment: The quantitative D-dimer assay should be used as an aid in   the diagnosis of deep vein thrombosis and pulmonary embolism  in patients with the appropriate presentation and clinical  history. The upper limit of the reference interval and the clinical   cut off   point are identical. Causes of a positive (>0.50 mg/L FEU) D-Dimer   test  include, but are not limited to: DVT, PE, DIC, thrombolytic   therapy, anticoagulant therapy, recent surgery, trauma, or   pregnancy, disseminated malignancy, aortic aneurysm, cirrhosis,  and severe infection. False negative results may occur in   patients with distal DVT.  ANTHONY^612^^7^  LOT^610^DDiSup^[id number]         Differential Method Automated       eGFR 28       Eos # 0.2       Eosinophil % 3.1       Glucose 138       Gran #  (ANC) 3.3       Gran % 59.5       Hematocrit 36.9       Hemoglobin 12.9       Immature Grans (Abs) 0.03  Comment: Mild elevation in immature granulocytes is non specific and   can be seen in a variety of conditions including stress response,   acute inflammation, trauma and pregnancy. Correlation with other   laboratory and clinical findings is essential.         Immature Granulocytes 0.5       Lymph # 1.2       Lymph % 21.1       Magnesium 1.8       MCH 31.5       MCHC 35.0       MCV 90       Mono # 0.8       Mono % 15.3       MPV 11.1       nRBC 0       Phosphorus 4.6       Platelets 204       Potassium 4.6       PROTEIN TOTAL 5.7       RBC 4.10       RDW 12.8       Sodium 138       Troponin I 0.015  Comment: The reference interval for Troponin I represents the 99th percentile   cutoff   for our facility and is consistent with 3rd generation assay   performance.         WBC 5.50               Significant Imaging: CT scan: CT ABDOMEN PELVIS WITH CONTRAST: No results found for this visit on 08/18/22. and CT ABDOMEN PELVIS WITHOUT CONTRAST: No results found for this visit on 08/18/22., Echocardiogram: 2D echo with color flow doppler: No results found for this or any previous visit. and Transthoracic echo (TTE) complete (Cupid Only): No results found for this or any previous visit., and X-Ray: CXR: X-Ray Chest 1 View (CXR):   Results for orders placed or performed during the hospital encounter of 08/18/22   X-Ray Chest 1 View    Narrative    EXAMINATION:  XR CHEST 1 VIEW    CLINICAL HISTORY:  Shortness of breath    TECHNIQUE:  Single frontal view of the chest was performed.    COMPARISON:  08/05/2022    FINDINGS:  The lungs are under expanded.  There are patchy mixed opacity seen throughout both lungs which could reflect atelectasis, aspiration or pneumonia.  Heart is enlarged.  Skeletal structures are intact.      Impression    As above.      Electronically signed by: Yi Dc  "MD  Date:    08/18/2022  Time:    07:46    and X-Ray Chest PA and Lateral (CXR): No results found for this visit on 08/18/22. and KUB: X-Ray Abdomen AP 1 View (KUB): No results found for this visit on 08/18/22.    Assessment and Plan:     No notes have been filed under this hospital service.  Service: Cardiology      VTE Risk Mitigation (From admission, onward)    None        Current Facility-Administered Medications   Medication    atropine 0.1 mg/mL injection    furosemide injection 40 mg    nitroGLYCERIN 50 mg in dextrose 5 % 250 mL infusion (TITRATING)     Current Outpatient Medications   Medication Sig    aspirin (ECOTRIN) 81 MG EC tablet Take 81 mg by mouth.    BD INSULIN SYRINGE ULTRA-FINE 1/2 mL 31 gauge x 15/64" Syrg     BD VEO INSULIN SYRINGE UF 1 mL 31 gauge x 15/64" Syrg USE SYRINGE FIVE TIMES DAILY SINGLE USE    bumetanide (BUMEX) 2 MG tablet Take 2 mg by mouth 2 (two) times daily.    clopidogreL (PLAVIX) 75 mg tablet Take 75 mg by mouth once daily.    coenzyme Q10-vitamin E 100-5 mg-unit Cap Take by mouth.    doxazosin (CARDURA) 8 MG Tab every 12 (twelve) hours.    DULoxetine (CYMBALTA) 20 MG capsule Take 1 capsule (20 mg total) by mouth once daily.    fish oil-omega-3 fatty acids 300-1,000 mg capsule Take 1 g by mouth once daily.    insulin glargine (LANTUS) 100 unit/mL injection Inject 40 Units into the skin every evening.    insulin regular 100 unit/mL Inj injection Inject into the skin 3 (three) times daily before meals.    metoprolol succinate (TOPROL-XL) 25 MG 24 hr tablet Take 100 mg by mouth once daily.    pantoprazole (PROTONIX) 40 MG tablet Take 1 tablet (40 mg total) by mouth once daily.    cloNIDine (CATAPRES) 0.1 MG tablet Take 0.1 mg by mouth 2 (two) times daily.     MPI 2/2022: abnormal consistent with ischemia. Small reversible perfusion abnormality of moderate intensity in the apical segment. Medium partially reversible perfusion abnormality of severe intensity in the " anterior region.     TTE 1/2022: LVEF 55%-60%, grade 1 LVDD, trace TR, PASP 15 mmHg    DX:weakness/sob due to chronic diastolic CHF  Sinus Bradycardia on metoprolol and recently clonidine  HFpEF  HTN, uncontrolled LVEF 60% DD 1/22  First degree AVB  CAD 40% LAD disease 2/22  HLD  Diabetic CKD  GERRY    Plan: Must control BP better  Continue Doxazosin 8 bid  Start Isordil 40 tid, Procardia 60 ( expect swelling as documented in past)  Note allergy to hydralazine  Hold metoprolol and clonidine  Telemetry  Will need nephrology input soon  Diurese as tolerated    Thank you for your consult. I will follow-up with patient. Please contact us if you have any additional questions.    Transcribed by  aJson Malave NP for Dr CODY Charles  Cardiology   [facility] - Emergency Dept  I attest that I have personally seen and examined this patient. I have reviewed and discussed the management in detail as outlined above.

## 2022-08-18 NOTE — HPI
77 y.o. male with PMHx of HFpEF, DMII, HTN, HLD, GERD, TARA, hearing impairment presented to ER with generalized weakness and SOB that was present upon awaking today. Pt states he was in his usual state of health prior. He denies congestion, sore throat, chest pain, palpitations, abd pain, N/V/D, dysuria, hematuria, and fever/chills. Per ER records, daughter reported patient complained of R facial and RUE numbness near the onset of symptoms, which lasted about 3-4 min and self-resolved. Pt states his cardiologist recently changed him from torsemide to bumex and also increased his toprol dose for BP control. In ER, pt found to be extremely hypertensive with sysolics over 200 and started on a nitro gtt. He was also given IV lasix 40mg x1. He was also bradycardic and required atropine.

## 2022-08-18 NOTE — ED NOTES
Patient complains of minor headache at this time but does not want anything for the discomfort.  Advised patient this is a normal ADR and to let me know if he would like anything to help soothe the headache.  V/U.      Patient's daughters at bedside.

## 2022-08-19 ENCOUNTER — PATIENT MESSAGE (OUTPATIENT)
Dept: FAMILY MEDICINE | Facility: CLINIC | Age: 77
End: 2022-08-19
Payer: MEDICARE

## 2022-08-19 DIAGNOSIS — G47.33 OSA (OBSTRUCTIVE SLEEP APNEA): Primary | ICD-10-CM

## 2022-08-19 LAB
ALBUMIN SERPL BCP-MCNC: 2.5 G/DL (ref 3.5–5.2)
ALBUMIN SERPL BCP-MCNC: 2.5 G/DL (ref 3.5–5.2)
ALP SERPL-CCNC: 60 U/L (ref 55–135)
ALP SERPL-CCNC: 60 U/L (ref 55–135)
ALT SERPL W/O P-5'-P-CCNC: 17 U/L (ref 10–44)
ALT SERPL W/O P-5'-P-CCNC: 17 U/L (ref 10–44)
ANION GAP SERPL CALC-SCNC: 12 MMOL/L (ref 8–16)
ANION GAP SERPL CALC-SCNC: 12 MMOL/L (ref 8–16)
ASCENDING AORTA: 2.67 CM
AST SERPL-CCNC: 14 U/L (ref 10–40)
AST SERPL-CCNC: 14 U/L (ref 10–40)
AV INDEX (PROSTH): 0.68
AV MEAN GRADIENT: 6 MMHG
AV PEAK GRADIENT: 12 MMHG
AV VALVE AREA: 2.19 CM2
AV VELOCITY RATIO: 0.66
BASOPHILS # BLD AUTO: 0.05 K/UL (ref 0–0.2)
BASOPHILS # BLD AUTO: 0.05 K/UL (ref 0–0.2)
BASOPHILS NFR BLD: 0.9 % (ref 0–1.9)
BASOPHILS NFR BLD: 0.9 % (ref 0–1.9)
BILIRUB SERPL-MCNC: 0.4 MG/DL (ref 0.1–1)
BILIRUB SERPL-MCNC: 0.4 MG/DL (ref 0.1–1)
BSA FOR ECHO PROCEDURE: 2.39 M2
BUN SERPL-MCNC: 45 MG/DL (ref 8–23)
BUN SERPL-MCNC: 45 MG/DL (ref 8–23)
CALCIUM SERPL-MCNC: 7.8 MG/DL (ref 8.7–10.5)
CALCIUM SERPL-MCNC: 7.8 MG/DL (ref 8.7–10.5)
CHLORIDE SERPL-SCNC: 100 MMOL/L (ref 95–110)
CHLORIDE SERPL-SCNC: 100 MMOL/L (ref 95–110)
CHOLEST SERPL-MCNC: 178 MG/DL (ref 120–199)
CHOLEST/HDLC SERPL: 7.7 {RATIO} (ref 2–5)
CO2 SERPL-SCNC: 27 MMOL/L (ref 23–29)
CO2 SERPL-SCNC: 27 MMOL/L (ref 23–29)
CREAT SERPL-MCNC: 2.8 MG/DL (ref 0.5–1.4)
CREAT SERPL-MCNC: 2.8 MG/DL (ref 0.5–1.4)
CV ECHO LV RWT: 0.33 CM
DIFFERENTIAL METHOD: ABNORMAL
DIFFERENTIAL METHOD: ABNORMAL
DOP CALC AO PEAK VEL: 1.75 M/S
DOP CALC AO VTI: 45.16 CM
DOP CALC LVOT AREA: 3.2 CM2
DOP CALC LVOT DIAMETER: 2.02 CM
DOP CALC LVOT PEAK VEL: 1.16 M/S
DOP CALC LVOT STROKE VOLUME: 98.91 CM3
DOP CALCLVOT PEAK VEL VTI: 30.88 CM
E WAVE DECELERATION TIME: 254.96 MSEC
E/A RATIO: 0.73
E/E' RATIO: 10.25 M/S
ECHO LV POSTERIOR WALL: 0.99 CM (ref 0.6–1.1)
EJECTION FRACTION: 60 %
EOSINOPHIL # BLD AUTO: 0.1 K/UL (ref 0–0.5)
EOSINOPHIL # BLD AUTO: 0.1 K/UL (ref 0–0.5)
EOSINOPHIL NFR BLD: 2.6 % (ref 0–8)
EOSINOPHIL NFR BLD: 2.6 % (ref 0–8)
ERYTHROCYTE [DISTWIDTH] IN BLOOD BY AUTOMATED COUNT: 12.7 % (ref 11.5–14.5)
ERYTHROCYTE [DISTWIDTH] IN BLOOD BY AUTOMATED COUNT: 12.7 % (ref 11.5–14.5)
EST. GFR  (NO RACE VARIABLE): 22 ML/MIN/1.73 M^2
EST. GFR  (NO RACE VARIABLE): 22 ML/MIN/1.73 M^2
ESTIMATED AVG GLUCOSE: 169 MG/DL (ref 68–131)
FRACTIONAL SHORTENING: 38 % (ref 28–44)
GLUCOSE SERPL-MCNC: 153 MG/DL (ref 70–110)
GLUCOSE SERPL-MCNC: 153 MG/DL (ref 70–110)
HBA1C MFR BLD: 7.5 % (ref 4–5.6)
HCT VFR BLD AUTO: 32.4 % (ref 40–54)
HCT VFR BLD AUTO: 32.4 % (ref 40–54)
HDLC SERPL-MCNC: 23 MG/DL (ref 40–75)
HDLC SERPL: 12.9 % (ref 20–50)
HGB BLD-MCNC: 11.2 G/DL (ref 14–18)
HGB BLD-MCNC: 11.2 G/DL (ref 14–18)
IMM GRANULOCYTES # BLD AUTO: 0.01 K/UL (ref 0–0.04)
IMM GRANULOCYTES # BLD AUTO: 0.01 K/UL (ref 0–0.04)
IMM GRANULOCYTES NFR BLD AUTO: 0.2 % (ref 0–0.5)
IMM GRANULOCYTES NFR BLD AUTO: 0.2 % (ref 0–0.5)
INTERVENTRICULAR SEPTUM: 1.43 CM (ref 0.6–1.1)
IVRT: 117.98 MSEC
LA MAJOR: 5.15 CM
LA WIDTH: 5.64 CM
LDLC SERPL CALC-MCNC: 113.2 MG/DL (ref 63–159)
LEFT ATRIUM SIZE: 3.33 CM
LEFT ATRIUM VOLUME INDEX MOD: 24.3 ML/M2
LEFT ATRIUM VOLUME MOD: 56.24 CM3
LEFT INTERNAL DIMENSION IN SYSTOLE: 3.76 CM (ref 2.1–4)
LEFT VENTRICLE DIASTOLIC VOLUME INDEX: 78.73 ML/M2
LEFT VENTRICLE DIASTOLIC VOLUME: 181.87 ML
LEFT VENTRICLE MASS INDEX: 139 G/M2
LEFT VENTRICLE SYSTOLIC VOLUME INDEX: 26.2 ML/M2
LEFT VENTRICLE SYSTOLIC VOLUME: 60.54 ML
LEFT VENTRICULAR INTERNAL DIMENSION IN DIASTOLE: 6.03 CM (ref 3.5–6)
LEFT VENTRICULAR MASS: 320.17 G
LV LATERAL E/E' RATIO: 13.67 M/S
LV SEPTAL E/E' RATIO: 8.2 M/S
LYMPHOCYTES # BLD AUTO: 1.2 K/UL (ref 1–4.8)
LYMPHOCYTES # BLD AUTO: 1.2 K/UL (ref 1–4.8)
LYMPHOCYTES NFR BLD: 21.5 % (ref 18–48)
LYMPHOCYTES NFR BLD: 21.5 % (ref 18–48)
MAGNESIUM SERPL-MCNC: 1.8 MG/DL (ref 1.6–2.6)
MCH RBC QN AUTO: 31.4 PG (ref 27–31)
MCH RBC QN AUTO: 31.4 PG (ref 27–31)
MCHC RBC AUTO-ENTMCNC: 34.6 G/DL (ref 32–36)
MCHC RBC AUTO-ENTMCNC: 34.6 G/DL (ref 32–36)
MCV RBC AUTO: 91 FL (ref 82–98)
MCV RBC AUTO: 91 FL (ref 82–98)
MONOCYTES # BLD AUTO: 0.7 K/UL (ref 0.3–1)
MONOCYTES # BLD AUTO: 0.7 K/UL (ref 0.3–1)
MONOCYTES NFR BLD: 12.5 % (ref 4–15)
MONOCYTES NFR BLD: 12.5 % (ref 4–15)
MV PEAK A VEL: 1.12 M/S
MV PEAK E VEL: 0.82 M/S
MV STENOSIS PRESSURE HALF TIME: 73.94 MS
MV VALVE AREA P 1/2 METHOD: 2.98 CM2
NEUTROPHILS # BLD AUTO: 3.3 K/UL (ref 1.8–7.7)
NEUTROPHILS # BLD AUTO: 3.3 K/UL (ref 1.8–7.7)
NEUTROPHILS NFR BLD: 62.3 % (ref 38–73)
NEUTROPHILS NFR BLD: 62.3 % (ref 38–73)
NONHDLC SERPL-MCNC: 155 MG/DL
NRBC BLD-RTO: 0 /100 WBC
NRBC BLD-RTO: 0 /100 WBC
PISA TR MAX VEL: 1.44 M/S
PLATELET # BLD AUTO: 184 K/UL (ref 150–450)
PLATELET # BLD AUTO: 184 K/UL (ref 150–450)
PMV BLD AUTO: 10.9 FL (ref 9.2–12.9)
PMV BLD AUTO: 10.9 FL (ref 9.2–12.9)
POCT GLUCOSE: 125 MG/DL (ref 70–110)
POCT GLUCOSE: 128 MG/DL (ref 70–110)
POCT GLUCOSE: 146 MG/DL (ref 70–110)
POCT GLUCOSE: 164 MG/DL (ref 70–110)
POCT GLUCOSE: 178 MG/DL (ref 70–110)
POCT GLUCOSE: 210 MG/DL (ref 70–110)
POTASSIUM SERPL-SCNC: 4.6 MMOL/L (ref 3.5–5.1)
POTASSIUM SERPL-SCNC: 4.6 MMOL/L (ref 3.5–5.1)
PROT SERPL-MCNC: 5 G/DL (ref 6–8.4)
PROT SERPL-MCNC: 5 G/DL (ref 6–8.4)
PULM VEIN S/D RATIO: 2
PV PEAK D VEL: 0.31 M/S
PV PEAK S VEL: 0.62 M/S
PV PEAK VELOCITY: 0.99 CM/S
RA MAJOR: 5.14 CM
RBC # BLD AUTO: 3.57 M/UL (ref 4.6–6.2)
RBC # BLD AUTO: 3.57 M/UL (ref 4.6–6.2)
RIGHT VENTRICULAR END-DIASTOLIC DIMENSION: 1.73 CM
SODIUM SERPL-SCNC: 139 MMOL/L (ref 136–145)
SODIUM SERPL-SCNC: 139 MMOL/L (ref 136–145)
STJ: 2.63 CM
TDI LATERAL: 0.06 M/S
TDI SEPTAL: 0.1 M/S
TDI: 0.08 M/S
TR MAX PG: 8 MMHG
TRICUSPID ANNULAR PLANE SYSTOLIC EXCURSION: 2.67 CM
TRIGL SERPL-MCNC: 209 MG/DL (ref 30–150)
WBC # BLD AUTO: 5.36 K/UL (ref 3.9–12.7)
WBC # BLD AUTO: 5.36 K/UL (ref 3.9–12.7)

## 2022-08-19 PROCEDURE — 83735 ASSAY OF MAGNESIUM: CPT | Performed by: STUDENT IN AN ORGANIZED HEALTH CARE EDUCATION/TRAINING PROGRAM

## 2022-08-19 PROCEDURE — 93005 ELECTROCARDIOGRAM TRACING: CPT

## 2022-08-19 PROCEDURE — 99233 SBSQ HOSP IP/OBS HIGH 50: CPT | Mod: ,,, | Performed by: INTERNAL MEDICINE

## 2022-08-19 PROCEDURE — 25000003 PHARM REV CODE 250: Performed by: STUDENT IN AN ORGANIZED HEALTH CARE EDUCATION/TRAINING PROGRAM

## 2022-08-19 PROCEDURE — 94761 N-INVAS EAR/PLS OXIMETRY MLT: CPT

## 2022-08-19 PROCEDURE — 99900035 HC TECH TIME PER 15 MIN (STAT)

## 2022-08-19 PROCEDURE — 20000000 HC ICU ROOM

## 2022-08-19 PROCEDURE — 80053 COMPREHEN METABOLIC PANEL: CPT | Performed by: STUDENT IN AN ORGANIZED HEALTH CARE EDUCATION/TRAINING PROGRAM

## 2022-08-19 PROCEDURE — 63600175 PHARM REV CODE 636 W HCPCS: Performed by: STUDENT IN AN ORGANIZED HEALTH CARE EDUCATION/TRAINING PROGRAM

## 2022-08-19 PROCEDURE — 83036 HEMOGLOBIN GLYCOSYLATED A1C: CPT | Performed by: STUDENT IN AN ORGANIZED HEALTH CARE EDUCATION/TRAINING PROGRAM

## 2022-08-19 PROCEDURE — 36415 COLL VENOUS BLD VENIPUNCTURE: CPT | Performed by: STUDENT IN AN ORGANIZED HEALTH CARE EDUCATION/TRAINING PROGRAM

## 2022-08-19 PROCEDURE — 80061 LIPID PANEL: CPT | Performed by: STUDENT IN AN ORGANIZED HEALTH CARE EDUCATION/TRAINING PROGRAM

## 2022-08-19 PROCEDURE — 99233 PR SUBSEQUENT HOSPITAL CARE,LEVL III: ICD-10-PCS | Mod: ,,, | Performed by: INTERNAL MEDICINE

## 2022-08-19 PROCEDURE — 85025 COMPLETE CBC W/AUTO DIFF WBC: CPT | Performed by: STUDENT IN AN ORGANIZED HEALTH CARE EDUCATION/TRAINING PROGRAM

## 2022-08-19 RX ADMIN — ISOSORBIDE DINITRATE 40 MG: 20 TABLET ORAL at 09:08

## 2022-08-19 RX ADMIN — CLOPIDOGREL 75 MG: 75 TABLET, FILM COATED ORAL at 09:08

## 2022-08-19 RX ADMIN — DOXAZOSIN 8 MG: 2 TABLET ORAL at 09:08

## 2022-08-19 RX ADMIN — MUPIROCIN: 20 OINTMENT TOPICAL at 09:08

## 2022-08-19 RX ADMIN — ACETAMINOPHEN 650 MG: 325 TABLET ORAL at 09:08

## 2022-08-19 RX ADMIN — INSULIN DETEMIR 15 UNITS: 100 INJECTION, SOLUTION SUBCUTANEOUS at 09:08

## 2022-08-19 RX ADMIN — ISOSORBIDE DINITRATE 40 MG: 20 TABLET ORAL at 03:08

## 2022-08-19 RX ADMIN — INSULIN ASPART 2 UNITS: 100 INJECTION, SOLUTION INTRAVENOUS; SUBCUTANEOUS at 04:08

## 2022-08-19 RX ADMIN — SERTRALINE HYDROCHLORIDE 25 MG: 25 TABLET ORAL at 09:08

## 2022-08-19 RX ADMIN — NIFEDIPINE 60 MG: 60 TABLET, FILM COATED, EXTENDED RELEASE ORAL at 11:08

## 2022-08-19 RX ADMIN — PANTOPRAZOLE SODIUM 40 MG: 40 TABLET, DELAYED RELEASE ORAL at 09:08

## 2022-08-19 RX ADMIN — ASPIRIN 81 MG: 81 TABLET, COATED ORAL at 09:08

## 2022-08-19 NOTE — PROGRESS NOTES
[facility] Medicine  Progress Note    Patient Name: Nestor Garcia  MRN: [id number]  Patient Class: IP- Inpatient   Admission Date: 8/18/2022  Length of Stay: 1 days  Attending Physician: Kashmir Conn MD  Primary Care Provider: Kashmir Conn MD        Subjective:     Principal Problem:Hypertensive emergency        HPI:  77 y.o. male with PMHx of HFpEF, DMII, HTN, HLD, GERD, TARA, hearing impairment presented to ER with generalized weakness and SOB that was present upon awaking today. Pt states he was in his usual state of health prior. He denies congestion, sore throat, chest pain, palpitations, abd pain, N/V/D, dysuria, hematuria, and fever/chills. Per ER records, daughter reported patient complained of R facial and RUE numbness near the onset of symptoms, which lasted about 3-4 min and self-resolved. Pt states his cardiologist recently changed him from torsemide to bumex and also increased his toprol dose for BP control. In ER, pt found to be extremely hypertensive with sysolics over 200 and started on a nitro gtt. He was also given IV lasix 40mg x1. He was also bradycardic and required atropine.       Overview/Hospital Course:  8/19/22  Nestor Garcia is a 77 y.o. 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 Patient stated symptoms began approximately 30 minutes prior to arrival. He had  bradycardia with HR in 30s. EKG revealed sinus bradycardia with first degree AVB, HR 47 bpm.   Presently  in icu ; cardiology seen patient his metoprolol was recently increased and it was held .  He reports he feels bad when his HR is below 50 .        Interval History:     Review of Systems   Constitutional:  Positive for activity change and fatigue. Negative for appetite change, chills and fever.   HENT:   Negative for congestion and sore throat.    Eyes:  Negative for visual disturbance.   Respiratory:  Negative for cough and shortness of breath.    Cardiovascular:  Positive for leg swelling (at baseline).   Gastrointestinal:  Negative for abdominal pain, diarrhea, nausea and vomiting.   Genitourinary:  Positive for difficulty urinating. Negative for dysuria and hematuria.   Musculoskeletal:  Negative for arthralgias and myalgias.   Skin:  Negative for wound.   Neurological:  Positive for numbness. Negative for facial asymmetry and speech difficulty.   Psychiatric/Behavioral:  Negative for confusion.    Objective:     Vital Signs (Most Recent):  Temp: 97.1 °F (36.2 °C) (08/19/22 0400)  Pulse: (!) 48 (08/19/22 0723)  Resp: 20 (08/19/22 0723)  BP: (!) 118/55 (08/19/22 0500)  SpO2: 96 % (08/19/22 0723)   Vital Signs (24h Range):  Temp:  [96.7 °F (35.9 °C)-98.3 °F (36.8 °C)] 97.1 °F (36.2 °C)  Pulse:  [36-52] 48  Resp:  [11-23] 20  SpO2:  [92 %-100 %] 96 %  BP: ()/(47-88) 118/55     Weight: 115.7 kg (255 lb 1.2 oz)  Body mass index is 36.6 kg/m².    Intake/Output Summary (Last 24 hours) at 8/19/2022 0742  Last data filed at 8/19/2022 0600  Gross per 24 hour   Intake 530 ml   Output 975 ml   Net -445 ml      Physical Exam  Vitals and nursing note reviewed.   Constitutional:       General: He is not in acute distress.     Appearance: He is obese.   HENT:      Head: Normocephalic and atraumatic.      Right Ear: External ear normal.      Left Ear: External ear normal.      Mouth/Throat:      Mouth: Mucous membranes are moist.   Eyes:      Extraocular Movements: Extraocular movements intact.      Conjunctiva/sclera: Conjunctivae normal.      Pupils: Pupils are equal, round, and reactive to light.   Cardiovascular:      Rate and Rhythm: Regular rhythm. Bradycardia present.      Heart sounds: Normal heart sounds. No murmur heard.  Pulmonary:      Effort: Pulmonary effort is normal. No respiratory distress.      Breath  sounds: Normal breath sounds.   Abdominal:      General: Bowel sounds are normal. There is no distension.      Palpations: Abdomen is soft.      Tenderness: There is no abdominal tenderness.   Musculoskeletal:      Cervical back: Neck supple.      Right lower leg: Edema (pitting to mid shin) present.      Left lower leg: Edema (pitting to mid-shin) present.   Neurological:      General: No focal deficit present.      Mental Status: He is alert and oriented to person, place, and time.   Psychiatric:         Mood and Affect: Mood normal.         Behavior: Behavior normal.       Significant Labs: All pertinent labs within the past 24 hours have been reviewed.  CBC:   Recent Labs   Lab 08/18/22  0703 08/19/22  0404   WBC 5.50 5.36  5.36   HGB 12.9* 11.2*  11.2*   HCT 36.9* 32.4*  32.4*    184  184     CMP  Sodium   Date Value Ref Range Status   08/19/2022 139 136 - 145 mmol/L Final   08/19/2022 139 136 - 145 mmol/L Final     Potassium   Date Value Ref Range Status   08/19/2022 4.6 3.5 - 5.1 mmol/L Final   08/19/2022 4.6 3.5 - 5.1 mmol/L Final     Chloride   Date Value Ref Range Status   08/19/2022 100 95 - 110 mmol/L Final   08/19/2022 100 95 - 110 mmol/L Final     CO2   Date Value Ref Range Status   08/19/2022 27 23 - 29 mmol/L Final   08/19/2022 27 23 - 29 mmol/L Final     Glucose   Date Value Ref Range Status   08/19/2022 153 (H) 70 - 110 mg/dL Final   08/19/2022 153 (H) 70 - 110 mg/dL Final     BUN   Date Value Ref Range Status   08/19/2022 45 (H) 8 - 23 mg/dL Final   08/19/2022 45 (H) 8 - 23 mg/dL Final     Creatinine   Date Value Ref Range Status   08/19/2022 2.8 (H) 0.5 - 1.4 mg/dL Final   08/19/2022 2.8 (H) 0.5 - 1.4 mg/dL Final     Calcium   Date Value Ref Range Status   08/19/2022 7.8 (L) 8.7 - 10.5 mg/dL Final   08/19/2022 7.8 (L) 8.7 - 10.5 mg/dL Final     Total Protein   Date Value Ref Range Status   08/19/2022 5.0 (L) 6.0 - 8.4 g/dL Final   08/19/2022 5.0 (L) 6.0 - 8.4 g/dL Final     Albumin    Date Value Ref Range Status   08/19/2022 2.5 (L) 3.5 - 5.2 g/dL Final   08/19/2022 2.5 (L) 3.5 - 5.2 g/dL Final     Total Bilirubin   Date Value Ref Range Status   08/19/2022 0.4 0.1 - 1.0 mg/dL Final     Comment:     For infants and newborns, interpretation of results should be based  on gestational age, weight and in agreement with clinical  observations.    Premature Infant recommended reference ranges:  Up to 24 hours.............<8.0 mg/dL  Up to 48 hours............<12.0 mg/dL  3-5 days..................<15.0 mg/dL  6-29 days.................<15.0 mg/dL     08/19/2022 0.4 0.1 - 1.0 mg/dL Final     Comment:     For infants and newborns, interpretation of results should be based  on gestational age, weight and in agreement with clinical  observations.    Premature Infant recommended reference ranges:  Up to 24 hours.............<8.0 mg/dL  Up to 48 hours............<12.0 mg/dL  3-5 days..................<15.0 mg/dL  6-29 days.................<15.0 mg/dL       Alkaline Phosphatase   Date Value Ref Range Status   08/19/2022 60 55 - 135 U/L Final   08/19/2022 60 55 - 135 U/L Final     AST   Date Value Ref Range Status   08/19/2022 14 10 - 40 U/L Final   08/19/2022 14 10 - 40 U/L Final     ALT   Date Value Ref Range Status   08/19/2022 17 10 - 44 U/L Final   08/19/2022 17 10 - 44 U/L Final     Anion Gap   Date Value Ref Range Status   08/19/2022 12 8 - 16 mmol/L Final   08/19/2022 12 8 - 16 mmol/L Final     eGFR if    Date Value Ref Range Status   07/01/2022 34 (A) >60 mL/min/1.73 m^2 Final     eGFR if non    Date Value Ref Range Status   07/01/2022 29 (A) >60 mL/min/1.73 m^2 Final     Comment:     Calculation used to obtain the estimated glomerular filtration  rate (eGFR) is the CKD-EPI equation.        Recent Labs   Lab 08/18/22  0703   TROPONINI 0.015       Significant Imaging:     The lungs are under expanded.  There are patchy mixed opacity seen throughout both lungs which  could reflect atelectasis, aspiration or pneumonia.  Heart is enlarged.  Skeletal structures are intact.     Impression:     As above.       No evidence of deep venous thrombosis in either lower extremity.       There is age-appropriate generalized cerebral volume loss.  Compensatory enlargement of the ventricle sulci and cisterns without hydrocephalus.  There is no midline shift or mass effect.  There is moderate severe ill-defined decreased attenuation in the supratentorial white matter while nonspecific suggestive for chronic ischemic change.  There is no evidence for acute intracranial hemorrhage or sulcal effacement.  There is no midline shift or mass effect.  Prominent vascular calcifications.  Visualized paranasal sinuses and mastoid air cells are clear..     Impression:     No acute intracranial findings.     Age-appropriate cerebral volume loss with moderate severe patchy decreased attenuation supratentorial white matter while nonspecific suggestive for chronic ischemic change.     No evidence for acute intracranial hemorrhage.  Clinical correlation and further evaluation as warranted.           EKG : Sinus bradycardia with 1st degree A-V block   Left axis deviation   LVH with QRS widening   Abnormal ECG   When compared with ECG of 05-AUG-2022 09:32,   Sinus rhythm has replaced Junctional rhythm   Confirmed by Kashmir Bo MD ([id number]) on 8/18/2022 10:26:08 AM       Assessment/Plan:      * Hypertensive emergency  Patient has a current diagnosis of Hypertensive emergency with end organ damage evidenced by hypertensive encephalopathy and acute heart failure which is controlled.  Latest blood pressure and vitals reviewed-   Temp:  [96.7 °F (35.9 °C)-98.3 °F (36.8 °C)]   Pulse:  [36-52]   Resp:  [11-23]   BP: ()/(47-88)   SpO2:  [92 %-100 %] .   Patient currently off IV antihypertensives.   Home meds for hypertension were reviewed and noted below.   Hypertension Medications             bumetanide (BUMEX) 2  MG tablet Take 2 mg by mouth 2 (two) times daily.    doxazosin (CARDURA) 8 MG Tab every 12 (twelve) hours.    metoprolol succinate (TOPROL-XL) 25 MG 24 hr tablet Take 100 mg by mouth once daily.    cloNIDine (CATAPRES) 0.1 MG tablet Take 0.1 mg by mouth 2 (two) times daily.          Medication adjustment for hospital antihypertensives is as follows- Continue Doxazosin 8 bid, Start Isordil 40 tid and Procardia 60mg daily ( expect swelling as documented in past)    Will aim for controlled BP reduction by medications noted above. Monitor and mitigate end organ damage as indicated.        8/19  Hypertensive emergency resolved .      CKD (chronic kidney disease) stage 4, GFR 15-29 ml/min  Renal function seems to be declining recently; will need nephrology outpatient  Avoid nephrotoxic meds; will need to DC cymbalta.      Acute respiratory failure with hypoxia  Likely 2/2 HFpEF exacerbation  2L NC; wean as tolerated.    8/19/22  Still using oxygen will wean off       Bradycardia  S/p atropine x2 in ER  Hold toprol  Tele  Cards consulted; appreciate recs.    8/19  Holding beta blockers         TIA (transient ischemic attack)  Pt with left sided numbness and tingling prior to arrival that self resolved  CT Head negative.    8/19   Check MRI brain today   Continue ASA    TARA (obstructive sleep apnea)  Does not tolerate CPAP  Likely contributing to bradycardia.      Acute on chronic diastolic heart failure  S/p lasix 40mg IV in ER  At home he takes bumex 2mg BID.    8/19  He was given lasix in ER   Creatinine went up   Hold diuretics for now .  Lying flat       Discussed with him and daughters ; compression stockings .  He will try .        Gastroesophageal reflux disease without esophagitis  Cont home PPI.      Type 2 diabetes mellitus without complication  Patient's FSGs are controlled on current medication regimen.  Last A1c reviewed-   Lab Results   Component Value Date    HGBA1C 7.3 (A) 03/22/2022     Most recent  fingerstick glucose reviewed-   Recent Labs   Lab 08/18/22 2018 08/19/22  0605   POCTGLUCOSE 146* 128*     Current correctional scale  Low  Maintain anti-hyperglycemic dose as follows-   Antihyperglycemics (From admission, onward)            Start     Stop Route Frequency Ordered    08/18/22 2100  insulin detemir U-100 pen 15 Units         -- SubQ Nightly 08/18/22 1649    08/18/22 1749  insulin aspart U-100 pen 0-5 Units         -- SubQ Before meals & nightly PRN 08/18/22 1649        Hold Oral hypoglycemics while patient is in the hospital.      8/20    Glucose 153  Continue same      Essential hypertension  Hold home metoprolol and clonidine  Cont home doxazosin 8mg BID  Start Isordil 40 TID and  Procardia 60mg daily ( expect swelling as documented in past) per cardiology  Note allergy to hydralazine.    8/19/22  His BP is great   Will hold doxazosin today   With HR still in 40s will hold beta blockers      Dyslipidemia  Statin intolerance; takes fish oil at home.        VTE Risk Mitigation (From admission, onward)         Ordered     IP VTE HIGH RISK PATIENT  Once         08/18/22 1642     Place sequential compression device  Until discontinued         08/18/22 1642                Discharge Planning   LESLIE:      Code Status: Full Code   Is the patient medically ready for discharge?:     Reason for patient still in hospital (select all that apply): Treatment               Critical care time spent on the evaluation and treatment of severe organ dysfunction, review of pertinent labs and imaging studies, discussions with consulting providers and discussions with patient/family: 35 minutes.      Dario Cano MD  Department of Hospital Medicine   [facility] - Intensive Care

## 2022-08-19 NOTE — ASSESSMENT & PLAN NOTE
S/p lasix 40mg IV in ER  At home he takes bumex 2mg BID.    8/19  He was given lasix in ER   Creatinine went up   Hold diuretics for now .  Lying flat       Discussed with him and daughters ; compression stockings .  He will try .

## 2022-08-19 NOTE — HOSPITAL COURSE
8/19/22  Nestor Garcia is a 77 y.o. 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 Patient stated symptoms began approximately 30 minutes prior to arrival. He had  bradycardia with HR in 30s. EKG revealed sinus bradycardia with first degree AVB, HR 47 bpm.   Presently  in icu ; cardiology seen patient his metoprolol was recently increased and it was held .  He reports he feels bad when his HR is below 50 .      8/20/22  Nestor Garcia is a 77 y.o. male  Needed clonidine for high BP this am    systolic .  He is off of beta blockers ;his HR has come up into 50s to 60s ; NSR;  Still requiring oxygen   Bilateral crackles. Hypoxia without oxygen     Will add bumex ; add lisinopril   Follow BMP       8/21/22.  Doing well   Still hypoxic without oxygen   Bilateral crackles . BP is better . HR 70s ;he did have period of tachycardia when got out of bed to chair   Will need more diuresis   Continue Bumex  Transfer to floor       8/22/22  BP and HR are well controlled  He is still requiring oxygen. CXR pending. He denies feeling SOB, cough or wheezing. Does not wear home O2. He wants to go home today.     8/23: oxygen weaned yesterday. Desats overnight due to TARA and still refuses CPAP. CXR clear. BP remains stable on current meds and ready for d/c home today

## 2022-08-19 NOTE — ASSESSMENT & PLAN NOTE
S/p atropine x2 in ER  Hold toprol  Tele  Cards consulted; appreciate recs.    8/19  Holding beta blockers

## 2022-08-19 NOTE — EICU
Rounding (Video Assessment):  Yes    Intervention Initiated From:  [facility] / EICU    Comments: Video rounds complete. Patient appears to be sleeping at this time. No alarms, or distress noted.

## 2022-08-19 NOTE — PROGRESS NOTES
[facility] - Intensive Care  Cardiology  Progress Note    Patient Name: Nestor Garcia  MRN: [id number]  Admission Date: 8/18/2022  Hospital Length of Stay: 1 days  Code Status: Full Code   Attending Physician: Kashmir Conn MD   Primary Care Physician: Kashmir Conn MD  Expected Discharge Date:   Principal Problem:Hypertensive emergency    Subjective:     Hospital Course: Pt admitted for bradycardia, weakness and SOB. Pt started on tridil overnight for Bp management. IV diuresis overnight.     Interval History: Bp controlled this am, off tridil. SB on tele overnight, Hr 40s.     Review of Systems   Cardiovascular: Positive for dyspnea on exertion.   Respiratory: Positive for shortness of breath.    Neurological: Positive for weakness.     Objective:     Vital Signs (Most Recent):  Temp: 97.1 °F (36.2 °C) (08/19/22 0703)  Pulse: (!) 48 (08/19/22 0723)  Resp: 20 (08/19/22 0723)  BP: (!) 147/63 (08/19/22 0703)  SpO2: 96 % (08/19/22 0723) Vital Signs (24h Range):  Temp:  [96.7 °F (35.9 °C)-98.3 °F (36.8 °C)] 97.1 °F (36.2 °C)  Pulse:  [36-52] 48  Resp:  [11-23] 20  SpO2:  [92 %-100 %] 96 %  BP: ()/(47-88) 147/63     Weight: 115.7 kg (255 lb 1.2 oz)  Body mass index is 36.6 kg/m².    SpO2: 96 %  O2 Device (Oxygen Therapy): room air      Intake/Output Summary (Last 24 hours) at 8/19/2022 0818  Last data filed at 8/19/2022 0705  Gross per 24 hour   Intake 530 ml   Output 1100 ml   Net -570 ml       Lines/Drains/Airways     Peripheral Intravenous Line  Duration                Peripheral IV - Single Lumen 08/18/22 0650 20 G Left Antecubital 1 day         Peripheral IV - Single Lumen 08/18/22 0657 18 G Right Wrist 1 day                Physical Exam  Vitals and nursing note reviewed.   HENT:      Head: Normocephalic.   Eyes:      Conjunctiva/sclera: Conjunctivae normal.      Pupils: Pupils are equal, round, and reactive to light.   Cardiovascular:      Rate and Rhythm: Bradycardia present.      Heart sounds: Normal heart  sounds.   Pulmonary:      Effort: Pulmonary effort is normal.      Breath sounds: Normal breath sounds.   Abdominal:      General: Bowel sounds are normal.      Palpations: Abdomen is soft.   Musculoskeletal:         General: Normal range of motion.      Cervical back: Normal range of motion and neck supple.      Right lower leg: Edema present.      Left lower leg: Edema present.   Skin:     General: Skin is warm and dry.   Neurological:      Mental Status: He is alert and oriented to person, place, and time.         Significant Labs:   BMP:   Recent Labs   Lab 08/18/22  0703 08/19/22  0404   * 153*  153*    139  139   K 4.6 4.6  4.6    100  100   CO2 28 27  27   BUN 36* 45*  45*   CREATININE 2.3* 2.8*  2.8*   CALCIUM 8.4* 7.8*  7.8*   MG 1.8 1.8   , CMP   Recent Labs   Lab 08/18/22  0703 08/19/22  0404    139  139   K 4.6 4.6  4.6    100  100   CO2 28 27  27   * 153*  153*   BUN 36* 45*  45*   CREATININE 2.3* 2.8*  2.8*   CALCIUM 8.4* 7.8*  7.8*   PROT 5.7* 5.0*  5.0*   ALBUMIN 2.9* 2.5*  2.5*   BILITOT 0.5 0.4  0.4   ALKPHOS 67 60  60   AST 16 14  14   ALT 18 17  17   ANIONGAP 10 12  12   , CBC   Recent Labs   Lab 08/18/22  0703 08/19/22  0404   WBC 5.50 5.36  5.36   HGB 12.9* 11.2*  11.2*   HCT 36.9* 32.4*  32.4*    184  184   , Troponin   Recent Labs   Lab 08/18/22  0703   TROPONINI 0.015    and All pertinent lab results from the last 24 hours have been reviewed.    Significant Imaging: X-Ray: CXR: X-Ray Chest 1 View (CXR):   Results for orders placed or performed during the hospital encounter of 08/18/22   X-Ray Chest 1 View    Narrative    EXAMINATION:  XR CHEST 1 VIEW    CLINICAL HISTORY:  Shortness of breath    TECHNIQUE:  Single frontal view of the chest was performed.    COMPARISON:  08/05/2022    FINDINGS:  The lungs are under expanded.  There are patchy mixed opacity seen throughout both lungs which could reflect atelectasis,  aspiration or pneumonia.  Heart is enlarged.  Skeletal structures are intact.      Impression    As above.      Electronically signed by: Yi Dc MD  Date:    08/18/2022  Time:    07:46    and X-Ray Chest PA and Lateral (CXR): No results found for this visit on 08/18/22.  Assessment and Plan:     Brief HPI:     Active Diagnoses:    Diagnosis Date Noted POA    PRINCIPAL PROBLEM:  Hypertensive emergency [I16.1] 08/18/2022 Yes    Acute on chronic diastolic heart failure [I50.33] 08/18/2022 Yes    TARA (obstructive sleep apnea) [G47.33] 08/18/2022 Yes    TIA (transient ischemic attack) [G45.9] 08/18/2022 Yes    Bradycardia [R00.1] 08/18/2022 Yes    Acute respiratory failure with hypoxia [J96.01] 08/18/2022 Yes    CKD (chronic kidney disease) stage 4, GFR 15-29 ml/min [N18.4] 08/18/2022 Yes    Gastroesophageal reflux disease without esophagitis [K21.9] 01/04/2022 Yes    Dyslipidemia [E78.5] 08/04/2015 Yes    Essential hypertension [I10] 08/04/2015 Yes    Type 2 diabetes mellitus without complication [E11.9] 08/04/2015 Yes      Problems Resolved During this Admission:       VTE Risk Mitigation (From admission, onward)         Ordered     IP VTE HIGH RISK PATIENT  Once         08/18/22 1642     Place sequential compression device  Until discontinued         08/18/22 1642                MPI 2/2022: abnormal consistent with ischemia. Small reversible perfusion abnormality of moderate intensity in the apical segment. Medium partially reversible perfusion abnormality of severe intensity in the anterior region.      TTE 1/2022: LVEF 55%-60%, grade 1 LVDD, trace TR, PASP 15 mmHg     DX:weakness/sob due to chronic diastolic CHF resolving, feels better today  Sinus Bradycardia on metoprolol and recently clonidine  HFpEF  HTN now OK OFF Tridil,  LVEF 60% DD 1/22  First degree AVB  CAD 40% LAD disease 2/22  HLD  Diabetic CKD  GERRY     Plan: Bp is better controlled now, off Tridil  Continue Doxazosin 8 bid  Continue  Isordil 40 tid, Procardia 60 ( expect swelling as documented in past)  Note allergy to hydralazine  Continue to hold metoprolol and clonidine  Tx to med surg, OOB as tolerated       Transcribed by  TICO Saunders  Cardiology  [place] - Intensive Care  I attest that I have personally seen and examined this patient. I have reviewed and discussed the management in detail as outlined above.

## 2022-08-19 NOTE — PLAN OF CARE
[facility] - Intensive Care  Initial Discharge Assessment       Primary Care Provider: Kashmir Conn MD    Admission Diagnosis: Shortness of breath [R06.02]  Acute on chronic diastolic heart failure [I50.33]  Hyperphosphatemia [E83.39]  Elevated d-dimer [R79.89]  Hypertensive emergency [I16.1]    Admission Date: 8/18/2022  Expected Discharge Date:     Discharge Barriers Identified: (P) None    Payor: [employer] MANAGED MEDICARE / Plan: [employer] MEDICARE HMO / Product Type: Capitation /     Extended Emergency Contact Information  Primary Emergency Contact: Dena Palomino   [facility]  Home Phone: [phone]  Mobile Phone: [phone]  Relation: Daughter  Secondary Emergency Contact: SONIA HWANG  Mobile Phone: [phone]  Relation: Daughter    Discharge Plan A: (P) Home Health  Discharge Plan B: (P) Home Health      [id number] - [place], [place] - [address]  [id number]  [place] [address]  Phone: [phone] Fax: [phone]      Initial Assessment (most recent)       Adult Discharge Assessment - 08/19/22 1446          Discharge Assessment    Assessment Type Discharge Planning Assessment     Confirmed/corrected address, phone number and insurance Yes     Confirmed Demographics Correct on Facesheet     Source of Information patient;family     Communicated LESLIE with patient/caregiver Yes     Reason For Admission Hypertensive urgency     Lives With child(claus), adult     Facility Arrived From: [facility]     Do you expect to return to your current living situation? Yes     Do you have help at home or someone to help you manage your care at home? Yes     Who are your caregiver(s) and their phone number(s)? Daughters Dena and Sonia     Prior to hospitilization cognitive status: Alert/Oriented     Current cognitive status: Alert/Oriented     Walking or Climbing Stairs Difficulty none     Dressing/Bathing Difficulty none     Home Accessibility wheelchair accessible     Home Layout Able to live on  1st floor     Equipment Currently Used at Home glucometer;shower chair     Readmission within 30 days? No     Patient currently being followed by outpatient case management? Yes     If yes, name of outpatient case management following: [facility] outpatient case management   order placed today    Do you currently have service(s) that help you manage your care at home? No     Do you take prescription medications? Yes     Do you have prescription coverage? Yes     Coverage [employer] medicare     Do you have any problems affording any of your prescribed medications? No (P)      Is the patient taking medications as prescribed? no (P)      Who is going to help you get home at discharge? Sonia Russell (P)      How do you get to doctors appointments? family or friend will provide (P)      Are you on dialysis? No (P)      Do you take coumadin? No (P)      Discharge Plan A Home Health (P)      Discharge Plan B Home Health (P)      DME Needed Upon Discharge  other (see comments) (P)    TBD    Discharge Plan discussed with: Patient;Adult children (P)      Discharge Barriers Identified None (P)         Relationship/Environment    Name(s) of Who Lives With Patient lisandra Scott (P)                         CM completed discharge assessment at bedside. Patient lives at home with daughterSonia. Sonia and Dena present in room.   Sonia states she help with whatever patient needs.   Patient independent in daily living.   Patient did use a CPAP but gave it back because he did not want it. He is now stating he will try it again.   lisandra Scott, confirmed with DME company the prescription is still valid and they will come to set patient up with CPAP once discharged.  PT ordered for evaluation.  Family agreeable to home health at discharge.     CM to follow and assist with any discharge needs.

## 2022-08-19 NOTE — ASSESSMENT & PLAN NOTE
Likely 2/2 HFpEF exacerbation  2L NC; wean as tolerated.    8/19/22  Still using oxygen will wean off

## 2022-08-19 NOTE — EICU
Rounding (Video Assessment):  Yes    Comments: Video assessment complete. Pt sitting up in bed, awake and alert. Family at bedside. HR bradycardic at 50 bpm, BP stable. NAD noted.

## 2022-08-19 NOTE — SUBJECTIVE & OBJECTIVE
Interval History:     Review of Systems   Constitutional:  Positive for activity change and fatigue. Negative for appetite change, chills and fever.   HENT:  Negative for congestion and sore throat.    Eyes:  Negative for visual disturbance.   Respiratory:  Negative for cough and shortness of breath.    Cardiovascular:  Positive for leg swelling (at baseline).   Gastrointestinal:  Negative for abdominal pain, diarrhea, nausea and vomiting.   Genitourinary:  Positive for difficulty urinating. Negative for dysuria and hematuria.   Musculoskeletal:  Negative for arthralgias and myalgias.   Skin:  Negative for wound.   Neurological:  Positive for numbness. Negative for facial asymmetry and speech difficulty.   Psychiatric/Behavioral:  Negative for confusion.    Objective:     Vital Signs (Most Recent):  Temp: 97.1 °F (36.2 °C) (08/19/22 0400)  Pulse: (!) 48 (08/19/22 0723)  Resp: 20 (08/19/22 0723)  BP: (!) 118/55 (08/19/22 0500)  SpO2: 96 % (08/19/22 0723)   Vital Signs (24h Range):  Temp:  [96.7 °F (35.9 °C)-98.3 °F (36.8 °C)] 97.1 °F (36.2 °C)  Pulse:  [36-52] 48  Resp:  [11-23] 20  SpO2:  [92 %-100 %] 96 %  BP: ()/(47-88) 118/55     Weight: 115.7 kg (255 lb 1.2 oz)  Body mass index is 36.6 kg/m².    Intake/Output Summary (Last 24 hours) at 8/19/2022 0742  Last data filed at 8/19/2022 0600  Gross per 24 hour   Intake 530 ml   Output 975 ml   Net -445 ml      Physical Exam  Vitals and nursing note reviewed.   Constitutional:       General: He is not in acute distress.     Appearance: He is obese.   HENT:      Head: Normocephalic and atraumatic.      Right Ear: External ear normal.      Left Ear: External ear normal.      Mouth/Throat:      Mouth: Mucous membranes are moist.   Eyes:      Extraocular Movements: Extraocular movements intact.      Conjunctiva/sclera: Conjunctivae normal.      Pupils: Pupils are equal, round, and reactive to light.   Cardiovascular:      Rate and Rhythm: Regular rhythm. Bradycardia  present.      Heart sounds: Normal heart sounds. No murmur heard.  Pulmonary:      Effort: Pulmonary effort is normal. No respiratory distress.      Breath sounds: Normal breath sounds.   Abdominal:      General: Bowel sounds are normal. There is no distension.      Palpations: Abdomen is soft.      Tenderness: There is no abdominal tenderness.   Musculoskeletal:      Cervical back: Neck supple.      Right lower leg: Edema (pitting to mid shin) present.      Left lower leg: Edema (pitting to mid-shin) present.   Neurological:      General: No focal deficit present.      Mental Status: He is alert and oriented to person, place, and time.   Psychiatric:         Mood and Affect: Mood normal.         Behavior: Behavior normal.       Significant Labs: All pertinent labs within the past 24 hours have been reviewed.  CBC:   Recent Labs   Lab 08/18/22  0703 08/19/22  0404   WBC 5.50 5.36  5.36   HGB 12.9* 11.2*  11.2*   HCT 36.9* 32.4*  32.4*    184  184     CMP  Sodium   Date Value Ref Range Status   08/19/2022 139 136 - 145 mmol/L Final   08/19/2022 139 136 - 145 mmol/L Final     Potassium   Date Value Ref Range Status   08/19/2022 4.6 3.5 - 5.1 mmol/L Final   08/19/2022 4.6 3.5 - 5.1 mmol/L Final     Chloride   Date Value Ref Range Status   08/19/2022 100 95 - 110 mmol/L Final   08/19/2022 100 95 - 110 mmol/L Final     CO2   Date Value Ref Range Status   08/19/2022 27 23 - 29 mmol/L Final   08/19/2022 27 23 - 29 mmol/L Final     Glucose   Date Value Ref Range Status   08/19/2022 153 (H) 70 - 110 mg/dL Final   08/19/2022 153 (H) 70 - 110 mg/dL Final     BUN   Date Value Ref Range Status   08/19/2022 45 (H) 8 - 23 mg/dL Final   08/19/2022 45 (H) 8 - 23 mg/dL Final     Creatinine   Date Value Ref Range Status   08/19/2022 2.8 (H) 0.5 - 1.4 mg/dL Final   08/19/2022 2.8 (H) 0.5 - 1.4 mg/dL Final     Calcium   Date Value Ref Range Status   08/19/2022 7.8 (L) 8.7 - 10.5 mg/dL Final   08/19/2022 7.8 (L) 8.7 - 10.5  mg/dL Final     Total Protein   Date Value Ref Range Status   08/19/2022 5.0 (L) 6.0 - 8.4 g/dL Final   08/19/2022 5.0 (L) 6.0 - 8.4 g/dL Final     Albumin   Date Value Ref Range Status   08/19/2022 2.5 (L) 3.5 - 5.2 g/dL Final   08/19/2022 2.5 (L) 3.5 - 5.2 g/dL Final     Total Bilirubin   Date Value Ref Range Status   08/19/2022 0.4 0.1 - 1.0 mg/dL Final     Comment:     For infants and newborns, interpretation of results should be based  on gestational age, weight and in agreement with clinical  observations.    Premature Infant recommended reference ranges:  Up to 24 hours.............<8.0 mg/dL  Up to 48 hours............<12.0 mg/dL  3-5 days..................<15.0 mg/dL  6-29 days.................<15.0 mg/dL     08/19/2022 0.4 0.1 - 1.0 mg/dL Final     Comment:     For infants and newborns, interpretation of results should be based  on gestational age, weight and in agreement with clinical  observations.    Premature Infant recommended reference ranges:  Up to 24 hours.............<8.0 mg/dL  Up to 48 hours............<12.0 mg/dL  3-5 days..................<15.0 mg/dL  6-29 days.................<15.0 mg/dL       Alkaline Phosphatase   Date Value Ref Range Status   08/19/2022 60 55 - 135 U/L Final   08/19/2022 60 55 - 135 U/L Final     AST   Date Value Ref Range Status   08/19/2022 14 10 - 40 U/L Final   08/19/2022 14 10 - 40 U/L Final     ALT   Date Value Ref Range Status   08/19/2022 17 10 - 44 U/L Final   08/19/2022 17 10 - 44 U/L Final     Anion Gap   Date Value Ref Range Status   08/19/2022 12 8 - 16 mmol/L Final   08/19/2022 12 8 - 16 mmol/L Final     eGFR if    Date Value Ref Range Status   07/01/2022 34 (A) >60 mL/min/1.73 m^2 Final     eGFR if non    Date Value Ref Range Status   07/01/2022 29 (A) >60 mL/min/1.73 m^2 Final     Comment:     Calculation used to obtain the estimated glomerular filtration  rate (eGFR) is the CKD-EPI equation.        Recent Labs   Lab  08/18/22  0703   TROPONINI 0.015       Significant Imaging:     The lungs are under expanded.  There are patchy mixed opacity seen throughout both lungs which could reflect atelectasis, aspiration or pneumonia.  Heart is enlarged.  Skeletal structures are intact.     Impression:     As above.       No evidence of deep venous thrombosis in either lower extremity.       There is age-appropriate generalized cerebral volume loss.  Compensatory enlargement of the ventricle sulci and cisterns without hydrocephalus.  There is no midline shift or mass effect.  There is moderate severe ill-defined decreased attenuation in the supratentorial white matter while nonspecific suggestive for chronic ischemic change.  There is no evidence for acute intracranial hemorrhage or sulcal effacement.  There is no midline shift or mass effect.  Prominent vascular calcifications.  Visualized paranasal sinuses and mastoid air cells are clear..     Impression:     No acute intracranial findings.     Age-appropriate cerebral volume loss with moderate severe patchy decreased attenuation supratentorial white matter while nonspecific suggestive for chronic ischemic change.     No evidence for acute intracranial hemorrhage.  Clinical correlation and further evaluation as warranted.           EKG : Sinus bradycardia with 1st degree A-V block   Left axis deviation   LVH with QRS widening   Abnormal ECG   When compared with ECG of 05-AUG-2022 09:32,   Sinus rhythm has replaced Junctional rhythm   Confirmed by Kashmir Bo MD ([id number]) on 8/18/2022 10:26:08 AM

## 2022-08-19 NOTE — ASSESSMENT & PLAN NOTE
Patient's FSGs are controlled on current medication regimen.  Last A1c reviewed-   Lab Results   Component Value Date    HGBA1C 7.3 (A) 03/22/2022     Most recent fingerstick glucose reviewed-   Recent Labs   Lab 08/18/22 2018 08/19/22  0605   POCTGLUCOSE 146* 128*     Current correctional scale  Low  Maintain anti-hyperglycemic dose as follows-   Antihyperglycemics (From admission, onward)            Start     Stop Route Frequency Ordered    08/18/22 2100  insulin detemir U-100 pen 15 Units         -- SubQ Nightly 08/18/22 1649    08/18/22 1749  insulin aspart U-100 pen 0-5 Units         -- SubQ Before meals & nightly PRN 08/18/22 1649        Hold Oral hypoglycemics while patient is in the hospital.      8/20    Glucose 153  Continue same

## 2022-08-19 NOTE — ASSESSMENT & PLAN NOTE
Pt with left sided numbness and tingling prior to arrival that self resolved  CT Head negative.    8/19   Check MRI brain today   Continue ASA

## 2022-08-19 NOTE — ASSESSMENT & PLAN NOTE
Hold home metoprolol and clonidine  Cont home doxazosin 8mg BID  Start Isordil 40 TID and  Procardia 60mg daily ( expect swelling as documented in past) per cardiology  Note allergy to hydralazine.    8/19/22  His BP is great   Will hold doxazosin today   With HR still in 40s will hold beta blockers

## 2022-08-19 NOTE — ASSESSMENT & PLAN NOTE
Patient has a current diagnosis of Hypertensive emergency with end organ damage evidenced by hypertensive encephalopathy and acute heart failure which is controlled.  Latest blood pressure and vitals reviewed-   Temp:  [96.7 °F (35.9 °C)-98.3 °F (36.8 °C)]   Pulse:  [36-52]   Resp:  [11-23]   BP: ()/(47-88)   SpO2:  [92 %-100 %] .   Patient currently off IV antihypertensives.   Home meds for hypertension were reviewed and noted below.   Hypertension Medications             bumetanide (BUMEX) 2 MG tablet Take 2 mg by mouth 2 (two) times daily.    doxazosin (CARDURA) 8 MG Tab every 12 (twelve) hours.    metoprolol succinate (TOPROL-XL) 25 MG 24 hr tablet Take 100 mg by mouth once daily.    cloNIDine (CATAPRES) 0.1 MG tablet Take 0.1 mg by mouth 2 (two) times daily.          Medication adjustment for hospital antihypertensives is as follows- Continue Doxazosin 8 bid, Start Isordil 40 tid and Procardia 60mg daily ( expect swelling as documented in past)    Will aim for controlled BP reduction by medications noted above. Monitor and mitigate end organ damage as indicated.        8/19  Hypertensive emergency resolved .

## 2022-08-19 NOTE — ASSESSMENT & PLAN NOTE
Renal function seems to be declining recently; will need nephrology outpatient  Avoid nephrotoxic meds; will need to DC cymbalta.

## 2022-08-19 NOTE — PLAN OF CARE
The patient remained stable throughout the night without any complaints. Remains bradycardic without symptoms.  Will continue to monitor.

## 2022-08-20 LAB
POCT GLUCOSE: 134 MG/DL (ref 70–110)
POCT GLUCOSE: 175 MG/DL (ref 70–110)
POCT GLUCOSE: 178 MG/DL (ref 70–110)

## 2022-08-20 PROCEDURE — 99233 SBSQ HOSP IP/OBS HIGH 50: CPT | Mod: ,,, | Performed by: INTERNAL MEDICINE

## 2022-08-20 PROCEDURE — 20000000 HC ICU ROOM

## 2022-08-20 PROCEDURE — 25000003 PHARM REV CODE 250: Performed by: INTERNAL MEDICINE

## 2022-08-20 PROCEDURE — 27000221 HC OXYGEN, UP TO 24 HOURS

## 2022-08-20 PROCEDURE — 94761 N-INVAS EAR/PLS OXIMETRY MLT: CPT

## 2022-08-20 PROCEDURE — 25000003 PHARM REV CODE 250: Performed by: STUDENT IN AN ORGANIZED HEALTH CARE EDUCATION/TRAINING PROGRAM

## 2022-08-20 PROCEDURE — 99233 PR SUBSEQUENT HOSPITAL CARE,LEVL III: ICD-10-PCS | Mod: ,,, | Performed by: INTERNAL MEDICINE

## 2022-08-20 PROCEDURE — 99900035 HC TECH TIME PER 15 MIN (STAT)

## 2022-08-20 RX ORDER — CLONIDINE HYDROCHLORIDE 0.1 MG/1
0.1 TABLET ORAL ONCE
Status: COMPLETED | OUTPATIENT
Start: 2022-08-20 | End: 2022-08-20

## 2022-08-20 RX ORDER — LISINOPRIL 10 MG/1
10 TABLET ORAL DAILY
Status: DISCONTINUED | OUTPATIENT
Start: 2022-08-20 | End: 2022-08-23 | Stop reason: HOSPADM

## 2022-08-20 RX ORDER — BUMETANIDE 1 MG/1
2 TABLET ORAL DAILY
Status: DISCONTINUED | OUTPATIENT
Start: 2022-08-20 | End: 2022-08-23 | Stop reason: HOSPADM

## 2022-08-20 RX ADMIN — CLOPIDOGREL 75 MG: 75 TABLET, FILM COATED ORAL at 09:08

## 2022-08-20 RX ADMIN — SERTRALINE HYDROCHLORIDE 25 MG: 25 TABLET ORAL at 10:08

## 2022-08-20 RX ADMIN — CLONIDINE HYDROCHLORIDE 0.1 MG: 0.1 TABLET ORAL at 06:08

## 2022-08-20 RX ADMIN — BUMETANIDE 2 MG: 1 TABLET ORAL at 10:08

## 2022-08-20 RX ADMIN — ACETAMINOPHEN 650 MG: 325 TABLET ORAL at 05:08

## 2022-08-20 RX ADMIN — DOXAZOSIN 8 MG: 2 TABLET ORAL at 10:08

## 2022-08-20 RX ADMIN — LISINOPRIL 10 MG: 10 TABLET ORAL at 10:08

## 2022-08-20 RX ADMIN — PANTOPRAZOLE SODIUM 40 MG: 40 TABLET, DELAYED RELEASE ORAL at 09:08

## 2022-08-20 RX ADMIN — ISOSORBIDE DINITRATE 40 MG: 20 TABLET ORAL at 09:08

## 2022-08-20 RX ADMIN — ASPIRIN 81 MG: 81 TABLET, COATED ORAL at 09:08

## 2022-08-20 RX ADMIN — ISOSORBIDE DINITRATE 40 MG: 20 TABLET ORAL at 10:08

## 2022-08-20 RX ADMIN — NIFEDIPINE 60 MG: 60 TABLET, FILM COATED, EXTENDED RELEASE ORAL at 09:08

## 2022-08-20 RX ADMIN — INSULIN DETEMIR 15 UNITS: 100 INJECTION, SOLUTION SUBCUTANEOUS at 10:08

## 2022-08-20 RX ADMIN — MUPIROCIN: 20 OINTMENT TOPICAL at 10:08

## 2022-08-20 RX ADMIN — DOXAZOSIN 8 MG: 2 TABLET ORAL at 09:08

## 2022-08-20 RX ADMIN — MUPIROCIN: 20 OINTMENT TOPICAL at 09:08

## 2022-08-20 RX ADMIN — ISOSORBIDE DINITRATE 40 MG: 20 TABLET ORAL at 02:08

## 2022-08-20 NOTE — ASSESSMENT & PLAN NOTE
S/p atropine x2 in ER  Hold toprol  Tele  Cards consulted; appreciate recs.    8/19  Holding beta blockers .    8/20  Hold beta blockers

## 2022-08-20 NOTE — ASSESSMENT & PLAN NOTE
S/p lasix 40mg IV in ER  At home he takes bumex 2mg BID.    8/19  He was given lasix in ER   Creatinine went up   Hold diuretics for now .  Lying flat       Discussed with him and daughters ; compression stockings .  He will try .    8/20  Resume bumex

## 2022-08-20 NOTE — PROGRESS NOTES
[facility] Medicine  Progress Note    Patient Name: Nestor Garcia  MRN: [id number]  Patient Class: IP- Inpatient   Admission Date: 8/18/2022  Length of Stay: 2 days  Attending Physician: Kashmir Conn MD  Primary Care Provider: Kashmir Conn MD        Subjective:     Principal Problem:Hypertensive emergency        HPI:  77 y.o. male with PMHx of HFpEF, DMII, HTN, HLD, GERD, TARA, hearing impairment presented to ER with generalized weakness and SOB that was present upon awaking today. Pt states he was in his usual state of health prior. He denies congestion, sore throat, chest pain, palpitations, abd pain, N/V/D, dysuria, hematuria, and fever/chills. Per ER records, daughter reported patient complained of R facial and RUE numbness near the onset of symptoms, which lasted about 3-4 min and self-resolved. Pt states his cardiologist recently changed him from torsemide to bumex and also increased his toprol dose for BP control. In ER, pt found to be extremely hypertensive with sysolics over 200 and started on a nitro gtt. He was also given IV lasix 40mg x1. He was also bradycardic and required atropine.       Overview/Hospital Course:  8/19/22  Nestor Garcia is a 77 y.o. 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 Patient stated symptoms began approximately 30 minutes prior to arrival. He had  bradycardia with HR in 30s. EKG revealed sinus bradycardia with first degree AVB, HR 47 bpm.   Presently  in icu ; cardiology seen patient his metoprolol was recently increased and it was held .  He reports he feels bad when his HR is below 50 .      8/20/22  Nestor Garcia is a 77 y.o. male  Needed clonidine for high BP this am    systolic .  He is off of beta blockers ;his HR has come up into 50s to 60s  ; NSR;  Still requiring oxygen   Bilateral crackles. Hypoxia without oxygen     Will add bumex ; add lisinopril   Follow BMP         Interval History:     Review of Systems   Constitutional:  Positive for activity change. Negative for appetite change, chills and fever.   HENT:  Negative for congestion and sore throat.    Eyes:  Negative for visual disturbance.   Respiratory:  Positive for shortness of breath. Negative for cough.    Cardiovascular:  Positive for leg swelling (at baseline).   Gastrointestinal:  Negative for abdominal pain, diarrhea, nausea and vomiting.   Genitourinary:  Positive for difficulty urinating. Negative for dysuria and hematuria.   Musculoskeletal:  Negative for arthralgias and myalgias.   Skin:  Negative for wound.   Neurological:  Positive for numbness. Negative for facial asymmetry and speech difficulty.   Psychiatric/Behavioral:  Negative for confusion.    Objective:     Vital Signs (Most Recent):  Temp: 98 °F (36.7 °C) (08/20/22 0728)  Pulse: (!) 57 (08/20/22 0728)  Resp: 15 (08/20/22 0728)  BP: (!) 196/81 (08/20/22 0649)  SpO2: 96 % (08/20/22 0728)   Vital Signs (24h Range):  Temp:  [97.6 °F (36.4 °C)-98.6 °F (37 °C)] 98 °F (36.7 °C)  Pulse:  [51-65] 57  Resp:  [13-26] 15  SpO2:  [90 %-99 %] 96 %  BP: (144-196)/(58-81) 196/81     Weight: 115.7 kg (255 lb)  Body mass index is 36.59 kg/m².    Intake/Output Summary (Last 24 hours) at 8/20/2022 0840  Last data filed at 8/20/2022 0500  Gross per 24 hour   Intake --   Output 800 ml   Net -800 ml      Physical Exam  Vitals and nursing note reviewed.   Constitutional:       General: He is not in acute distress.     Appearance: He is obese.   HENT:      Head: Normocephalic and atraumatic.      Right Ear: External ear normal.      Left Ear: External ear normal.      Mouth/Throat:      Mouth: Mucous membranes are moist.   Eyes:      Extraocular Movements: Extraocular movements intact.      Conjunctiva/sclera: Conjunctivae normal.      Pupils:  Pupils are equal, round, and reactive to light.   Cardiovascular:      Rate and Rhythm: Regular rhythm. Bradycardia present.      Heart sounds: Normal heart sounds. No murmur heard.  Pulmonary:      Effort: Pulmonary effort is normal. No respiratory distress.      Breath sounds: Examination of the right-lower field reveals rales. Examination of the left-lower field reveals rales. Rales present.   Abdominal:      General: Bowel sounds are normal. There is no distension.      Palpations: Abdomen is soft.      Tenderness: There is no abdominal tenderness.   Musculoskeletal:      Cervical back: Neck supple.      Right lower leg: Edema (pitting to mid shin) present.      Left lower leg: Edema (pitting to mid-shin) present.   Neurological:      General: No focal deficit present.      Mental Status: He is alert and oriented to person, place, and time.   Psychiatric:         Mood and Affect: Mood normal.         Behavior: Behavior normal.       Significant Labs: All pertinent labs within the past 24 hours have been reviewed.  CBC:   Recent Labs   Lab 08/19/22  0404   WBC 5.36  5.36   HGB 11.2*  11.2*   HCT 32.4*  32.4*     184     CMP:   Recent Labs   Lab 08/19/22  0404     139   K 4.6  4.6     100   CO2 27  27   *  153*   BUN 45*  45*   CREATININE 2.8*  2.8*   CALCIUM 7.8*  7.8*   PROT 5.0*  5.0*   ALBUMIN 2.5*  2.5*   BILITOT 0.4  0.4   ALKPHOS 60  60   AST 14  14   ALT 17  17   ANIONGAP 12  12   BNP  Recent Labs   Lab 08/18/22  0703   *         Significant Imaging: I have reviewed all pertinent imaging results/findings within the past 24 hours.        Patchy areas of T2 hyperintensity in the cerebral white matter.  There are no areas of restricted diffusion to indicate acute ischemia.  There is no mass or mass effect.  No evidence of intracranial hemorrhage.  There is no ventricular dilatation or abnormal extra-axial fluid collection.  Moderate cerebral atrophy.   Craniocervical junction appears intact and images of sella demonstrate no abnormality.     Impression:     1. Evidence of chronic microvascular disease.  2. No acute ischemia.  3. Moderate cerebral atrophy.         Assessment/Plan:      * Hypertensive emergency  Patient has a current diagnosis of Hypertensive emergency with end organ damage evidenced by hypertensive encephalopathy and acute heart failure which is controlled.  Latest blood pressure and vitals reviewed-   Temp:  [97.6 °F (36.4 °C)-98.6 °F (37 °C)]   Pulse:  [51-65]   Resp:  [13-26]   BP: (144-196)/(58-81)   SpO2:  [90 %-99 %] .   Patient currently off IV antihypertensives.   Home meds for hypertension were reviewed and noted below.   Hypertension Medications             bumetanide (BUMEX) 2 MG tablet Take 2 mg by mouth 2 (two) times daily.    doxazosin (CARDURA) 8 MG Tab every 12 (twelve) hours.    metoprolol succinate (TOPROL-XL) 25 MG 24 hr tablet Take 100 mg by mouth once daily.    cloNIDine (CATAPRES) 0.1 MG tablet Take 0.1 mg by mouth 2 (two) times daily.          Medication adjustment for hospital antihypertensives is as follows- Continue Doxazosin 8 bid, Start Isordil 40 tid and Procardia 60mg daily ( expect swelling as documented in past)    Will aim for controlled BP reduction by medications noted above. Monitor and mitigate end organ damage as indicated.        8/19  Hypertensive emergency resolved .    8/20  Continue nifedipine   Continue cardura   Add lisinopril         CKD (chronic kidney disease) stage 4, GFR 15-29 ml/min  Renal function seems to be declining recently; will need nephrology outpatient  Avoid nephrotoxic meds; will need to DC cymbalta.      8/20  Follow BMP in am       Acute respiratory failure with hypoxia  Likely 2/2 HFpEF exacerbation  2L NC; wean as tolerated.    8/19/22  Still using oxygen will wean off     8/20  Still requiring oxygen   ? CHF   Resume diuretic      Bradycardia  S/p atropine x2 in ER  Hold  [facility]  Tele  Cards consulted; appreciate recs.    8/19  Holding beta blockers .    8/20  Hold beta blockers        TIA (transient ischemic attack)  Pt with left sided numbness and tingling prior to arrival that self resolved  CT Head negative.    8/19   Check MRI brain today   Continue ASA      8/20  MRI   Reviewed   Patchy areas of T2 hyperintensity in the cerebral white matter.  There are no areas of restricted diffusion to indicate acute ischemia.  There is no mass or mass effect.  No evidence of intracranial hemorrhage.  There is no ventricular dilatation or abnormal extra-axial fluid collection.  Moderate cerebral atrophy.  Craniocervical junction appears intact and images of sella demonstrate no abnormality.     Impression:     1. Evidence of chronic microvascular disease.  2. No acute ischemia.  3. Moderate cerebral atrophy.       TARA (obstructive sleep apnea)  Does not tolerate CPAP  Likely contributing to bradycardia.      Acute on chronic diastolic heart failure  S/p lasix 40mg IV in ER  At home he takes bumex 2mg BID.    8/19  He was given lasix in ER   Creatinine went up   Hold diuretics for now .  Lying flat       Discussed with him and daughters ; compression stockings .  He will try .    8/20  Resume bumex    Gastroesophageal reflux disease without esophagitis  Cont home PPI.      Type 2 diabetes mellitus without complication  Patient's FSGs are controlled on current medication regimen.  Last A1c reviewed-   Lab Results   Component Value Date    HGBA1C 7.5 (H) 08/19/2022     Most recent fingerstick glucose reviewed-   Recent Labs   Lab 08/19/22  1111 08/19/22  1633 08/19/22  2037 08/20/22  0729   POCTGLUCOSE 178* 210* 164* 134*     Current correctional scale  Low  Maintain anti-hyperglycemic dose as follows-   Antihyperglycemics (From admission, onward)            Start     Stop Route Frequency Ordered    08/18/22 2100  insulin detemir U-100 pen 15 Units         -- SubQ Nightly 08/18/22 4829     08/18/22 1749  insulin aspart U-100 pen 0-5 Units         -- SubQ Before meals & nightly PRN 08/18/22 1649        Hold Oral hypoglycemics while patient is in the hospital.      8/20    Glucose 134  Continue same        Essential hypertension  Hold home metoprolol and clonidine  Cont home doxazosin 8mg BID  Start Isordil 40 TID and  Procardia 60mg daily ( expect swelling as documented in past) per cardiology  Note allergy to hydralazine.    8/19/22  His BP is great   Will hold doxazosin today   With HR still in 40s will hold beta blockers      8/20/22  Add lisinopril  Follow BMP       Dyslipidemia  Statin intolerance; takes fish oil at home        VTE Risk Mitigation (From admission, onward)         Ordered     IP VTE HIGH RISK PATIENT  Once         08/18/22 1642     Place sequential compression device  Until discontinued         08/18/22 1642                Discharge Planning   LESLIE:      Code Status: Full Code   Is the patient medically ready for discharge?:     Reason for patient still in hospital (select all that apply): Treatment  Discharge Plan A: Home Health            Critical care time spent on the evaluation and treatment of severe organ dysfunction, review of pertinent labs and imaging studies, discussions with consulting providers and discussions with patient/family: 35 minutes.      Dario Cano MD  Department of Hospital Medicine   [place]

## 2022-08-20 NOTE — ASSESSMENT & PLAN NOTE
Hold home metoprolol and clonidine  Cont home doxazosin 8mg BID  Start Isordil 40 TID and  Procardia 60mg daily ( expect swelling as documented in past) per cardiology  Note allergy to hydralazine.    8/19/22  His BP is great   Will hold doxazosin today   With HR still in 40s will hold beta blockers      8/20/22  Add lisinopril  Follow BMP

## 2022-08-20 NOTE — EICU
Rounding (Video Assessment):  Yes      Comments: Video rounding completed. Lying in bed, respirations even and unlabored, sats 96% on RA, VSS, NAD.

## 2022-08-20 NOTE — PT/OT/SLP PROGRESS
Physical Therapy      Patient Name:  Nestor Garcia   MRN:  [id number]    Patient not seen today secondary to patient not in room, currently in MRI Will follow-up tomorrow 8/21/22.  Shalom Bran, PT, DPT

## 2022-08-20 NOTE — ASSESSMENT & PLAN NOTE
Pt with left sided numbness and tingling prior to arrival that self resolved  CT Head negative.    8/19   Check MRI brain today   Continue ASA      8/20  MRI   Reviewed   Patchy areas of T2 hyperintensity in the cerebral white matter.  There are no areas of restricted diffusion to indicate acute ischemia.  There is no mass or mass effect.  No evidence of intracranial hemorrhage.  There is no ventricular dilatation or abnormal extra-axial fluid collection.  Moderate cerebral atrophy.  Craniocervical junction appears intact and images of sella demonstrate no abnormality.     Impression:     1. Evidence of chronic microvascular disease.  2. No acute ischemia.  3. Moderate cerebral atrophy.

## 2022-08-20 NOTE — EICU
Rounding (Video Assessment):  Yes    Comments: Video assessment completed.  Pt lying in bed.  Eyes closed, resp even and unlabored.  NIBP noted 181/74.  NAD noted at this time.

## 2022-08-20 NOTE — ASSESSMENT & PLAN NOTE
Renal function seems to be declining recently; will need nephrology outpatient  Avoid nephrotoxic meds; will need to DC cymbalta.      8/20  Follow BMP in am

## 2022-08-20 NOTE — ASSESSMENT & PLAN NOTE
Likely 2/2 HFpEF exacerbation  2L NC; wean as tolerated.    8/19/22  Still using oxygen will wean off     8/20  Still requiring oxygen   ? CHF   Resume diuretic

## 2022-08-20 NOTE — ASSESSMENT & PLAN NOTE
Patient has a current diagnosis of Hypertensive emergency with end organ damage evidenced by hypertensive encephalopathy and acute heart failure which is controlled.  Latest blood pressure and vitals reviewed-   Temp:  [97.6 °F (36.4 °C)-98.6 °F (37 °C)]   Pulse:  [51-65]   Resp:  [13-26]   BP: (144-196)/(58-81)   SpO2:  [90 %-99 %] .   Patient currently off IV antihypertensives.   Home meds for hypertension were reviewed and noted below.   Hypertension Medications             bumetanide (BUMEX) 2 MG tablet Take 2 mg by mouth 2 (two) times daily.    doxazosin (CARDURA) 8 MG Tab every 12 (twelve) hours.    metoprolol succinate (TOPROL-XL) 25 MG 24 hr tablet Take 100 mg by mouth once daily.    cloNIDine (CATAPRES) 0.1 MG tablet Take 0.1 mg by mouth 2 (two) times daily.          Medication adjustment for hospital antihypertensives is as follows- Continue Doxazosin 8 bid, Start Isordil 40 tid and Procardia 60mg daily ( expect swelling as documented in past)    Will aim for controlled BP reduction by medications noted above. Monitor and mitigate end organ damage as indicated.        8/19  Hypertensive emergency resolved .    8/20  Continue nifedipine   Continue cardura   Add lisinopril

## 2022-08-20 NOTE — ASSESSMENT & PLAN NOTE
Patient's FSGs are controlled on current medication regimen.  Last A1c reviewed-   Lab Results   Component Value Date    HGBA1C 7.5 (H) 08/19/2022     Most recent fingerstick glucose reviewed-   Recent Labs   Lab 08/19/22  1111 08/19/22  1633 08/19/22  2037 08/20/22  0729   POCTGLUCOSE 178* 210* 164* 134*     Current correctional scale  Low  Maintain anti-hyperglycemic dose as follows-   Antihyperglycemics (From admission, onward)            Start     Stop Route Frequency Ordered    08/18/22 2100  insulin detemir U-100 pen 15 Units         -- SubQ Nightly 08/18/22 1649    08/18/22 1749  insulin aspart U-100 pen 0-5 Units         -- SubQ Before meals & nightly PRN 08/18/22 1649        Hold Oral hypoglycemics while patient is in the hospital.      8/20    Glucose 134  Continue same

## 2022-08-20 NOTE — PLAN OF CARE
"PT REMAINS A-FEBRILE AND IN SR-SB WITH ON EPISODE OF LOW ST (108) WHILE SITTING ON THE SIDE OF BED FOR LUNCH.  PT REQUESTS TO NO LONGER CONTINUE ZOLOFT, STARTED TODAY, R/T "THEY WAY IT IS MAKING HIM FEEL, LIKE A ZOMBIE".  URINE OUTPUT IS ADEQUATE.  INCREASE IN ORAL BP MEDICATIONS HAVE CONTROLLED BP THROUGHOUT THE DAY.  FAMILY STATES THE PT HAD A SHORT EPISODE OF CONFUSION AROUND LUNCH AS WELL.  PT IS CURRENTLY RESTING COMFORTABLY IN BED.  VSS.  DENIES DISCOMFORT AND NEEDS.  TOLERATED MEALS.    "

## 2022-08-20 NOTE — PLAN OF CARE
The patient remained stable throughout the night without any complaints.  Will continue to monitor.

## 2022-08-20 NOTE — SUBJECTIVE & OBJECTIVE
Interval History:     Review of Systems   Constitutional:  Positive for activity change. Negative for appetite change, chills and fever.   HENT:  Negative for congestion and sore throat.    Eyes:  Negative for visual disturbance.   Respiratory:  Positive for shortness of breath. Negative for cough.    Cardiovascular:  Positive for leg swelling (at baseline).   Gastrointestinal:  Negative for abdominal pain, diarrhea, nausea and vomiting.   Genitourinary:  Positive for difficulty urinating. Negative for dysuria and hematuria.   Musculoskeletal:  Negative for arthralgias and myalgias.   Skin:  Negative for wound.   Neurological:  Positive for numbness. Negative for facial asymmetry and speech difficulty.   Psychiatric/Behavioral:  Negative for confusion.    Objective:     Vital Signs (Most Recent):  Temp: 98 °F (36.7 °C) (08/20/22 0728)  Pulse: (!) 57 (08/20/22 0728)  Resp: 15 (08/20/22 0728)  BP: (!) 196/81 (08/20/22 0649)  SpO2: 96 % (08/20/22 0728)   Vital Signs (24h Range):  Temp:  [97.6 °F (36.4 °C)-98.6 °F (37 °C)] 98 °F (36.7 °C)  Pulse:  [51-65] 57  Resp:  [13-26] 15  SpO2:  [90 %-99 %] 96 %  BP: (144-196)/(58-81) 196/81     Weight: 115.7 kg (255 lb)  Body mass index is 36.59 kg/m².    Intake/Output Summary (Last 24 hours) at 8/20/2022 0840  Last data filed at 8/20/2022 0500  Gross per 24 hour   Intake --   Output 800 ml   Net -800 ml      Physical Exam  Vitals and nursing note reviewed.   Constitutional:       General: He is not in acute distress.     Appearance: He is obese.   HENT:      Head: Normocephalic and atraumatic.      Right Ear: External ear normal.      Left Ear: External ear normal.      Mouth/Throat:      Mouth: Mucous membranes are moist.   Eyes:      Extraocular Movements: Extraocular movements intact.      Conjunctiva/sclera: Conjunctivae normal.      Pupils: Pupils are equal, round, and reactive to light.   Cardiovascular:      Rate and Rhythm: Regular rhythm. Bradycardia present.      Heart  sounds: Normal heart sounds. No murmur heard.  Pulmonary:      Effort: Pulmonary effort is normal. No respiratory distress.      Breath sounds: Examination of the right-lower field reveals rales. Examination of the left-lower field reveals rales. Rales present.   Abdominal:      General: Bowel sounds are normal. There is no distension.      Palpations: Abdomen is soft.      Tenderness: There is no abdominal tenderness.   Musculoskeletal:      Cervical back: Neck supple.      Right lower leg: Edema (pitting to mid shin) present.      Left lower leg: Edema (pitting to mid-shin) present.   Neurological:      General: No focal deficit present.      Mental Status: He is alert and oriented to person, place, and time.   Psychiatric:         Mood and Affect: Mood normal.         Behavior: Behavior normal.       Significant Labs: All pertinent labs within the past 24 hours have been reviewed.  CBC:   Recent Labs   Lab 08/19/22  0404   WBC 5.36  5.36   HGB 11.2*  11.2*   HCT 32.4*  32.4*     184     CMP:   Recent Labs   Lab 08/19/22  0404     139   K 4.6  4.6     100   CO2 27  27   *  153*   BUN 45*  45*   CREATININE 2.8*  2.8*   CALCIUM 7.8*  7.8*   PROT 5.0*  5.0*   ALBUMIN 2.5*  2.5*   BILITOT 0.4  0.4   ALKPHOS 60  60   AST 14  14   ALT 17  17   ANIONGAP 12  12   BNP  Recent Labs   Lab 08/18/22  0703   *         Significant Imaging: I have reviewed all pertinent imaging results/findings within the past 24 hours.        Patchy areas of T2 hyperintensity in the cerebral white matter.  There are no areas of restricted diffusion to indicate acute ischemia.  There is no mass or mass effect.  No evidence of intracranial hemorrhage.  There is no ventricular dilatation or abnormal extra-axial fluid collection.  Moderate cerebral atrophy.  Craniocervical junction appears intact and images of sella demonstrate no abnormality.     Impression:     1. Evidence of chronic  microvascular disease.  2. No acute ischemia.  3. Moderate cerebral atrophy.

## 2022-08-21 LAB
ANION GAP SERPL CALC-SCNC: 12 MMOL/L (ref 8–16)
BUN SERPL-MCNC: 43 MG/DL (ref 8–23)
CALCIUM SERPL-MCNC: 8 MG/DL (ref 8.7–10.5)
CHLORIDE SERPL-SCNC: 102 MMOL/L (ref 95–110)
CO2 SERPL-SCNC: 26 MMOL/L (ref 23–29)
CREAT SERPL-MCNC: 2.6 MG/DL (ref 0.5–1.4)
EST. GFR  (NO RACE VARIABLE): 24 ML/MIN/1.73 M^2
GLUCOSE SERPL-MCNC: 154 MG/DL (ref 70–110)
POCT GLUCOSE: 141 MG/DL (ref 70–110)
POCT GLUCOSE: 209 MG/DL (ref 70–110)
POCT GLUCOSE: 215 MG/DL (ref 70–110)
POCT GLUCOSE: 259 MG/DL (ref 70–110)
POTASSIUM SERPL-SCNC: 3.9 MMOL/L (ref 3.5–5.1)
SODIUM SERPL-SCNC: 140 MMOL/L (ref 136–145)

## 2022-08-21 PROCEDURE — 36415 COLL VENOUS BLD VENIPUNCTURE: CPT | Performed by: INTERNAL MEDICINE

## 2022-08-21 PROCEDURE — 25000003 PHARM REV CODE 250: Performed by: STUDENT IN AN ORGANIZED HEALTH CARE EDUCATION/TRAINING PROGRAM

## 2022-08-21 PROCEDURE — 94761 N-INVAS EAR/PLS OXIMETRY MLT: CPT

## 2022-08-21 PROCEDURE — 99233 SBSQ HOSP IP/OBS HIGH 50: CPT | Mod: ,,, | Performed by: INTERNAL MEDICINE

## 2022-08-21 PROCEDURE — 27000221 HC OXYGEN, UP TO 24 HOURS

## 2022-08-21 PROCEDURE — 99900035 HC TECH TIME PER 15 MIN (STAT)

## 2022-08-21 PROCEDURE — 20000000 HC ICU ROOM

## 2022-08-21 PROCEDURE — 94799 UNLISTED PULMONARY SVC/PX: CPT

## 2022-08-21 PROCEDURE — 99233 PR SUBSEQUENT HOSPITAL CARE,LEVL III: ICD-10-PCS | Mod: ,,, | Performed by: INTERNAL MEDICINE

## 2022-08-21 PROCEDURE — 97162 PT EVAL MOD COMPLEX 30 MIN: CPT

## 2022-08-21 PROCEDURE — 80048 BASIC METABOLIC PNL TOTAL CA: CPT | Performed by: INTERNAL MEDICINE

## 2022-08-21 PROCEDURE — 25000003 PHARM REV CODE 250: Performed by: INTERNAL MEDICINE

## 2022-08-21 RX ORDER — GUAIFENESIN 600 MG/1
600 TABLET, EXTENDED RELEASE ORAL 2 TIMES DAILY
Status: DISCONTINUED | OUTPATIENT
Start: 2022-08-21 | End: 2022-08-21

## 2022-08-21 RX ADMIN — ISOSORBIDE DINITRATE 40 MG: 20 TABLET ORAL at 09:08

## 2022-08-21 RX ADMIN — INSULIN ASPART 2 UNITS: 100 INJECTION, SOLUTION INTRAVENOUS; SUBCUTANEOUS at 11:08

## 2022-08-21 RX ADMIN — LISINOPRIL 10 MG: 10 TABLET ORAL at 09:08

## 2022-08-21 RX ADMIN — MUPIROCIN: 20 OINTMENT TOPICAL at 09:08

## 2022-08-21 RX ADMIN — ASPIRIN 81 MG: 81 TABLET, COATED ORAL at 09:08

## 2022-08-21 RX ADMIN — ISOSORBIDE DINITRATE 40 MG: 20 TABLET ORAL at 03:08

## 2022-08-21 RX ADMIN — PANTOPRAZOLE SODIUM 40 MG: 40 TABLET, DELAYED RELEASE ORAL at 09:08

## 2022-08-21 RX ADMIN — INSULIN ASPART 2 UNITS: 100 INJECTION, SOLUTION INTRAVENOUS; SUBCUTANEOUS at 05:08

## 2022-08-21 RX ADMIN — BUMETANIDE 2 MG: 1 TABLET ORAL at 09:08

## 2022-08-21 RX ADMIN — CLOPIDOGREL 75 MG: 75 TABLET, FILM COATED ORAL at 09:08

## 2022-08-21 RX ADMIN — DOXAZOSIN 8 MG: 2 TABLET ORAL at 09:08

## 2022-08-21 RX ADMIN — INSULIN DETEMIR 15 UNITS: 100 INJECTION, SOLUTION SUBCUTANEOUS at 09:08

## 2022-08-21 RX ADMIN — NIFEDIPINE 60 MG: 60 TABLET, FILM COATED, EXTENDED RELEASE ORAL at 09:08

## 2022-08-21 RX ADMIN — INSULIN ASPART 1 UNITS: 100 INJECTION, SOLUTION INTRAVENOUS; SUBCUTANEOUS at 09:08

## 2022-08-21 NOTE — SUBJECTIVE & OBJECTIVE
Interval History:     Review of Systems   Constitutional:  Negative for appetite change, chills and fever.   HENT:  Negative for congestion and sore throat.    Eyes:  Negative for visual disturbance.   Respiratory:  Positive for shortness of breath. Negative for cough.    Cardiovascular:  Positive for leg swelling (at baseline).   Gastrointestinal:  Negative for abdominal pain, diarrhea, nausea and vomiting.   Genitourinary:  Negative for dysuria and hematuria.   Musculoskeletal:  Negative for arthralgias and myalgias.   Skin:  Negative for wound.   Neurological:  Positive for numbness. Negative for facial asymmetry and speech difficulty.   Psychiatric/Behavioral:  Negative for confusion.    Objective:     Vital Signs (Most Recent):  Temp: 98 °F (36.7 °C) (08/21/22 0715)  Pulse: 110 (08/21/22 0800)  Resp: (!) 28 (08/21/22 0800)  BP: (!) 128/56 (08/21/22 0800)  SpO2: (!) 89 % (08/21/22 0800)   Vital Signs (24h Range):  Temp:  [97.6 °F (36.4 °C)-99.3 °F (37.4 °C)] 98 °F (36.7 °C)  Pulse:  [] 110  Resp:  [15-34] 28  SpO2:  [89 %-97 %] 89 %  BP: (119-180)/(56-78) 128/56     Weight: 115.7 kg (255 lb)  Body mass index is 36.59 kg/m².    Intake/Output Summary (Last 24 hours) at 8/21/2022 0830  Last data filed at 8/21/2022 0800  Gross per 24 hour   Intake 411.82 ml   Output 2225 ml   Net -1813.18 ml      Physical Exam  Vitals and nursing note reviewed.   Constitutional:       General: He is not in acute distress.     Appearance: He is obese.   HENT:      Head: Normocephalic and atraumatic.      Right Ear: External ear normal.      Left Ear: External ear normal.      Mouth/Throat:      Mouth: Mucous membranes are moist.   Eyes:      Extraocular Movements: Extraocular movements intact.      Conjunctiva/sclera: Conjunctivae normal.      Pupils: Pupils are equal, round, and reactive to light.   Cardiovascular:      Rate and Rhythm: Regular rhythm. Bradycardia present.      Heart sounds: Normal heart sounds. No murmur  heard.  Pulmonary:      Effort: Pulmonary effort is normal. No respiratory distress.      Breath sounds: Examination of the right-lower field reveals rales. Examination of the left-lower field reveals rales. Rales present.          Comments: Bilateral crackles  Abdominal:      General: Bowel sounds are normal. There is no distension.      Palpations: Abdomen is soft.      Tenderness: There is no abdominal tenderness.   Musculoskeletal:      Cervical back: Neck supple.      Right lower leg: Edema (pitting to mid shin) present.      Left lower leg: Edema (pitting to mid-shin) present.   Neurological:      General: No focal deficit present.      Mental Status: He is alert and oriented to person, place, and time.   Psychiatric:         Mood and Affect: Mood normal.         Behavior: Behavior normal.       Significant Labs: All pertinent labs within the past 24 hours have been reviewed.  BMP:   Recent Labs   Lab 08/21/22  0426   *      K 3.9      CO2 26   BUN 43*   CREATININE 2.6*   CALCIUM 8.0*     ECHO repot pending

## 2022-08-21 NOTE — ASSESSMENT & PLAN NOTE
Patient has a current diagnosis of Hypertensive emergency with end organ damage evidenced by hypertensive encephalopathy and acute heart failure which is controlled.  Latest blood pressure and vitals reviewed-   Temp:  [97.6 °F (36.4 °C)-99.3 °F (37.4 °C)]   Pulse:  []   Resp:  [15-34]   BP: (119-180)/(56-78)   SpO2:  [89 %-97 %] .   Patient currently off IV antihypertensives.   Home meds for hypertension were reviewed and noted below.   Hypertension Medications             bumetanide (BUMEX) 2 MG tablet Take 2 mg by mouth 2 (two) times daily.    doxazosin (CARDURA) 8 MG Tab every 12 (twelve) hours.    metoprolol succinate (TOPROL-XL) 25 MG 24 hr tablet Take 100 mg by mouth once daily.    cloNIDine (CATAPRES) 0.1 MG tablet Take 0.1 mg by mouth 2 (two) times daily.          Medication adjustment for hospital antihypertensives is as follows- Continue Doxazosin 8 bid, Start Isordil 40 tid and Procardia 60mg daily ( expect swelling as documented in past)    Will aim for controlled BP reduction by medications noted above. Monitor and mitigate end organ damage as indicated.        8/19  Hypertensive emergency resolved .    8/21  Continue nifedipine   Continue cardura   Added yesterday  lisinopril ; BP better controlled  Transfer to floor

## 2022-08-21 NOTE — ASSESSMENT & PLAN NOTE
Patient's FSGs are controlled on current medication regimen.  Last A1c reviewed-   Lab Results   Component Value Date    HGBA1C 7.5 (H) 08/19/2022     Most recent fingerstick glucose reviewed-   Recent Labs   Lab 08/20/22  1617 08/20/22  2136 08/21/22  0718   POCTGLUCOSE 178* 175* 141*     Current correctional scale  Low  Maintain anti-hyperglycemic dose as follows-   Antihyperglycemics (From admission, onward)            Start     Stop Route Frequency Ordered    08/18/22 2100  insulin detemir U-100 pen 15 Units         -- SubQ Nightly 08/18/22 1649    08/18/22 1749  insulin aspart U-100 pen 0-5 Units         -- SubQ Before meals & nightly PRN 08/18/22 1649        Hold Oral hypoglycemics while patient is in the hospital.      8/20    Glucose 134  Continue same    8/21     Continue same

## 2022-08-21 NOTE — ASSESSMENT & PLAN NOTE
Renal function seems to be declining recently; will need nephrology outpatient  Avoid nephrotoxic meds; will need to DC cymbalta.      8/20  Follow BMP in am     8/21  BMP  Lab Results   Component Value Date     08/21/2022    K 3.9 08/21/2022     08/21/2022    CO2 26 08/21/2022    BUN 43 (H) 08/21/2022    CREATININE 2.6 (H) 08/21/2022    CALCIUM 8.0 (L) 08/21/2022    ANIONGAP 12 08/21/2022    ESTGFRAFRICA 34 (A) 07/01/2022    EGFRNONAA 29 (A) 07/01/2022

## 2022-08-21 NOTE — ASSESSMENT & PLAN NOTE
S/p atropine x2 in ER  Hold toprol  Tele  Cards consulted; appreciate recs.    8/19  Holding beta blockers .    8/20  Hold beta blockers    8/21  One period of tachycardia with activity   Still holding his beta blockers

## 2022-08-21 NOTE — NURSING
Patient has rested quietly this shift. Sat in chair this morning. Participated in PT. New IV this shift.

## 2022-08-21 NOTE — ASSESSMENT & PLAN NOTE
Statin intolerance; takes fish oil at home.  Lab Results   Component Value Date    LDLCALC 113.2 08/19/2022

## 2022-08-21 NOTE — PT/OT/SLP EVAL
"Physical Therapy Evaluation and Discharge Note    Patient Name:  Nestor Garcia   MRN:  [id number]    Recommendations:     Discharge Recommendations:  home   Discharge Equipment Recommendations: none   Barriers to discharge: None    Assessment:     Nestor Garcia is a 77 y.o. male admitted with a medical diagnosis of Hypertensive emergency. .  At this time, patient is functioning at their prior level of function and does not require further acute PT services.     Recent Surgery: * No surgery found *      Plan:     During this hospitalization, patient does not require further acute PT services.  Please re-consult if situation changes.      Subjective     Chief Complaint: "I'm doing good, I can walk around without any problems."  Patient/Family Comments/goals: "To be able to go back home."  Pain/Comfort:  · Pain Rating 1: 0/10  · Pain Rating Post-Intervention 1: 0/10    Patients cultural, spiritual, Yazidism conflicts given the current situation: no    Living Environment:  Patient lives in [facility] with ramp with [facility].  Prior to admission, patients level of function was [place] with ambualtion at community level and independence with ADLS.  Equipment used at home: shower chair, glucometer.  DME owned (not currently used): none.  Upon discharge, patient will have assistance from Family.    Objective:     Communicated with Nurse prior to session.  Patient found up in chair with telemetry, pulse ox (continuous), blood pressure cuff, oxygen upon PT entry to room.    General Precautions: Standard,     Orthopedic Precautions:N/A   Braces: N/A   Respiratory Status: Nasal cannula, flow 2 L/min    Exams:  · Cognitive Exam:  Patient is oriented to Person, Place, Time and Situation  · RUE ROM: WFL  · RUE Strength: WFL  · LUE ROM: WFL  · LUE Strength: WFL  · RLE ROM: WFL  · RLE Strength: WFL  · LLE ROM: WFL  · LLE Strength: WFL    Functional Mobility:  · Transfers:     · Sit to Stand:  independence with no AD  · Gait: Patient " ambualted 75 feet without AD and with supervision of PT. Reciprocal gait noted, steady.  · Balance: Sitting good, Standing good    AM-PAC 6 CLICK MOBILITY  Total Score:24         AM-PAC 6 CLICK MOBILITY  Total Score:24     Patient left up in chair with all lines intact and call button in reach.    GOALS:   Multidisciplinary Problems     Physical Therapy Goals     Not on file                History:     Past Medical History:   Diagnosis Date    CHF (congestive heart failure)     Diabetes mellitus type II     Hypertension        Past Surgical History:   Procedure Laterality Date    APPENDECTOMY      BACK SURGERY      CHOLECYSTECTOMY      INNER EAR SURGERY      KNEE SURGERY         Time Tracking:     PT Received On: 08/21/22  PT Start Time: 0905     PT Stop Time: 0920  PT Total Time (min): 15 min     Billable Minutes: Evaluation 15 min      08/21/2022

## 2022-08-21 NOTE — EICU
Rounding (Video Assessment):  Yes          Comments: Video rounding completed. Lying in bed, respirations even and unlabored, sats 94% on RA, VSS, NAD.

## 2022-08-21 NOTE — PLAN OF CARE
Problem: Adult Inpatient Plan of Care  Goal: Plan of Care Review  Outcome: Ongoing, Progressing  Goal: Optimal Comfort and Wellbeing  Outcome: Ongoing, Progressing     Problem: Diabetes Comorbidity  Goal: Blood Glucose Level Within Targeted Range  Outcome: Ongoing, Progressing     Problem: Fall Injury Risk  Goal: Absence of Fall and Fall-Related Injury  Outcome: Ongoing, Progressing

## 2022-08-21 NOTE — ASSESSMENT & PLAN NOTE
S/p lasix 40mg IV in ER  At home he takes bumex 2mg BID.    8/19  He was given lasix in ER   Creatinine went up   Hold diuretics for now .  Lying flat       Discussed with him and daughters ; compression stockings .  He will try .    8/20  Resume bumex    8/21  He is not at home on oxygen   He is requiring here   Will need more diuresis ;obviously a challenge with his CKD   Transfer to floor ;not needing icu level care

## 2022-08-21 NOTE — PROGRESS NOTES
[facility] Medicine  Progress Note    Patient Name: Nestor Garcia  MRN: [id number]  Patient Class: IP- Inpatient   Admission Date: 8/18/2022  Length of Stay: 3 days  Attending Physician: Kashmir Conn MD  Primary Care Provider: Kashmir Conn MD        Subjective:     Principal Problem:Hypertensive emergency        HPI:  77 y.o. male with PMHx of HFpEF, DMII, HTN, HLD, GERD, TARA, hearing impairment presented to ER with generalized weakness and SOB that was present upon awaking today. Pt states he was in his usual state of health prior. He denies congestion, sore throat, chest pain, palpitations, abd pain, N/V/D, dysuria, hematuria, and fever/chills. Per ER records, daughter reported patient complained of R facial and RUE numbness near the onset of symptoms, which lasted about 3-4 min and self-resolved. Pt states his cardiologist recently changed him from torsemide to bumex and also increased his toprol dose for BP control. In ER, pt found to be extremely hypertensive with sysolics over 200 and started on a nitro gtt. He was also given IV lasix 40mg x1. He was also bradycardic and required atropine.       Overview/Hospital Course:  8/19/22  Nestor Garcia is a 77 y.o. 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 Patient stated symptoms began approximately 30 minutes prior to arrival. He had  bradycardia with HR in 30s. EKG revealed sinus bradycardia with first degree AVB, HR 47 bpm.   Presently  in icu ; cardiology seen patient his metoprolol was recently increased and it was held .  He reports he feels bad when his HR is below 50 .      8/20/22  Nestor Garcia is a 77 y.o. male  Needed clonidine for high BP this am    systolic .  He is off of beta blockers ;his HR has come up into 50s to 60s  ; NSR;  Still requiring oxygen   Bilateral crackles. Hypoxia without oxygen     Will add bumex ; add lisinopril   Follow BMP       8/21/22.  Doing well   Still hypoxic without oxygen   Bilateral crackles . BP is better . HR 70s ;he did have period of tachycardia when got out of bed to chair   Will need more diuresis   Continue Bumex  Transfer to floor           Interval History:     Review of Systems   Constitutional:  Negative for appetite change, chills and fever.   HENT:  Negative for congestion and sore throat.    Eyes:  Negative for visual disturbance.   Respiratory:  Positive for shortness of breath. Negative for cough.    Cardiovascular:  Positive for leg swelling (at baseline).   Gastrointestinal:  Negative for abdominal pain, diarrhea, nausea and vomiting.   Genitourinary:  Negative for dysuria and hematuria.   Musculoskeletal:  Negative for arthralgias and myalgias.   Skin:  Negative for wound.   Neurological:  Positive for numbness. Negative for facial asymmetry and speech difficulty.   Psychiatric/Behavioral:  Negative for confusion.    Objective:     Vital Signs (Most Recent):  Temp: 98 °F (36.7 °C) (08/21/22 0715)  Pulse: 110 (08/21/22 0800)  Resp: (!) 28 (08/21/22 0800)  BP: (!) 128/56 (08/21/22 0800)  SpO2: (!) 89 % (08/21/22 0800)   Vital Signs (24h Range):  Temp:  [97.6 °F (36.4 °C)-99.3 °F (37.4 °C)] 98 °F (36.7 °C)  Pulse:  [] 110  Resp:  [15-34] 28  SpO2:  [89 %-97 %] 89 %  BP: (119-180)/(56-78) 128/56     Weight: 115.7 kg (255 lb)  Body mass index is 36.59 kg/m².    Intake/Output Summary (Last 24 hours) at 8/21/2022 0830  Last data filed at 8/21/2022 0800  Gross per 24 hour   Intake 411.82 ml   Output 2225 ml   Net -1813.18 ml      Physical Exam  Vitals and nursing note reviewed.   Constitutional:       General: He is not in acute distress.     Appearance: He is obese.   HENT:      Head: Normocephalic and atraumatic.      Right Ear: External ear normal.      Left Ear: External ear normal.       Mouth/Throat:      Mouth: Mucous membranes are moist.   Eyes:      Extraocular Movements: Extraocular movements intact.      Conjunctiva/sclera: Conjunctivae normal.      Pupils: Pupils are equal, round, and reactive to light.   Cardiovascular:      Rate and Rhythm: Regular rhythm. Bradycardia present.      Heart sounds: Normal heart sounds. No murmur heard.  Pulmonary:      Effort: Pulmonary effort is normal. No respiratory distress.      Breath sounds: Examination of the right-lower field reveals rales. Examination of the left-lower field reveals rales. Rales present.          Comments: Bilateral crackles  Abdominal:      General: Bowel sounds are normal. There is no distension.      Palpations: Abdomen is soft.      Tenderness: There is no abdominal tenderness.   Musculoskeletal:      Cervical back: Neck supple.      Right lower leg: Edema (pitting to mid shin) present.      Left lower leg: Edema (pitting to mid-shin) present.   Neurological:      General: No focal deficit present.      Mental Status: He is alert and oriented to person, place, and time.   Psychiatric:         Mood and Affect: Mood normal.         Behavior: Behavior normal.       Significant Labs: All pertinent labs within the past 24 hours have been reviewed.  BMP:   Recent Labs   Lab 08/21/22  0426   *      K 3.9      CO2 26   BUN 43*   CREATININE 2.6*   CALCIUM 8.0*     ECHO repot pending             Assessment/Plan:      * Hypertensive emergency  Patient has a current diagnosis of Hypertensive emergency with end organ damage evidenced by hypertensive encephalopathy and acute heart failure which is controlled.  Latest blood pressure and vitals reviewed-   Temp:  [97.6 °F (36.4 °C)-99.3 °F (37.4 °C)]   Pulse:  []   Resp:  [15-34]   BP: (119-180)/(56-78)   SpO2:  [89 %-97 %] .   Patient currently off IV antihypertensives.   Home meds for hypertension were reviewed and noted below.   Hypertension Medications              bumetanide (BUMEX) 2 MG tablet Take 2 mg by mouth 2 (two) times daily.    doxazosin (CARDURA) 8 MG Tab every 12 (twelve) hours.    metoprolol succinate (TOPROL-XL) 25 MG 24 hr tablet Take 100 mg by mouth once daily.    cloNIDine (CATAPRES) 0.1 MG tablet Take 0.1 mg by mouth 2 (two) times daily.          Medication adjustment for hospital antihypertensives is as follows- Continue Doxazosin 8 bid, Start Isordil 40 tid and Procardia 60mg daily ( expect swelling as documented in past)    Will aim for controlled BP reduction by medications noted above. Monitor and mitigate end organ damage as indicated.        8/19  Hypertensive emergency resolved .    8/21  Continue nifedipine   Continue cardura   Added yesterday  lisinopril ; BP better controlled  Transfer to floor          CKD (chronic kidney disease) stage 4, GFR 15-29 ml/min  Renal function seems to be declining recently; will need nephrology outpatient  Avoid nephrotoxic meds; will need to DC cymbalta.      8/20  Follow BMP in am     8/21  BMP  Lab Results   Component Value Date     08/21/2022    K 3.9 08/21/2022     08/21/2022    CO2 26 08/21/2022    BUN 43 (H) 08/21/2022    CREATININE 2.6 (H) 08/21/2022    CALCIUM 8.0 (L) 08/21/2022    ANIONGAP 12 08/21/2022    ESTGFRAFRICA 34 (A) 07/01/2022    EGFRNONAA 29 (A) 07/01/2022         Acute respiratory failure with hypoxia  Likely 2/2 HFpEF exacerbation  2L NC; wean as tolerated.    8/19/22  Still using oxygen will wean off     8/21  Still requiring oxygen    CHF   Resumed diuretic      Bradycardia  S/p atropine x2 in ER  Hold toprol  Tele  Cards consulted; appreciate recs.    8/19  Holding beta blockers .    8/20  Hold beta blockers    8/21  One period of tachycardia with activity   Still holding his beta blockers            TIA (transient ischemic attack)  Pt with left sided numbness and tingling prior to arrival that self resolved  CT Head negative.    8/19   Check MRI brain today   Continue  ASA      8/20  MRI   Reviewed   Patchy areas of T2 hyperintensity in the cerebral white matter.  There are no areas of restricted diffusion to indicate acute ischemia.  There is no mass or mass effect.  No evidence of intracranial hemorrhage.  There is no ventricular dilatation or abnormal extra-axial fluid collection.  Moderate cerebral atrophy.  Craniocervical junction appears intact and images of sella demonstrate no abnormality.     Impression:     1. Evidence of chronic microvascular disease.  2. No acute ischemia.  3. Moderate cerebral atrophy.       TARA (obstructive sleep apnea)  Does not tolerate CPAP.Likely contributing to bradycardia.      Acute on chronic diastolic heart failure  S/p lasix 40mg IV in ER  At home he takes bumex 2mg BID.    8/19  He was given lasix in ER   Creatinine went up   Hold diuretics for now .  Lying flat       Discussed with him and daughters ; compression stockings .  He will try .    8/20  Resume bumex    8/21  He is not at home on oxygen   He is requiring here   Will need more diuresis ;obviously a challenge with his CKD   Transfer to floor ;not needing icu level care       Gastroesophageal reflux disease without esophagitis  Cont home PPI.      Type 2 diabetes mellitus without complication  Patient's FSGs are controlled on current medication regimen.  Last A1c reviewed-   Lab Results   Component Value Date    HGBA1C 7.5 (H) 08/19/2022     Most recent fingerstick glucose reviewed-   Recent Labs   Lab 08/20/22  1617 08/20/22  2136 08/21/22  0718   POCTGLUCOSE 178* 175* 141*     Current correctional scale  Low  Maintain anti-hyperglycemic dose as follows-   Antihyperglycemics (From admission, onward)            Start     Stop Route Frequency Ordered    08/18/22 2100  insulin detemir U-100 pen 15 Units         -- SubQ Nightly 08/18/22 1649    08/18/22 1749  insulin aspart U-100 pen 0-5 Units         -- SubQ Before meals & nightly PRN 08/18/22 1649        Hold Oral hypoglycemics  while patient is in the hospital.      8/20    Glucose 134  Continue same    8/21     Continue same     Essential hypertension  Hold home metoprolol and clonidine  Cont home doxazosin 8mg BID  Start Isordil 40 TID and  Procardia 60mg daily ( expect swelling as documented in past) per cardiology  Note allergy to hydralazine.    8/19/22  His BP is great   Will hold doxazosin today   With HR still in 40s will hold beta blockers      8/20/22  Add lisinopril  Follow BMP       8/21  His BP is better since added bumex and lisinopril       Dyslipidemia  Statin intolerance; takes fish oil at home.  Lab Results   Component Value Date    LDLCALC 113.2 08/19/2022                 VTE Risk Mitigation (From admission, onward)         Ordered     IP VTE HIGH RISK PATIENT  Once         08/18/22 1642     Place sequential compression device  Until discontinued         08/18/22 1642                Discharge Planning   LESLIE:      Code Status: Full Code   Is the patient medically ready for discharge?:     Reason for patient still in hospital (select all that apply): Treatment  Discharge Plan A: Home Health            Critical care time spent on the evaluation and treatment of severe organ dysfunction, review of pertinent labs and imaging studies, discussions with consulting providers and discussions with patient/family:32 minutes.      Dario Cano MD  Department of Hospital Medicine   [place] - [facility]

## 2022-08-21 NOTE — ASSESSMENT & PLAN NOTE
Hold home metoprolol and clonidine  Cont home doxazosin 8mg BID  Start Isordil 40 TID and  Procardia 60mg daily ( expect swelling as documented in past) per cardiology  Note allergy to hydralazine.    8/19/22  His BP is great   Will hold doxazosin today   With HR still in 40s will hold beta blockers      8/20/22  Add lisinopril  Follow BMP       8/21  His BP is better since added bumex and lisinopril

## 2022-08-21 NOTE — ASSESSMENT & PLAN NOTE
Likely 2/2 HFpEF exacerbation  2L NC; wean as tolerated.    8/19/22  Still using oxygen will wean off     8/21  Still requiring oxygen    CHF   Resumed diuretic

## 2022-08-22 LAB
ANION GAP SERPL CALC-SCNC: 11 MMOL/L (ref 8–16)
BUN SERPL-MCNC: 44 MG/DL (ref 8–23)
CALCIUM SERPL-MCNC: 8.3 MG/DL (ref 8.7–10.5)
CHLORIDE SERPL-SCNC: 103 MMOL/L (ref 95–110)
CO2 SERPL-SCNC: 27 MMOL/L (ref 23–29)
CREAT SERPL-MCNC: 2.5 MG/DL (ref 0.5–1.4)
EST. GFR  (NO RACE VARIABLE): 25 ML/MIN/1.73 M^2
GLUCOSE SERPL-MCNC: 208 MG/DL (ref 70–110)
POCT GLUCOSE: 223 MG/DL (ref 70–110)
POCT GLUCOSE: 273 MG/DL (ref 70–110)
POTASSIUM SERPL-SCNC: 4 MMOL/L (ref 3.5–5.1)
SODIUM SERPL-SCNC: 141 MMOL/L (ref 136–145)

## 2022-08-22 PROCEDURE — 25000003 PHARM REV CODE 250: Performed by: INTERNAL MEDICINE

## 2022-08-22 PROCEDURE — 80048 BASIC METABOLIC PNL TOTAL CA: CPT | Performed by: INTERNAL MEDICINE

## 2022-08-22 PROCEDURE — 36415 COLL VENOUS BLD VENIPUNCTURE: CPT | Performed by: INTERNAL MEDICINE

## 2022-08-22 PROCEDURE — 27000221 HC OXYGEN, UP TO 24 HOURS

## 2022-08-22 PROCEDURE — 94761 N-INVAS EAR/PLS OXIMETRY MLT: CPT

## 2022-08-22 PROCEDURE — 99900035 HC TECH TIME PER 15 MIN (STAT)

## 2022-08-22 PROCEDURE — 11000001 HC ACUTE MED/SURG PRIVATE ROOM

## 2022-08-22 PROCEDURE — 25000003 PHARM REV CODE 250: Performed by: STUDENT IN AN ORGANIZED HEALTH CARE EDUCATION/TRAINING PROGRAM

## 2022-08-22 PROCEDURE — 99232 SBSQ HOSP IP/OBS MODERATE 35: CPT | Mod: ,,, | Performed by: INTERNAL MEDICINE

## 2022-08-22 PROCEDURE — 99232 PR SUBSEQUENT HOSPITAL CARE,LEVL II: ICD-10-PCS | Mod: ,,, | Performed by: INTERNAL MEDICINE

## 2022-08-22 PROCEDURE — 94760 N-INVAS EAR/PLS OXIMETRY 1: CPT

## 2022-08-22 RX ADMIN — PANTOPRAZOLE SODIUM 40 MG: 40 TABLET, DELAYED RELEASE ORAL at 09:08

## 2022-08-22 RX ADMIN — LISINOPRIL 10 MG: 10 TABLET ORAL at 09:08

## 2022-08-22 RX ADMIN — ISOSORBIDE DINITRATE 40 MG: 20 TABLET ORAL at 09:08

## 2022-08-22 RX ADMIN — DOXAZOSIN 8 MG: 2 TABLET ORAL at 08:08

## 2022-08-22 RX ADMIN — BUMETANIDE 2 MG: 1 TABLET ORAL at 09:08

## 2022-08-22 RX ADMIN — NIFEDIPINE 60 MG: 60 TABLET, FILM COATED, EXTENDED RELEASE ORAL at 09:08

## 2022-08-22 RX ADMIN — CLOPIDOGREL 75 MG: 75 TABLET, FILM COATED ORAL at 09:08

## 2022-08-22 RX ADMIN — MUPIROCIN: 20 OINTMENT TOPICAL at 08:08

## 2022-08-22 RX ADMIN — ISOSORBIDE DINITRATE 40 MG: 20 TABLET ORAL at 08:08

## 2022-08-22 RX ADMIN — INSULIN DETEMIR 15 UNITS: 100 INJECTION, SOLUTION SUBCUTANEOUS at 08:08

## 2022-08-22 RX ADMIN — INSULIN ASPART 1 UNITS: 100 INJECTION, SOLUTION INTRAVENOUS; SUBCUTANEOUS at 08:08

## 2022-08-22 RX ADMIN — ISOSORBIDE DINITRATE 40 MG: 20 TABLET ORAL at 03:08

## 2022-08-22 RX ADMIN — ACETAMINOPHEN 650 MG: 325 TABLET ORAL at 06:08

## 2022-08-22 RX ADMIN — ASPIRIN 81 MG: 81 TABLET, COATED ORAL at 10:08

## 2022-08-22 RX ADMIN — DOXAZOSIN 8 MG: 2 TABLET ORAL at 09:08

## 2022-08-22 RX ADMIN — INSULIN ASPART 2 UNITS: 100 INJECTION, SOLUTION INTRAVENOUS; SUBCUTANEOUS at 05:08

## 2022-08-22 RX ADMIN — INSULIN ASPART 3 UNITS: 100 INJECTION, SOLUTION INTRAVENOUS; SUBCUTANEOUS at 11:08

## 2022-08-22 RX ADMIN — MUPIROCIN: 20 OINTMENT TOPICAL at 09:08

## 2022-08-22 NOTE — SUBJECTIVE & OBJECTIVE
Interval History: no acute events    Review of Systems   Constitutional:  Negative for activity change, fatigue, fever and unexpected weight change.   HENT:  Negative for congestion, ear pain, hearing loss, rhinorrhea and sore throat.    Eyes:  Negative for redness and visual disturbance.   Respiratory:  Negative for cough, shortness of breath and wheezing.    Cardiovascular:  Negative for chest pain, palpitations and leg swelling.   Gastrointestinal:  Negative for abdominal pain, constipation, diarrhea, nausea and vomiting.   Genitourinary:  Negative for decreased urine volume, dysuria, frequency and urgency.   Musculoskeletal:  Negative for back pain, joint swelling and neck pain.   Skin:  Negative for color change, rash and wound.   Neurological:  Negative for dizziness, tremors, weakness, light-headedness and headaches.   Objective:     Vital Signs (Most Recent):  Temp: 97.6 °F (36.4 °C) (08/22/22 0715)  Pulse: 99 (08/22/22 0703)  Resp: 18 (08/22/22 0703)  BP: 132/83 (08/22/22 0703)  SpO2: (!) 93 % (08/22/22 0703)   Vital Signs (24h Range):  Temp:  [97.6 °F (36.4 °C)-98.2 °F (36.8 °C)] 97.6 °F (36.4 °C)  Pulse:  [] 99  Resp:  [14-35] 18  SpO2:  [91 %-98 %] 93 %  BP: (114-171)/(55-83) 132/83     Weight: 115.7 kg (255 lb)  Body mass index is 36.59 kg/m².    Intake/Output Summary (Last 24 hours) at 8/22/2022 0918  Last data filed at 8/22/2022 0725  Gross per 24 hour   Intake 720 ml   Output 1875 ml   Net -1155 ml      Physical Exam  Vitals and nursing note reviewed.   Constitutional:       General: He is not in acute distress.     Appearance: He is well-developed.   HENT:      Head: Normocephalic and atraumatic.      Right Ear: External ear normal.      Left Ear: External ear normal.   Eyes:      General:         Right eye: No discharge.         Left eye: No discharge.      Extraocular Movements: Extraocular movements intact.      Conjunctiva/sclera: Conjunctivae normal.      Pupils: Pupils are equal, round,  and reactive to light.   Neck:      Thyroid: No thyromegaly.   Cardiovascular:      Rate and Rhythm: Normal rate and regular rhythm.      Heart sounds: No murmur heard.  Pulmonary:      Effort: Pulmonary effort is normal. No respiratory distress.      Breath sounds: Normal breath sounds. No wheezing or rales.   Abdominal:      General: Bowel sounds are normal. There is no distension.      Palpations: Abdomen is soft.      Tenderness: There is no abdominal tenderness.   Musculoskeletal:      Right lower leg: Edema present.      Left lower leg: Edema present.      Comments: Compression stockings in place   Skin:     General: Skin is warm and dry.   Neurological:      Mental Status: He is alert and oriented to person, place, and time.      Cranial Nerves: No cranial nerve deficit.   Psychiatric:         Behavior: Behavior normal.         Thought Content: Thought content normal.       Significant Labs: All pertinent labs within the past 24 hours have been reviewed.  CBC: No results for input(s): WBC, HGB, HCT, PLT in the last 48 hours.  CMP:   Recent Labs   Lab 08/21/22  0426 08/22/22  0418    141   K 3.9 4.0    103   CO2 26 27   * 208*   BUN 43* 44*   CREATININE 2.6* 2.5*   CALCIUM 8.0* 8.3*   ANIONGAP 12 11       Significant Imaging: I have reviewed all pertinent imaging results/findings within the past 24 hours.

## 2022-08-22 NOTE — ASSESSMENT & PLAN NOTE
Renal function seems to be declining recently; will need nephrology outpatient  Avoid nephrotoxic meds; will need to DC cymbalta.      8/20  Follow BMP in am     8/22  Stable  Follow labs

## 2022-08-22 NOTE — ASSESSMENT & PLAN NOTE
S/p atropine x2 in ER  Hold toprol  Tele  Cards consulted; appreciate recs.    8/19  Holding beta blockers .    8/20  Hold beta blockers    8/22  One period of tachycardia with activity   Still holding his beta blockers   CIS evaluating this AM

## 2022-08-22 NOTE — PROGRESS NOTES
[facility] Medicine  Progress Note    Patient Name: Nestor Garcia  MRN: [id number]  Patient Class: IP- Inpatient   Admission Date: 8/18/2022  Length of Stay: 4 days  Attending Physician: Kashmir Conn MD  Primary Care Provider: Kashmir Conn MD        Subjective:     Principal Problem:Hypertensive emergency        HPI:  77 y.o. male with PMHx of HFpEF, DMII, HTN, HLD, GERD, TARA, hearing impairment presented to ER with generalized weakness and SOB that was present upon awaking today. Pt states he was in his usual state of health prior. He denies congestion, sore throat, chest pain, palpitations, abd pain, N/V/D, dysuria, hematuria, and fever/chills. Per ER records, daughter reported patient complained of R facial and RUE numbness near the onset of symptoms, which lasted about 3-4 min and self-resolved. Pt states his cardiologist recently changed him from torsemide to bumex and also increased his toprol dose for BP control. In ER, pt found to be extremely hypertensive with sysolics over 200 and started on a nitro gtt. He was also given IV lasix 40mg x1. He was also bradycardic and required atropine.       Overview/Hospital Course:  8/19/22  Nestor Garcia is a 77 y.o. 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 Patient stated symptoms began approximately 30 minutes prior to arrival. He had  bradycardia with HR in 30s. EKG revealed sinus bradycardia with first degree AVB, HR 47 bpm.   Presently  in icu ; cardiology seen patient his metoprolol was recently increased and it was held .  He reports he feels bad when his HR is below 50 .      8/20/22  Nestor Garcia is a 77 y.o. male  Needed clonidine for high BP this am    systolic .  He is off of beta blockers ;his HR has come up into 50s to 60s  ; NSR;  Still requiring oxygen   Bilateral crackles. Hypoxia without oxygen     Will add bumex ; add lisinopril   Follow BMP       8/21/22.  Doing well   Still hypoxic without oxygen   Bilateral crackles . BP is better . HR 70s ;he did have period of tachycardia when got out of bed to chair   Will need more diuresis   Continue Bumex  Transfer to floor       8/22/22  BP and HR are well controlled  He is still requiring oxygen. CXR pending. He denies feeling SOB, cough or wheezing. Does not wear home O2. He wants to go home today.       Interval History: no acute events    Review of Systems   Constitutional:  Negative for activity change, fatigue, fever and unexpected weight change.   HENT:  Negative for congestion, ear pain, hearing loss, rhinorrhea and sore throat.    Eyes:  Negative for redness and visual disturbance.   Respiratory:  Negative for cough, shortness of breath and wheezing.    Cardiovascular:  Negative for chest pain, palpitations and leg swelling.   Gastrointestinal:  Negative for abdominal pain, constipation, diarrhea, nausea and vomiting.   Genitourinary:  Negative for decreased urine volume, dysuria, frequency and urgency.   Musculoskeletal:  Negative for back pain, joint swelling and neck pain.   Skin:  Negative for color change, rash and wound.   Neurological:  Negative for dizziness, tremors, weakness, light-headedness and headaches.   Objective:     Vital Signs (Most Recent):  Temp: 97.6 °F (36.4 °C) (08/22/22 0715)  Pulse: 99 (08/22/22 0703)  Resp: 18 (08/22/22 0703)  BP: 132/83 (08/22/22 0703)  SpO2: (!) 93 % (08/22/22 0703)   Vital Signs (24h Range):  Temp:  [97.6 °F (36.4 °C)-98.2 °F (36.8 °C)] 97.6 °F (36.4 °C)  Pulse:  [] 99  Resp:  [14-35] 18  SpO2:  [91 %-98 %] 93 %  BP: (114-171)/(55-83) 132/83     Weight: 115.7 kg (255 lb)  Body mass index is 36.59 kg/m².    Intake/Output Summary (Last 24 hours) at 8/22/2022 0918  Last data filed at 8/22/2022 0725  Gross per 24 hour   Intake 720  ml   Output 1875 ml   Net -1155 ml      Physical Exam  Vitals and nursing note reviewed.   Constitutional:       General: He is not in acute distress.     Appearance: He is well-developed.   HENT:      Head: Normocephalic and atraumatic.      Right Ear: External ear normal.      Left Ear: External ear normal.   Eyes:      General:         Right eye: No discharge.         Left eye: No discharge.      Extraocular Movements: Extraocular movements intact.      Conjunctiva/sclera: Conjunctivae normal.      Pupils: Pupils are equal, round, and reactive to light.   Neck:      Thyroid: No thyromegaly.   Cardiovascular:      Rate and Rhythm: Normal rate and regular rhythm.      Heart sounds: No murmur heard.  Pulmonary:      Effort: Pulmonary effort is normal. No respiratory distress.      Breath sounds: Normal breath sounds. No wheezing or rales.   Abdominal:      General: Bowel sounds are normal. There is no distension.      Palpations: Abdomen is soft.      Tenderness: There is no abdominal tenderness.   Musculoskeletal:      Right lower leg: Edema present.      Left lower leg: Edema present.      Comments: Compression stockings in place   Skin:     General: Skin is warm and dry.   Neurological:      Mental Status: He is alert and oriented to person, place, and time.      Cranial Nerves: No cranial nerve deficit.   Psychiatric:         Behavior: Behavior normal.         Thought Content: Thought content normal.       Significant Labs: All pertinent labs within the past 24 hours have been reviewed.  CBC: No results for input(s): WBC, HGB, HCT, PLT in the last 48 hours.  CMP:   Recent Labs   Lab 08/21/22  0426 08/22/22  0418    141   K 3.9 4.0    103   CO2 26 27   * 208*   BUN 43* 44*   CREATININE 2.6* 2.5*   CALCIUM 8.0* 8.3*   ANIONGAP 12 11       Significant Imaging: I have reviewed all pertinent imaging results/findings within the past 24 hours.      Assessment/Plan:      * Hypertensive  emergency  Patient has a current diagnosis of Hypertensive emergency with end organ damage evidenced by hypertensive encephalopathy and acute heart failure which is controlled.  Latest blood pressure and vitals reviewed-   Temp:  [97.6 °F (36.4 °C)-98.2 °F (36.8 °C)]   Pulse:  []   Resp:  [14-35]   BP: (114-171)/(55-83)   SpO2:  [91 %-98 %] .   Patient currently off IV antihypertensives.   Home meds for hypertension were reviewed and noted below.   Hypertension Medications             bumetanide (BUMEX) 2 MG tablet Take 2 mg by mouth 2 (two) times daily.    doxazosin (CARDURA) 8 MG Tab every 12 (twelve) hours.    metoprolol succinate (TOPROL-XL) 25 MG 24 hr tablet Take 100 mg by mouth once daily.    cloNIDine (CATAPRES) 0.1 MG tablet Take 0.1 mg by mouth 2 (two) times daily.          Medication adjustment for hospital antihypertensives is as follows- Continue Doxazosin 8 bid, Start Isordil 40 tid and Procardia 60mg daily ( expect swelling as documented in past)    Will aim for controlled BP reduction by medications noted above. Monitor and mitigate end organ damage as indicated.        8/19  Hypertensive emergency resolved .    8/21  Continue nifedipine   Continue cardura   Added yesterday  lisinopril ; BP better controlled  Transfer to floor      8/22: med surg hold in ICU  Cont BP meds same dose as BP is stable  Remains in patient for hypoxia--may diureses further. CXR pending        CKD (chronic kidney disease) stage 4, GFR 15-29 ml/min  Renal function seems to be declining recently; will need nephrology outpatient  Avoid nephrotoxic meds; will need to DC cymbalta.      8/20  Follow BMP in am     8/22  Stable  Follow labs    Acute respiratory failure with hypoxia  Likely 2/2 HFpEF exacerbation  2L NC; wean as tolerated.    8/19/22  Still using oxygen will wean off     8/22  Still requiring oxygen   CHF   Resumed diuretic--repeat CXR pending  Wean as tolerated  Lungs clear on exam. No signs of acute  infection      Bradycardia  S/p atropine x2 in ER  Hold toprol  Tele  Cards consulted; appreciate recs.    8/19  Holding beta blockers .    8/20  Hold beta blockers    8/22  One period of tachycardia with activity   Still holding his beta blockers   CIS evaluating this AM           TIA (transient ischemic attack)  Pt with left sided numbness and tingling prior to arrival that self resolved  CT Head negative.    8/19   Check MRI brain today   Continue ASA      8/20  MRI   Reviewed   Patchy areas of T2 hyperintensity in the cerebral white matter.  There are no areas of restricted diffusion to indicate acute ischemia.  There is no mass or mass effect.  No evidence of intracranial hemorrhage.  There is no ventricular dilatation or abnormal extra-axial fluid collection.  Moderate cerebral atrophy.  Craniocervical junction appears intact and images of sella demonstrate no abnormality.     Impression:     1. Evidence of chronic microvascular disease.  2. No acute ischemia.  3. Moderate cerebral atrophy.       TARA (obstructive sleep apnea)  Does not tolerate CPAP.Likely contributing to bradycardia.      Acute on chronic diastolic heart failure  S/p lasix 40mg IV in ER  At home he takes bumex 2mg BID.    8/19  He was given lasix in ER   Creatinine went up   Hold diuretics for now .  Lying flat       Discussed with him and daughters ; compression stockings .  He will try .    8/20  Resume bumex    8/22  He is not at home on oxygen   He is requiring here   Will may need more diuresis but lungs are clear, CXR pending this AM. Wean oxygen as toelrated    Gastroesophageal reflux disease without esophagitis  Cont home PPI.      Type 2 diabetes mellitus without complication  Patient's FSGs are controlled on current medication regimen.  Last A1c reviewed-   Lab Results   Component Value Date    HGBA1C 7.5 (H) 08/19/2022     Most recent fingerstick glucose reviewed-   Recent Labs   Lab 08/21/22  1113 08/21/22  1646 08/21/22 2109    POCTGLUCOSE 215* 209* 259*     Current correctional scale  Low  Maintain anti-hyperglycemic dose as follows-   Antihyperglycemics (From admission, onward)            Start     Stop Route Frequency Ordered    08/18/22 2100  insulin detemir U-100 pen 15 Units         -- SubQ Nightly 08/18/22 1649    08/18/22 1749  insulin aspart U-100 pen 0-5 Units         -- SubQ Before meals & nightly PRN 08/18/22 1649        Hold Oral hypoglycemics while patient is in the hospital.      8/20    Glucose 134  Continue same    8/21     Continue same     Essential hypertension  Hold home metoprolol and clonidine  Cont home doxazosin 8mg BID  Start Isordil 40 TID and  Procardia 60mg daily ( expect swelling as documented in past) per cardiology  Note allergy to hydralazine.    8/19/22  His BP is great   Will hold doxazosin today   With HR still in 40s will hold beta blockers      8/20/22  Add lisinopril  Follow BMP       8/22  His BP is better since added bumex and lisinopril --cont same  He remains inpatient for hypoxia (see above)      Dyslipidemia  Statin intolerance; takes fish oil at home.    Lab Results   Component Value Date    LDLCALC 113.2 08/19/2022                 VTE Risk Mitigation (From admission, onward)         Ordered     IP VTE HIGH RISK PATIENT  Once         08/18/22 1642     Place sequential compression device  Until discontinued         08/18/22 1642                Discharge Planning   LESLIE:      Code Status: Full Code   Is the patient medically ready for discharge?:     Reason for patient still in hospital (select all that apply): Treatment  Discharge Plan A: Home Health            Critical care time spent on the evaluation and treatment of severe organ dysfunction, review of pertinent labs and imaging studies, discussions with consulting providers and discussions with patient/family: 45 minutes.      Katey Polanco MD  Department of Hospital Medicine   [place] - Intensive Care

## 2022-08-22 NOTE — PROGRESS NOTES
[facility] - Intensive Care  Cardiology  Progress Note    Patient Name: Nestor Garcia  MRN: [id number]  Admission Date: 8/18/2022  Hospital Length of Stay: 4 days  Code Status: Full Code   Attending Physician: Kashmir Conn MD   Primary Care Physician: Kashmir Conn MD  Expected Discharge Date:   Principal Problem:Hypertensive emergency    Subjective:     Hospital Course: Pt admitted for bradycardia, weakness and SOB. Pt was started on tridil for Bp management and IV diuresis. Patient noncompliant. Blood pressure meds were optimized and tridil tirated off.     Interval History: Blood pressure better controlled this am. CKD appears at baseline.     ROS   Constitutional : Negative  EENT : Negative  CV : Positive for dyspnea on exertion  Respiratory : Negative  Gastrointestinal: Negative   Genitourinary: Negative  Musculoskeletal: Negative  Skin : Negative  Neurological : weakness; AAO x 3   Objective:     Vital Signs (Most Recent):  Temp: 97.6 °F (36.4 °C) (08/22/22 0715)  Pulse: 99 (08/22/22 0703)  Resp: 18 (08/22/22 0703)  BP: 132/83 (08/22/22 0703)  SpO2: (!) 93 % (08/22/22 0703) Vital Signs (24h Range):  Temp:  [97.6 °F (36.4 °C)-98.2 °F (36.8 °C)] 97.6 °F (36.4 °C)  Pulse:  [] 99  Resp:  [14-35] 18  SpO2:  [91 %-98 %] 93 %  BP: (114-171)/(55-83) 132/83     Weight: 115.7 kg (255 lb)  Body mass index is 36.59 kg/m².    SpO2: (!) 93 %  O2 Device (Oxygen Therapy): nasal cannula w/ humidification      Intake/Output Summary (Last 24 hours) at 8/22/2022 0846  Last data filed at 8/22/2022 0725  Gross per 24 hour   Intake 1200 ml   Output 2125 ml   Net -925 ml       Lines/Drains/Airways     Peripheral Intravenous Line  Duration                Peripheral IV - Single Lumen 08/21/22 1146 22 G Anterior;Distal;Right Forearm <1 day                Physical Exam   General appearance: alert, appears stated age and cooperative  Head: Normocephalic, without obvious abnormality, atraumatic  Eyes: conjunctivae/corneas clear.  PERRL  Neck: no carotid bruit, no JVD and supple, symmetrical, trachea midline  Lungs: bilateral crackles, normal respiratory effort  Chest Wall: no tenderness  Heart: regular rate and rhythm, S1, S2 normal, no murmur, click, rub or gallop  Abdomen: soft, non-tender; bowel sounds normal; no masses,  no organomegaly  Extremities: Extremities normal, atraumatic, no cyanosis, clubbing, or edema  Pulses: Dorsalis Pedis R: 2+ (normal)/L: 2+ (normal)  Skin: Skin color, texture, turgor normal. No rashes or lesions  Neurologic: Normal mood and affect  Alert and oriented X 3    Significant Labs:   CMP   Recent Labs   Lab 08/21/22  0426 08/22/22  0418    141   K 3.9 4.0    103   CO2 26 27   * 208*   BUN 43* 44*   CREATININE 2.6* 2.5*   CALCIUM 8.0* 8.3*   ANIONGAP 12 11   , CBC No results for input(s): WBC, HGB, HCT, PLT in the last 48 hours., Troponin No results for input(s): TROPONINI in the last 48 hours. and All pertinent lab results from the last 24 hours have been reviewed.    Significant Imaging: X-Ray: CXR: X-Ray Chest 1 View (CXR):   Results for orders placed or performed during the hospital encounter of 08/18/22   X-Ray Chest 1 View    Narrative    EXAMINATION:  XR CHEST 1 VIEW    CLINICAL HISTORY:  Shortness of breath    TECHNIQUE:  Single frontal view of the chest was performed.    COMPARISON:  08/05/2022    FINDINGS:  The lungs are under expanded.  There are patchy mixed opacity seen throughout both lungs which could reflect atelectasis, aspiration or pneumonia.  Heart is enlarged.  Skeletal structures are intact.      Impression    As above.      Electronically signed by: Yi Dc MD  Date:    08/18/2022  Time:    07:46     Assessment and Plan:       Active Diagnoses:    Diagnosis Date Noted POA    PRINCIPAL PROBLEM:  Hypertensive emergency [I16.1] 08/18/2022 Yes    Acute on chronic diastolic heart failure [I50.33] 08/18/2022 Yes    TARA (obstructive sleep apnea) [G47.33] 08/18/2022  Yes    TIA (transient ischemic attack) [G45.9] 08/18/2022 Yes    Bradycardia [R00.1] 08/18/2022 Yes    Acute respiratory failure with hypoxia [J96.01] 08/18/2022 Yes    CKD (chronic kidney disease) stage 4, GFR 15-29 ml/min [N18.4] 08/18/2022 Yes    Gastroesophageal reflux disease without esophagitis [K21.9] 01/04/2022 Yes    Dyslipidemia [E78.5] 08/04/2015 Yes    Essential hypertension [I10] 08/04/2015 Yes    Type 2 diabetes mellitus without complication [E11.9] 08/04/2015 Yes      Problems Resolved During this Admission:       VTE Risk Mitigation (From admission, onward)         Ordered     IP VTE HIGH RISK PATIENT  Once         08/18/22 1642     Place sequential compression device  Until discontinued         08/18/22 1642              Current Facility-Administered Medications   Medication    acetaminophen tablet 650 mg    aspirin EC tablet 81 mg    atropine 0.1 mg/mL injection    bumetanide tablet 2 mg    clopidogreL tablet 75 mg    dextrose 10% bolus 125 mL    dextrose 10% bolus 250 mL    doxazosin tablet 8 mg    glucagon (human recombinant) injection 1 mg    glucose chewable tablet 16 g    glucose chewable tablet 24 g    insulin aspart U-100 pen 0-5 Units    insulin detemir U-100 pen 15 Units    isosorbide dinitrate tablet 40 mg    lisinopriL tablet 10 mg    melatonin tablet 6 mg    mupirocin 2 % ointment    NIFEdipine 24 hr tablet 60 mg    pantoprazole EC tablet 40 mg    sodium chloride 0.9% flush 10 mL     MPI 2/2022: abnormal consistent with ischemia. Small reversible perfusion abnormality of moderate intensity in the apical segment. Medium partially reversible perfusion abnormality of severe intensity in the anterior region.      TTE 1/2022: LVEF 55%-60%, grade 1 LVDD, trace TR, PASP 15 mmHg     DX:weakness/sob due to chronic diastolic CHF resolving, now near baseline  Sinus Bradycardia on metoprolol and recently clonidine, now resolved  HFpEF  HTN now OK OFF Tridil,  LVEF 60% DD  1/22  First degree AVB  CAD 40% LAD disease 2/22  HLD  Diabetic CKD  GERRY     Plan: Bp is better controlled now, off Tridil  Continue Doxazosin 8 bid  Continue Isordil 40 tid, Procardia 60 ( expect swelling as documented in past)  Note allergy to hydralazine  Continue to hold metoprolol and clonidine  Tx to med surg, OOB as tolerated   May DChome from CV standpoint; f/u Dr Mcmahan soon with further BP adjustment         Telma Aleman, JAMES scribed for Dr. Charles  Cardiology  [facility] Intensive Care    I have personally interviewed and examined this patient face-to-face, and as the physician. Documented the above plan and rendered all medical decision making for this encounter. I have read and agree with the above documentation unless otherwise noted.

## 2022-08-22 NOTE — ASSESSMENT & PLAN NOTE
Hold home metoprolol and clonidine  Cont home doxazosin 8mg BID  Start Isordil 40 TID and  Procardia 60mg daily ( expect swelling as documented in past) per cardiology  Note allergy to hydralazine.    8/19/22  His BP is great   Will hold doxazosin today   With HR still in 40s will hold beta blockers      8/20/22  Add lisinopril  Follow BMP       8/22  His BP is better since added bumex and lisinopril --cont same  He remains inpatient for hypoxia (see above)

## 2022-08-22 NOTE — ASSESSMENT & PLAN NOTE
Likely 2/2 HFpEF exacerbation  2L NC; wean as tolerated.    8/19/22  Still using oxygen will wean off     8/22  Still requiring oxygen   CHF   Resumed diuretic--repeat CXR pending  Wean as tolerated  Lungs clear on exam. No signs of acute infection

## 2022-08-22 NOTE — ASSESSMENT & PLAN NOTE
S/p lasix 40mg IV in ER  At home he takes bumex 2mg BID.    8/19  He was given lasix in ER   Creatinine went up   Hold diuretics for now .  Lying flat       Discussed with him and daughters ; compression stockings .  He will try .    8/20  Resume bumex    8/22  He is not at home on oxygen   He is requiring here   Will may need more diuresis but lungs are clear, CXR pending this AM. Wean oxygen as toelrated

## 2022-08-22 NOTE — TELEPHONE ENCOUNTER
Patient requesting new CPAP order.   Last sleep study done was 7/14/2021    Please advise  Order pending if approved

## 2022-08-22 NOTE — ASSESSMENT & PLAN NOTE
Patient's FSGs are controlled on current medication regimen.  Last A1c reviewed-   Lab Results   Component Value Date    HGBA1C 7.5 (H) 08/19/2022     Most recent fingerstick glucose reviewed-   Recent Labs   Lab 08/21/22  1113 08/21/22  1646 08/21/22  2109   POCTGLUCOSE 215* 209* 259*     Current correctional scale  Low  Maintain anti-hyperglycemic dose as follows-   Antihyperglycemics (From admission, onward)            Start     Stop Route Frequency Ordered    08/18/22 2100  insulin detemir U-100 pen 15 Units         -- SubQ Nightly 08/18/22 1649    08/18/22 1749  insulin aspart U-100 pen 0-5 Units         -- SubQ Before meals & nightly PRN 08/18/22 1649        Hold Oral hypoglycemics while patient is in the hospital.      8/20    Glucose 134  Continue same    8/21     Continue same

## 2022-08-22 NOTE — ASSESSMENT & PLAN NOTE
Patient has a current diagnosis of Hypertensive emergency with end organ damage evidenced by hypertensive encephalopathy and acute heart failure which is controlled.  Latest blood pressure and vitals reviewed-   Temp:  [97.6 °F (36.4 °C)-98.2 °F (36.8 °C)]   Pulse:  []   Resp:  [14-35]   BP: (114-171)/(55-83)   SpO2:  [91 %-98 %] .   Patient currently off IV antihypertensives.   Home meds for hypertension were reviewed and noted below.   Hypertension Medications             bumetanide (BUMEX) 2 MG tablet Take 2 mg by mouth 2 (two) times daily.    doxazosin (CARDURA) 8 MG Tab every 12 (twelve) hours.    metoprolol succinate (TOPROL-XL) 25 MG 24 hr tablet Take 100 mg by mouth once daily.    cloNIDine (CATAPRES) 0.1 MG tablet Take 0.1 mg by mouth 2 (two) times daily.          Medication adjustment for hospital antihypertensives is as follows- Continue Doxazosin 8 bid, Start Isordil 40 tid and Procardia 60mg daily ( expect swelling as documented in past)    Will aim for controlled BP reduction by medications noted above. Monitor and mitigate end organ damage as indicated.        8/19  Hypertensive emergency resolved .    8/21  Continue nifedipine   Continue cardura   Added yesterday  lisinopril ; BP better controlled  Transfer to floor      8/22: med surg hold in ICU  Cont BP meds same dose as BP is stable  Remains in patient for hypoxia--may diureses further. CXR pending

## 2022-08-23 VITALS
DIASTOLIC BLOOD PRESSURE: 67 MMHG | BODY MASS INDEX: 36.51 KG/M2 | HEART RATE: 87 BPM | WEIGHT: 255 LBS | HEIGHT: 70 IN | SYSTOLIC BLOOD PRESSURE: 152 MMHG | TEMPERATURE: 97 F | RESPIRATION RATE: 18 BRPM | OXYGEN SATURATION: 95 %

## 2022-08-23 DIAGNOSIS — G47.33 OSA (OBSTRUCTIVE SLEEP APNEA): Primary | ICD-10-CM

## 2022-08-23 LAB
POCT GLUCOSE: 197 MG/DL (ref 70–110)
POCT GLUCOSE: 261 MG/DL (ref 70–110)

## 2022-08-23 PROCEDURE — 99232 SBSQ HOSP IP/OBS MODERATE 35: CPT | Mod: ,,, | Performed by: INTERNAL MEDICINE

## 2022-08-23 PROCEDURE — 99232 PR SUBSEQUENT HOSPITAL CARE,LEVL II: ICD-10-PCS | Mod: ,,, | Performed by: INTERNAL MEDICINE

## 2022-08-23 PROCEDURE — 25000003 PHARM REV CODE 250: Performed by: INTERNAL MEDICINE

## 2022-08-23 PROCEDURE — 99900035 HC TECH TIME PER 15 MIN (STAT)

## 2022-08-23 PROCEDURE — 94760 N-INVAS EAR/PLS OXIMETRY 1: CPT

## 2022-08-23 PROCEDURE — 27000221 HC OXYGEN, UP TO 24 HOURS

## 2022-08-23 RX ORDER — LISINOPRIL 10 MG/1
10 TABLET ORAL DAILY
Qty: 30 TABLET | Refills: 1 | Status: ON HOLD | OUTPATIENT
Start: 2022-08-24 | End: 2022-11-08 | Stop reason: HOSPADM

## 2022-08-23 RX ORDER — NIFEDIPINE 60 MG/1
60 TABLET, EXTENDED RELEASE ORAL DAILY
Qty: 30 TABLET | Refills: 1 | Status: ON HOLD | OUTPATIENT
Start: 2022-08-24 | End: 2022-11-08 | Stop reason: HOSPADM

## 2022-08-23 RX ORDER — BUMETANIDE 2 MG/1
2 TABLET ORAL DAILY
Qty: 30 TABLET | Refills: 11 | Status: SHIPPED | OUTPATIENT
Start: 2022-08-24 | End: 2022-11-17

## 2022-08-23 RX ORDER — ISOSORBIDE DINITRATE 40 MG/1
40 TABLET ORAL 3 TIMES DAILY
Qty: 90 TABLET | Refills: 1 | Status: SHIPPED | OUTPATIENT
Start: 2022-08-23 | End: 2022-08-31

## 2022-08-23 RX ADMIN — ASPIRIN 81 MG: 81 TABLET, COATED ORAL at 08:08

## 2022-08-23 RX ADMIN — MUPIROCIN: 20 OINTMENT TOPICAL at 08:08

## 2022-08-23 RX ADMIN — LISINOPRIL 10 MG: 10 TABLET ORAL at 08:08

## 2022-08-23 RX ADMIN — PANTOPRAZOLE SODIUM 40 MG: 40 TABLET, DELAYED RELEASE ORAL at 08:08

## 2022-08-23 RX ADMIN — NIFEDIPINE 60 MG: 60 TABLET, FILM COATED, EXTENDED RELEASE ORAL at 08:08

## 2022-08-23 RX ADMIN — ISOSORBIDE DINITRATE 40 MG: 20 TABLET ORAL at 08:08

## 2022-08-23 RX ADMIN — BUMETANIDE 2 MG: 1 TABLET ORAL at 08:08

## 2022-08-23 RX ADMIN — CLOPIDOGREL 75 MG: 75 TABLET, FILM COATED ORAL at 08:08

## 2022-08-23 RX ADMIN — DOXAZOSIN 8 MG: 2 TABLET ORAL at 08:08

## 2022-08-23 NOTE — PLAN OF CARE
Pt A&Ox4, up ind, VSS, says he feels better requesting to go home and ready with no complaints at this time.     Problem: Adult Inpatient Plan of Care  Goal: Plan of Care Review  Outcome: Met  Goal: Patient-Specific Goal (Individualized)  Outcome: Met  Goal: Absence of Hospital-Acquired Illness or Injury  Outcome: Met  Goal: Optimal Comfort and Wellbeing  Outcome: Met  Goal: Readiness for Transition of Care  Outcome: Met

## 2022-08-23 NOTE — ASSESSMENT & PLAN NOTE
Renal function seems to be declining recently; will need nephrology outpatient  Avoid nephrotoxic meds; will need to DC cymbalta.      8/20  Follow BMP in am     8/23  Stable  Follow labs outpatient

## 2022-08-23 NOTE — PROGRESS NOTES
[facility] ([address] [place] [address] Medicine  Progress Note    Patient Name: Nestor Garcia  MRN: [id number]  Patient Class: IP- Inpatient   Admission Date: 8/18/2022  Length of Stay: 5 days  Attending Physician: Kashmir Conn MD  Primary Care Provider: Kashmir Conn MD        Subjective:     Principal Problem:Hypertensive emergency        HPI:  77 y.o. male with PMHx of HFpEF, DMII, HTN, HLD, GERD, TARA, hearing impairment presented to ER with generalized weakness and SOB that was present upon awaking today. Pt states he was in his usual state of health prior. He denies congestion, sore throat, chest pain, palpitations, abd pain, N/V/D, dysuria, hematuria, and fever/chills. Per ER records, daughter reported patient complained of R facial and RUE numbness near the onset of symptoms, which lasted about 3-4 min and self-resolved. Pt states his cardiologist recently changed him from torsemide to bumex and also increased his toprol dose for BP control. In ER, pt found to be extremely hypertensive with sysolics over 200 and started on a nitro gtt. He was also given IV lasix 40mg x1. He was also bradycardic and required atropine.       Overview/Hospital Course:  8/19/22  Nestor Garcia is a 77 y.o. 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 Patient stated symptoms began approximately 30 minutes prior to arrival. He had  bradycardia with HR in 30s. EKG revealed sinus bradycardia with first degree AVB, HR 47 bpm.   Presently  in icu ; cardiology seen patient his metoprolol was recently increased and it was held .  He reports he feels bad when his HR is below 50 .      8/20/22  Nestor Garcia is a 77 y.o. male  Needed clonidine for high BP this am    systolic .  He is off of beta blockers ;his HR has come up into 50s to  60s ; NSR;  Still requiring oxygen   Bilateral crackles. Hypoxia without oxygen     Will add bumex ; add lisinopril   Follow BMP       8/21/22.  Doing well   Still hypoxic without oxygen   Bilateral crackles . BP is better . HR 70s ;he did have period of tachycardia when got out of bed to chair   Will need more diuresis   Continue Bumex  Transfer to floor       8/22/22  BP and HR are well controlled  He is still requiring oxygen. CXR pending. He denies feeling SOB, cough or wheezing. Does not wear home O2. He wants to go home today.     8/23: oxygen weaned yesterday. Desats overnight due to TARA and still refuses CPAP. CXR clear. BP remains stable on current meds and ready for d/c home today      Interval History: no acute events    Review of Systems   Constitutional:  Negative for activity change, fatigue, fever and unexpected weight change.   HENT:  Negative for congestion, ear pain, hearing loss, rhinorrhea and sore throat.    Eyes:  Negative for redness and visual disturbance.   Respiratory:  Negative for cough, shortness of breath and wheezing.    Cardiovascular:  Negative for chest pain, palpitations and leg swelling.   Gastrointestinal:  Negative for abdominal pain, constipation, diarrhea, nausea and vomiting.   Genitourinary:  Negative for decreased urine volume, dysuria, frequency and urgency.   Musculoskeletal:  Negative for back pain, joint swelling and neck pain.   Skin:  Negative for color change, rash and wound.   Neurological:  Negative for dizziness, tremors, weakness, light-headedness and headaches.   Objective:     Vital Signs (Most Recent):  Temp: 97.4 °F (36.3 °C) (08/23/22 0726)  Pulse: 69 (08/23/22 0800)  Resp: 18 (08/23/22 0726)  BP: (!) 170/74 (08/23/22 0726)  SpO2: 95 % (08/23/22 0726)   Vital Signs (24h Range):  Temp:  [96.7 °F (35.9 °C)-98.5 °F (36.9 °C)] 97.4 °F (36.3 °C)  Pulse:  [] 69  Resp:  [15-40] 18  SpO2:  [90 %-98 %] 95 %  BP: (129-177)/(58-83) 170/74     Weight: 115.7 kg  (255 lb)  Body mass index is 36.59 kg/m².    Intake/Output Summary (Last 24 hours) at 8/23/2022 0956  Last data filed at 8/23/2022 0524  Gross per 24 hour   Intake 360 ml   Output 850 ml   Net -490 ml        Physical Exam  Vitals and nursing note reviewed.   Constitutional:       General: He is not in acute distress.     Appearance: He is well-developed.   HENT:      Head: Normocephalic and atraumatic.      Right Ear: External ear normal.      Left Ear: External ear normal.   Eyes:      General:         Right eye: No discharge.         Left eye: No discharge.      Extraocular Movements: Extraocular movements intact.      Conjunctiva/sclera: Conjunctivae normal.      Pupils: Pupils are equal, round, and reactive to light.   Neck:      Thyroid: No thyromegaly.   Cardiovascular:      Rate and Rhythm: Normal rate and regular rhythm.      Heart sounds: No murmur heard.  Pulmonary:      Effort: Pulmonary effort is normal. No respiratory distress.      Breath sounds: Normal breath sounds. No wheezing or rales.   Abdominal:      General: Bowel sounds are normal. There is no distension.      Palpations: Abdomen is soft.      Tenderness: There is no abdominal tenderness.   Musculoskeletal:      Right lower leg: Edema present.      Left lower leg: Edema present.      Comments: Compression stockings in place   Skin:     General: Skin is warm and dry.   Neurological:      Mental Status: He is alert and oriented to person, place, and time.      Cranial Nerves: No cranial nerve deficit.   Psychiatric:         Behavior: Behavior normal.         Thought Content: Thought content normal.       Significant Labs: All pertinent labs within the past 24 hours have been reviewed.  CBC: No results for input(s): WBC, HGB, HCT, PLT in the last 48 hours.  CMP:   Recent Labs   Lab 08/22/22  0418      K 4.0      CO2 27   *   BUN 44*   CREATININE 2.5*   CALCIUM 8.3*   ANIONGAP 11         Significant Imaging: I have reviewed all  pertinent imaging results/findings within the past 24 hours.      Assessment/Plan:      * Hypertensive emergency  Patient has a current diagnosis of Hypertensive emergency with end organ damage evidenced by hypertensive encephalopathy and acute heart failure which is controlled.  Latest blood pressure and vitals reviewed-   Temp:  [96.7 °F (35.9 °C)-98.5 °F (36.9 °C)]   Pulse:  []   Resp:  [15-40]   BP: (129-177)/(58-83)   SpO2:  [90 %-98 %] .   Patient currently off IV antihypertensives.   Home meds for hypertension were reviewed and noted below.   Hypertension Medications             bumetanide (BUMEX) 2 MG tablet Take 2 mg by mouth 2 (two) times daily.    doxazosin (CARDURA) 8 MG Tab every 12 (twelve) hours.    metoprolol succinate (TOPROL-XL) 25 MG 24 hr tablet Take 100 mg by mouth once daily.    cloNIDine (CATAPRES) 0.1 MG tablet Take 0.1 mg by mouth 2 (two) times daily.          Medication adjustment for hospital antihypertensives is as follows- Continue Doxazosin 8 bid, Start Isordil 40 tid and Procardia 60mg daily ( expect swelling as documented in past)    Will aim for controlled BP reduction by medications noted above. Monitor and mitigate end organ damage as indicated.        8/19  Hypertensive emergency resolved .    8/21  Continue nifedipine   Continue cardura   Added yesterday  lisinopril ; BP better controlled  Transfer to floor      8/23  BP stable on current regimen: bumex, imdur, lisinopril, nifedipine, cardura. Home on these meds with close PCP follow up         CKD (chronic kidney disease) stage 4, GFR 15-29 ml/min  Renal function seems to be declining recently; will need nephrology outpatient  Avoid nephrotoxic meds; will need to DC cymbalta.      8/20  Follow BMP in am     8/23  Stable  Follow labs outpatient    Acute respiratory failure with hypoxia  Likely 2/2 HFpEF exacerbation  2L NC; wean as tolerated.    8/19/22  Still using oxygen will wean off     8/22  Still requiring oxygen    CHF   Resumed diuretic--repeat CXR pending  Wean as tolerated  Lungs clear on exam. No signs of acute infection    8/23:  Resolved. Off oxygen. desat at night due to TARA and refuses CPAP  CXR clear      Bradycardia  S/p atropine x2 in ER  Hold toprol  Tele  Cards consulted; appreciate recs.    8/19  Holding beta blockers .    8/20  Hold beta blockers    8/23  resolved  Still holding his beta blockers at discharge             TIA (transient ischemic attack)  Pt with left sided numbness and tingling prior to arrival that self resolved  CT Head negative.    8/19   Check MRI brain today   Continue ASA      8/20  MRI   Reviewed   Patchy areas of T2 hyperintensity in the cerebral white matter.  There are no areas of restricted diffusion to indicate acute ischemia.  There is no mass or mass effect.  No evidence of intracranial hemorrhage.  There is no ventricular dilatation or abnormal extra-axial fluid collection.  Moderate cerebral atrophy.  Craniocervical junction appears intact and images of sella demonstrate no abnormality.     Impression:     1. Evidence of chronic microvascular disease.  2. No acute ischemia.  3. Moderate cerebral atrophy.       TARA (obstructive sleep apnea)  Does not tolerate CPAP.Likely contributing to bradycardia.  desats at night and strongly encouraged use outpatient but declines      Acute on chronic diastolic heart failure  S/p lasix 40mg IV in ER  At home he takes bumex 2mg BID.    8/19  He was given lasix in ER   Creatinine went up   Hold diuretics for now .  Lying flat       Discussed with him and daughters ; compression stockings .  He will try .    8/20  Resume bumex    8/23  Weaned off oxygen yesterday  Repeat CXR clear  Will d/c home with current dose bumex  Cont compression stockings    Gastroesophageal reflux disease without esophagitis  Cont home PPI.      Type 2 diabetes mellitus without complication  Patient's FSGs are controlled on current medication regimen.  Last A1c  reviewed-   Lab Results   Component Value Date    HGBA1C 7.5 (H) 08/19/2022     Most recent fingerstick glucose reviewed-   Recent Labs   Lab 08/22/22  1129 08/22/22  1646 08/23/22  0611   POCTGLUCOSE 273* 223* 197*     Current correctional scale  Low  Maintain anti-hyperglycemic dose as follows-   Antihyperglycemics (From admission, onward)            Start     Stop Route Frequency Ordered    08/18/22 2100  insulin detemir U-100 pen 15 Units         -- SubQ Nightly 08/18/22 1649    08/18/22 1749  insulin aspart U-100 pen 0-5 Units         -- SubQ Before meals & nightly PRN 08/18/22 1649        Hold Oral hypoglycemics while patient is in the hospital.      8/20    Glucose 134  Continue same    8/23  Resume home meds at discharge    Essential hypertension  Hold home metoprolol and clonidine  Cont home doxazosin 8mg BID  Start Isordil 40 TID and  Procardia 60mg daily ( expect swelling as documented in past) per cardiology  Note allergy to hydralazine.    8/19/22  His BP is great   Will hold doxazosin today   With HR still in 40s will hold beta blockers      8/20/22  Add lisinopril  Follow BMP       8/23  BP stable on current regimen: bumex, imdur, lisinopril, nifedipine, cardura. Home on these meds with close PCP follow up   Close PCP follow up for BP monitoring      Dyslipidemia  Statin intolerance; takes fish oil at home.    Lab Results   Component Value Date    LDLCALC 113.2 08/19/2022                 VTE Risk Mitigation (From admission, onward)         Ordered     IP VTE HIGH RISK PATIENT  Once         08/18/22 1642     Place sequential compression device  Until discontinued         08/18/22 1642                Discharge Planning   LESLIE:      Code Status: Full Code   Is the patient medically ready for discharge?:     Reason for patient still in hospital (select all that apply): Pending disposition  Discharge Plan A: Home Health                  Katey Polanco MD  Department of Hospital Medicine   [facility]  Med Surg (3rd Fl)

## 2022-08-23 NOTE — ASSESSMENT & PLAN NOTE
Likely 2/2 HFpEF exacerbation  2L NC; wean as tolerated.    8/19/22  Still using oxygen will wean off     8/22  Still requiring oxygen   CHF   Resumed diuretic--repeat CXR pending  Wean as tolerated  Lungs clear on exam. No signs of acute infection    8/23:  Resolved. Off oxygen. desat at night due to TARA and refuses CPAP  CXR clear

## 2022-08-23 NOTE — ASSESSMENT & PLAN NOTE
Patient has a current diagnosis of Hypertensive emergency with end organ damage evidenced by hypertensive encephalopathy and acute heart failure which is controlled.  Latest blood pressure and vitals reviewed-   Temp:  [96.7 °F (35.9 °C)-98.5 °F (36.9 °C)]   Pulse:  []   Resp:  [15-40]   BP: (129-177)/(58-83)   SpO2:  [90 %-98 %] .   Patient currently off IV antihypertensives.   Home meds for hypertension were reviewed and noted below.   Hypertension Medications             bumetanide (BUMEX) 2 MG tablet Take 2 mg by mouth 2 (two) times daily.    doxazosin (CARDURA) 8 MG Tab every 12 (twelve) hours.    metoprolol succinate (TOPROL-XL) 25 MG 24 hr tablet Take 100 mg by mouth once daily.    cloNIDine (CATAPRES) 0.1 MG tablet Take 0.1 mg by mouth 2 (two) times daily.          Medication adjustment for hospital antihypertensives is as follows- Continue Doxazosin 8 bid, Start Isordil 40 tid and Procardia 60mg daily ( expect swelling as documented in past)    Will aim for controlled BP reduction by medications noted above. Monitor and mitigate end organ damage as indicated.        8/19  Hypertensive emergency resolved .    8/21  Continue nifedipine   Continue cardura   Added yesterday  lisinopril ; BP better controlled  Transfer to floor      8/23  BP stable on current regimen: bumex, imdur, lisinopril, nifedipine, cardura. Home on these meds with close PCP follow up

## 2022-08-23 NOTE — ASSESSMENT & PLAN NOTE
S/p lasix 40mg IV in ER  At home he takes bumex 2mg BID.    8/19  He was given lasix in ER   Creatinine went up   Hold diuretics for now .  Lying flat       Discussed with him and daughters ; compression stockings .  He will try .    8/20  Resume bumex    8/23  Weaned off oxygen yesterday  Repeat CXR clear  Will d/c home with current dose bumex  Cont compression stockings

## 2022-08-23 NOTE — ASSESSMENT & PLAN NOTE
Hold home metoprolol and clonidine  Cont home doxazosin 8mg BID  Start Isordil 40 TID and  Procardia 60mg daily ( expect swelling as documented in past) per cardiology  Note allergy to hydralazine.    8/19/22  His BP is great   Will hold doxazosin today   With HR still in 40s will hold beta blockers      8/20/22  Add lisinopril  Follow BMP       8/23  BP stable on current regimen: bumex, imdur, lisinopril, nifedipine, cardura. Home on these meds with close PCP follow up   Close PCP follow up for BP monitoring

## 2022-08-23 NOTE — PLAN OF CARE
08/23/22 0941   Medicare Message   Important Message from Medicare regarding Discharge Appeal Rights Given to patient/caregiver;Explained to patient/caregiver;Signed/date by patient/caregiver   Date IMM was signed 08/22/22   Time IMM was signed 1606

## 2022-08-23 NOTE — PLAN OF CARE
Problem: Adult Inpatient Plan of Care  Goal: Plan of Care Review  Outcome: Ongoing, Progressing     Problem: Adult Inpatient Plan of Care  Goal: Patient-Specific Goal (Individualized)  Outcome: Ongoing, Progressing     Problem: Adult Inpatient Plan of Care  Goal: Optimal Comfort and Wellbeing  Outcome: Ongoing, Progressing     Problem: Diabetes Comorbidity  Goal: Blood Glucose Level Within Targeted Range  Outcome: Ongoing, Progressing

## 2022-08-23 NOTE — NURSING
1904 Lying in bed awake and oriented times 4. Calm and cooperative. Patient is depressed tearful;. Denies pain at this time. Continuous cardiac monitoring in progress. HR 78 NSR. B/p 151/65. Patient is on room air and sat 90. Observed patient performing IS. 1500 ml on IS and tolerated well. Encouraged patient to perform hourly while awake. MAEW. Due to void. Discussed plan of care with patient. HE verbalized understanding. Will continue to monitor. Side rails times 2 up, call button in reach, and bed low and locked.

## 2022-08-23 NOTE — SUBJECTIVE & OBJECTIVE
Interval History: no acute events    Review of Systems   Constitutional:  Negative for activity change, fatigue, fever and unexpected weight change.   HENT:  Negative for congestion, ear pain, hearing loss, rhinorrhea and sore throat.    Eyes:  Negative for redness and visual disturbance.   Respiratory:  Negative for cough, shortness of breath and wheezing.    Cardiovascular:  Negative for chest pain, palpitations and leg swelling.   Gastrointestinal:  Negative for abdominal pain, constipation, diarrhea, nausea and vomiting.   Genitourinary:  Negative for decreased urine volume, dysuria, frequency and urgency.   Musculoskeletal:  Negative for back pain, joint swelling and neck pain.   Skin:  Negative for color change, rash and wound.   Neurological:  Negative for dizziness, tremors, weakness, light-headedness and headaches.   Objective:     Vital Signs (Most Recent):  Temp: 97.4 °F (36.3 °C) (08/23/22 0726)  Pulse: 69 (08/23/22 0800)  Resp: 18 (08/23/22 0726)  BP: (!) 170/74 (08/23/22 0726)  SpO2: 95 % (08/23/22 0726)   Vital Signs (24h Range):  Temp:  [96.7 °F (35.9 °C)-98.5 °F (36.9 °C)] 97.4 °F (36.3 °C)  Pulse:  [] 69  Resp:  [15-40] 18  SpO2:  [90 %-98 %] 95 %  BP: (129-177)/(58-83) 170/74     Weight: 115.7 kg (255 lb)  Body mass index is 36.59 kg/m².    Intake/Output Summary (Last 24 hours) at 8/23/2022 0914  Last data filed at 8/23/2022 0524  Gross per 24 hour   Intake 360 ml   Output 850 ml   Net -490 ml        Physical Exam  Vitals and nursing note reviewed.   Constitutional:       General: He is not in acute distress.     Appearance: He is well-developed.   HENT:      Head: Normocephalic and atraumatic.      Right Ear: External ear normal.      Left Ear: External ear normal.   Eyes:      General:         Right eye: No discharge.         Left eye: No discharge.      Extraocular Movements: Extraocular movements intact.      Conjunctiva/sclera: Conjunctivae normal.      Pupils: Pupils are equal, round,  and reactive to light.   Neck:      Thyroid: No thyromegaly.   Cardiovascular:      Rate and Rhythm: Normal rate and regular rhythm.      Heart sounds: No murmur heard.  Pulmonary:      Effort: Pulmonary effort is normal. No respiratory distress.      Breath sounds: Normal breath sounds. No wheezing or rales.   Abdominal:      General: Bowel sounds are normal. There is no distension.      Palpations: Abdomen is soft.      Tenderness: There is no abdominal tenderness.   Musculoskeletal:      Right lower leg: Edema present.      Left lower leg: Edema present.      Comments: Compression stockings in place   Skin:     General: Skin is warm and dry.   Neurological:      Mental Status: He is alert and oriented to person, place, and time.      Cranial Nerves: No cranial nerve deficit.   Psychiatric:         Behavior: Behavior normal.         Thought Content: Thought content normal.       Significant Labs: All pertinent labs within the past 24 hours have been reviewed.  CBC: No results for input(s): WBC, HGB, HCT, PLT in the last 48 hours.  CMP:   Recent Labs   Lab 08/22/22  0418      K 4.0      CO2 27   *   BUN 44*   CREATININE 2.5*   CALCIUM 8.3*   ANIONGAP 11         Significant Imaging: I have reviewed all pertinent imaging results/findings within the past 24 hours.

## 2022-08-23 NOTE — PROGRESS NOTES
[facility] ([facility])  Cardiology  Progress Note    Patient Name: Nestor Garcia  MRN: [id number]  Admission Date: 8/18/2022  Hospital Length of Stay: 5 days  Code Status: Full Code   Attending Physician: Kashmir Conn MD   Primary Care Physician: Kashmir Conn MD  Expected Discharge Date:   Principal Problem:Hypertensive emergency    Subjective:     Hospital Course: Pt admitted for bradycardia, weakness and SOB. Pt was started on tridil for Bp management and IV diuresis. Patient noncompliant. Blood pressure meds were optimized and tridil tirated off.     Interval History: BP better controlled     ROS   Constitutional : Negative  EENT : Negative  CV : Positive for dyspnea on exertion  Respiratory : Negative  Gastrointestinal: Negative   Genitourinary: Negative  Musculoskeletal: Negative  Skin : Negative  Neurological : weakness; AAO x 3     Objective:     Vital Signs (Most Recent):  Temp: 97.4 °F (36.3 °C) (08/23/22 0726)  Pulse: 69 (08/23/22 0800)  Resp: 18 (08/23/22 0726)  BP: (!) 170/74 (08/23/22 0726)  SpO2: 95 % (08/23/22 0726) Vital Signs (24h Range):  Temp:  [96.7 °F (35.9 °C)-98.5 °F (36.9 °C)] 97.4 °F (36.3 °C)  Pulse:  [] 69  Resp:  [13-40] 18  SpO2:  [90 %-98 %] 95 %  BP: (129-177)/(58-83) 170/74     Weight: 115.7 kg (255 lb)  Body mass index is 36.59 kg/m².    SpO2: 95 %  O2 Device (Oxygen Therapy): room air      Intake/Output Summary (Last 24 hours) at 8/23/2022 0851  Last data filed at 8/23/2022 0524  Gross per 24 hour   Intake 360 ml   Output 850 ml   Net -490 ml       Lines/Drains/Airways     Peripheral Intravenous Line  Duration                Peripheral IV - Single Lumen 08/21/22 1146 22 G Anterior;Distal;Right Forearm 1 day                Physical Exam   General appearance: alert, appears stated age and cooperative  Head: Normocephalic, without obvious abnormality, atraumatic  Eyes: conjunctivae/corneas clear. PERRL  Neck: no carotid bruit, no JVD and supple, symmetrical, trachea  midline  Lungs: bilateral crackles, normal respiratory effort  Chest Wall: no tenderness  Heart: regular rate and rhythm, S1, S2 normal, no murmur, click, rub or gallop  Abdomen: soft, non-tender; bowel sounds normal; no masses,  no organomegaly  Extremities: Extremities normal, atraumatic, no cyanosis, clubbing, or edema  Pulses: Dorsalis Pedis R: 2+ (normal)/L: 2+ (normal)  Skin: Skin color, texture, turgor normal. No rashes or lesions  Neurologic: Normal mood and affect  Alert and oriented X 3    Significant Labs:   BMP:   Recent Labs   Lab 08/22/22 0418   *      K 4.0      CO2 27   BUN 44*   CREATININE 2.5*   CALCIUM 8.3*   , CMP   Recent Labs   Lab 08/22/22 0418      K 4.0      CO2 27   *   BUN 44*   CREATININE 2.5*   CALCIUM 8.3*   ANIONGAP 11   , CBC No results for input(s): WBC, HGB, HCT, PLT in the last 48 hours. and Troponin No results for input(s): TROPONINI in the last 48 hours.    Assessment and Plan:       Active Diagnoses:    Diagnosis Date Noted POA    PRINCIPAL PROBLEM:  Hypertensive emergency [I16.1] 08/18/2022 Yes    Acute on chronic diastolic heart failure [I50.33] 08/18/2022 Yes    TARA (obstructive sleep apnea) [G47.33] 08/18/2022 Yes    TIA (transient ischemic attack) [G45.9] 08/18/2022 Yes    Bradycardia [R00.1] 08/18/2022 Yes    Acute respiratory failure with hypoxia [J96.01] 08/18/2022 Yes    CKD (chronic kidney disease) stage 4, GFR 15-29 ml/min [N18.4] 08/18/2022 Yes    Gastroesophageal reflux disease without esophagitis [K21.9] 01/04/2022 Yes    Dyslipidemia [E78.5] 08/04/2015 Yes    Essential hypertension [I10] 08/04/2015 Yes    Type 2 diabetes mellitus without complication [E11.9] 08/04/2015 Yes      Problems Resolved During this Admission:       VTE Risk Mitigation (From admission, onward)         Ordered     IP VTE HIGH RISK PATIENT  Once         08/18/22 1642     Place sequential compression device  Until discontinued          08/18/22 1642                 MPI 2/2022:   abnormal consistent with ischemia. Small reversible perfusion abnormality of moderate intensity in the apical segment. Medium partially reversible perfusion abnormality of severe intensity in the anterior region.      TTE 1/2022:   LVEF 55%-60%, grade 1 LVDD, trace TR, PASP 15 mmHg     DX:Feeling great this am  weakness/sob due to chronic diastolic CHF resolved  Sinus Bradycardia on metoprolol and recently clonidine, now resolved  HFpEF  HTN now OK OFF Tridil,  LVEF 60% DD 1/22  First degree AVB  CAD 40% LAD disease 2/22  HLD  Diabetic CKD  GERRY     Plan:Increase Nifedipine as needed; 90 mg now  Bp is better controlled now, off Tridil  Continue Doxazosin 8 bid  Continue Isordil 40 tid, Procardia 60 ( expect swelling as documented in past)  Note allergy to hydralazine  Continue to hold metoprolol and clonidine  Tx to med surg, OOB as tolerated   May DChome from CV standpoint; f/u Dr Mcmahan soon with further BP adjustment    Ted Munoz, JAMES scribed for Dr Charles  Cardiology  [place] - Med Surg (3rd Fl)    I have personally interviewed and examined this patient face-to-face, and as the physician. Documented the above plan and rendered all medical decision making for this encounter. I have read and agree with the above documentation unless otherwise noted.

## 2022-08-23 NOTE — NURSING
Pt transferred to 303 from icu via wheelchair and staff pt positioned self in bed oriented pt to room and surroundings call light in reach plan of care reviewed pt voices no c/o o2 2 l nasal cannula in use no distress noted report from mekhi conti rn

## 2022-08-23 NOTE — ASSESSMENT & PLAN NOTE
S/p atropine x2 in ER  Hold toprol  Tele  Cards consulted; appreciate recs.    8/19  Holding beta blockers .    8/20  Hold beta blockers    8/23  resolved  Still holding his beta blockers at discharge

## 2022-08-23 NOTE — ASSESSMENT & PLAN NOTE
Does not tolerate CPAP.Likely contributing to bradycardia.  desats at night and strongly encouraged use outpatient but declines

## 2022-08-23 NOTE — ASSESSMENT & PLAN NOTE
Patient's FSGs are controlled on current medication regimen.  Last A1c reviewed-   Lab Results   Component Value Date    HGBA1C 7.5 (H) 08/19/2022     Most recent fingerstick glucose reviewed-   Recent Labs   Lab 08/22/22  1129 08/22/22  1646 08/23/22  0611   POCTGLUCOSE 273* 223* 197*     Current correctional scale  Low  Maintain anti-hyperglycemic dose as follows-   Antihyperglycemics (From admission, onward)            Start     Stop Route Frequency Ordered    08/18/22 2100  insulin detemir U-100 pen 15 Units         -- SubQ Nightly 08/18/22 1649    08/18/22 1749  insulin aspart U-100 pen 0-5 Units         -- SubQ Before meals & nightly PRN 08/18/22 1649        Hold Oral hypoglycemics while patient is in the hospital.      8/20    Glucose 134  Continue same    8/23  Resume home meds at discharge

## 2022-08-23 NOTE — DISCHARGE SUMMARY
[facility] ([facility])  [facility] Medicine  Discharge Summary      Patient Name: Nestor Garcia  MRN: [id number]  Patient Class: IP- Inpatient  Admission Date: 8/18/2022  Hospital Length of Stay: 5 days  Discharge Date and Time:  08/23/2022 9:23 AM  Attending Physician: Kashmir Conn MD   Discharging Provider: Katey Polanco MD  Primary Care Provider: Kashmir Conn MD      HPI:   77 y.o. male with PMHx of HFpEF, DMII, HTN, HLD, GERD, TARA, hearing impairment presented to ER with generalized weakness and SOB that was present upon awaking today. Pt states he was in his usual state of health prior. He denies congestion, sore throat, chest pain, palpitations, abd pain, N/V/D, dysuria, hematuria, and fever/chills. Per ER records, daughter reported patient complained of R facial and RUE numbness near the onset of symptoms, which lasted about 3-4 min and self-resolved. Pt states his cardiologist recently changed him from torsemide to bumex and also increased his toprol dose for BP control. In ER, pt found to be extremely hypertensive with sysolics over 200 and started on a nitro gtt. He was also given IV lasix 40mg x1. He was also bradycardic and required atropine.       * No surgery found *      Hospital Course:   8/19/22  Nestor Garcia is a 77 y.o. 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 Patient stated symptoms began approximately 30 minutes prior to arrival. He had  bradycardia with HR in 30s. EKG revealed sinus bradycardia with first degree AVB, HR 47 bpm.   Presently  in icu ; cardiology seen patient his metoprolol was recently increased and it was held .  He reports he feels bad when his HR is below 50 .      8/20/22  Nestor Garcia is a 77 y.o. male  Needed clonidine for high BP this am    systolic  .  He is off of beta blockers ;his HR has come up into 50s to 60s ; NSR;  Still requiring oxygen   Bilateral crackles. Hypoxia without oxygen     Will add bumex ; add lisinopril   Follow BMP       8/21/22.  Doing well   Still hypoxic without oxygen   Bilateral crackles . BP is better . HR 70s ;he did have period of tachycardia when got out of bed to chair   Will need more diuresis   Continue Bumex  Transfer to floor       8/22/22  BP and HR are well controlled  He is still requiring oxygen. CXR pending. He denies feeling SOB, cough or wheezing. Does not wear home O2. He wants to go home today.     8/23: oxygen weaned yesterday. Desats overnight due to TARA and still refuses CPAP. CXR clear. BP remains stable on current meds and ready for d/c home today       Goals of Care Treatment Preferences:  Code Status: Full Code      Consults:   Consults (From admission, onward)        Status Ordering Provider     Inpatient consult to Cardiology[facility]  Once        Provider:  MD Quan Salcido MOHAMMAD Q.          * Hypertensive emergency  Patient has a current diagnosis of Hypertensive emergency with end organ damage evidenced by hypertensive encephalopathy and acute heart failure which is controlled.  Latest blood pressure and vitals reviewed-   Temp:  [96.7 °F (35.9 °C)-98.5 °F (36.9 °C)]   Pulse:  []   Resp:  [15-40]   BP: (129-177)/(58-83)   SpO2:  [90 %-98 %] .   Patient currently off IV antihypertensives.   Home meds for hypertension were reviewed and noted below.   Hypertension Medications             bumetanide (BUMEX) 2 MG tablet Take 2 mg by mouth 2 (two) times daily.    doxazosin (CARDURA) 8 MG Tab every 12 (twelve) hours.    metoprolol succinate (TOPROL-XL) 25 MG 24 hr tablet Take 100 mg by mouth once daily.    cloNIDine (CATAPRES) 0.1 MG tablet Take 0.1 mg by mouth 2 (two) times daily.          Medication adjustment for hospital antihypertensives is as follows- Continue Doxazosin 8 bid,  Start Isordil 40 tid and Procardia 60mg daily ( expect swelling as documented in past)    Will aim for controlled BP reduction by medications noted above. Monitor and mitigate end organ damage as indicated.        8/19  Hypertensive emergency resolved .    8/21  Continue nifedipine   Continue cardura   Added yesterday  lisinopril ; BP better controlled  Transfer to floor      8/23  BP stable on current regimen: bumex, imdur, lisinopril, nifedipine, cardura. Home on these meds with close PCP follow up         CKD (chronic kidney disease) stage 4, GFR 15-29 ml/min  Renal function seems to be declining recently; will need nephrology outpatient  Avoid nephrotoxic meds; will need to DC cymbalta.      8/20  Follow BMP in am     8/23  Stable  Follow labs outpatient    Acute respiratory failure with hypoxia  Likely 2/2 HFpEF exacerbation  2L NC; wean as tolerated.    8/19/22  Still using oxygen will wean off     8/22  Still requiring oxygen   CHF   Resumed diuretic--repeat CXR pending  Wean as tolerated  Lungs clear on exam. No signs of acute infection    8/23:  Resolved. Off oxygen. desat at night due to TARA and refuses CPAP  CXR clear      Bradycardia  S/p atropine x2 in ER  Hold toprol  Tele  Cards consulted; appreciate recs.    8/19  Holding beta blockers .    8/20  Hold beta blockers    8/23  resolved  Still holding his beta blockers at discharge             TIA (transient ischemic attack)  Pt with left sided numbness and tingling prior to arrival that self resolved  CT Head negative.    8/19   Check MRI brain today   Continue ASA      8/20  MRI   Reviewed   Patchy areas of T2 hyperintensity in the cerebral white matter.  There are no areas of restricted diffusion to indicate acute ischemia.  There is no mass or mass effect.  No evidence of intracranial hemorrhage.  There is no ventricular dilatation or abnormal extra-axial fluid collection.  Moderate cerebral atrophy.  Craniocervical junction appears intact and images  of sella demonstrate no abnormality.     Impression:     1. Evidence of chronic microvascular disease.  2. No acute ischemia.  3. Moderate cerebral atrophy.       TARA (obstructive sleep apnea)  Does not tolerate CPAP.Likely contributing to bradycardia.  desats at night and strongly encouraged use outpatient but declines      Acute on chronic diastolic heart failure  S/p lasix 40mg IV in ER  At home he takes bumex 2mg BID.    8/19  He was given lasix in ER   Creatinine went up   Hold diuretics for now .  Lying flat       Discussed with him and daughters ; compression stockings .  He will try .    8/20  Resume bumex    8/23  Weaned off oxygen yesterday  Repeat CXR clear  Will d/c home with current dose bumex  Cont compression stockings    Gastroesophageal reflux disease without esophagitis  Cont home PPI.      Type 2 diabetes mellitus without complication  Patient's FSGs are controlled on current medication regimen.  Last A1c reviewed-   Lab Results   Component Value Date    HGBA1C 7.5 (H) 08/19/2022     Most recent fingerstick glucose reviewed-   Recent Labs   Lab 08/22/22  1129 08/22/22  1646 08/23/22  0611   POCTGLUCOSE 273* 223* 197*     Current correctional scale  Low  Maintain anti-hyperglycemic dose as follows-   Antihyperglycemics (From admission, onward)            Start     Stop Route Frequency Ordered    08/18/22 2100  insulin detemir U-100 pen 15 Units         -- SubQ Nightly 08/18/22 1649    08/18/22 1749  insulin aspart U-100 pen 0-5 Units         -- SubQ Before meals & nightly PRN 08/18/22 1649        Hold Oral hypoglycemics while patient is in the hospital.      8/20    Glucose 134  Continue same    8/23  Resume home meds at discharge    Essential hypertension  Hold home metoprolol and clonidine  Cont home doxazosin 8mg BID  Start Isordil 40 TID and  Procardia 60mg daily ( expect swelling as documented in past) per cardiology  Note allergy to hydralazine.    8/19/22  His BP is great   Will hold  doxazosin today   With HR still in 40s will hold beta blockers      8/20/22  Add lisinopril  Follow BMP       8/23  BP stable on current regimen: bumex, imdur, lisinopril, nifedipine, cardura. Home on these meds with close PCP follow up   Close PCP follow up for BP monitoring      Dyslipidemia  Statin intolerance; takes fish oil at home.    Lab Results   Component Value Date    LDLCALC 113.2 08/19/2022                 Final Active Diagnoses:    Diagnosis Date Noted POA    PRINCIPAL PROBLEM:  Hypertensive emergency [I16.1] 08/18/2022 Yes    Acute on chronic diastolic heart failure [I50.33] 08/18/2022 Yes    TARA (obstructive sleep apnea) [G47.33] 08/18/2022 Yes    TIA (transient ischemic attack) [G45.9] 08/18/2022 Yes    Bradycardia [R00.1] 08/18/2022 Yes    Acute respiratory failure with hypoxia [J96.01] 08/18/2022 Yes    CKD (chronic kidney disease) stage 4, GFR 15-29 ml/min [N18.4] 08/18/2022 Yes    Gastroesophageal reflux disease without esophagitis [K21.9] 01/04/2022 Yes    Dyslipidemia [E78.5] 08/04/2015 Yes    Essential hypertension [I10] 08/04/2015 Yes    Type 2 diabetes mellitus without complication [E11.9] 08/04/2015 Yes      Problems Resolved During this Admission:       Discharged Condition: good    Disposition: Home or Self Care    Follow Up:   Follow-up Information     Kashmir Conn MD Follow up in 1 week(s).    Specialty: Family Medicine  Contact information:  [address] [place], [place] [id number]  [phone]                       Patient Instructions:      Ambulatory referral/consult to Outpatient Case Management   Referral Priority: Routine Referral Type: Consultation   Referral Reason: Specialty Services Required   Number of Visits Requested: 1       Significant Diagnostic Studies: Labs:   CMP   Recent Labs   Lab 08/22/22  0418      K 4.0      CO2 27   *   BUN 44*   CREATININE 2.5*   CALCIUM 8.3*   ANIONGAP 11   , CBC No results for input(s): WBC, HGB, HCT, PLT in  "the last 48 hours. and All labs within the past 24 hours have been reviewed    Pending Diagnostic Studies:     None         Medications:  Reconciled Home Medications:      Medication List      START taking these medications    isosorbide dinitrate 40 MG Tab  Commonly known as: ISORDIL  Take 1 tablet (40 mg total) by mouth 3 (three) times daily.     lisinopriL 10 MG tablet  Take 1 tablet (10 mg total) by mouth once daily.  Start taking on: August 24, 2022     NIFEdipine 60 MG (OSM) 24 hr tablet  Commonly known as: PROCARDIA-XL  Take 1 tablet (60 mg total) by mouth once daily.  Start taking on: August 24, 2022        CHANGE how you take these medications    bumetanide 2 MG tablet  Commonly known as: BUMEX  Take 1 tablet (2 mg total) by mouth once daily.  Start taking on: August 24, 2022  What changed: when to take this        CONTINUE taking these medications    aspirin 81 MG EC tablet  Commonly known as: ECOTRIN  Take 81 mg by mouth.     BD INSULIN SYRINGE ULTRA-FINE 1/2 mL 31 gauge x 15/64" Syrg  Generic drug: insulin syringe-needle U-100     BD VEO INSULIN SYRINGE UF 1 mL 31 gauge x 15/64" Syrg  Generic drug: insulin syringe-needle U-100  USE SYRINGE FIVE TIMES DAILY SINGLE USE     clopidogreL 75 mg tablet  Commonly known as: PLAVIX  Take 75 mg by mouth once daily.     doxazosin 8 MG Tab  Commonly known as: CARDURA  every 12 (twelve) hours.     fish oil-omega-3 fatty acids 300-1,000 mg capsule  Take 1 g by mouth once daily.     insulin glargine 100 unit/mL injection  Commonly known as: Lantus  Inject 40 Units into the skin every evening.     insulin regular 100 unit/mL injection  Inject into the skin 3 (three) times daily before meals.     pantoprazole 40 MG tablet  Commonly known as: PROTONIX  Take 1 tablet (40 mg total) by mouth once daily.        STOP taking these medications    cloNIDine 0.1 MG tablet  Commonly known as: CATAPRES     coenzyme Q10-vitamin E 100-5 mg-unit Cap     DULoxetine 20 MG " capsule  Commonly known as: CYMBALTA     metoprolol succinate 25 MG 24 hr tablet  Commonly known as: TOPROL-XL            Indwelling Lines/Drains at time of discharge:   Lines/Drains/Airways     None                 Time spent on the discharge of patient: 25 minutes         Katey Polanco MD  Department of Hospital Medicine  [place] - [facility] Surg (3rd Fl)

## 2022-08-24 ENCOUNTER — TELEPHONE (OUTPATIENT)
Dept: FAMILY MEDICINE | Facility: CLINIC | Age: 77
End: 2022-08-24
Payer: MEDICARE

## 2022-08-24 ENCOUNTER — PATIENT OUTREACH (OUTPATIENT)
Dept: ADMINISTRATIVE | Facility: CLINIC | Age: 77
End: 2022-08-24
Payer: MEDICARE

## 2022-08-24 PROCEDURE — G0180 PR HOME HEALTH MD CERTIFICATION: ICD-10-PCS | Mod: ,,, | Performed by: INTERNAL MEDICINE

## 2022-08-24 PROCEDURE — G0180 MD CERTIFICATION HHA PATIENT: HCPCS | Mod: ,,, | Performed by: INTERNAL MEDICINE

## 2022-08-24 NOTE — TELEPHONE ENCOUNTER
----- Message from eNha Hernández sent at 2022 12:51 PM CDT -----  Contact: [facility]/glendy Wadsworthrebeca Garcia  MRN: [id number]  : 1945  PCP: Kashmir Conn  Home Phone      [phone]  Work Phone      Not on file.  Mobile          [phone]      MESSAGE:     Glendy with [facility] called stating [facility] is needing a rx for pt cpap machine. Please advise.    Nlrzx-[phone]  [facility]- [phone]  Aglheq-[phone]

## 2022-08-24 NOTE — PLAN OF CARE
[place] - Med Surg (3rd Fl)  Discharge Final Note    Primary Care Provider: Kashmir Conn MD    Expected Discharge Date: 8/23/2022    Final Discharge Note (most recent)     Final Note - 08/24/22 1123        Final Note    Assessment Type Final Discharge Note     Anticipated Discharge Disposition Home-Health Care [facility]     What phone number can be called within the next 1-3 days to see how you are doing after discharge? [phone]     Hospital Resources/Appts/Education Provided Appointments scheduled and added to AVS        Post-Acute Status    Post-Acute Authorization Home Health     Home Health Status Set-up Complete/Auth obtained     Discharge Delays None known at this time                 Important Message from Medicare  Important Message from Medicare regarding Discharge Appeal Rights: Given to patient/caregiver, Explained to patient/caregiver, Signed/date by patient/caregiver     Date IMM was signed: 08/22/22  Time IMM was signed: 8648    Contact Info     Kashmir Conn MD   Specialty: Family Medicine   Relationship: PCP - General    [address] [place] [address]   Phone: [phone]       Next Steps: Go on 8/31/2022    Instructions: Hospital Follow-up at 1pm          Patient discharging home with [facility].

## 2022-08-24 NOTE — PROGRESS NOTES
C3 nurse spoke with Nestor Garcia for a TCC post hospital discharge follow up call. The patient has a scheduled HOSFU appointment with Kashmir Conn MD on 8/31/22 @ 1300.

## 2022-08-25 ENCOUNTER — OUTPATIENT CASE MANAGEMENT (OUTPATIENT)
Dept: ADMINISTRATIVE | Facility: OTHER | Age: 77
End: 2022-08-25
Payer: MEDICARE

## 2022-08-25 NOTE — LETTER
August 25, 2022    Nestorrebeca Garcia  [address] [place] [address]             [facility]  [address] [place] [address] Dear Mr. Nestor Garcia,      I have been assigned as your  with [facility] Outpatient Complex Case Management Department. We received a referral to call you to discuss your medical history. I have attempted to reach you by telephone, but I was unsuccessful. Please call our department so that we can go over some questions with you regarding your health.     The Outpatient Case Management department can be reached at [phone] from 8:00am to 4:30pm on Monday thru Friday. [facility] also has a program where a nurse is available 24/7 to answer questions or provider medical advice. Their number is [phone].     Thanks,      Citlaly Olivier, RN  Outpatient Case Management  [phone]

## 2022-08-29 NOTE — PROGRESS NOTES
Outpatient Care Management  Patient Does Not Consent    Patient: Nestor Garcia  MRN:  [id number]  Date of Service:  8/29/2022  Completed by:  Citlaly Olivier, RN    Chief Complaint   Patient presents with    OPCM RN First Assessment Attempt    OPCM Enrollment Call    OPCM Chart Review    OPCM RN Second Assessment Attempt       Patient Summary     Program:  OPCM High Risk  Qualification Status:  No

## 2022-08-31 ENCOUNTER — OFFICE VISIT (OUTPATIENT)
Dept: FAMILY MEDICINE | Facility: CLINIC | Age: 77
End: 2022-08-31
Payer: MEDICARE

## 2022-08-31 VITALS
HEART RATE: 64 BPM | HEIGHT: 70 IN | RESPIRATION RATE: 16 BRPM | WEIGHT: 239.44 LBS | SYSTOLIC BLOOD PRESSURE: 128 MMHG | BODY MASS INDEX: 34.28 KG/M2 | DIASTOLIC BLOOD PRESSURE: 76 MMHG

## 2022-08-31 DIAGNOSIS — N18.4 CKD (CHRONIC KIDNEY DISEASE) STAGE 4, GFR 15-29 ML/MIN: ICD-10-CM

## 2022-08-31 DIAGNOSIS — E11.42 DIABETIC POLYNEUROPATHY ASSOCIATED WITH TYPE 2 DIABETES MELLITUS: ICD-10-CM

## 2022-08-31 DIAGNOSIS — E78.5 DYSLIPIDEMIA: ICD-10-CM

## 2022-08-31 DIAGNOSIS — E11.42 TYPE 2 DIABETES MELLITUS WITH DIABETIC POLYNEUROPATHY, WITH LONG-TERM CURRENT USE OF INSULIN: ICD-10-CM

## 2022-08-31 DIAGNOSIS — Z09 HOSPITAL DISCHARGE FOLLOW-UP: ICD-10-CM

## 2022-08-31 DIAGNOSIS — I10 ESSENTIAL HYPERTENSION: Primary | ICD-10-CM

## 2022-08-31 DIAGNOSIS — Z79.4 TYPE 2 DIABETES MELLITUS WITH DIABETIC POLYNEUROPATHY, WITH LONG-TERM CURRENT USE OF INSULIN: ICD-10-CM

## 2022-08-31 PROBLEM — J96.01 ACUTE RESPIRATORY FAILURE WITH HYPOXIA: Status: RESOLVED | Noted: 2022-08-18 | Resolved: 2022-08-31

## 2022-08-31 PROBLEM — G45.9 TIA (TRANSIENT ISCHEMIC ATTACK): Status: RESOLVED | Noted: 2022-08-18 | Resolved: 2022-08-31

## 2022-08-31 PROCEDURE — 3074F PR MOST RECENT SYSTOLIC BLOOD PRESSURE < 130 MM HG: ICD-10-PCS | Mod: CPTII,S$GLB,, | Performed by: STUDENT IN AN ORGANIZED HEALTH CARE EDUCATION/TRAINING PROGRAM

## 2022-08-31 PROCEDURE — 1159F MED LIST DOCD IN RCRD: CPT | Mod: CPTII,S$GLB,, | Performed by: STUDENT IN AN ORGANIZED HEALTH CARE EDUCATION/TRAINING PROGRAM

## 2022-08-31 PROCEDURE — 3074F SYST BP LT 130 MM HG: CPT | Mod: CPTII,S$GLB,, | Performed by: STUDENT IN AN ORGANIZED HEALTH CARE EDUCATION/TRAINING PROGRAM

## 2022-08-31 PROCEDURE — 3288F FALL RISK ASSESSMENT DOCD: CPT | Mod: CPTII,S$GLB,, | Performed by: STUDENT IN AN ORGANIZED HEALTH CARE EDUCATION/TRAINING PROGRAM

## 2022-08-31 PROCEDURE — 1160F PR REVIEW ALL MEDS BY PRESCRIBER/CLIN PHARMACIST DOCUMENTED: ICD-10-PCS | Mod: CPTII,S$GLB,, | Performed by: STUDENT IN AN ORGANIZED HEALTH CARE EDUCATION/TRAINING PROGRAM

## 2022-08-31 PROCEDURE — 1101F PR PT FALLS ASSESS DOC 0-1 FALLS W/OUT INJ PAST YR: ICD-10-PCS | Mod: CPTII,S$GLB,, | Performed by: STUDENT IN AN ORGANIZED HEALTH CARE EDUCATION/TRAINING PROGRAM

## 2022-08-31 PROCEDURE — 1101F PT FALLS ASSESS-DOCD LE1/YR: CPT | Mod: CPTII,S$GLB,, | Performed by: STUDENT IN AN ORGANIZED HEALTH CARE EDUCATION/TRAINING PROGRAM

## 2022-08-31 PROCEDURE — 3288F PR FALLS RISK ASSESSMENT DOCUMENTED: ICD-10-PCS | Mod: CPTII,S$GLB,, | Performed by: STUDENT IN AN ORGANIZED HEALTH CARE EDUCATION/TRAINING PROGRAM

## 2022-08-31 PROCEDURE — 1160F RVW MEDS BY RX/DR IN RCRD: CPT | Mod: CPTII,S$GLB,, | Performed by: STUDENT IN AN ORGANIZED HEALTH CARE EDUCATION/TRAINING PROGRAM

## 2022-08-31 PROCEDURE — 99999 PR PBB SHADOW E&M-EST. PATIENT-LVL IV: ICD-10-PCS | Mod: PBBFAC,,, | Performed by: STUDENT IN AN ORGANIZED HEALTH CARE EDUCATION/TRAINING PROGRAM

## 2022-08-31 PROCEDURE — 3078F PR MOST RECENT DIASTOLIC BLOOD PRESSURE < 80 MM HG: ICD-10-PCS | Mod: CPTII,S$GLB,, | Performed by: STUDENT IN AN ORGANIZED HEALTH CARE EDUCATION/TRAINING PROGRAM

## 2022-08-31 PROCEDURE — 1159F PR MEDICATION LIST DOCUMENTED IN MEDICAL RECORD: ICD-10-PCS | Mod: CPTII,S$GLB,, | Performed by: STUDENT IN AN ORGANIZED HEALTH CARE EDUCATION/TRAINING PROGRAM

## 2022-08-31 PROCEDURE — 3078F DIAST BP <80 MM HG: CPT | Mod: CPTII,S$GLB,, | Performed by: STUDENT IN AN ORGANIZED HEALTH CARE EDUCATION/TRAINING PROGRAM

## 2022-08-31 PROCEDURE — 99214 PR OFFICE/OUTPT VISIT, EST, LEVL IV, 30-39 MIN: ICD-10-PCS | Mod: S$GLB,,, | Performed by: STUDENT IN AN ORGANIZED HEALTH CARE EDUCATION/TRAINING PROGRAM

## 2022-08-31 PROCEDURE — 1126F AMNT PAIN NOTED NONE PRSNT: CPT | Mod: CPTII,S$GLB,, | Performed by: STUDENT IN AN ORGANIZED HEALTH CARE EDUCATION/TRAINING PROGRAM

## 2022-08-31 PROCEDURE — 99214 OFFICE O/P EST MOD 30 MIN: CPT | Mod: S$GLB,,, | Performed by: STUDENT IN AN ORGANIZED HEALTH CARE EDUCATION/TRAINING PROGRAM

## 2022-08-31 PROCEDURE — 1126F PR PAIN SEVERITY QUANTIFIED, NO PAIN PRESENT: ICD-10-PCS | Mod: CPTII,S$GLB,, | Performed by: STUDENT IN AN ORGANIZED HEALTH CARE EDUCATION/TRAINING PROGRAM

## 2022-08-31 PROCEDURE — 99999 PR PBB SHADOW E&M-EST. PATIENT-LVL IV: CPT | Mod: PBBFAC,,, | Performed by: STUDENT IN AN ORGANIZED HEALTH CARE EDUCATION/TRAINING PROGRAM

## 2022-08-31 RX ORDER — AMLODIPINE BESYLATE 10 MG/1
10 TABLET ORAL DAILY
COMMUNITY
Start: 2022-07-24 | End: 2022-08-31

## 2022-08-31 RX ORDER — METOLAZONE 5 MG/1
5 TABLET ORAL DAILY
COMMUNITY
Start: 2022-07-14 | End: 2022-08-31

## 2022-08-31 RX ORDER — CARVEDILOL 3.12 MG/1
3.12 TABLET ORAL 2 TIMES DAILY WITH MEALS
Status: ON HOLD | COMMUNITY
End: 2022-11-08 | Stop reason: SDUPTHER

## 2022-08-31 RX ORDER — METOPROLOL SUCCINATE 100 MG/1
TABLET, EXTENDED RELEASE ORAL
COMMUNITY
Start: 2022-08-05 | End: 2022-08-31

## 2022-08-31 RX ORDER — ISOSORBIDE MONONITRATE 60 MG/1
60 TABLET, EXTENDED RELEASE ORAL DAILY
COMMUNITY

## 2022-08-31 NOTE — ASSESSMENT & PLAN NOTE
Cont bumex 2mg daily, imdur 60mg daily, lisinopril 10mg daily, nifedipine 60mg daily, coreg 3.125mg BID and cardura 8mg BID.

## 2022-09-01 ENCOUNTER — TELEPHONE (OUTPATIENT)
Dept: FAMILY MEDICINE | Facility: CLINIC | Age: 77
End: 2022-09-01
Payer: MEDICARE

## 2022-09-01 NOTE — TELEPHONE ENCOUNTER
Nephrologist pt was referred to doesn't accept pt's insurance, do you have a provider you would like pt sent to?

## 2022-09-02 ENCOUNTER — TELEPHONE (OUTPATIENT)
Dept: FAMILY MEDICINE | Facility: CLINIC | Age: 77
End: 2022-09-02
Payer: MEDICARE

## 2022-09-02 NOTE — TELEPHONE ENCOUNTER
----- Message from Edgardo Chang sent at 2022  2:02 PM CDT -----  Contact: Yulia @ [facility]  Nestor Garcia  MRN: [id number]  : 1945  PCP: Kashmir Conn  Home Phone      [phone]  Work Phone      Not on file.  Mobile          [phone]      MESSAGE: [facility] - family requesting discharge from home health - feel he no longer needs their services -- please advise     Call Yulia @ [phone]    PCP: Fabien

## 2022-09-03 ENCOUNTER — HOSPITAL ENCOUNTER (EMERGENCY)
Facility: HOSPITAL | Age: 77
Discharge: HOME | End: 2022-09-03
Attending: STUDENT IN AN ORGANIZED HEALTH CARE EDUCATION/TRAINING PROGRAM
Payer: MEDICARE

## 2022-09-03 VITALS
TEMPERATURE: 99 F | HEART RATE: 74 BPM | OXYGEN SATURATION: 95 % | RESPIRATION RATE: 15 BRPM | SYSTOLIC BLOOD PRESSURE: 119 MMHG | HEIGHT: 70 IN | DIASTOLIC BLOOD PRESSURE: 55 MMHG | BODY MASS INDEX: 34.93 KG/M2 | WEIGHT: 244 LBS

## 2022-09-03 DIAGNOSIS — N18.9 CHRONIC KIDNEY DISEASE, UNSPECIFIED CKD STAGE: ICD-10-CM

## 2022-09-03 DIAGNOSIS — R53.83 FATIGUE, UNSPECIFIED TYPE: ICD-10-CM

## 2022-09-03 DIAGNOSIS — R07.9 CHEST PAIN: ICD-10-CM

## 2022-09-03 DIAGNOSIS — R07.89 CHEST PRESSURE: Primary | ICD-10-CM

## 2022-09-03 LAB
ALBUMIN SERPL BCP-MCNC: 3.3 G/DL (ref 3.5–5.2)
ALP SERPL-CCNC: 65 U/L (ref 55–135)
ALT SERPL W/O P-5'-P-CCNC: 21 U/L (ref 10–44)
ANION GAP SERPL CALC-SCNC: 12 MMOL/L (ref 8–16)
AST SERPL-CCNC: 19 U/L (ref 10–40)
BASOPHILS # BLD AUTO: 0.03 K/UL (ref 0–0.2)
BASOPHILS NFR BLD: 0.6 % (ref 0–1.9)
BILIRUB SERPL-MCNC: 0.4 MG/DL (ref 0.1–1)
BNP SERPL-MCNC: 39 PG/ML (ref 0–99)
BUN SERPL-MCNC: 47 MG/DL (ref 8–23)
CALCIUM SERPL-MCNC: 8.4 MG/DL (ref 8.7–10.5)
CHLORIDE SERPL-SCNC: 97 MMOL/L (ref 95–110)
CO2 SERPL-SCNC: 23 MMOL/L (ref 23–29)
CREAT SERPL-MCNC: 2.7 MG/DL (ref 0.5–1.4)
D DIMER PPP IA.FEU-MCNC: 1.89 MG/L FEU
DIFFERENTIAL METHOD: ABNORMAL
EOSINOPHIL # BLD AUTO: 0.2 K/UL (ref 0–0.5)
EOSINOPHIL NFR BLD: 4.2 % (ref 0–8)
ERYTHROCYTE [DISTWIDTH] IN BLOOD BY AUTOMATED COUNT: 12.9 % (ref 11.5–14.5)
EST. GFR  (NO RACE VARIABLE): 23 ML/MIN/1.73 M^2
GLUCOSE SERPL-MCNC: 234 MG/DL (ref 70–110)
HCT VFR BLD AUTO: 36.6 % (ref 40–54)
HGB BLD-MCNC: 12.6 G/DL (ref 14–18)
IMM GRANULOCYTES # BLD AUTO: 0.02 K/UL (ref 0–0.04)
IMM GRANULOCYTES NFR BLD AUTO: 0.4 % (ref 0–0.5)
LYMPHOCYTES # BLD AUTO: 1 K/UL (ref 1–4.8)
LYMPHOCYTES NFR BLD: 19.7 % (ref 18–48)
MCH RBC QN AUTO: 31.2 PG (ref 27–31)
MCHC RBC AUTO-ENTMCNC: 34.4 G/DL (ref 32–36)
MCV RBC AUTO: 91 FL (ref 82–98)
MONOCYTES # BLD AUTO: 0.7 K/UL (ref 0.3–1)
MONOCYTES NFR BLD: 13.1 % (ref 4–15)
NEUTROPHILS # BLD AUTO: 3.3 K/UL (ref 1.8–7.7)
NEUTROPHILS NFR BLD: 62 % (ref 38–73)
NRBC BLD-RTO: 0 /100 WBC
PLATELET # BLD AUTO: 239 K/UL (ref 150–450)
PMV BLD AUTO: 11.4 FL (ref 9.2–12.9)
POTASSIUM SERPL-SCNC: 5.3 MMOL/L (ref 3.5–5.1)
PROT SERPL-MCNC: 6.3 G/DL (ref 6–8.4)
RBC # BLD AUTO: 4.04 M/UL (ref 4.6–6.2)
SARS-COV-2 RDRP RESP QL NAA+PROBE: NEGATIVE
SODIUM SERPL-SCNC: 132 MMOL/L (ref 136–145)
TROPONIN I SERPL DL<=0.01 NG/ML-MCNC: 0.01 NG/ML (ref 0–0.03)
WBC # BLD AUTO: 5.27 K/UL (ref 3.9–12.7)

## 2022-09-03 PROCEDURE — 84484 ASSAY OF TROPONIN QUANT: CPT | Performed by: STUDENT IN AN ORGANIZED HEALTH CARE EDUCATION/TRAINING PROGRAM

## 2022-09-03 PROCEDURE — 83880 ASSAY OF NATRIURETIC PEPTIDE: CPT | Performed by: STUDENT IN AN ORGANIZED HEALTH CARE EDUCATION/TRAINING PROGRAM

## 2022-09-03 PROCEDURE — 99285 EMERGENCY DEPT VISIT HI MDM: CPT | Mod: 25

## 2022-09-03 PROCEDURE — 93010 EKG 12-LEAD: ICD-10-PCS | Mod: ,,, | Performed by: INTERNAL MEDICINE

## 2022-09-03 PROCEDURE — 36415 COLL VENOUS BLD VENIPUNCTURE: CPT | Performed by: STUDENT IN AN ORGANIZED HEALTH CARE EDUCATION/TRAINING PROGRAM

## 2022-09-03 PROCEDURE — 94760 N-INVAS EAR/PLS OXIMETRY 1: CPT

## 2022-09-03 PROCEDURE — 93010 ELECTROCARDIOGRAM REPORT: CPT | Mod: ,,, | Performed by: INTERNAL MEDICINE

## 2022-09-03 PROCEDURE — U0002 COVID-19 LAB TEST NON-CDC: HCPCS | Performed by: STUDENT IN AN ORGANIZED HEALTH CARE EDUCATION/TRAINING PROGRAM

## 2022-09-03 PROCEDURE — 93005 ELECTROCARDIOGRAM TRACING: CPT

## 2022-09-03 PROCEDURE — 85379 FIBRIN DEGRADATION QUANT: CPT | Performed by: STUDENT IN AN ORGANIZED HEALTH CARE EDUCATION/TRAINING PROGRAM

## 2022-09-03 PROCEDURE — 80053 COMPREHEN METABOLIC PANEL: CPT | Performed by: STUDENT IN AN ORGANIZED HEALTH CARE EDUCATION/TRAINING PROGRAM

## 2022-09-03 PROCEDURE — 85025 COMPLETE CBC W/AUTO DIFF WBC: CPT | Performed by: STUDENT IN AN ORGANIZED HEALTH CARE EDUCATION/TRAINING PROGRAM

## 2022-09-03 NOTE — ED PROVIDER NOTES
"Encounter Date: 9/3/2022    This document was partially completed using speech recognition software and may contain misspellings, grammatical errors, and/or unexpected word substitutions.       History     Chief Complaint   Patient presents with    Numbness     Patient to ER CC of facial numbness that started at about 4 pm, patient also states he is feeling weak and has a pressure in his chest      77 year old male with a PMHx of TIA, CHF, DM, HTN presents to the ED with chest pressure, bilateral lower facial dec sensation, global fatigue. Patient is with family. He was sitting in his recliner this afternoon when at 4pm, he felt constant, nonradiating chest pressure. Hasn't had this before. Reports the fatigue and dec lower facial sensation followed but the lower facial sensation has resolved. He states it wasn't numb but that it felt "funny" and was lower face and both sides. No shortness of breath. Never smoked. Has taken 81 mg of aspirin this morning and when this started, family gave him 81 mg x 3 aspirins for a total of 324mg today. Took his plavix this morning.    Review of patient's allergies indicates:   Allergen Reactions    Hydralazine analogues Shortness Of Breath, Itching and Swelling    Procardia [nifedipine] Swelling     edema    Statins-hmg-coa reductase inhibitors Other (See Comments)     pain     Past Medical History:   Diagnosis Date    Acute respiratory failure with hypoxia 8/18/2022    CHF (congestive heart failure)     Diabetes mellitus type II     Hypertension     TIA (transient ischemic attack) 8/18/2022     Past Surgical History:   Procedure Laterality Date    APPENDECTOMY      BACK SURGERY      CHOLECYSTECTOMY      INNER EAR SURGERY      KNEE SURGERY       Family History   Problem Relation Age of Onset    Alzheimer's disease Mother     Heart disease Father      Social History     Tobacco Use    Smoking status: Never    Smokeless tobacco: Never   Substance Use Topics    Alcohol use: No    " Drug use: No     Review of Systems   Constitutional:  Positive for fatigue. Negative for chills and fever.   HENT:  Negative for congestion, rhinorrhea and sneezing.    Eyes:  Negative for discharge and redness.   Respiratory:  Negative for cough and shortness of breath.    Cardiovascular:  Positive for chest pain. Negative for palpitations.   Gastrointestinal:  Negative for abdominal pain, diarrhea and vomiting.   Genitourinary:  Negative for difficulty urinating, flank pain and urgency.   Musculoskeletal:  Negative for back pain and neck pain.   Skin:  Negative for rash and wound.   Neurological:  Positive for numbness. Negative for weakness and headaches.     Physical Exam     Initial Vitals [09/03/22 1842]   BP Pulse Resp Temp SpO2   (!) 147/71 79 18 98.7 °F (37.1 °C) 95 %      MAP       --         Physical Exam    ED Course   Procedures  Labs Reviewed   CBC W/ AUTO DIFFERENTIAL - Abnormal; Notable for the following components:       Result Value    RBC 4.04 (*)     Hemoglobin 12.6 (*)     Hematocrit 36.6 (*)     MCH 31.2 (*)     All other components within normal limits   COMPREHENSIVE METABOLIC PANEL - Abnormal; Notable for the following components:    Sodium 132 (*)     Potassium 5.3 (*)     Glucose 234 (*)     BUN 47 (*)     Creatinine 2.7 (*)     Calcium 8.4 (*)     Albumin 3.3 (*)     eGFR 23 (*)     All other components within normal limits   D DIMER, QUANTITATIVE - Abnormal; Notable for the following components:    D-Dimer 1.89 (*)     All other components within normal limits   TROPONIN I   B-TYPE NATRIURETIC PEPTIDE   SARS-COV-2 RNA AMPLIFICATION, QUAL     EKG Readings: (Independently Interpreted)   NSR at 76 bpm. LAD. Normal intervals. No STEMI.     Imaging Results              X-Ray Chest AP Portable (Final result)  Result time 09/03/22 19:25:45      Final result by Tra Pittman DO (09/03/22 19:25:45)                   Impression:      No acute abnormality.      Electronically signed by: Tra  Toya  Date:    09/03/2022  Time:    19:25               Narrative:    EXAMINATION:  XR CHEST AP PORTABLE    CLINICAL HISTORY:  Chest Pain;    TECHNIQUE:  Single frontal view of the chest was performed.    COMPARISON:  08/22/2022.    FINDINGS:  The lungs are well expanded and clear. No focal opacities are seen. The pleural spaces are clear.    The cardiac silhouette is enlarged.    The visualized osseous structures are intact.                                       Medications - No data to display  Medical Decision Making:   Differential Diagnosis:   Differential considerations include (in particular order): ACS, Pneumonia, Pneumothorax, PE, Aortic dissection, Pericarditis, Zoster rash, Cardiac tamponade, Myocarditis, stroke, TIA  ED Management:  Based on the patient's evaluation - I strongly recommended evaluation for possible PE (given elevated d-dimer), 2nd troponin. However, patient feels better - states he's ready for a jog. No longer having chest pressure, weird bilateral lower facial sensation. He has decision making capacity and I advised him to stay for PE evaluation and 2nd troponin but he rather go home. He states he will return if his symptoms returned. I considered AMA but will discharge home with wife after shared decision making.                    Clinical Impression:   Final diagnoses:  [R07.9] Chest pain  [R07.89] Chest pressure (Primary)  [N18.9] Chronic kidney disease, unspecified CKD stage  [R53.83] Fatigue, unspecified type        ED Disposition Condition    Discharge Stable          ED Prescriptions    None       Follow-up Information       Follow up With Specialties Details Why Contact Info    Kashmir Conn MD Family Medicine Schedule an appointment as soon as possible for a visit in 1 week As needed [address] [facility] [address]  [phone]      [facility] Emergency Medicine Go to  If symptoms worsen [address] [id number] [phone]              Willam Crowe DO  09/03/22 2042

## 2022-09-04 NOTE — ED NOTES
PT SITTING COMFORTABLY IN ED STRETCHER. NO ACUTE DISTRESS NOTED. BREATHS EVEN AND UNLABORED. PT ON CONTINUOUS CARDIAC MONITORING, PULSE OX. PT RATES PAIN 0/10 AT THIS TIME. PT REPORTS HE FEELS MUCH BETTER.

## 2022-09-06 ENCOUNTER — EXTERNAL HOME HEALTH (OUTPATIENT)
Dept: HOME HEALTH SERVICES | Facility: HOSPITAL | Age: 77
End: 2022-09-06
Payer: MEDICARE

## 2022-09-07 ENCOUNTER — DOCUMENT SCAN (OUTPATIENT)
Dept: HOME HEALTH SERVICES | Facility: HOSPITAL | Age: 77
End: 2022-09-07
Payer: MEDICARE

## 2022-09-15 ENCOUNTER — DOCUMENT SCAN (OUTPATIENT)
Dept: HOME HEALTH SERVICES | Facility: HOSPITAL | Age: 77
End: 2022-09-15
Payer: MEDICARE

## 2022-09-21 NOTE — ED PROVIDER NOTES
"[facility]                                                 I reviewed the ER triage nurse's note before evaluating the patient    Chief Complaint  76 y.o. male with Chest Pain   -- C/o "chest pressure" and sob that started last night around 10pm  -- states he has CHF and took lasix last night and thinks that he is dehydrated    History of Present Illness  Nestor W Garcia presents to the emergency room with chest pressure today  Chest pressure today shortness of breath with a long history of CHF reported   Patient sees Dr. Mcmahan, he has been on extra Lasix for the last couple days   Patient states he has had chest pressure since 10:00 p.m. with no GERD hx    The history is provided by the patient  Previous medical records were obtained from [facility]  Previous records are summarized from prior ER visits and hospitalizations  Medical history significant for diabetes and hypertension  Surgeries significant for appendectomy, gallbladder, inner ear, knee  Patient is allergic to hydralazine  No significant family history  No significant social history, nonsmoker    I have reviewed all of this patient's past medical, surgical, family, and social   histories as well as active allergies and medications documented in the  electronic medical record    Review of Systems and Physical Exam      Review of Systems (all other ROS are otherwise negative)  -- Constitution - no fever, denies fatigue, no weakness, no chills, night sweats  -- Eyes - no tearing or redness, no visual disturbance  -- Ear, Nose - no tinnitus or earache, no nasal congestion or discharge  -- Mouth,Throat - no sore throat, no toothache, normal voice, normal swallowing  -- Respiratory - shortness of breath, no ORTIZ, no cough or congestion  -- Cardiovascular - chest pain, no palpitations, denies claudication  -- Gastrointestinal - denies abdominal pain, nausea, vomiting, or diarrhea  -- Genitourinary - no dysuria, no hematuria, no flank pain, no " What Type Of Note Output Would You Prefer (Optional)?: Standard Output Hpi Title: Evaluation of Skin Lesions bladder pain  -- Musculoskeletal - denies back pain, negative for trauma or injury  -- Neurological - no headache, no numbness, tingling, seizure, balance issues  -- Hematologic- no bruising, no bleeding, nose bleed, bleeding disorders    Vital Signs (reviewed by the physician)  His oral temperature is 96.7 °F (35.9 °C).   His blood pressure is 142/60 and his pulse is 59   His respiration is 17 and oxygen saturation is 97%.     Physical Exam  -- Nursing note and vitals reviewed  -- Constitutional: Appears well-developed and well-nourished  -- Head: Atraumatic. Normocephalic. No obvious abnormality  -- Eyes: Pupils are equal and reactive to light. Normal conjunctiva and lids  -- Nose: Nose normal in appearance, nares grossly normal. No discharge  -- Throat: Mucous membranes moist, pharynx normal, normal tonsils. No lesions   -- Ears: External ears and TM normal bilaterally. Normal hearing and no drainage  -- Neck: Normal range of motion. Neck supple. No masses, trachea midline  -- Cardiac: Normal rate, regular rhythm and normal heart sounds  -- Respiratory: Normal respiratory effort, breath sounds clear to auscultation  -- Gastrointestinal: Soft, no tenderness. Normal bowel sounds. Normal liver edge  -- Musculoskeletal: Normal range of motion, no effusions. Joints stable   -- Neurological: No focal deficits. Showed good interaction with staff  -- Vascular: Posterior tibial, dorsalis pedis and radial pulses 2+ bilaterally      Emergency Room Course      Lab Results (reviewed by the physician)     K 3.2 (L)   CL 91 (L)   CO2 34 (H)   BUN 26 (H)   CREATININE 1.8 (H)    (H)   ALKPHOS 63   AST 19   ALT 20   BILITOT 0.6   ALBUMIN 3.3 (L)   PROT 6.4   WBC 7.85   HGB 14.7   HCT 41.4      CPK 72   CPK 72   CPKMB 2.3   TROPONINI 0.012   BNP 86   DDIMER 1.10 (H)     EKG (interpreted by the physician)  Overall Interpretation: normal rate and rhythm with no obvious ischemic changes  Normal sinus rhythm @ 59  How Severe Are Your Spot(S)?: moderate bpm  No ST elevation or depression  No arrhythmia or QRS change noted  Similar when compared to previous EKG    Radiology (images visualized & reports reviewed by the physician)  -- Chest x-ray showed no infiltrate and showed no acute pathology     Additional Work up (reviewed by the physician)  -- rapid Coronavirus PCR was negative    Medications Given  aspirin tablet 325 mg (325 mg Oral Given 12/21/21 0264)   potassium chloride SA CR tablet 40 mEq (40 mEq Oral Given 12/21/21 4144)   potassium chloride 10 mEq in 100 mL IVPB (0 mEq Intravenous Stopped 12/21/21 0901)   0.9%  NaCl infusion ( Intravenous New Bag 12/21/21 7038)     Cardiovascular [facility] ER Consult  -- Patient was seen by [facility] cardiology today in the emergency room  -- Please see the [facility] notes for full evaluation of the consult in the ER today     Critical Care ED Physician Time (minutes):  -- Performed by: Genaro Marsh M.D.  -- Date/Time: 9:32 AM 12/21/2021   -- Direct Patient Care (Face Time): 5  -- Additional History from Records or Taking Additional History: 5  -- Ordering, Reviewing, and Interpreting Diagnostic Studies: 5  -- Total Time in Documentation: 11  -- Consultation with Other Physicians: 5  -- Consultation with Family Related to Condition: 0  -- Total Critical Care Time: 31  -- Critical care was necessary to treat hypotension  -- Critical care was time spent personally by me on the following activities:   -- blood draw for specimens discussions with consultants,   -- development of treatment plan with patient or surrogate,   -- examination of patient, ordering and performing treatments   -- review of radiographic studies, re-evaluation of pt's condition  -- review of labs and evaluation of response to treatment    Medical Decision Making     ED Course/ED Management  -- patient with chest pressure last night with long cardiac history noted now  -- patient has been on extra Lasix per his cardiologist for last couple days  --  Have Your Spot(S) Been Treated In The Past?: has not been treated family feels he is dehydrated; indeed is creatinine is elevated this morning  -- potassium is lower, [facility] consult in the ER recommends potassium replacement  -- the also recommend conservative hydration with overnight observation for CP  -- will admit to Dr. Katey Polanco; minimal fluids with potassium, serial troponins    Assessment, Disposition, & Plan      Diagnosis  [R07.9] Chest pain  [E87.6] Hypokalemia (Primary)  [N17.9] KRISTI (acute kidney injury)    Disposition and Plan  -- Disposition: admit  -- Condition: stable    This note is dictated on M*Modal word recognition program.  There are word recognition mistakes that are occasionally missed on review.         Genaro Marsh MD  12/21/21 1185

## 2022-09-29 LAB
CHOLEST SERPL-MSCNC: 152 MG/DL (ref 0–200)
CHOLEST/HDLC SERPL: 5.8 {RATIO}
HDLC SERPL-MCNC: 26 MG/DL (ref 35–70)
LDL CHOLESTEROL DIRECT: 111 MG/DL
NON HDL CHOL. (LDL+VLDL): 126
TRIGL SERPL-MCNC: 163 MG/DL (ref 40–160)
VLDL CHOLESTEROL: 33 MG/DL

## 2022-10-31 ENCOUNTER — LAB VISIT (OUTPATIENT)
Dept: LAB | Facility: HOSPITAL | Age: 77
End: 2022-10-31
Attending: INTERNAL MEDICINE
Payer: MEDICARE

## 2022-10-31 DIAGNOSIS — N18.30 STAGE 3 CHRONIC KIDNEY DISEASE, UNSPECIFIED WHETHER STAGE 3A OR 3B CKD: ICD-10-CM

## 2022-10-31 DIAGNOSIS — E55.9 VITAMIN D DEFICIENCY: ICD-10-CM

## 2022-10-31 DIAGNOSIS — D64.9 ANEMIA, UNSPECIFIED TYPE: ICD-10-CM

## 2022-10-31 LAB
25(OH)D3+25(OH)D2 SERPL-MCNC: 9 NG/ML (ref 30–96)
ALBUMIN SERPL BCP-MCNC: 3.2 G/DL (ref 3.5–5.2)
ALP SERPL-CCNC: 62 U/L (ref 55–135)
ALT SERPL W/O P-5'-P-CCNC: 18 U/L (ref 10–44)
AMORPH CRY URNS QL MICRO: NORMAL
ANION GAP SERPL CALC-SCNC: 9 MMOL/L (ref 8–16)
AST SERPL-CCNC: 15 U/L (ref 10–40)
BACTERIA #/AREA URNS HPF: NORMAL /HPF
BASOPHILS # BLD AUTO: 0.04 K/UL (ref 0–0.2)
BASOPHILS NFR BLD: 0.6 % (ref 0–1.9)
BILIRUB SERPL-MCNC: 0.3 MG/DL (ref 0.1–1)
BILIRUB UR QL STRIP: NEGATIVE
BUN SERPL-MCNC: 37 MG/DL (ref 8–23)
CALCIUM SERPL-MCNC: 8.4 MG/DL (ref 8.7–10.5)
CHLORIDE SERPL-SCNC: 106 MMOL/L (ref 95–110)
CLARITY UR: CLEAR
CO2 SERPL-SCNC: 23 MMOL/L (ref 23–29)
COLOR UR: YELLOW
CREAT SERPL-MCNC: 2 MG/DL (ref 0.5–1.4)
CREAT UR-MCNC: 58.2 MG/DL (ref 23–375)
DIFFERENTIAL METHOD: ABNORMAL
EOSINOPHIL # BLD AUTO: 0.2 K/UL (ref 0–0.5)
EOSINOPHIL NFR BLD: 2.9 % (ref 0–8)
ERYTHROCYTE [DISTWIDTH] IN BLOOD BY AUTOMATED COUNT: 13.2 % (ref 11.5–14.5)
EST. GFR  (NO RACE VARIABLE): 33 ML/MIN/1.73 M^2
FERRITIN SERPL-MCNC: 128 NG/ML (ref 20–300)
GLUCOSE SERPL-MCNC: 154 MG/DL (ref 70–110)
GLUCOSE UR QL STRIP: NEGATIVE
HCT VFR BLD AUTO: 34.1 % (ref 40–54)
HGB BLD-MCNC: 11.5 G/DL (ref 14–18)
HGB UR QL STRIP: ABNORMAL
HYALINE CASTS #/AREA URNS LPF: 0 /LPF
IMM GRANULOCYTES # BLD AUTO: 0.04 K/UL (ref 0–0.04)
IMM GRANULOCYTES NFR BLD AUTO: 0.6 % (ref 0–0.5)
IRON SERPL-MCNC: 98 UG/DL (ref 45–160)
KETONES UR QL STRIP: NEGATIVE
LEUKOCYTE ESTERASE UR QL STRIP: NEGATIVE
LYMPHOCYTES # BLD AUTO: 0.9 K/UL (ref 1–4.8)
LYMPHOCYTES NFR BLD: 14.1 % (ref 18–48)
MAGNESIUM SERPL-MCNC: 1.7 MG/DL (ref 1.6–2.6)
MCH RBC QN AUTO: 30.3 PG (ref 27–31)
MCHC RBC AUTO-ENTMCNC: 33.7 G/DL (ref 32–36)
MCV RBC AUTO: 90 FL (ref 82–98)
MICROSCOPIC COMMENT: NORMAL
MONOCYTES # BLD AUTO: 0.7 K/UL (ref 0.3–1)
MONOCYTES NFR BLD: 10.6 % (ref 4–15)
NEUTROPHILS # BLD AUTO: 4.7 K/UL (ref 1.8–7.7)
NEUTROPHILS NFR BLD: 71.2 % (ref 38–73)
NITRITE UR QL STRIP: NEGATIVE
NRBC BLD-RTO: 0 /100 WBC
PH UR STRIP: 6 [PH] (ref 5–8)
PHOSPHATE SERPL-MCNC: 4.3 MG/DL (ref 2.7–4.5)
PLATELET # BLD AUTO: 222 K/UL (ref 150–450)
PMV BLD AUTO: 11.5 FL (ref 9.2–12.9)
POTASSIUM SERPL-SCNC: 4.7 MMOL/L (ref 3.5–5.1)
PROT SERPL-MCNC: 6.1 G/DL (ref 6–8.4)
PROT UR QL STRIP: ABNORMAL
PROT UR-MCNC: 342 MG/DL (ref 0–15)
PROT/CREAT UR: 5.88 MG/G{CREAT} (ref 0–0.2)
PTH-INTACT SERPL-MCNC: 279.3 PG/ML (ref 9–77)
RBC # BLD AUTO: 3.8 M/UL (ref 4.6–6.2)
RBC #/AREA URNS HPF: 1 /HPF (ref 0–4)
SATURATED IRON: 36 % (ref 20–50)
SODIUM SERPL-SCNC: 138 MMOL/L (ref 136–145)
SP GR UR STRIP: 1.02 (ref 1–1.03)
TOTAL IRON BINDING CAPACITY: 271 UG/DL (ref 250–450)
TRANSFERRIN SERPL-MCNC: 183 MG/DL (ref 200–375)
URATE SERPL-MCNC: 8.9 MG/DL (ref 3.4–7)
URN SPEC COLLECT METH UR: ABNORMAL
UROBILINOGEN UR STRIP-ACNC: NEGATIVE EU/DL
WBC # BLD AUTO: 6.58 K/UL (ref 3.9–12.7)
WBC #/AREA URNS HPF: 1 /HPF (ref 0–5)

## 2022-10-31 PROCEDURE — 83970 ASSAY OF PARATHORMONE: CPT | Performed by: INTERNAL MEDICINE

## 2022-10-31 PROCEDURE — 36415 COLL VENOUS BLD VENIPUNCTURE: CPT | Performed by: INTERNAL MEDICINE

## 2022-10-31 PROCEDURE — 84100 ASSAY OF PHOSPHORUS: CPT | Performed by: INTERNAL MEDICINE

## 2022-10-31 PROCEDURE — 84466 ASSAY OF TRANSFERRIN: CPT | Performed by: INTERNAL MEDICINE

## 2022-10-31 PROCEDURE — 81000 URINALYSIS NONAUTO W/SCOPE: CPT | Performed by: INTERNAL MEDICINE

## 2022-10-31 PROCEDURE — 84550 ASSAY OF BLOOD/URIC ACID: CPT | Performed by: INTERNAL MEDICINE

## 2022-10-31 PROCEDURE — 83735 ASSAY OF MAGNESIUM: CPT | Performed by: INTERNAL MEDICINE

## 2022-10-31 PROCEDURE — 82306 VITAMIN D 25 HYDROXY: CPT | Performed by: INTERNAL MEDICINE

## 2022-10-31 PROCEDURE — 80053 COMPREHEN METABOLIC PANEL: CPT | Performed by: INTERNAL MEDICINE

## 2022-10-31 PROCEDURE — 85025 COMPLETE CBC W/AUTO DIFF WBC: CPT | Performed by: INTERNAL MEDICINE

## 2022-10-31 PROCEDURE — 82570 ASSAY OF URINE CREATININE: CPT | Performed by: INTERNAL MEDICINE

## 2022-10-31 PROCEDURE — 82728 ASSAY OF FERRITIN: CPT | Performed by: INTERNAL MEDICINE

## 2022-11-06 PROBLEM — R07.9 CHEST PAIN: Status: ACTIVE | Noted: 2019-12-08

## 2022-11-06 PROBLEM — I73.9 PAD (PERIPHERAL ARTERY DISEASE): Status: ACTIVE | Noted: 2022-11-06

## 2022-11-06 PROBLEM — I49.8 VENTRICULAR BIGEMINY: Status: ACTIVE | Noted: 2022-11-06

## 2022-11-07 PROBLEM — I49.3 SYMPTOMATIC PVCS: Status: ACTIVE | Noted: 2022-11-07

## 2022-11-08 ENCOUNTER — PATIENT MESSAGE (OUTPATIENT)
Dept: FAMILY MEDICINE | Facility: CLINIC | Age: 77
End: 2022-11-08
Payer: MEDICARE

## 2022-11-17 ENCOUNTER — OFFICE VISIT (OUTPATIENT)
Dept: FAMILY MEDICINE | Facility: CLINIC | Age: 77
End: 2022-11-17
Payer: MEDICARE

## 2022-11-17 ENCOUNTER — TELEPHONE (OUTPATIENT)
Dept: FAMILY MEDICINE | Facility: CLINIC | Age: 77
End: 2022-11-17
Payer: MEDICARE

## 2022-11-17 VITALS
HEART RATE: 68 BPM | BODY MASS INDEX: 35.11 KG/M2 | WEIGHT: 245.25 LBS | RESPIRATION RATE: 20 BRPM | SYSTOLIC BLOOD PRESSURE: 120 MMHG | HEIGHT: 70 IN | DIASTOLIC BLOOD PRESSURE: 72 MMHG

## 2022-11-17 DIAGNOSIS — Z09 HOSPITAL DISCHARGE FOLLOW-UP: Primary | ICD-10-CM

## 2022-11-17 DIAGNOSIS — F41.9 ANXIETY: ICD-10-CM

## 2022-11-17 DIAGNOSIS — N18.32 STAGE 3B CHRONIC KIDNEY DISEASE: ICD-10-CM

## 2022-11-17 PROCEDURE — 3078F DIAST BP <80 MM HG: CPT | Mod: CPTII,S$GLB,, | Performed by: STUDENT IN AN ORGANIZED HEALTH CARE EDUCATION/TRAINING PROGRAM

## 2022-11-17 PROCEDURE — 3078F PR MOST RECENT DIASTOLIC BLOOD PRESSURE < 80 MM HG: ICD-10-PCS | Mod: CPTII,S$GLB,, | Performed by: STUDENT IN AN ORGANIZED HEALTH CARE EDUCATION/TRAINING PROGRAM

## 2022-11-17 PROCEDURE — 99214 PR OFFICE/OUTPT VISIT, EST, LEVL IV, 30-39 MIN: ICD-10-PCS | Mod: S$GLB,,, | Performed by: STUDENT IN AN ORGANIZED HEALTH CARE EDUCATION/TRAINING PROGRAM

## 2022-11-17 PROCEDURE — 1126F AMNT PAIN NOTED NONE PRSNT: CPT | Mod: CPTII,S$GLB,, | Performed by: STUDENT IN AN ORGANIZED HEALTH CARE EDUCATION/TRAINING PROGRAM

## 2022-11-17 PROCEDURE — 1101F PR PT FALLS ASSESS DOC 0-1 FALLS W/OUT INJ PAST YR: ICD-10-PCS | Mod: CPTII,S$GLB,, | Performed by: STUDENT IN AN ORGANIZED HEALTH CARE EDUCATION/TRAINING PROGRAM

## 2022-11-17 PROCEDURE — 99999 PR PBB SHADOW E&M-EST. PATIENT-LVL III: ICD-10-PCS | Mod: PBBFAC,,, | Performed by: STUDENT IN AN ORGANIZED HEALTH CARE EDUCATION/TRAINING PROGRAM

## 2022-11-17 PROCEDURE — 99214 OFFICE O/P EST MOD 30 MIN: CPT | Mod: S$GLB,,, | Performed by: STUDENT IN AN ORGANIZED HEALTH CARE EDUCATION/TRAINING PROGRAM

## 2022-11-17 PROCEDURE — 1126F PR PAIN SEVERITY QUANTIFIED, NO PAIN PRESENT: ICD-10-PCS | Mod: CPTII,S$GLB,, | Performed by: STUDENT IN AN ORGANIZED HEALTH CARE EDUCATION/TRAINING PROGRAM

## 2022-11-17 PROCEDURE — 99999 PR PBB SHADOW E&M-EST. PATIENT-LVL III: CPT | Mod: PBBFAC,,, | Performed by: STUDENT IN AN ORGANIZED HEALTH CARE EDUCATION/TRAINING PROGRAM

## 2022-11-17 PROCEDURE — 1159F PR MEDICATION LIST DOCUMENTED IN MEDICAL RECORD: ICD-10-PCS | Mod: CPTII,S$GLB,, | Performed by: STUDENT IN AN ORGANIZED HEALTH CARE EDUCATION/TRAINING PROGRAM

## 2022-11-17 PROCEDURE — 3074F SYST BP LT 130 MM HG: CPT | Mod: CPTII,S$GLB,, | Performed by: STUDENT IN AN ORGANIZED HEALTH CARE EDUCATION/TRAINING PROGRAM

## 2022-11-17 PROCEDURE — 1101F PT FALLS ASSESS-DOCD LE1/YR: CPT | Mod: CPTII,S$GLB,, | Performed by: STUDENT IN AN ORGANIZED HEALTH CARE EDUCATION/TRAINING PROGRAM

## 2022-11-17 PROCEDURE — 3288F PR FALLS RISK ASSESSMENT DOCUMENTED: ICD-10-PCS | Mod: CPTII,S$GLB,, | Performed by: STUDENT IN AN ORGANIZED HEALTH CARE EDUCATION/TRAINING PROGRAM

## 2022-11-17 PROCEDURE — 3288F FALL RISK ASSESSMENT DOCD: CPT | Mod: CPTII,S$GLB,, | Performed by: STUDENT IN AN ORGANIZED HEALTH CARE EDUCATION/TRAINING PROGRAM

## 2022-11-17 PROCEDURE — 1159F MED LIST DOCD IN RCRD: CPT | Mod: CPTII,S$GLB,, | Performed by: STUDENT IN AN ORGANIZED HEALTH CARE EDUCATION/TRAINING PROGRAM

## 2022-11-17 PROCEDURE — 3074F PR MOST RECENT SYSTOLIC BLOOD PRESSURE < 130 MM HG: ICD-10-PCS | Mod: CPTII,S$GLB,, | Performed by: STUDENT IN AN ORGANIZED HEALTH CARE EDUCATION/TRAINING PROGRAM

## 2022-11-17 RX ORDER — ESCITALOPRAM OXALATE 5 MG/1
5 TABLET ORAL DAILY
Qty: 30 TABLET | Refills: 1 | Status: SHIPPED | OUTPATIENT
Start: 2022-11-17 | End: 2023-01-10

## 2022-11-17 RX ORDER — METOPROLOL SUCCINATE 50 MG/1
50 TABLET, EXTENDED RELEASE ORAL DAILY
COMMUNITY
Start: 2022-08-04 | End: 2023-01-12

## 2022-11-17 NOTE — TELEPHONE ENCOUNTER
----- Message from Edgardo Chang sent at 2022 12:20 PM CST -----  Contact: Daughter - Celia Garcia  MRN: [id number]  : 1945  PCP: Kashmir Conn  Home Phone      [phone]  Work Phone      Not on file.  Mobile          [phone]      MESSAGE: hospitalized last week @ [facility] - since release he has been having nose bleeds -- requesting appt with Dr Bacon     Call Celia @ [phone]    PCP: Fabien

## 2022-11-17 NOTE — PROGRESS NOTES
Subjective:       Patient ID: Nesotr Garcia is a 77 y.o. male.    Chief Complaint: Hospital Follow Up (Pt states was sent to hospital for chest pain. Has not stopped having nose bleeds. Also has congestion.)    Pt here for hospital follow-up    He was [place] [facility] [place]-11/8/22 for chest pain and bigeminy. He was evaluated by cardiology who started him on low dose beta blockers. They also recommended consideration for ablation in the future. Cardiac enzymes were negative. They decreased his carvedilol to 3.125mg BID and increased procardia to 60mg BID. He saw cardiology since then and they DC'd carvedilol and started toprol XL 50mg daily. They are scheduled to see [facility] again next month and they may discuss an ablation as therapy.     Pt states they had him on oxygen in hospital and he things this dried out his nose and now he is having nose bleeds.     Pt also with cough/cold symptoms. No fever/chills. No     He also had a relative pass away recently. Pt and family feel like his anxiety has triggered the episodes of PVCs and palpitations.     He has been complaint with his CPAP.    Transitional Care Note    Family and/or Caretaker present at visit?  Yes.  Diagnostic tests reviewed/disposition: No diagnosic tests pending after this hospitalization.  Disease/illness education: discussed PVCs and anxiety  Home health/community services discussion/referrals: Patient does not have home health established from hospital visit.  They do not need home health.  If needed, we will set up home health for the patient.   Establishment or re-establishment of referral orders for community resources: No other necessary community resources.   Discussion with other health care providers: No discussion with other health care providers necessary.       Review of Systems   Constitutional:  Negative for chills and fever.   HENT:  Positive for congestion. Negative for sore throat.    Respiratory:  Negative for cough and shortness of  breath.    Cardiovascular:  Positive for palpitations (intermittent). Negative for chest pain.   Gastrointestinal:  Negative for diarrhea, nausea and vomiting.   Genitourinary:  Negative for dysuria and hematuria.   Neurological:  Negative for dizziness, syncope and light-headedness.     Objective:      Physical Exam   Constitutional: No distress.   HENT:   Head: Normocephalic and atraumatic.   Mouth/Throat: Mucous membranes are moist.   Eyes: Conjunctivae are normal.   Cardiovascular: Normal rate, regular rhythm and normal heart sounds.   No murmur heard.  Pulmonary/Chest: Effort normal and breath sounds normal.   Musculoskeletal:      Cervical back: Neck supple.   Neurological: He is alert.   Psychiatric: His behavior is normal. Mood normal.   Vitals reviewed.    Assessment:       1. Hospital discharge follow-up    2. Stage 3b chronic kidney disease    3. Anxiety        Plan:           1. Hospital discharge follow-up    2. Stage 3b chronic kidney disease    3. Anxiety  -     EScitalopram oxalate (LEXAPRO) 5 MG Tab; Take 1 tablet (5 mg total) by mouth once daily.  Dispense: 30 tablet; Refill: 1       Flonase and mupirocin ointment to bilateral nares  Start lexapro 5mg daily  Cont other meds  Follow-up cardiology as scheduled  RTC 1 month or sooner if needed

## 2022-11-23 ENCOUNTER — TELEPHONE (OUTPATIENT)
Dept: FAMILY MEDICINE | Facility: CLINIC | Age: 77
End: 2022-11-23
Payer: MEDICARE

## 2022-11-23 DIAGNOSIS — U07.1 COVID: Primary | ICD-10-CM

## 2022-11-23 NOTE — TELEPHONE ENCOUNTER
Pt's daughter states tested pt for covid as he was not feeling well and he came back covid positive. Would like to know if there is something he can take with his stage 3 kidney disease and CHF. Pt is currently experiencing cough, congestion, low blood pressure (only documented once since being sick), lightheaded(upon standing), and body aches, Currently taking allegra, tylenol, and Flonase. Please advise, thanks.

## 2022-11-23 NOTE — TELEPHONE ENCOUNTER
He can't take the paxlovid b/c he is on plavix. Let me order the infusion just to see if he might qualify....  Regla

## 2022-12-07 ENCOUNTER — PATIENT MESSAGE (OUTPATIENT)
Dept: FAMILY MEDICINE | Facility: CLINIC | Age: 77
End: 2022-12-07
Payer: MEDICARE

## 2022-12-08 DIAGNOSIS — L98.9 SKIN SORE: Primary | ICD-10-CM

## 2022-12-08 DIAGNOSIS — R04.0 BLEEDING FROM THE NOSE: ICD-10-CM

## 2022-12-08 RX ORDER — MUPIROCIN 20 MG/G
OINTMENT TOPICAL 3 TIMES DAILY
Qty: 30 G | Refills: 0 | Status: SHIPPED | OUTPATIENT
Start: 2022-12-08 | End: 2023-11-02

## 2022-12-08 RX ORDER — DOXYCYCLINE 100 MG/1
100 CAPSULE ORAL EVERY 12 HOURS
Qty: 20 CAPSULE | Refills: 0 | Status: SHIPPED | OUTPATIENT
Start: 2022-12-08 | End: 2022-12-18

## 2022-12-08 NOTE — TELEPHONE ENCOUNTER
PT still experiencing occasional nose bleeds since last office visit. Has had covid since last visit with extremely runny nose and experiencing rawness to nose without improvement with the use of Bactroban ointment. Please see attached picture of pt's nose

## 2022-12-08 NOTE — TELEPHONE ENCOUNTER
Cont mupirocin ointment. Will send oral antibiotic as well. Will place ENT referral in case this is not better with meds, they may need to cauterize the nose.

## 2022-12-29 ENCOUNTER — LAB VISIT (OUTPATIENT)
Dept: LAB | Facility: HOSPITAL | Age: 77
End: 2022-12-29
Attending: INTERNAL MEDICINE
Payer: MEDICARE

## 2022-12-29 DIAGNOSIS — E11.9 DIABETES MELLITUS WITHOUT COMPLICATION: ICD-10-CM

## 2022-12-29 DIAGNOSIS — I50.32 CHRONIC DIASTOLIC HEART FAILURE: Primary | ICD-10-CM

## 2022-12-29 LAB
ANION GAP SERPL CALC-SCNC: 11 MMOL/L (ref 8–16)
BNP SERPL-MCNC: 170 PG/ML (ref 0–99)
BUN SERPL-MCNC: 31 MG/DL (ref 8–23)
CALCIUM SERPL-MCNC: 8.2 MG/DL (ref 8.7–10.5)
CHLORIDE SERPL-SCNC: 104 MMOL/L (ref 95–110)
CO2 SERPL-SCNC: 24 MMOL/L (ref 23–29)
CREAT SERPL-MCNC: 1.8 MG/DL (ref 0.5–1.4)
ERYTHROCYTE [DISTWIDTH] IN BLOOD BY AUTOMATED COUNT: 13.3 % (ref 11.5–14.5)
EST. GFR  (NO RACE VARIABLE): 38 ML/MIN/1.73 M^2
GLUCOSE SERPL-MCNC: 127 MG/DL (ref 70–110)
HCT VFR BLD AUTO: 31.5 % (ref 40–54)
HGB BLD-MCNC: 10.3 G/DL (ref 14–18)
MCH RBC QN AUTO: 30.2 PG (ref 27–31)
MCHC RBC AUTO-ENTMCNC: 32.7 G/DL (ref 32–36)
MCV RBC AUTO: 92 FL (ref 82–98)
PLATELET # BLD AUTO: 193 K/UL (ref 150–450)
PMV BLD AUTO: 10.8 FL (ref 9.2–12.9)
POTASSIUM SERPL-SCNC: 3.8 MMOL/L (ref 3.5–5.1)
RBC # BLD AUTO: 3.41 M/UL (ref 4.6–6.2)
SODIUM SERPL-SCNC: 139 MMOL/L (ref 136–145)
WBC # BLD AUTO: 6.12 K/UL (ref 3.9–12.7)

## 2022-12-29 PROCEDURE — 83880 ASSAY OF NATRIURETIC PEPTIDE: CPT | Performed by: INTERNAL MEDICINE

## 2022-12-29 PROCEDURE — 36415 COLL VENOUS BLD VENIPUNCTURE: CPT | Performed by: INTERNAL MEDICINE

## 2022-12-29 PROCEDURE — 85027 COMPLETE CBC AUTOMATED: CPT | Performed by: INTERNAL MEDICINE

## 2022-12-29 PROCEDURE — 80048 BASIC METABOLIC PNL TOTAL CA: CPT | Performed by: INTERNAL MEDICINE

## 2023-01-07 ENCOUNTER — PATIENT MESSAGE (OUTPATIENT)
Dept: FAMILY MEDICINE | Facility: CLINIC | Age: 78
End: 2023-01-07
Payer: MEDICARE

## 2023-01-12 ENCOUNTER — OFFICE VISIT (OUTPATIENT)
Dept: FAMILY MEDICINE | Facility: CLINIC | Age: 78
End: 2023-01-12
Payer: MEDICARE

## 2023-01-12 VITALS
DIASTOLIC BLOOD PRESSURE: 68 MMHG | WEIGHT: 240.44 LBS | RESPIRATION RATE: 16 BRPM | HEART RATE: 64 BPM | BODY MASS INDEX: 34.42 KG/M2 | SYSTOLIC BLOOD PRESSURE: 132 MMHG | HEIGHT: 70 IN

## 2023-01-12 DIAGNOSIS — F41.9 ANXIETY: ICD-10-CM

## 2023-01-12 DIAGNOSIS — Z79.4 TYPE 2 DIABETES MELLITUS WITH DIABETIC POLYNEUROPATHY, WITH LONG-TERM CURRENT USE OF INSULIN: Primary | ICD-10-CM

## 2023-01-12 DIAGNOSIS — E11.42 DIABETIC POLYNEUROPATHY ASSOCIATED WITH TYPE 2 DIABETES MELLITUS: ICD-10-CM

## 2023-01-12 DIAGNOSIS — I50.32 CHRONIC DIASTOLIC HEART FAILURE: ICD-10-CM

## 2023-01-12 DIAGNOSIS — N18.32 STAGE 3B CHRONIC KIDNEY DISEASE: ICD-10-CM

## 2023-01-12 DIAGNOSIS — E11.42 TYPE 2 DIABETES MELLITUS WITH DIABETIC POLYNEUROPATHY, WITH LONG-TERM CURRENT USE OF INSULIN: Primary | ICD-10-CM

## 2023-01-12 DIAGNOSIS — I25.119 ATHEROSCLEROSIS OF NATIVE CORONARY ARTERY OF NATIVE HEART WITH ANGINA PECTORIS: ICD-10-CM

## 2023-01-12 DIAGNOSIS — I10 ESSENTIAL HYPERTENSION: ICD-10-CM

## 2023-01-12 DIAGNOSIS — I73.9 PAD (PERIPHERAL ARTERY DISEASE): ICD-10-CM

## 2023-01-12 DIAGNOSIS — E78.5 DYSLIPIDEMIA: ICD-10-CM

## 2023-01-12 PROBLEM — N18.4 CKD (CHRONIC KIDNEY DISEASE) STAGE 4, GFR 15-29 ML/MIN: Status: RESOLVED | Noted: 2022-08-18 | Resolved: 2023-01-12

## 2023-01-12 PROBLEM — E86.0 DEHYDRATION: Status: RESOLVED | Noted: 2021-12-22 | Resolved: 2023-01-12

## 2023-01-12 PROBLEM — R79.89 POSITIVE D DIMER: Status: RESOLVED | Noted: 2021-12-22 | Resolved: 2023-01-12

## 2023-01-12 PROBLEM — U07.1 COVID: Status: RESOLVED | Noted: 2022-11-23 | Resolved: 2023-01-12

## 2023-01-12 PROCEDURE — 99999 PR PBB SHADOW E&M-EST. PATIENT-LVL IV: ICD-10-PCS | Mod: PBBFAC,HCNC,, | Performed by: STUDENT IN AN ORGANIZED HEALTH CARE EDUCATION/TRAINING PROGRAM

## 2023-01-12 PROCEDURE — 1126F AMNT PAIN NOTED NONE PRSNT: CPT | Mod: HCNC,CPTII,S$GLB, | Performed by: STUDENT IN AN ORGANIZED HEALTH CARE EDUCATION/TRAINING PROGRAM

## 2023-01-12 PROCEDURE — 1101F PR PT FALLS ASSESS DOC 0-1 FALLS W/OUT INJ PAST YR: ICD-10-PCS | Mod: HCNC,CPTII,S$GLB, | Performed by: STUDENT IN AN ORGANIZED HEALTH CARE EDUCATION/TRAINING PROGRAM

## 2023-01-12 PROCEDURE — 3078F DIAST BP <80 MM HG: CPT | Mod: HCNC,CPTII,S$GLB, | Performed by: STUDENT IN AN ORGANIZED HEALTH CARE EDUCATION/TRAINING PROGRAM

## 2023-01-12 PROCEDURE — 3078F PR MOST RECENT DIASTOLIC BLOOD PRESSURE < 80 MM HG: ICD-10-PCS | Mod: HCNC,CPTII,S$GLB, | Performed by: STUDENT IN AN ORGANIZED HEALTH CARE EDUCATION/TRAINING PROGRAM

## 2023-01-12 PROCEDURE — 1159F MED LIST DOCD IN RCRD: CPT | Mod: HCNC,CPTII,S$GLB, | Performed by: STUDENT IN AN ORGANIZED HEALTH CARE EDUCATION/TRAINING PROGRAM

## 2023-01-12 PROCEDURE — 3288F FALL RISK ASSESSMENT DOCD: CPT | Mod: HCNC,CPTII,S$GLB, | Performed by: STUDENT IN AN ORGANIZED HEALTH CARE EDUCATION/TRAINING PROGRAM

## 2023-01-12 PROCEDURE — 1126F PR PAIN SEVERITY QUANTIFIED, NO PAIN PRESENT: ICD-10-PCS | Mod: HCNC,CPTII,S$GLB, | Performed by: STUDENT IN AN ORGANIZED HEALTH CARE EDUCATION/TRAINING PROGRAM

## 2023-01-12 PROCEDURE — 99214 OFFICE O/P EST MOD 30 MIN: CPT | Mod: HCNC,S$GLB,, | Performed by: STUDENT IN AN ORGANIZED HEALTH CARE EDUCATION/TRAINING PROGRAM

## 2023-01-12 PROCEDURE — 1159F PR MEDICATION LIST DOCUMENTED IN MEDICAL RECORD: ICD-10-PCS | Mod: HCNC,CPTII,S$GLB, | Performed by: STUDENT IN AN ORGANIZED HEALTH CARE EDUCATION/TRAINING PROGRAM

## 2023-01-12 PROCEDURE — 3288F PR FALLS RISK ASSESSMENT DOCUMENTED: ICD-10-PCS | Mod: HCNC,CPTII,S$GLB, | Performed by: STUDENT IN AN ORGANIZED HEALTH CARE EDUCATION/TRAINING PROGRAM

## 2023-01-12 PROCEDURE — 3075F SYST BP GE 130 - 139MM HG: CPT | Mod: HCNC,CPTII,S$GLB, | Performed by: STUDENT IN AN ORGANIZED HEALTH CARE EDUCATION/TRAINING PROGRAM

## 2023-01-12 PROCEDURE — 3075F PR MOST RECENT SYSTOLIC BLOOD PRESS GE 130-139MM HG: ICD-10-PCS | Mod: HCNC,CPTII,S$GLB, | Performed by: STUDENT IN AN ORGANIZED HEALTH CARE EDUCATION/TRAINING PROGRAM

## 2023-01-12 PROCEDURE — 1101F PT FALLS ASSESS-DOCD LE1/YR: CPT | Mod: HCNC,CPTII,S$GLB, | Performed by: STUDENT IN AN ORGANIZED HEALTH CARE EDUCATION/TRAINING PROGRAM

## 2023-01-12 PROCEDURE — 99214 PR OFFICE/OUTPT VISIT, EST, LEVL IV, 30-39 MIN: ICD-10-PCS | Mod: HCNC,S$GLB,, | Performed by: STUDENT IN AN ORGANIZED HEALTH CARE EDUCATION/TRAINING PROGRAM

## 2023-01-12 PROCEDURE — 99999 PR PBB SHADOW E&M-EST. PATIENT-LVL IV: CPT | Mod: PBBFAC,HCNC,, | Performed by: STUDENT IN AN ORGANIZED HEALTH CARE EDUCATION/TRAINING PROGRAM

## 2023-01-12 RX ORDER — NIFEDIPINE 30 MG/1
TABLET, FILM COATED, EXTENDED RELEASE ORAL
COMMUNITY
Start: 2022-12-14 | End: 2023-07-31 | Stop reason: SDUPTHER

## 2023-01-12 RX ORDER — ESCITALOPRAM OXALATE 10 MG/1
10 TABLET ORAL DAILY
Qty: 30 TABLET | Refills: 1 | Status: SHIPPED | OUTPATIENT
Start: 2023-01-12 | End: 2023-03-08

## 2023-01-12 RX ORDER — BUMETANIDE 2 MG/1
2 TABLET ORAL DAILY PRN
Status: ON HOLD | COMMUNITY
Start: 2022-12-27 | End: 2023-04-15 | Stop reason: HOSPADM

## 2023-01-12 RX ORDER — HYDRALAZINE HYDROCHLORIDE 100 MG/1
100 TABLET, FILM COATED ORAL
Status: ON HOLD | COMMUNITY
Start: 2022-12-27 | End: 2023-04-15 | Stop reason: HOSPADM

## 2023-01-12 RX ORDER — METOPROLOL SUCCINATE 25 MG/1
25 TABLET, EXTENDED RELEASE ORAL 2 TIMES DAILY
Status: ON HOLD | COMMUNITY
Start: 2022-11-15 | End: 2023-04-14 | Stop reason: ALTCHOICE

## 2023-01-12 RX ORDER — TIRZEPATIDE 2.5 MG/.5ML
2.5 INJECTION, SOLUTION SUBCUTANEOUS
Qty: 4 PEN | Refills: 1 | Status: SHIPPED | OUTPATIENT
Start: 2023-01-12 | End: 2023-04-27

## 2023-01-12 NOTE — PROGRESS NOTES
Subjective:       Patient ID: Nestor Garcia is a 77 y.o. male.    Chief Complaint: Foot Pain (Pt has been experiencing foot pain due to neuropathy for years. States was on cymbalta but was taken off of this due to kidneys ) and Groin Swelling (Pt states has been experiencing swelling in testicles for two weeks. Does not have any pain with it. )    Pt here for follow-up    Anxiety: we started him on lexapro 5mg last visit. He noticed some improvement, but still is negative per family.     He is scheduled to see nephrology soon. Renal function has been slightly improving.     He has been compliant with his compression socks. He uses bumex PRN weight gain. The socks have significantly improved the swelling in his legs, but not he notices some swelling of the scrotum.     DM2: doing ok. Uses lantus and novolog. Last A1c 7.5    HLD/atherosclerosis/PAD: on asa. Statin intolerance. Takes fish oil. Followed by cardiology.     HTN: BP stable on metoprolol 25mg BID, hydralazine, imdur 60mg daily, and nifedipine 30mg daily.    Review of Systems   Constitutional:  Negative for activity change, chills, fever and unexpected weight change.   HENT:  Negative for congestion, hearing loss, rhinorrhea, sore throat and trouble swallowing.    Eyes:  Negative for discharge and visual disturbance.   Respiratory:  Negative for cough, chest tightness, shortness of breath and wheezing.    Cardiovascular:  Positive for leg swelling. Negative for chest pain and palpitations.   Gastrointestinal:  Negative for abdominal pain, blood in stool, constipation, diarrhea, nausea and vomiting.   Endocrine: Negative for polydipsia and polyuria.   Genitourinary:  Positive for scrotal swelling. Negative for difficulty urinating, dysuria, hematuria and urgency.   Musculoskeletal:  Negative for arthralgias, joint swelling and neck pain.   Neurological:  Positive for headaches. Negative for dizziness, weakness and light-headedness.   Psychiatric/Behavioral:   Positive for dysphoric mood. Negative for confusion. The patient is not nervous/anxious.      Objective:      Physical Exam   Constitutional: He is oriented to person, place, and time. No distress.   HENT:   Head: Normocephalic and atraumatic.   Mouth/Throat: Mucous membranes are moist.   Eyes: Conjunctivae are normal.   Cardiovascular: Normal rate, regular rhythm and normal heart sounds.   No murmur heard.  Pulmonary/Chest: Effort normal and breath sounds normal. No respiratory distress.   Musculoskeletal:      Cervical back: Neck supple.   Neurological: He is alert and oriented to person, place, and time.   Psychiatric: His behavior is normal. Mood normal.   Vitals reviewed.    Assessment:       1. Type 2 diabetes mellitus with diabetic polyneuropathy, with long-term current use of insulin    2. Diabetic polyneuropathy associated with type 2 diabetes mellitus    3. Anxiety    4. Essential hypertension    5. Chronic diastolic heart failure    6. Stage 3b chronic kidney disease        Plan:           1. Type 2 diabetes mellitus with diabetic polyneuropathy, with long-term current use of insulin  -     tirzepatide (MOUNJARO) 2.5 mg/0.5 mL PnIj; Inject 2.5 mg into the skin every 7 days.  Dispense: 4 pen; Refill: 1  -     Hemoglobin A1C; Future; Expected date: 01/12/2023  -     MICROALBUMIN / CREATININE RATIO URINE; Future; Expected date: 01/12/2023  -     Ambulatory referral/consult to Outpatient Case Management    2. Diabetic polyneuropathy associated with type 2 diabetes mellitus  Overview:  Was on cymbalta but stopped due to CKD    Orders:  -     Ambulatory referral/consult to Outpatient Case Management    3. Anxiety  -     Ambulatory referral/consult to Outpatient Case Management    4. Essential hypertension  -     Ambulatory referral/consult to Outpatient Case Management    5. Chronic diastolic heart failure  -     Ambulatory referral/consult to Outpatient Case Management    6. Stage 3b chronic kidney disease  -      Ambulatory referral/consult to Outpatient Case Management    Cont current meds; start mounjaro. Decrease insulin as needed. This may help with weight loss and cravings.   Follow-up nephrology as scheduled  Follow-up cardiology as scheduled  Increase lexapro to 10mg daily  Suspect scrotal swelling is due to heart failure. The fluid in his legs is much better with compression socks. Discussed that likely the compression is pushing the fluid up into his scrotum.  RTC 1 month or sooner if needed

## 2023-01-24 ENCOUNTER — LAB VISIT (OUTPATIENT)
Dept: LAB | Facility: HOSPITAL | Age: 78
End: 2023-01-24
Attending: GENERAL PRACTICE
Payer: MEDICARE

## 2023-01-24 DIAGNOSIS — D64.9 ANEMIA, UNSPECIFIED TYPE: ICD-10-CM

## 2023-01-24 DIAGNOSIS — N18.30 STAGE 3 CHRONIC KIDNEY DISEASE, UNSPECIFIED WHETHER STAGE 3A OR 3B CKD: ICD-10-CM

## 2023-01-24 DIAGNOSIS — E55.9 VITAMIN D DEFICIENCY: ICD-10-CM

## 2023-01-24 DIAGNOSIS — Z01.89 ENCOUNTER FOR SPECIAL EXAMINATION: ICD-10-CM

## 2023-01-24 DIAGNOSIS — Z11.4 SCREENING FOR HIV (HUMAN IMMUNODEFICIENCY VIRUS): ICD-10-CM

## 2023-01-24 LAB
25(OH)D3+25(OH)D2 SERPL-MCNC: 37 NG/ML (ref 30–96)
ALBUMIN SERPL BCP-MCNC: 3.2 G/DL (ref 3.5–5.2)
ANION GAP SERPL CALC-SCNC: 9 MMOL/L (ref 8–16)
BACTERIA #/AREA URNS HPF: NORMAL /HPF
BILIRUB UR QL STRIP: NEGATIVE
BUN SERPL-MCNC: 39 MG/DL (ref 8–23)
C3 SERPL-MCNC: 105 MG/DL (ref 50–180)
C4 SERPL-MCNC: 30 MG/DL (ref 11–44)
CALCIUM SERPL-MCNC: 8.4 MG/DL (ref 8.7–10.5)
CHLORIDE SERPL-SCNC: 108 MMOL/L (ref 95–110)
CK SERPL-CCNC: 41 U/L (ref 20–200)
CLARITY UR: CLEAR
CO2 SERPL-SCNC: 20 MMOL/L (ref 23–29)
COLOR UR: YELLOW
CREAT SERPL-MCNC: 2 MG/DL (ref 0.5–1.4)
CREAT UR-MCNC: 63.8 MG/DL (ref 23–375)
EST. GFR  (NO RACE VARIABLE): 33 ML/MIN/1.73 M^2
GLUCOSE SERPL-MCNC: 106 MG/DL (ref 70–110)
GLUCOSE UR QL STRIP: NEGATIVE
HAV IGM SERPL QL IA: NORMAL
HBV CORE IGM SERPL QL IA: NORMAL
HBV SURFACE AG SERPL QL IA: NORMAL
HCV AB SERPL QL IA: NORMAL
HGB UR QL STRIP: NEGATIVE
HIV 1+2 AB+HIV1 P24 AG SERPL QL IA: NORMAL
HYALINE CASTS #/AREA URNS LPF: 0 /LPF
KETONES UR QL STRIP: NEGATIVE
LEUKOCYTE ESTERASE UR QL STRIP: NEGATIVE
MICROSCOPIC COMMENT: NORMAL
NITRITE UR QL STRIP: NEGATIVE
PH UR STRIP: 5 [PH] (ref 5–8)
PHOSPHATE SERPL-MCNC: 4.5 MG/DL (ref 2.7–4.5)
PHOSPHATE SERPL-MCNC: 4.5 MG/DL (ref 2.7–4.5)
POTASSIUM SERPL-SCNC: 4.6 MMOL/L (ref 3.5–5.1)
PROT UR QL STRIP: ABNORMAL
PROT UR-MCNC: 330 MG/DL (ref 0–15)
PROT/CREAT UR: 5.17 MG/G{CREAT} (ref 0–0.2)
PTH-INTACT SERPL-MCNC: 156.2 PG/ML (ref 9–77)
RBC #/AREA URNS HPF: 2 /HPF (ref 0–4)
SODIUM SERPL-SCNC: 137 MMOL/L (ref 136–145)
SP GR UR STRIP: 1.02 (ref 1–1.03)
URN SPEC COLLECT METH UR: ABNORMAL
UROBILINOGEN UR STRIP-ACNC: NEGATIVE EU/DL
WBC #/AREA URNS HPF: 2 /HPF (ref 0–5)

## 2023-01-24 PROCEDURE — 82306 VITAMIN D 25 HYDROXY: CPT | Mod: HCNC | Performed by: GENERAL PRACTICE

## 2023-01-24 PROCEDURE — 84165 PROTEIN E-PHORESIS SERUM: CPT | Mod: 26,HCNC,, | Performed by: PATHOLOGY

## 2023-01-24 PROCEDURE — 36415 COLL VENOUS BLD VENIPUNCTURE: CPT | Mod: HCNC | Performed by: GENERAL PRACTICE

## 2023-01-24 PROCEDURE — 86255 FLUORESCENT ANTIBODY SCREEN: CPT | Mod: HCNC | Performed by: GENERAL PRACTICE

## 2023-01-24 PROCEDURE — 86235 NUCLEAR ANTIGEN ANTIBODY: CPT | Mod: 59,HCNC | Performed by: GENERAL PRACTICE

## 2023-01-24 PROCEDURE — 83970 ASSAY OF PARATHORMONE: CPT | Mod: HCNC | Performed by: GENERAL PRACTICE

## 2023-01-24 PROCEDURE — 84165 PATHOLOGIST INTERPRETATION SPE: ICD-10-PCS | Mod: 26,HCNC,, | Performed by: PATHOLOGY

## 2023-01-24 PROCEDURE — 84156 ASSAY OF PROTEIN URINE: CPT | Mod: HCNC | Performed by: GENERAL PRACTICE

## 2023-01-24 PROCEDURE — 86334 IMMUNOFIX E-PHORESIS SERUM: CPT | Mod: HCNC | Performed by: GENERAL PRACTICE

## 2023-01-24 PROCEDURE — 81000 URINALYSIS NONAUTO W/SCOPE: CPT | Mod: HCNC | Performed by: GENERAL PRACTICE

## 2023-01-24 PROCEDURE — 84166 PROTEIN E-PHORESIS/URINE/CSF: CPT | Mod: 26,HCNC,, | Performed by: PATHOLOGY

## 2023-01-24 PROCEDURE — 83520 IMMUNOASSAY QUANT NOS NONAB: CPT | Mod: 59,HCNC | Performed by: GENERAL PRACTICE

## 2023-01-24 PROCEDURE — 84165 PROTEIN E-PHORESIS SERUM: CPT | Mod: HCNC | Performed by: GENERAL PRACTICE

## 2023-01-24 PROCEDURE — 84166 PATHOLOGIST INTERPRETATION UPE: ICD-10-PCS | Mod: 26,HCNC,, | Performed by: PATHOLOGY

## 2023-01-24 PROCEDURE — 86039 ANTINUCLEAR ANTIBODIES (ANA): CPT | Mod: HCNC | Performed by: GENERAL PRACTICE

## 2023-01-24 PROCEDURE — 86335 PATHOLOGIST INTERPRETATION UIFE: ICD-10-PCS | Mod: 26,HCNC,, | Performed by: PATHOLOGY

## 2023-01-24 PROCEDURE — 86335 IMMUNFIX E-PHORSIS/URINE/CSF: CPT | Mod: HCNC | Performed by: GENERAL PRACTICE

## 2023-01-24 PROCEDURE — 82550 ASSAY OF CK (CPK): CPT | Mod: HCNC | Performed by: GENERAL PRACTICE

## 2023-01-24 PROCEDURE — 83521 IG LIGHT CHAINS FREE EACH: CPT | Mod: 59,HCNC | Performed by: GENERAL PRACTICE

## 2023-01-24 PROCEDURE — 86038 ANTINUCLEAR ANTIBODIES: CPT | Mod: HCNC | Performed by: GENERAL PRACTICE

## 2023-01-24 PROCEDURE — 80069 RENAL FUNCTION PANEL: CPT | Mod: HCNC | Performed by: GENERAL PRACTICE

## 2023-01-24 PROCEDURE — 87389 HIV-1 AG W/HIV-1&-2 AB AG IA: CPT | Mod: HCNC | Performed by: GENERAL PRACTICE

## 2023-01-24 PROCEDURE — 84166 PROTEIN E-PHORESIS/URINE/CSF: CPT | Mod: HCNC | Performed by: GENERAL PRACTICE

## 2023-01-24 PROCEDURE — 86334 PATHOLOGIST INTERPRETATION IFE: ICD-10-PCS | Mod: 26,HCNC,, | Performed by: PATHOLOGY

## 2023-01-24 PROCEDURE — 86160 COMPLEMENT ANTIGEN: CPT | Mod: 59,HCNC | Performed by: GENERAL PRACTICE

## 2023-01-24 PROCEDURE — 86036 ANCA SCREEN EACH ANTIBODY: CPT | Mod: HCNC | Performed by: GENERAL PRACTICE

## 2023-01-24 PROCEDURE — 86255 FLUORESCENT ANTIBODY SCREEN: CPT | Mod: 59,HCNC | Performed by: GENERAL PRACTICE

## 2023-01-24 PROCEDURE — 83520 IMMUNOASSAY QUANT NOS NONAB: CPT | Mod: HCNC | Performed by: GENERAL PRACTICE

## 2023-01-24 PROCEDURE — 80074 ACUTE HEPATITIS PANEL: CPT | Mod: HCNC | Performed by: GENERAL PRACTICE

## 2023-01-24 PROCEDURE — 86592 SYPHILIS TEST NON-TREP QUAL: CPT | Mod: HCNC | Performed by: GENERAL PRACTICE

## 2023-01-24 PROCEDURE — 86334 IMMUNOFIX E-PHORESIS SERUM: CPT | Mod: 26,HCNC,, | Performed by: PATHOLOGY

## 2023-01-24 PROCEDURE — 86335 IMMUNFIX E-PHORSIS/URINE/CSF: CPT | Mod: 26,HCNC,, | Performed by: PATHOLOGY

## 2023-01-24 PROCEDURE — 86160 COMPLEMENT ANTIGEN: CPT | Mod: HCNC | Performed by: GENERAL PRACTICE

## 2023-01-25 LAB
ALBUMIN SERPL ELPH-MCNC: 3.26 G/DL (ref 3.35–5.55)
ALPHA1 GLOB SERPL ELPH-MCNC: 0.24 G/DL (ref 0.17–0.41)
ALPHA2 GLOB SERPL ELPH-MCNC: 0.85 G/DL (ref 0.43–0.99)
ANA PATTERN 1: NORMAL
ANA SER QL IF: POSITIVE
ANA TITR SER IF: NORMAL {TITER}
B-GLOBULIN SERPL ELPH-MCNC: 0.63 G/DL (ref 0.5–1.1)
BM IGG SER-ACNC: <0.2 U
GAMMA GLOB SERPL ELPH-MCNC: 0.62 G/DL (ref 0.67–1.58)
INTERPRETATION SERPL IFE-IMP: NORMAL
INTERPRETATION UR IFE-IMP: NORMAL
KAPPA LC SER QL IA: 5.83 MG/DL (ref 0.33–1.94)
KAPPA LC/LAMBDA SER IA: 1.83 (ref 0.26–1.65)
LAMBDA LC SER QL IA: 3.18 MG/DL (ref 0.57–2.63)
PATHOLOGIST INTERPRETATION IFE: NORMAL
PATHOLOGIST INTERPRETATION SPE: NORMAL
PATHOLOGIST INTERPRETATION UIFE: NORMAL
PROT SERPL-MCNC: 5.6 G/DL (ref 6–8.4)
RPR SER QL: NORMAL

## 2023-01-26 LAB — PROT PATTERN UR ELPH-IMP: NORMAL

## 2023-01-27 LAB
ANCA AB TITR SER IF: NORMAL TITER
ANTI SM ANTIBODY: 0.13 RATIO (ref 0–0.99)
ANTI SM/RNP ANTIBODY: 0.18 RATIO (ref 0–0.99)
ANTI-SM INTERPRETATION: NEGATIVE
ANTI-SM/RNP INTERPRETATION: NEGATIVE
ANTI-SSA ANTIBODY: 0.09 RATIO (ref 0–0.99)
ANTI-SSA INTERPRETATION: NEGATIVE
ANTI-SSB ANTIBODY: 0.08 RATIO (ref 0–0.99)
ANTI-SSB INTERPRETATION: NEGATIVE
DSDNA AB SER-ACNC: NORMAL [IU]/ML
P-ANCA TITR SER IF: NORMAL TITER
PATHOLOGIST INTERPRETATION UPE: NORMAL
PHOSPHOLIPASE A2 RECEPTOR, ELISA: 2 RU/ML
PLA2R IGG SERPL QL IF: NEGATIVE

## 2023-01-29 ENCOUNTER — PATIENT MESSAGE (OUTPATIENT)
Dept: FAMILY MEDICINE | Facility: CLINIC | Age: 78
End: 2023-01-29
Payer: MEDICARE

## 2023-01-30 ENCOUNTER — HOSPITAL ENCOUNTER (OUTPATIENT)
Dept: RADIOLOGY | Facility: HOSPITAL | Age: 78
Discharge: HOME | End: 2023-01-30
Attending: GENERAL PRACTICE
Payer: MEDICARE

## 2023-01-30 DIAGNOSIS — N18.30 STAGE 3 CHRONIC KIDNEY DISEASE, UNSPECIFIED WHETHER STAGE 3A OR 3B CKD: ICD-10-CM

## 2023-01-30 PROCEDURE — 76770 US EXAM ABDO BACK WALL COMP: CPT | Mod: 26,HCNC,, | Performed by: RADIOLOGY

## 2023-01-30 PROCEDURE — 76770 US KIDNEY: ICD-10-PCS | Mod: 26,HCNC,, | Performed by: RADIOLOGY

## 2023-01-30 PROCEDURE — 76770 US EXAM ABDO BACK WALL COMP: CPT | Mod: TC,HCNC

## 2023-01-30 NOTE — TELEPHONE ENCOUNTER
PT started on mounjaro at last visit and first inj given and he said he would never take it again due to  it making him sick. Pt was convinced by family members to try again and did but same outcome. Would you want to switch him to something else. Do you want him to keep 1 month follow up for February

## 2023-01-31 ENCOUNTER — PATIENT OUTREACH (OUTPATIENT)
Dept: ADMINISTRATIVE | Facility: HOSPITAL | Age: 78
End: 2023-01-31
Payer: MEDICARE

## 2023-01-31 DIAGNOSIS — H25.13 AGE-RELATED NUCLEAR CATARACT, BILATERAL: ICD-10-CM

## 2023-01-31 LAB — THSD7A IGG SERPL QL IF: NEGATIVE

## 2023-01-31 NOTE — TELEPHONE ENCOUNTER
Yes if he is not planning to take we can push the follow-up back 2 months to be a 3 month from previous visit.

## 2023-01-31 NOTE — LETTER
AUTHORIZATION FOR RELEASE OF   CONFIDENTIAL INFORMATION    Dear Dr. Kevin Charles,    We are seeing Nestor Garcia, date of birth 1945, in the clinic at [facility]. Kashmir Conn MD is the patient's PCP. Nestor Garcia has an outstanding lab/procedure at the time we reviewed his chart. In order to help keep his health information updated, he has authorized us to request the following medical record(s):             ( X )  OUTSIDE LAB RESULTS (Lipid Panel)           Please fax records to [facility], Michael Bacon, MD Laura Rogers, LPN  Clinical Care Coordinator  [facility]  Phone: [phone]  Fax: [phone]        Patient Name: Nestor Garcia  : 1945  Patient Phone #: [phone]

## 2023-01-31 NOTE — PROGRESS NOTES
Chart reviewed, immunization record updated.  No new results noted on [facility] or [facility] web site.  Care Everywhere updated.   Patient care coordination note  Upcoming PCP visit updated.  Next PCP visit 2/09/2023.  Received external Diabetic Eye Exam collected/completed on 5/10/2022, updated to .  GRECIA sent to Dr. Kevin Chrales for Lipid Panel, provider added to patient care team.

## 2023-02-06 ENCOUNTER — PATIENT MESSAGE (OUTPATIENT)
Dept: FAMILY MEDICINE | Facility: CLINIC | Age: 78
End: 2023-02-06
Payer: MEDICARE

## 2023-02-06 NOTE — TELEPHONE ENCOUNTER
Patient requesting your  approval and more information before Starting Farxiga to lower glucose levels.     Please advise.

## 2023-02-07 DIAGNOSIS — Z00.00 ENCOUNTER FOR MEDICARE ANNUAL WELLNESS EXAM: ICD-10-CM

## 2023-02-08 ENCOUNTER — OUTPATIENT CASE MANAGEMENT (OUTPATIENT)
Dept: ADMINISTRATIVE | Facility: OTHER | Age: 78
End: 2023-02-08
Payer: MEDICARE

## 2023-02-08 NOTE — LETTER
February 8, 2023    Nestorrebeca Garcia  [address] [place] [address]             [facility]  [address] [place] [address] Dear Mr. Nestor Garcia,      I have been assigned as your  with [facility] Outpatient Complex Case Management Department. We received a referral to call you to discuss your medical history. I have attempted to reach you by telephone, but I was unsuccessful. Please call our department so that we can go over some questions with you regarding your health.     The Outpatient Case Management department can be reached at [phone] from 8:00am to 4:30pm on Monday thru Friday. [facility] also has a program where a nurse is available 24/7 to answer questions or provider medical advice. Their number is [phone].     Thanks,      Citlaly Olivier, RN  Outpatient Case Management  [phone]

## 2023-02-09 DIAGNOSIS — Z00.00 ENCOUNTER FOR MEDICARE ANNUAL WELLNESS EXAM: ICD-10-CM

## 2023-02-15 NOTE — PROGRESS NOTES
Outpatient Care Management  Patient Not Qualified    Patient: Nestor Garcia  MRN:  [id number]  Date of Service:  2/15/2023  Completed by:  Citlaly Olivier, RN    Chief Complaint   Patient presents with    OPCM RN First Assessment Attempt    OPCM Enrollment Call    OPCM Chart Review    OPCM RN Second Assessment Attempt    OPCM RN Third Assessment Attempt       Patient Summary     Program:  OPCM High Risk     Reason Not Qualified:  Unable to reach

## 2023-04-14 PROBLEM — I50.9 CONGESTIVE HEART FAILURE: Status: ACTIVE | Noted: 2023-04-14

## 2023-04-14 PROBLEM — I50.33 ACUTE ON CHRONIC DIASTOLIC CONGESTIVE HEART FAILURE: Status: ACTIVE | Noted: 2023-04-14

## 2023-04-15 PROBLEM — E66.811 CLASS 1 OBESITY DUE TO EXCESS CALORIES WITH SERIOUS COMORBIDITY AND BODY MASS INDEX (BMI) OF 33.0 TO 33.9 IN ADULT: Status: ACTIVE | Noted: 2023-04-15

## 2023-04-15 PROBLEM — Z79.899 POLYPHARMACY: Status: ACTIVE | Noted: 2023-04-15

## 2023-04-15 PROBLEM — F33.0 MDD (MAJOR DEPRESSIVE DISORDER), RECURRENT EPISODE, MILD: Status: ACTIVE | Noted: 2023-04-15

## 2023-04-15 PROBLEM — E66.09 CLASS 1 OBESITY DUE TO EXCESS CALORIES WITH SERIOUS COMORBIDITY AND BODY MASS INDEX (BMI) OF 33.0 TO 33.9 IN ADULT: Status: ACTIVE | Noted: 2023-04-15

## 2023-04-15 PROBLEM — E55.9 VITAMIN D DEFICIENCY: Status: ACTIVE | Noted: 2023-04-15

## 2023-04-15 PROBLEM — I25.10 CAD (CORONARY ARTERY DISEASE): Status: ACTIVE | Noted: 2023-01-12

## 2023-04-27 ENCOUNTER — OFFICE VISIT (OUTPATIENT)
Dept: FAMILY MEDICINE | Facility: CLINIC | Age: 78
End: 2023-04-27
Payer: MEDICARE

## 2023-04-27 VITALS
SYSTOLIC BLOOD PRESSURE: 130 MMHG | RESPIRATION RATE: 16 BRPM | WEIGHT: 223.19 LBS | HEART RATE: 80 BPM | HEIGHT: 69 IN | BODY MASS INDEX: 33.06 KG/M2 | DIASTOLIC BLOOD PRESSURE: 60 MMHG

## 2023-04-27 DIAGNOSIS — Z95.0 PACEMAKER: ICD-10-CM

## 2023-04-27 DIAGNOSIS — I10 ESSENTIAL HYPERTENSION: ICD-10-CM

## 2023-04-27 DIAGNOSIS — Z79.4 TYPE 2 DIABETES MELLITUS WITH DIABETIC POLYNEUROPATHY, WITH LONG-TERM CURRENT USE OF INSULIN: Primary | ICD-10-CM

## 2023-04-27 DIAGNOSIS — I50.32 CHRONIC DIASTOLIC HEART FAILURE: ICD-10-CM

## 2023-04-27 DIAGNOSIS — E11.42 TYPE 2 DIABETES MELLITUS WITH DIABETIC POLYNEUROPATHY, WITH LONG-TERM CURRENT USE OF INSULIN: Primary | ICD-10-CM

## 2023-04-27 DIAGNOSIS — E55.9 VITAMIN D DEFICIENCY: ICD-10-CM

## 2023-04-27 DIAGNOSIS — J43.8 OTHER EMPHYSEMA: ICD-10-CM

## 2023-04-27 DIAGNOSIS — Z78.9 UNCIRCUMCISED MALE: ICD-10-CM

## 2023-04-27 DIAGNOSIS — N18.32 STAGE 3B CHRONIC KIDNEY DISEASE: ICD-10-CM

## 2023-04-27 PROCEDURE — 3288F PR FALLS RISK ASSESSMENT DOCUMENTED: ICD-10-PCS | Mod: HCNC,CPTII,S$GLB, | Performed by: STUDENT IN AN ORGANIZED HEALTH CARE EDUCATION/TRAINING PROGRAM

## 2023-04-27 PROCEDURE — 3078F PR MOST RECENT DIASTOLIC BLOOD PRESSURE < 80 MM HG: ICD-10-PCS | Mod: HCNC,CPTII,S$GLB, | Performed by: STUDENT IN AN ORGANIZED HEALTH CARE EDUCATION/TRAINING PROGRAM

## 2023-04-27 PROCEDURE — 1159F PR MEDICATION LIST DOCUMENTED IN MEDICAL RECORD: ICD-10-PCS | Mod: HCNC,CPTII,S$GLB, | Performed by: STUDENT IN AN ORGANIZED HEALTH CARE EDUCATION/TRAINING PROGRAM

## 2023-04-27 PROCEDURE — 3075F PR MOST RECENT SYSTOLIC BLOOD PRESS GE 130-139MM HG: ICD-10-PCS | Mod: HCNC,CPTII,S$GLB, | Performed by: STUDENT IN AN ORGANIZED HEALTH CARE EDUCATION/TRAINING PROGRAM

## 2023-04-27 PROCEDURE — 3075F SYST BP GE 130 - 139MM HG: CPT | Mod: HCNC,CPTII,S$GLB, | Performed by: STUDENT IN AN ORGANIZED HEALTH CARE EDUCATION/TRAINING PROGRAM

## 2023-04-27 PROCEDURE — 99213 OFFICE O/P EST LOW 20 MIN: CPT | Mod: HCNC,S$GLB,, | Performed by: STUDENT IN AN ORGANIZED HEALTH CARE EDUCATION/TRAINING PROGRAM

## 2023-04-27 PROCEDURE — 3288F FALL RISK ASSESSMENT DOCD: CPT | Mod: HCNC,CPTII,S$GLB, | Performed by: STUDENT IN AN ORGANIZED HEALTH CARE EDUCATION/TRAINING PROGRAM

## 2023-04-27 PROCEDURE — 99999 PR PBB SHADOW E&M-EST. PATIENT-LVL V: CPT | Mod: PBBFAC,HCNC,, | Performed by: STUDENT IN AN ORGANIZED HEALTH CARE EDUCATION/TRAINING PROGRAM

## 2023-04-27 PROCEDURE — 1101F PR PT FALLS ASSESS DOC 0-1 FALLS W/OUT INJ PAST YR: ICD-10-PCS | Mod: HCNC,CPTII,S$GLB, | Performed by: STUDENT IN AN ORGANIZED HEALTH CARE EDUCATION/TRAINING PROGRAM

## 2023-04-27 PROCEDURE — 3078F DIAST BP <80 MM HG: CPT | Mod: HCNC,CPTII,S$GLB, | Performed by: STUDENT IN AN ORGANIZED HEALTH CARE EDUCATION/TRAINING PROGRAM

## 2023-04-27 PROCEDURE — 1101F PT FALLS ASSESS-DOCD LE1/YR: CPT | Mod: HCNC,CPTII,S$GLB, | Performed by: STUDENT IN AN ORGANIZED HEALTH CARE EDUCATION/TRAINING PROGRAM

## 2023-04-27 PROCEDURE — 99999 PR PBB SHADOW E&M-EST. PATIENT-LVL V: ICD-10-PCS | Mod: PBBFAC,HCNC,, | Performed by: STUDENT IN AN ORGANIZED HEALTH CARE EDUCATION/TRAINING PROGRAM

## 2023-04-27 PROCEDURE — 1126F AMNT PAIN NOTED NONE PRSNT: CPT | Mod: HCNC,CPTII,S$GLB, | Performed by: STUDENT IN AN ORGANIZED HEALTH CARE EDUCATION/TRAINING PROGRAM

## 2023-04-27 PROCEDURE — 1126F PR PAIN SEVERITY QUANTIFIED, NO PAIN PRESENT: ICD-10-PCS | Mod: HCNC,CPTII,S$GLB, | Performed by: STUDENT IN AN ORGANIZED HEALTH CARE EDUCATION/TRAINING PROGRAM

## 2023-04-27 PROCEDURE — 99213 PR OFFICE/OUTPT VISIT, EST, LEVL III, 20-29 MIN: ICD-10-PCS | Mod: HCNC,S$GLB,, | Performed by: STUDENT IN AN ORGANIZED HEALTH CARE EDUCATION/TRAINING PROGRAM

## 2023-04-27 PROCEDURE — 1159F MED LIST DOCD IN RCRD: CPT | Mod: HCNC,CPTII,S$GLB, | Performed by: STUDENT IN AN ORGANIZED HEALTH CARE EDUCATION/TRAINING PROGRAM

## 2023-04-27 RX ORDER — INSULIN GLARGINE 100 [IU]/ML
INJECTION, SOLUTION SUBCUTANEOUS
COMMUNITY
Start: 2023-02-28 | End: 2023-08-01 | Stop reason: SDUPTHER

## 2023-04-27 NOTE — PROGRESS NOTES
Subjective:       Patient ID: Nestor Garcia is a 78 y.o. male.    Chief Complaint: Hospital Follow Up (Pt had pacemaker placed 2-3 weeks ago and was placed in hospital due to fluid build up from surgery. )    Pt here for follow-up    Since last visit he had a pacemaker placed for sick sinus syndrome.     He reports swelling in the groin with CHF exacerbation recently. He had swelling of the penis. He is uncircumcised. When he was swollen he had some issues retracting his foreskin.    He was having hypoglycemic episodes so he stopped his short acting insulin. He uses sliding scale. Continues on lantus 20u nightly. Last A1c at goal.    His recent vit D level was low and he is now on 50k units weekly.    Emphysema: found on CT. Stable from resp standpoint.    Review of Systems   Constitutional:  Negative for activity change, chills, fever and unexpected weight change.   HENT:  Negative for congestion, hearing loss, rhinorrhea, sore throat and trouble swallowing.    Eyes:  Negative for discharge and visual disturbance.   Respiratory:  Negative for cough, chest tightness, shortness of breath and wheezing.    Cardiovascular:  Positive for leg swelling (much better). Negative for chest pain and palpitations.   Gastrointestinal:  Negative for abdominal pain, blood in stool, constipation, diarrhea, nausea and vomiting.   Endocrine: Negative for polydipsia and polyuria.   Genitourinary:  Positive for scrotal swelling. Negative for difficulty urinating, dysuria, hematuria and urgency.   Musculoskeletal:  Negative for arthralgias, joint swelling and neck pain.   Neurological:  Negative for dizziness, weakness and light-headedness.   Psychiatric/Behavioral:  Negative for confusion. The patient is not nervous/anxious.      Objective:      Physical Exam   Constitutional: He is oriented to person, place, and time. No distress.   HENT:   Head: Normocephalic and atraumatic.   Mouth/Throat: Mucous membranes are moist.   Eyes:  Conjunctivae are normal.   Cardiovascular: Normal rate, regular rhythm and normal heart sounds.   No murmur heard.  Pulmonary/Chest: Effort normal and breath sounds normal. No respiratory distress.   Musculoskeletal:      Cervical back: Neck supple.   Neurological: He is alert and oriented to person, place, and time.   Psychiatric: His behavior is normal. Mood normal.   Vitals reviewed.    Assessment:       1. Type 2 diabetes mellitus with diabetic polyneuropathy, with long-term current use of insulin    2. Stage 3b chronic kidney disease    3. Chronic diastolic heart failure    4. Essential hypertension    5. Vitamin D deficiency    6. Pacemaker    7. Uncircumcised male        Plan:           1. Type 2 diabetes mellitus with diabetic polyneuropathy, with long-term current use of insulin  Overview:  Cont lantus  Lab Results   Component Value Date    HGBA1C 7.5 (H) 08/19/2022         Orders:  -     Ambulatory referral/consult to Ophthalmology; Future; Expected date: 05/04/2023    2. Stage 3b chronic kidney disease    3. Chronic diastolic heart failure  Overview:  Cont metoprolol  bumex PRN  Compression socks      4. Essential hypertension  Overview:  Stable on procardia XL 30mg daily, toprol XL 25mg BID, imdur 60mg daily, and hydralazine      5. Vitamin D deficiency    6. Pacemaker    7. Uncircumcised male  -     Ambulatory referral/consult to Urology; Future; Expected date: 05/04/2023    Cont current meds  Recent labs reviewed  Refer ophthalmology for annual eye exam  Refer urology; he would like to discuss potential circumcision due to recurrent swelling with CHF and tight foreskin  RTC 6 months or sooner if needed

## 2023-04-28 ENCOUNTER — TELEPHONE (OUTPATIENT)
Dept: FAMILY MEDICINE | Facility: CLINIC | Age: 78
End: 2023-04-28
Payer: MEDICARE

## 2023-04-28 DIAGNOSIS — Z78.9 UNCIRCUMCISED MALE: Primary | ICD-10-CM

## 2023-04-28 NOTE — TELEPHONE ENCOUNTER
----- Message from Edgardo Esparza sent at 2023  4:26 PM CDT -----  Nestor Garcia  MRN: [id number]  : 1945  PCP: Kashmir Conn  Home Phone      [phone]  Work Phone      Not on file.  [employer]          [phone]      MESSAGE:     [facility] called and stated they received a referral for this pt but they are not in network with there health insurance.        [phone]

## 2023-04-28 NOTE — TELEPHONE ENCOUNTER
Ok, can you let patient know and ask if he wants to go to [place] or [facility]?

## 2023-04-28 NOTE — TELEPHONE ENCOUNTER
[facility] does not accept pts insurance requesting referral sent to someone else do you have any recommendations?

## 2023-05-01 ENCOUNTER — LAB VISIT (OUTPATIENT)
Dept: LAB | Facility: HOSPITAL | Age: 78
End: 2023-05-01
Attending: INTERNAL MEDICINE
Payer: MEDICARE

## 2023-05-01 DIAGNOSIS — N18.30 HYPERTENSIVE HEART AND CHRONIC KIDNEY DISEASE STAGE 3: ICD-10-CM

## 2023-05-01 DIAGNOSIS — D64.9 ANEMIA, UNSPECIFIED TYPE: ICD-10-CM

## 2023-05-01 DIAGNOSIS — I13.10 HYPERTENSIVE HEART AND CHRONIC KIDNEY DISEASE STAGE 3: ICD-10-CM

## 2023-05-01 DIAGNOSIS — N18.30 CKD STAGE 3 DUE TO TYPE 2 DIABETES MELLITUS: ICD-10-CM

## 2023-05-01 DIAGNOSIS — E11.22 CKD STAGE 3 DUE TO TYPE 2 DIABETES MELLITUS: ICD-10-CM

## 2023-05-01 LAB
ALBUMIN SERPL BCP-MCNC: 3.2 G/DL (ref 3.5–5.2)
ALP SERPL-CCNC: 76 U/L (ref 55–135)
ALT SERPL W/O P-5'-P-CCNC: 14 U/L (ref 10–44)
ANION GAP SERPL CALC-SCNC: 12 MMOL/L (ref 8–16)
AST SERPL-CCNC: 20 U/L (ref 10–40)
BACTERIA #/AREA URNS HPF: NORMAL /HPF
BASOPHILS # BLD AUTO: 0.06 K/UL (ref 0–0.2)
BASOPHILS NFR BLD: 0.9 % (ref 0–1.9)
BILIRUB SERPL-MCNC: 0.5 MG/DL (ref 0.1–1)
BILIRUB UR QL STRIP: NEGATIVE
BUN SERPL-MCNC: 67 MG/DL (ref 8–23)
CALCIUM SERPL-MCNC: 8.5 MG/DL (ref 8.7–10.5)
CHLORIDE SERPL-SCNC: 105 MMOL/L (ref 95–110)
CLARITY UR: CLEAR
CO2 SERPL-SCNC: 23 MMOL/L (ref 23–29)
COLOR UR: YELLOW
CREAT SERPL-MCNC: 2.8 MG/DL (ref 0.5–1.4)
CREAT UR-MCNC: 66.8 MG/DL (ref 23–375)
DIFFERENTIAL METHOD: ABNORMAL
EOSINOPHIL # BLD AUTO: 0.2 K/UL (ref 0–0.5)
EOSINOPHIL NFR BLD: 2.7 % (ref 0–8)
ERYTHROCYTE [DISTWIDTH] IN BLOOD BY AUTOMATED COUNT: 13.7 % (ref 11.5–14.5)
EST. GFR  (NO RACE VARIABLE): 22 ML/MIN/1.73 M^2
FERRITIN SERPL-MCNC: 143 NG/ML (ref 20–300)
GLUCOSE SERPL-MCNC: 117 MG/DL (ref 70–110)
GLUCOSE UR QL STRIP: NEGATIVE
HCT VFR BLD AUTO: 31.6 % (ref 40–54)
HGB BLD-MCNC: 10.2 G/DL (ref 14–18)
HGB UR QL STRIP: NEGATIVE
HYALINE CASTS #/AREA URNS LPF: 0 /LPF
IMM GRANULOCYTES # BLD AUTO: 0.03 K/UL (ref 0–0.04)
IMM GRANULOCYTES NFR BLD AUTO: 0.5 % (ref 0–0.5)
IRON SERPL-MCNC: 92 UG/DL (ref 45–160)
KETONES UR QL STRIP: NEGATIVE
LEUKOCYTE ESTERASE UR QL STRIP: NEGATIVE
LYMPHOCYTES # BLD AUTO: 1 K/UL (ref 1–4.8)
LYMPHOCYTES NFR BLD: 15.6 % (ref 18–48)
MAGNESIUM SERPL-MCNC: 2 MG/DL (ref 1.6–2.6)
MCH RBC QN AUTO: 30.5 PG (ref 27–31)
MCHC RBC AUTO-ENTMCNC: 32.3 G/DL (ref 32–36)
MCV RBC AUTO: 95 FL (ref 82–98)
MICROSCOPIC COMMENT: NORMAL
MONOCYTES # BLD AUTO: 0.7 K/UL (ref 0.3–1)
MONOCYTES NFR BLD: 11.2 % (ref 4–15)
NEUTROPHILS # BLD AUTO: 4.4 K/UL (ref 1.8–7.7)
NEUTROPHILS NFR BLD: 69.1 % (ref 38–73)
NITRITE UR QL STRIP: NEGATIVE
NRBC BLD-RTO: 0 /100 WBC
PH UR STRIP: 5 [PH] (ref 5–8)
PHOSPHATE SERPL-MCNC: 5.8 MG/DL (ref 2.7–4.5)
PLATELET # BLD AUTO: 237 K/UL (ref 150–450)
PMV BLD AUTO: 11 FL (ref 9.2–12.9)
POTASSIUM SERPL-SCNC: 4.9 MMOL/L (ref 3.5–5.1)
PROT SERPL-MCNC: 5.9 G/DL (ref 6–8.4)
PROT UR QL STRIP: ABNORMAL
PROT UR-MCNC: 365 MG/DL (ref 0–15)
PROT/CREAT UR: 5.46 MG/G{CREAT} (ref 0–0.2)
PTH-INTACT SERPL-MCNC: 247.2 PG/ML (ref 9–77)
RBC # BLD AUTO: 3.34 M/UL (ref 4.6–6.2)
RBC #/AREA URNS HPF: 3 /HPF (ref 0–4)
SATURATED IRON: 38 % (ref 20–50)
SODIUM SERPL-SCNC: 140 MMOL/L (ref 136–145)
SP GR UR STRIP: 1.02 (ref 1–1.03)
TOTAL IRON BINDING CAPACITY: 241 UG/DL (ref 250–450)
TRANSFERRIN SERPL-MCNC: 163 MG/DL (ref 200–375)
URATE SERPL-MCNC: 10.2 MG/DL (ref 3.4–7)
URN SPEC COLLECT METH UR: ABNORMAL
UROBILINOGEN UR STRIP-ACNC: NEGATIVE EU/DL
WBC # BLD AUTO: 6.34 K/UL (ref 3.9–12.7)
WBC #/AREA URNS HPF: 0 /HPF (ref 0–5)

## 2023-05-01 PROCEDURE — 84100 ASSAY OF PHOSPHORUS: CPT | Mod: HCNC | Performed by: INTERNAL MEDICINE

## 2023-05-01 PROCEDURE — 84550 ASSAY OF BLOOD/URIC ACID: CPT | Mod: HCNC | Performed by: INTERNAL MEDICINE

## 2023-05-01 PROCEDURE — 84466 ASSAY OF TRANSFERRIN: CPT | Mod: HCNC | Performed by: INTERNAL MEDICINE

## 2023-05-01 PROCEDURE — 83970 ASSAY OF PARATHORMONE: CPT | Mod: HCNC | Performed by: INTERNAL MEDICINE

## 2023-05-01 PROCEDURE — 84156 ASSAY OF PROTEIN URINE: CPT | Mod: HCNC | Performed by: INTERNAL MEDICINE

## 2023-05-01 PROCEDURE — 82728 ASSAY OF FERRITIN: CPT | Mod: HCNC | Performed by: INTERNAL MEDICINE

## 2023-05-01 PROCEDURE — 80053 COMPREHEN METABOLIC PANEL: CPT | Mod: HCNC | Performed by: INTERNAL MEDICINE

## 2023-05-01 PROCEDURE — 85025 COMPLETE CBC W/AUTO DIFF WBC: CPT | Mod: HCNC | Performed by: INTERNAL MEDICINE

## 2023-05-01 PROCEDURE — 36415 COLL VENOUS BLD VENIPUNCTURE: CPT | Mod: HCNC | Performed by: INTERNAL MEDICINE

## 2023-05-01 PROCEDURE — 83735 ASSAY OF MAGNESIUM: CPT | Mod: HCNC | Performed by: INTERNAL MEDICINE

## 2023-05-01 PROCEDURE — 81000 URINALYSIS NONAUTO W/SCOPE: CPT | Mod: HCNC | Performed by: INTERNAL MEDICINE

## 2023-05-04 NOTE — TELEPHONE ENCOUNTER
Attempted to contact pt to discuss who he'd prefer seeing as original urologist does not accept insurance. Na nitam

## 2023-07-13 ENCOUNTER — PATIENT MESSAGE (OUTPATIENT)
Dept: FAMILY MEDICINE | Facility: CLINIC | Age: 78
End: 2023-07-13
Payer: MEDICARE

## 2023-07-18 ENCOUNTER — OFFICE VISIT (OUTPATIENT)
Dept: FAMILY MEDICINE | Facility: CLINIC | Age: 78
End: 2023-07-18
Payer: MEDICARE

## 2023-07-18 VITALS
RESPIRATION RATE: 12 BRPM | SYSTOLIC BLOOD PRESSURE: 118 MMHG | DIASTOLIC BLOOD PRESSURE: 70 MMHG | HEART RATE: 60 BPM | BODY MASS INDEX: 31.7 KG/M2 | WEIGHT: 221.44 LBS | HEIGHT: 70 IN

## 2023-07-18 DIAGNOSIS — Z01.818 PRE-OP EVALUATION: Primary | ICD-10-CM

## 2023-07-18 PROCEDURE — 99999 PR PBB SHADOW E&M-EST. PATIENT-LVL III: CPT | Mod: PBBFAC,HCNC,, | Performed by: STUDENT IN AN ORGANIZED HEALTH CARE EDUCATION/TRAINING PROGRAM

## 2023-07-18 PROCEDURE — 1159F PR MEDICATION LIST DOCUMENTED IN MEDICAL RECORD: ICD-10-PCS | Mod: HCNC,CPTII,S$GLB, | Performed by: STUDENT IN AN ORGANIZED HEALTH CARE EDUCATION/TRAINING PROGRAM

## 2023-07-18 PROCEDURE — 3078F PR MOST RECENT DIASTOLIC BLOOD PRESSURE < 80 MM HG: ICD-10-PCS | Mod: HCNC,CPTII,S$GLB, | Performed by: STUDENT IN AN ORGANIZED HEALTH CARE EDUCATION/TRAINING PROGRAM

## 2023-07-18 PROCEDURE — 99213 PR OFFICE/OUTPT VISIT, EST, LEVL III, 20-29 MIN: ICD-10-PCS | Mod: HCNC,S$GLB,, | Performed by: STUDENT IN AN ORGANIZED HEALTH CARE EDUCATION/TRAINING PROGRAM

## 2023-07-18 PROCEDURE — 1126F PR PAIN SEVERITY QUANTIFIED, NO PAIN PRESENT: ICD-10-PCS | Mod: HCNC,CPTII,S$GLB, | Performed by: STUDENT IN AN ORGANIZED HEALTH CARE EDUCATION/TRAINING PROGRAM

## 2023-07-18 PROCEDURE — 3288F FALL RISK ASSESSMENT DOCD: CPT | Mod: HCNC,CPTII,S$GLB, | Performed by: STUDENT IN AN ORGANIZED HEALTH CARE EDUCATION/TRAINING PROGRAM

## 2023-07-18 PROCEDURE — 3078F DIAST BP <80 MM HG: CPT | Mod: HCNC,CPTII,S$GLB, | Performed by: STUDENT IN AN ORGANIZED HEALTH CARE EDUCATION/TRAINING PROGRAM

## 2023-07-18 PROCEDURE — 3288F PR FALLS RISK ASSESSMENT DOCUMENTED: ICD-10-PCS | Mod: HCNC,CPTII,S$GLB, | Performed by: STUDENT IN AN ORGANIZED HEALTH CARE EDUCATION/TRAINING PROGRAM

## 2023-07-18 PROCEDURE — 1159F MED LIST DOCD IN RCRD: CPT | Mod: HCNC,CPTII,S$GLB, | Performed by: STUDENT IN AN ORGANIZED HEALTH CARE EDUCATION/TRAINING PROGRAM

## 2023-07-18 PROCEDURE — 1101F PT FALLS ASSESS-DOCD LE1/YR: CPT | Mod: HCNC,CPTII,S$GLB, | Performed by: STUDENT IN AN ORGANIZED HEALTH CARE EDUCATION/TRAINING PROGRAM

## 2023-07-18 PROCEDURE — 3074F PR MOST RECENT SYSTOLIC BLOOD PRESSURE < 130 MM HG: ICD-10-PCS | Mod: HCNC,CPTII,S$GLB, | Performed by: STUDENT IN AN ORGANIZED HEALTH CARE EDUCATION/TRAINING PROGRAM

## 2023-07-18 PROCEDURE — 1101F PR PT FALLS ASSESS DOC 0-1 FALLS W/OUT INJ PAST YR: ICD-10-PCS | Mod: HCNC,CPTII,S$GLB, | Performed by: STUDENT IN AN ORGANIZED HEALTH CARE EDUCATION/TRAINING PROGRAM

## 2023-07-18 PROCEDURE — 99213 OFFICE O/P EST LOW 20 MIN: CPT | Mod: HCNC,S$GLB,, | Performed by: STUDENT IN AN ORGANIZED HEALTH CARE EDUCATION/TRAINING PROGRAM

## 2023-07-18 PROCEDURE — 3074F SYST BP LT 130 MM HG: CPT | Mod: HCNC,CPTII,S$GLB, | Performed by: STUDENT IN AN ORGANIZED HEALTH CARE EDUCATION/TRAINING PROGRAM

## 2023-07-18 PROCEDURE — 99999 PR PBB SHADOW E&M-EST. PATIENT-LVL III: ICD-10-PCS | Mod: PBBFAC,HCNC,, | Performed by: STUDENT IN AN ORGANIZED HEALTH CARE EDUCATION/TRAINING PROGRAM

## 2023-07-18 PROCEDURE — 1126F AMNT PAIN NOTED NONE PRSNT: CPT | Mod: HCNC,CPTII,S$GLB, | Performed by: STUDENT IN AN ORGANIZED HEALTH CARE EDUCATION/TRAINING PROGRAM

## 2023-07-18 RX ORDER — HYDRALAZINE HYDROCHLORIDE 100 MG/1
100 TABLET, FILM COATED ORAL 3 TIMES DAILY
COMMUNITY
Start: 2023-05-25

## 2023-07-26 NOTE — PROGRESS NOTES
Pt phoned requesting a refil on a prescription eye drop that he was prescribed here a few weeks ago. I tried to reach him but had to leave a message on his answering machine. I will refill that but I asked that he be sure he is doing warm compresses to his eye 3 times a day and also doing eyelid scrubs with baby shampoo daily. If not better after this then he will need to return or see an eye specialist. Left message for him to call back with questions or problems   Subjective:       Patient ID: Nestor Garcia is a 78 y.o. male.    Chief Complaint: Pre-op Exam (Pt here for surgical clearance for surgery on eye on 08/23/23/)    Pt here for pre-op clearance. He is scheduld to have left cataract surgery under monitored anesthesia in Aug 2023 with Dr. Rodriguez at [facility].    He is doing well and has no other complaints.     Review of Systems   Constitutional:  Negative for chills and fever.   HENT:  Negative for congestion and sore throat.    Respiratory:  Negative for cough and shortness of breath.    Cardiovascular:  Negative for chest pain.   Gastrointestinal:  Negative for abdominal pain, diarrhea, nausea and vomiting.   Genitourinary:  Negative for dysuria and hematuria.   Neurological:  Negative for dizziness, syncope and light-headedness.     Objective:      Physical Exam  Vitals reviewed.   Constitutional:       General: He is not in acute distress.  HENT:      Head: Normocephalic and atraumatic.      Mouth/Throat:      Mouth: Mucous membranes are moist.   Eyes:      Conjunctiva/sclera: Conjunctivae normal.   Cardiovascular:      Rate and Rhythm: Normal rate and regular rhythm.      Heart sounds: Normal heart sounds. No murmur heard.  Pulmonary:      Effort: Pulmonary effort is normal. No respiratory distress.      Breath sounds: Normal breath sounds.   Musculoskeletal:      Cervical back: Neck supple.   Neurological:      Mental Status: He is alert and oriented to person, place, and time.   Psychiatric:         Mood and Affect: Mood normal.         Behavior: Behavior normal.       Assessment:       1. Pre-op evaluation        Plan:           1. Pre-op evaluation    Pt is medically optimized for cataract surgery under conscious sedation    RTC for worsening symptoms or failure to improve, or RTC PRN/as scheduled

## 2023-07-28 ENCOUNTER — LAB VISIT (OUTPATIENT)
Dept: LAB | Facility: HOSPITAL | Age: 78
End: 2023-07-28
Attending: INTERNAL MEDICINE
Payer: MEDICARE

## 2023-07-28 DIAGNOSIS — E55.9 VITAMIN D DEFICIENCY: ICD-10-CM

## 2023-07-28 DIAGNOSIS — N18.30 CKD STAGE 3 DUE TO TYPE 2 DIABETES MELLITUS: ICD-10-CM

## 2023-07-28 DIAGNOSIS — D63.1 ANEMIA IN STAGE 3B CHRONIC KIDNEY DISEASE: ICD-10-CM

## 2023-07-28 DIAGNOSIS — E21.1 HYPERPARATHYROIDISM DUE TO VITAMIN D DEFICIENCY: ICD-10-CM

## 2023-07-28 DIAGNOSIS — R80.1 PERSISTENT PROTEINURIA: ICD-10-CM

## 2023-07-28 DIAGNOSIS — N18.32 ANEMIA IN STAGE 3B CHRONIC KIDNEY DISEASE: ICD-10-CM

## 2023-07-28 DIAGNOSIS — I10 HYPERTENSION, ESSENTIAL: ICD-10-CM

## 2023-07-28 DIAGNOSIS — N18.32 STAGE 3B CHRONIC KIDNEY DISEASE: ICD-10-CM

## 2023-07-28 DIAGNOSIS — E11.22 CKD STAGE 3 DUE TO TYPE 2 DIABETES MELLITUS: ICD-10-CM

## 2023-07-28 LAB
ALBUMIN SERPL BCP-MCNC: 2.8 G/DL (ref 3.5–5.2)
ALP SERPL-CCNC: 84 U/L (ref 55–135)
ALT SERPL W/O P-5'-P-CCNC: 11 U/L (ref 10–44)
AMORPH CRY URNS QL MICRO: ABNORMAL
ANION GAP SERPL CALC-SCNC: 10 MMOL/L (ref 8–16)
AST SERPL-CCNC: 15 U/L (ref 10–40)
BACTERIA #/AREA URNS HPF: ABNORMAL /HPF
BASOPHILS # BLD AUTO: 0.04 K/UL (ref 0–0.2)
BASOPHILS NFR BLD: 0.6 % (ref 0–1.9)
BILIRUB SERPL-MCNC: 0.2 MG/DL (ref 0.1–1)
BILIRUB UR QL STRIP: NEGATIVE
BUN SERPL-MCNC: 42 MG/DL (ref 8–23)
CALCIUM SERPL-MCNC: 8.1 MG/DL (ref 8.7–10.5)
CHLORIDE SERPL-SCNC: 113 MMOL/L (ref 95–110)
CLARITY UR: CLEAR
CO2 SERPL-SCNC: 20 MMOL/L (ref 23–29)
COLOR UR: YELLOW
CREAT SERPL-MCNC: 2.8 MG/DL (ref 0.5–1.4)
CREAT UR-MCNC: 82.7 MG/DL (ref 23–375)
DIFFERENTIAL METHOD: ABNORMAL
EOSINOPHIL # BLD AUTO: 0.2 K/UL (ref 0–0.5)
EOSINOPHIL NFR BLD: 2.4 % (ref 0–8)
ERYTHROCYTE [DISTWIDTH] IN BLOOD BY AUTOMATED COUNT: 14.6 % (ref 11.5–14.5)
EST. GFR  (NO RACE VARIABLE): 22 ML/MIN/1.73 M^2
GLUCOSE SERPL-MCNC: 138 MG/DL (ref 70–110)
GLUCOSE UR QL STRIP: NEGATIVE
GRAN CASTS #/AREA URNS LPF: 2 /LPF
HCT VFR BLD AUTO: 26.8 % (ref 40–54)
HGB BLD-MCNC: 8.7 G/DL (ref 14–18)
HGB UR QL STRIP: NEGATIVE
HYALINE CASTS #/AREA URNS LPF: 5 /LPF
IMM GRANULOCYTES # BLD AUTO: 0.03 K/UL (ref 0–0.04)
IMM GRANULOCYTES NFR BLD AUTO: 0.5 % (ref 0–0.5)
IRON SERPL-MCNC: 70 UG/DL (ref 45–160)
KETONES UR QL STRIP: NEGATIVE
LEUKOCYTE ESTERASE UR QL STRIP: NEGATIVE
LYMPHOCYTES # BLD AUTO: 1.1 K/UL (ref 1–4.8)
LYMPHOCYTES NFR BLD: 18 % (ref 18–48)
MAGNESIUM SERPL-MCNC: 1.7 MG/DL (ref 1.6–2.6)
MCH RBC QN AUTO: 31.2 PG (ref 27–31)
MCHC RBC AUTO-ENTMCNC: 32.5 G/DL (ref 32–36)
MCV RBC AUTO: 96 FL (ref 82–98)
MICROSCOPIC COMMENT: ABNORMAL
MONOCYTES # BLD AUTO: 0.7 K/UL (ref 0.3–1)
MONOCYTES NFR BLD: 10.7 % (ref 4–15)
NEUTROPHILS # BLD AUTO: 4.3 K/UL (ref 1.8–7.7)
NEUTROPHILS NFR BLD: 67.8 % (ref 38–73)
NITRITE UR QL STRIP: NEGATIVE
NRBC BLD-RTO: 0 /100 WBC
PH UR STRIP: 6 [PH] (ref 5–8)
PHOSPHATE SERPL-MCNC: 5.2 MG/DL (ref 2.7–4.5)
PLATELET # BLD AUTO: 207 K/UL (ref 150–450)
PMV BLD AUTO: 11.5 FL (ref 9.2–12.9)
POTASSIUM SERPL-SCNC: 4.2 MMOL/L (ref 3.5–5.1)
PROT SERPL-MCNC: 5.4 G/DL (ref 6–8.4)
PROT UR QL STRIP: ABNORMAL
PROT UR-MCNC: 413 MG/DL (ref 0–15)
PROT/CREAT UR: 4.99 MG/G{CREAT} (ref 0–0.2)
PTH-INTACT SERPL-MCNC: 182.9 PG/ML (ref 9–77)
RBC # BLD AUTO: 2.79 M/UL (ref 4.6–6.2)
RBC #/AREA URNS HPF: 14 /HPF (ref 0–4)
SATURATED IRON: 31 % (ref 20–50)
SODIUM SERPL-SCNC: 143 MMOL/L (ref 136–145)
SP GR UR STRIP: 1.02 (ref 1–1.03)
TOTAL IRON BINDING CAPACITY: 223 UG/DL (ref 250–450)
TRANSFERRIN SERPL-MCNC: 151 MG/DL (ref 200–375)
URATE SERPL-MCNC: 6.6 MG/DL (ref 3.4–7)
URN SPEC COLLECT METH UR: ABNORMAL
UROBILINOGEN UR STRIP-ACNC: NEGATIVE EU/DL
WBC # BLD AUTO: 6.28 K/UL (ref 3.9–12.7)
WBC #/AREA URNS HPF: 4 /HPF (ref 0–5)

## 2023-07-28 PROCEDURE — 84550 ASSAY OF BLOOD/URIC ACID: CPT | Mod: HCNC | Performed by: INTERNAL MEDICINE

## 2023-07-28 PROCEDURE — 36415 COLL VENOUS BLD VENIPUNCTURE: CPT | Mod: HCNC | Performed by: INTERNAL MEDICINE

## 2023-07-28 PROCEDURE — 81000 URINALYSIS NONAUTO W/SCOPE: CPT | Mod: HCNC | Performed by: INTERNAL MEDICINE

## 2023-07-28 PROCEDURE — 84156 ASSAY OF PROTEIN URINE: CPT | Mod: HCNC | Performed by: INTERNAL MEDICINE

## 2023-07-28 PROCEDURE — 84100 ASSAY OF PHOSPHORUS: CPT | Mod: HCNC | Performed by: INTERNAL MEDICINE

## 2023-07-28 PROCEDURE — 83970 ASSAY OF PARATHORMONE: CPT | Mod: HCNC | Performed by: INTERNAL MEDICINE

## 2023-07-28 PROCEDURE — 82728 ASSAY OF FERRITIN: CPT | Mod: HCNC | Performed by: INTERNAL MEDICINE

## 2023-07-28 PROCEDURE — 85025 COMPLETE CBC W/AUTO DIFF WBC: CPT | Mod: HCNC | Performed by: INTERNAL MEDICINE

## 2023-07-28 PROCEDURE — 84466 ASSAY OF TRANSFERRIN: CPT | Mod: HCNC | Performed by: INTERNAL MEDICINE

## 2023-07-28 PROCEDURE — 83735 ASSAY OF MAGNESIUM: CPT | Mod: HCNC | Performed by: INTERNAL MEDICINE

## 2023-07-28 PROCEDURE — 80053 COMPREHEN METABOLIC PANEL: CPT | Mod: HCNC | Performed by: INTERNAL MEDICINE

## 2023-07-29 LAB — FERRITIN SERPL-MCNC: 152 NG/ML (ref 20–300)

## 2023-07-31 PROBLEM — D63.1 ANEMIA OF CHRONIC RENAL FAILURE: Status: ACTIVE | Noted: 2023-07-31

## 2023-07-31 PROBLEM — N18.9 ANEMIA OF CHRONIC RENAL FAILURE: Status: ACTIVE | Noted: 2023-07-31

## 2023-07-31 PROBLEM — D50.9 IRON DEFICIENCY ANEMIA, UNSPECIFIED: Status: ACTIVE | Noted: 2023-07-31

## 2023-08-01 ENCOUNTER — PATIENT MESSAGE (OUTPATIENT)
Dept: FAMILY MEDICINE | Facility: CLINIC | Age: 78
End: 2023-08-01
Payer: MEDICARE

## 2023-08-01 DIAGNOSIS — Z79.4 TYPE 2 DIABETES MELLITUS WITH DIABETIC POLYNEUROPATHY, WITH LONG-TERM CURRENT USE OF INSULIN: Primary | ICD-10-CM

## 2023-08-01 DIAGNOSIS — E11.42 TYPE 2 DIABETES MELLITUS WITH DIABETIC POLYNEUROPATHY, WITH LONG-TERM CURRENT USE OF INSULIN: Primary | ICD-10-CM

## 2023-08-01 RX ORDER — INSULIN GLARGINE 100 [IU]/ML
22 INJECTION, SOLUTION SUBCUTANEOUS DAILY
Qty: 12 ML | Refills: 5 | Status: SHIPPED | OUTPATIENT
Start: 2023-08-01 | End: 2023-08-03

## 2023-08-02 ENCOUNTER — PATIENT MESSAGE (OUTPATIENT)
Dept: FAMILY MEDICINE | Facility: CLINIC | Age: 78
End: 2023-08-02
Payer: MEDICARE

## 2023-08-02 DIAGNOSIS — E11.42 TYPE 2 DIABETES MELLITUS WITH DIABETIC POLYNEUROPATHY, WITH LONG-TERM CURRENT USE OF INSULIN: Primary | ICD-10-CM

## 2023-08-02 DIAGNOSIS — Z79.4 TYPE 2 DIABETES MELLITUS WITH DIABETIC POLYNEUROPATHY, WITH LONG-TERM CURRENT USE OF INSULIN: Primary | ICD-10-CM

## 2023-08-03 RX ORDER — INSULIN GLARGINE 100 [IU]/ML
22 INJECTION, SOLUTION SUBCUTANEOUS DAILY
Qty: 10 ML | Refills: 5 | Status: SHIPPED | OUTPATIENT
Start: 2023-08-03 | End: 2024-08-02

## 2023-08-03 NOTE — TELEPHONE ENCOUNTER
PT's son states that pt is requesting lantus in vials as he much rather prefers drawing up the units then having the pens. Pt currently on 22 units

## 2023-08-16 ENCOUNTER — PATIENT OUTREACH (OUTPATIENT)
Dept: FAMILY MEDICINE | Facility: CLINIC | Age: 78
End: 2023-08-16
Payer: MEDICARE

## 2023-08-25 ENCOUNTER — LAB VISIT (OUTPATIENT)
Dept: LAB | Facility: HOSPITAL | Age: 78
End: 2023-08-25
Attending: INTERNAL MEDICINE
Payer: MEDICARE

## 2023-08-25 DIAGNOSIS — N18.32 STAGE 3B CHRONIC KIDNEY DISEASE: ICD-10-CM

## 2023-08-25 DIAGNOSIS — E87.6 HYPOKALEMIA: ICD-10-CM

## 2023-08-25 DIAGNOSIS — I10 HYPERTENSION, ESSENTIAL: ICD-10-CM

## 2023-08-25 DIAGNOSIS — N18.32 ANEMIA IN STAGE 3B CHRONIC KIDNEY DISEASE: ICD-10-CM

## 2023-08-25 DIAGNOSIS — E11.9 DM (DIABETES MELLITUS): ICD-10-CM

## 2023-08-25 DIAGNOSIS — R80.1 PERSISTENT PROTEINURIA: ICD-10-CM

## 2023-08-25 DIAGNOSIS — E83.39 HYPERPHOSPHATEMIA: ICD-10-CM

## 2023-08-25 DIAGNOSIS — D63.1 ANEMIA IN STAGE 3B CHRONIC KIDNEY DISEASE: ICD-10-CM

## 2023-08-25 DIAGNOSIS — I11.9 HHD (HYPERTENSIVE HEART DISEASE): ICD-10-CM

## 2023-08-25 DIAGNOSIS — I50.9 CHF (CONGESTIVE HEART FAILURE): Primary | ICD-10-CM

## 2023-08-25 DIAGNOSIS — E79.0 HYPERURICEMIA: ICD-10-CM

## 2023-08-25 DIAGNOSIS — E21.1 HYPERPARATHYROIDISM DUE TO VITAMIN D DEFICIENCY: ICD-10-CM

## 2023-08-25 LAB
ALBUMIN SERPL BCP-MCNC: 3.2 G/DL (ref 3.5–5.2)
ALP SERPL-CCNC: 105 U/L (ref 55–135)
ALT SERPL W/O P-5'-P-CCNC: 14 U/L (ref 10–44)
ANION GAP SERPL CALC-SCNC: 11 MMOL/L (ref 8–16)
ANION GAP SERPL CALC-SCNC: 11 MMOL/L (ref 8–16)
AST SERPL-CCNC: 17 U/L (ref 10–40)
BILIRUB DIRECT SERPL-MCNC: 0.2 MG/DL (ref 0.1–0.3)
BILIRUB SERPL-MCNC: 0.4 MG/DL (ref 0.1–1)
BUN SERPL-MCNC: 39 MG/DL (ref 8–23)
BUN SERPL-MCNC: 39 MG/DL (ref 8–23)
CALCIUM SERPL-MCNC: 8.3 MG/DL (ref 8.7–10.5)
CALCIUM SERPL-MCNC: 8.3 MG/DL (ref 8.7–10.5)
CHLORIDE SERPL-SCNC: 112 MMOL/L (ref 95–110)
CHLORIDE SERPL-SCNC: 112 MMOL/L (ref 95–110)
CHOLEST SERPL-MCNC: 144 MG/DL (ref 120–199)
CHOLEST/HDLC SERPL: 4.5 {RATIO} (ref 2–5)
CO2 SERPL-SCNC: 19 MMOL/L (ref 23–29)
CO2 SERPL-SCNC: 19 MMOL/L (ref 23–29)
CREAT SERPL-MCNC: 2.7 MG/DL (ref 0.5–1.4)
CREAT SERPL-MCNC: 2.7 MG/DL (ref 0.5–1.4)
ERYTHROCYTE [DISTWIDTH] IN BLOOD BY AUTOMATED COUNT: 14.6 % (ref 11.5–14.5)
EST. GFR  (NO RACE VARIABLE): 23 ML/MIN/1.73 M^2
EST. GFR  (NO RACE VARIABLE): 23 ML/MIN/1.73 M^2
GLUCOSE SERPL-MCNC: 89 MG/DL (ref 70–110)
GLUCOSE SERPL-MCNC: 89 MG/DL (ref 70–110)
HCT VFR BLD AUTO: 29.7 % (ref 40–54)
HDLC SERPL-MCNC: 32 MG/DL (ref 40–75)
HDLC SERPL: 22.2 % (ref 20–50)
HGB BLD-MCNC: 9.9 G/DL (ref 14–18)
LDLC SERPL CALC-MCNC: 95 MG/DL (ref 63–159)
MAGNESIUM SERPL-MCNC: 1.7 MG/DL (ref 1.6–2.6)
MCH RBC QN AUTO: 32 PG (ref 27–31)
MCHC RBC AUTO-ENTMCNC: 33.3 G/DL (ref 32–36)
MCV RBC AUTO: 96 FL (ref 82–98)
NONHDLC SERPL-MCNC: 112 MG/DL
PLATELET # BLD AUTO: 212 K/UL (ref 150–450)
PMV BLD AUTO: 11.3 FL (ref 9.2–12.9)
POTASSIUM SERPL-SCNC: 4 MMOL/L (ref 3.5–5.1)
POTASSIUM SERPL-SCNC: 4 MMOL/L (ref 3.5–5.1)
PROT SERPL-MCNC: 6 G/DL (ref 6–8.4)
RBC # BLD AUTO: 3.09 M/UL (ref 4.6–6.2)
SODIUM SERPL-SCNC: 142 MMOL/L (ref 136–145)
SODIUM SERPL-SCNC: 142 MMOL/L (ref 136–145)
T3FREE SERPL-MCNC: 2.1 PG/ML (ref 2.3–4.2)
T4 FREE SERPL-MCNC: 0.96 NG/DL (ref 0.71–1.51)
TRIGL SERPL-MCNC: 85 MG/DL (ref 30–150)
TSH SERPL DL<=0.005 MIU/L-ACNC: 4.27 UIU/ML (ref 0.4–4)
WBC # BLD AUTO: 5.83 K/UL (ref 3.9–12.7)

## 2023-08-25 PROCEDURE — 84481 FREE ASSAY (FT-3): CPT | Mod: HCNC | Performed by: INTERNAL MEDICINE

## 2023-08-25 PROCEDURE — 83735 ASSAY OF MAGNESIUM: CPT | Mod: HCNC | Performed by: INTERNAL MEDICINE

## 2023-08-25 PROCEDURE — 80076 HEPATIC FUNCTION PANEL: CPT | Mod: HCNC | Performed by: INTERNAL MEDICINE

## 2023-08-25 PROCEDURE — 36415 COLL VENOUS BLD VENIPUNCTURE: CPT | Mod: HCNC | Performed by: INTERNAL MEDICINE

## 2023-08-25 PROCEDURE — 80048 BASIC METABOLIC PNL TOTAL CA: CPT | Mod: HCNC | Performed by: INTERNAL MEDICINE

## 2023-08-25 PROCEDURE — 85027 COMPLETE CBC AUTOMATED: CPT | Mod: HCNC | Performed by: INTERNAL MEDICINE

## 2023-08-25 PROCEDURE — 84443 ASSAY THYROID STIM HORMONE: CPT | Mod: HCNC | Performed by: INTERNAL MEDICINE

## 2023-08-25 PROCEDURE — 80061 LIPID PANEL: CPT | Mod: HCNC | Performed by: INTERNAL MEDICINE

## 2023-08-25 PROCEDURE — 84439 ASSAY OF FREE THYROXINE: CPT | Mod: HCNC | Performed by: INTERNAL MEDICINE

## 2023-09-14 ENCOUNTER — TELEPHONE (OUTPATIENT)
Dept: FAMILY MEDICINE | Facility: CLINIC | Age: 78
End: 2023-09-14
Payer: MEDICARE

## 2023-09-14 VITALS — DIASTOLIC BLOOD PRESSURE: 58 MMHG | SYSTOLIC BLOOD PRESSURE: 124 MMHG

## 2023-09-17 NOTE — TELEPHONE ENCOUNTER
Follow-up with your rheumatologist.  Take steroids to completion. PLEASE RETURN TO THE EMERGENCY DEPARTMENT IMMEDIATELY for worsening symptoms of the reaction, shortness of breath, wheezing, sensation of your throat is closing, or if you develop any concerning symptoms such as: high fever not relieved by acetaminophen (Tylenol) and/or ibuprofen (Motrin), chills, shortness of breath, chest pain, persistent nausea and/or vomiting, numbness, weakness or tingling in the arms or legs or change in color of the extremities, changes in mental status, persistent headache, blurry vision, unable to follow up with your physician, or other any other care or concern. Left voicemail briefly introducing HDMP.  Encourage to call back for more information.

## 2023-10-04 ENCOUNTER — PATIENT OUTREACH (OUTPATIENT)
Dept: ADMINISTRATIVE | Facility: HOSPITAL | Age: 78
End: 2023-10-04
Payer: MEDICARE

## 2023-10-04 NOTE — LETTER
AUTHORIZATION FOR RELEASE OF   CONFIDENTIAL INFORMATION    Dear Dr. Charles,    We are seeing Nestor Garcia, date of birth 1945, in the clinic at [facility]. Kashmir Conn MD is the patient's PCP. Nestor Garcia has an outstanding lab/procedure at the time we reviewed his chart. In order to help keep his health information updated, he has authorized us to request the following medical record(s):          ( X )  OUTSIDE LAB RESULTS (Lipid Panel)          Please fax records to [facility], Bacon, Michael C., MD Laura Rogers, LPN  Clinical Care Coordinator  [facility]  Phone: [phone]  Fax: [phone]            Patient Name: Nestor Garcia  : 1945  Patient Phone #: [phone]

## 2023-10-04 NOTE — PROGRESS NOTES
Chart reviewed, immunization record updated.  No new results noted on [facility] or [facility] web site.  Care Everywhere updated.   Patient care coordination note  Upcoming PCP visit updated.  Next PCP visit 10/13/2023.  [facility] sent to Dr. Charles for Lipid Panel.  Left detailed message for patient to return call to discuss scheduling eAWV.

## 2023-10-06 ENCOUNTER — PATIENT MESSAGE (OUTPATIENT)
Dept: FAMILY MEDICINE | Facility: CLINIC | Age: 78
End: 2023-10-06
Payer: MEDICARE

## 2023-10-06 NOTE — TELEPHONE ENCOUNTER
.refilMorning Dr Conn, my dad has been dealing with an ongoing problem of cyclical diarrhea. It isnt all the time but quite frequently and urgently. When these episodes occur, he generally cannot make it to the toilet fast enough. Per his nephrologist suggestion, he used Imodium once which blocked him up for three days. I was considering adding a fiber supplement to his diet to bulk up the stool, but I didnt want to interact with medication. What would be best to do in a situation to handle explosive cyclical diarrhea in order to have a more normal regular bowel movement without the uncertainty and limitations of frequent diarrhea?       Please advise.

## 2023-10-13 ENCOUNTER — OFFICE VISIT (OUTPATIENT)
Dept: FAMILY MEDICINE | Facility: CLINIC | Age: 78
End: 2023-10-13
Payer: MEDICARE

## 2023-10-13 VITALS
RESPIRATION RATE: 19 BRPM | BODY MASS INDEX: 32.36 KG/M2 | DIASTOLIC BLOOD PRESSURE: 60 MMHG | HEIGHT: 70 IN | SYSTOLIC BLOOD PRESSURE: 124 MMHG | HEART RATE: 62 BPM | WEIGHT: 226.06 LBS | OXYGEN SATURATION: 96 %

## 2023-10-13 DIAGNOSIS — E55.9 VITAMIN D DEFICIENCY: ICD-10-CM

## 2023-10-13 DIAGNOSIS — N18.32 STAGE 3B CHRONIC KIDNEY DISEASE: ICD-10-CM

## 2023-10-13 DIAGNOSIS — E11.42 TYPE 2 DIABETES MELLITUS WITH DIABETIC POLYNEUROPATHY, WITH LONG-TERM CURRENT USE OF INSULIN: ICD-10-CM

## 2023-10-13 DIAGNOSIS — Z01.818 PRE-OP EVALUATION: Primary | ICD-10-CM

## 2023-10-13 DIAGNOSIS — Z79.4 TYPE 2 DIABETES MELLITUS WITH DIABETIC POLYNEUROPATHY, WITH LONG-TERM CURRENT USE OF INSULIN: ICD-10-CM

## 2023-10-13 PROBLEM — I11.0: Status: ACTIVE | Noted: 2018-03-27

## 2023-10-13 PROBLEM — I50.30: Status: ACTIVE | Noted: 2018-03-27

## 2023-10-13 PROCEDURE — 99214 PR OFFICE/OUTPT VISIT, EST, LEVL IV, 30-39 MIN: ICD-10-PCS | Mod: HCNC,S$GLB,, | Performed by: STUDENT IN AN ORGANIZED HEALTH CARE EDUCATION/TRAINING PROGRAM

## 2023-10-13 PROCEDURE — 99999 PR PBB SHADOW E&M-EST. PATIENT-LVL IV: CPT | Mod: PBBFAC,HCNC,, | Performed by: STUDENT IN AN ORGANIZED HEALTH CARE EDUCATION/TRAINING PROGRAM

## 2023-10-13 PROCEDURE — 1159F MED LIST DOCD IN RCRD: CPT | Mod: HCNC,CPTII,S$GLB, | Performed by: STUDENT IN AN ORGANIZED HEALTH CARE EDUCATION/TRAINING PROGRAM

## 2023-10-13 PROCEDURE — 1159F PR MEDICATION LIST DOCUMENTED IN MEDICAL RECORD: ICD-10-PCS | Mod: HCNC,CPTII,S$GLB, | Performed by: STUDENT IN AN ORGANIZED HEALTH CARE EDUCATION/TRAINING PROGRAM

## 2023-10-13 PROCEDURE — 3074F SYST BP LT 130 MM HG: CPT | Mod: HCNC,CPTII,S$GLB, | Performed by: STUDENT IN AN ORGANIZED HEALTH CARE EDUCATION/TRAINING PROGRAM

## 2023-10-13 PROCEDURE — 1101F PT FALLS ASSESS-DOCD LE1/YR: CPT | Mod: HCNC,CPTII,S$GLB, | Performed by: STUDENT IN AN ORGANIZED HEALTH CARE EDUCATION/TRAINING PROGRAM

## 2023-10-13 PROCEDURE — 3078F DIAST BP <80 MM HG: CPT | Mod: HCNC,CPTII,S$GLB, | Performed by: STUDENT IN AN ORGANIZED HEALTH CARE EDUCATION/TRAINING PROGRAM

## 2023-10-13 PROCEDURE — 1101F PR PT FALLS ASSESS DOC 0-1 FALLS W/OUT INJ PAST YR: ICD-10-PCS | Mod: HCNC,CPTII,S$GLB, | Performed by: STUDENT IN AN ORGANIZED HEALTH CARE EDUCATION/TRAINING PROGRAM

## 2023-10-13 PROCEDURE — 1126F AMNT PAIN NOTED NONE PRSNT: CPT | Mod: HCNC,CPTII,S$GLB, | Performed by: STUDENT IN AN ORGANIZED HEALTH CARE EDUCATION/TRAINING PROGRAM

## 2023-10-13 PROCEDURE — 99999 PR PBB SHADOW E&M-EST. PATIENT-LVL IV: ICD-10-PCS | Mod: PBBFAC,HCNC,, | Performed by: STUDENT IN AN ORGANIZED HEALTH CARE EDUCATION/TRAINING PROGRAM

## 2023-10-13 PROCEDURE — 99214 OFFICE O/P EST MOD 30 MIN: CPT | Mod: HCNC,S$GLB,, | Performed by: STUDENT IN AN ORGANIZED HEALTH CARE EDUCATION/TRAINING PROGRAM

## 2023-10-13 PROCEDURE — 3288F PR FALLS RISK ASSESSMENT DOCUMENTED: ICD-10-PCS | Mod: HCNC,CPTII,S$GLB, | Performed by: STUDENT IN AN ORGANIZED HEALTH CARE EDUCATION/TRAINING PROGRAM

## 2023-10-13 PROCEDURE — 3074F PR MOST RECENT SYSTOLIC BLOOD PRESSURE < 130 MM HG: ICD-10-PCS | Mod: HCNC,CPTII,S$GLB, | Performed by: STUDENT IN AN ORGANIZED HEALTH CARE EDUCATION/TRAINING PROGRAM

## 2023-10-13 PROCEDURE — 3078F PR MOST RECENT DIASTOLIC BLOOD PRESSURE < 80 MM HG: ICD-10-PCS | Mod: HCNC,CPTII,S$GLB, | Performed by: STUDENT IN AN ORGANIZED HEALTH CARE EDUCATION/TRAINING PROGRAM

## 2023-10-13 PROCEDURE — 3288F FALL RISK ASSESSMENT DOCD: CPT | Mod: HCNC,CPTII,S$GLB, | Performed by: STUDENT IN AN ORGANIZED HEALTH CARE EDUCATION/TRAINING PROGRAM

## 2023-10-13 PROCEDURE — 1126F PR PAIN SEVERITY QUANTIFIED, NO PAIN PRESENT: ICD-10-PCS | Mod: HCNC,CPTII,S$GLB, | Performed by: STUDENT IN AN ORGANIZED HEALTH CARE EDUCATION/TRAINING PROGRAM

## 2023-10-13 RX ORDER — TIRZEPATIDE 2.5 MG/.5ML
2.5 INJECTION, SOLUTION SUBCUTANEOUS
Qty: 4 PEN | Refills: 2 | Status: SHIPPED | OUTPATIENT
Start: 2023-10-13 | End: 2024-01-02 | Stop reason: SDUPTHER

## 2023-10-18 ENCOUNTER — TELEPHONE (OUTPATIENT)
Dept: DERMATOLOGY | Facility: CLINIC | Age: 78
End: 2023-10-18
Payer: MEDICARE

## 2023-10-18 NOTE — TELEPHONE ENCOUNTER
Pt informed of bx results proven MIS left ear. Scheduled for consult on 10/23 at 3pm. Pt confirmed time, date, and location. Appt reminder sent in the mail

## 2023-10-19 ENCOUNTER — LAB VISIT (OUTPATIENT)
Dept: LAB | Facility: HOSPITAL | Age: 78
End: 2023-10-19
Attending: INTERNAL MEDICINE
Payer: MEDICARE

## 2023-10-19 DIAGNOSIS — D63.1 ANEMIA IN STAGE 3B CHRONIC KIDNEY DISEASE: ICD-10-CM

## 2023-10-19 DIAGNOSIS — R00.1 SYMPTOMATIC BRADYCARDIA: ICD-10-CM

## 2023-10-19 DIAGNOSIS — E21.1 HYPERPARATHYROIDISM DUE TO VITAMIN D DEFICIENCY: ICD-10-CM

## 2023-10-19 DIAGNOSIS — R00.1 BRADYCARDIA: Primary | ICD-10-CM

## 2023-10-19 DIAGNOSIS — D50.9 IRON DEFICIENCY ANEMIA, UNSPECIFIED IRON DEFICIENCY ANEMIA TYPE: ICD-10-CM

## 2023-10-19 DIAGNOSIS — Z79.4 TYPE 2 DIABETES MELLITUS WITH DIABETIC POLYNEUROPATHY, WITH LONG-TERM CURRENT USE OF INSULIN: ICD-10-CM

## 2023-10-19 DIAGNOSIS — E78.5 DYSLIPIDEMIA: ICD-10-CM

## 2023-10-19 DIAGNOSIS — E83.39 HYPERPHOSPHATEMIA: ICD-10-CM

## 2023-10-19 DIAGNOSIS — R80.1 PERSISTENT PROTEINURIA: ICD-10-CM

## 2023-10-19 DIAGNOSIS — E11.42 TYPE 2 DIABETES MELLITUS WITH DIABETIC POLYNEUROPATHY, WITH LONG-TERM CURRENT USE OF INSULIN: ICD-10-CM

## 2023-10-19 DIAGNOSIS — N18.32 STAGE 3B CHRONIC KIDNEY DISEASE: ICD-10-CM

## 2023-10-19 DIAGNOSIS — E79.0 HYPERURICEMIA: ICD-10-CM

## 2023-10-19 DIAGNOSIS — I10 HYPERTENSION, ESSENTIAL: ICD-10-CM

## 2023-10-19 DIAGNOSIS — E55.9 VITAMIN D DEFICIENCY: ICD-10-CM

## 2023-10-19 DIAGNOSIS — E11.9 DM TYPE 2 (DIABETES MELLITUS, TYPE 2): ICD-10-CM

## 2023-10-19 DIAGNOSIS — N18.32 ANEMIA IN STAGE 3B CHRONIC KIDNEY DISEASE: ICD-10-CM

## 2023-10-19 LAB
25(OH)D3+25(OH)D2 SERPL-MCNC: 26 NG/ML (ref 30–96)
ALBUMIN SERPL BCP-MCNC: 3.1 G/DL (ref 3.5–5.2)
ALBUMIN SERPL BCP-MCNC: 3.1 G/DL (ref 3.5–5.2)
ALBUMIN/CREAT UR: 3173.3 UG/MG (ref 0–30)
ALP SERPL-CCNC: 103 U/L (ref 55–135)
ALP SERPL-CCNC: 103 U/L (ref 55–135)
ALT SERPL W/O P-5'-P-CCNC: 12 U/L (ref 10–44)
ALT SERPL W/O P-5'-P-CCNC: 12 U/L (ref 10–44)
ANION GAP SERPL CALC-SCNC: 10 MMOL/L (ref 8–16)
ANION GAP SERPL CALC-SCNC: 10 MMOL/L (ref 8–16)
AST SERPL-CCNC: 19 U/L (ref 10–40)
AST SERPL-CCNC: 19 U/L (ref 10–40)
BACTERIA #/AREA URNS HPF: ABNORMAL /HPF
BASOPHILS # BLD AUTO: 0.04 K/UL (ref 0–0.2)
BASOPHILS NFR BLD: 0.8 % (ref 0–1.9)
BILIRUB DIRECT SERPL-MCNC: 0.2 MG/DL (ref 0.1–0.3)
BILIRUB SERPL-MCNC: 0.5 MG/DL (ref 0.1–1)
BILIRUB SERPL-MCNC: 0.5 MG/DL (ref 0.1–1)
BILIRUB UR QL STRIP: NEGATIVE
BUN SERPL-MCNC: 29 MG/DL (ref 8–23)
BUN SERPL-MCNC: 29 MG/DL (ref 8–23)
CALCIUM SERPL-MCNC: 7.8 MG/DL (ref 8.7–10.5)
CALCIUM SERPL-MCNC: 7.8 MG/DL (ref 8.7–10.5)
CHLORIDE SERPL-SCNC: 107 MMOL/L (ref 95–110)
CHLORIDE SERPL-SCNC: 107 MMOL/L (ref 95–110)
CHOLEST SERPL-MCNC: 113 MG/DL (ref 120–199)
CHOLEST/HDLC SERPL: 4.3 {RATIO} (ref 2–5)
CLARITY UR: CLEAR
CO2 SERPL-SCNC: 21 MMOL/L (ref 23–29)
CO2 SERPL-SCNC: 21 MMOL/L (ref 23–29)
COLOR UR: YELLOW
CREAT SERPL-MCNC: 2.2 MG/DL (ref 0.5–1.4)
CREAT SERPL-MCNC: 2.2 MG/DL (ref 0.5–1.4)
CREAT UR-MCNC: 68.1 MG/DL (ref 23–375)
CREAT UR-MCNC: 68.1 MG/DL (ref 23–375)
DIFFERENTIAL METHOD: ABNORMAL
EOSINOPHIL # BLD AUTO: 0.1 K/UL (ref 0–0.5)
EOSINOPHIL NFR BLD: 2.8 % (ref 0–8)
ERYTHROCYTE [DISTWIDTH] IN BLOOD BY AUTOMATED COUNT: 14.4 % (ref 11.5–14.5)
ERYTHROCYTE [DISTWIDTH] IN BLOOD BY AUTOMATED COUNT: 14.4 % (ref 11.5–14.5)
EST. GFR  (NO RACE VARIABLE): 30 ML/MIN/1.73 M^2
EST. GFR  (NO RACE VARIABLE): 30 ML/MIN/1.73 M^2
ESTIMATED AVG GLUCOSE: 91 MG/DL (ref 68–131)
FERRITIN SERPL-MCNC: 290 NG/ML (ref 20–300)
GLUCOSE SERPL-MCNC: 96 MG/DL (ref 70–110)
GLUCOSE SERPL-MCNC: 96 MG/DL (ref 70–110)
GLUCOSE UR QL STRIP: NEGATIVE
GRAN CASTS #/AREA URNS LPF: 2 /LPF
HBA1C MFR BLD: 4.8 % (ref 4–5.6)
HCT VFR BLD AUTO: 28.4 % (ref 40–54)
HCT VFR BLD AUTO: 28.4 % (ref 40–54)
HDLC SERPL-MCNC: 26 MG/DL (ref 40–75)
HDLC SERPL: 23 % (ref 20–50)
HGB BLD-MCNC: 9.2 G/DL (ref 14–18)
HGB BLD-MCNC: 9.2 G/DL (ref 14–18)
HGB UR QL STRIP: NEGATIVE
HYALINE CASTS #/AREA URNS LPF: 1 /LPF
IMM GRANULOCYTES # BLD AUTO: 0.02 K/UL (ref 0–0.04)
IMM GRANULOCYTES NFR BLD AUTO: 0.4 % (ref 0–0.5)
IRON SERPL-MCNC: 80 UG/DL (ref 45–160)
KETONES UR QL STRIP: NEGATIVE
LDLC SERPL CALC-MCNC: 68.8 MG/DL (ref 63–159)
LEUKOCYTE ESTERASE UR QL STRIP: NEGATIVE
LYMPHOCYTES # BLD AUTO: 0.7 K/UL (ref 1–4.8)
LYMPHOCYTES NFR BLD: 13.6 % (ref 18–48)
MAGNESIUM SERPL-MCNC: 1.7 MG/DL (ref 1.6–2.6)
MCH RBC QN AUTO: 31.2 PG (ref 27–31)
MCH RBC QN AUTO: 31.2 PG (ref 27–31)
MCHC RBC AUTO-ENTMCNC: 32.4 G/DL (ref 32–36)
MCHC RBC AUTO-ENTMCNC: 32.4 G/DL (ref 32–36)
MCV RBC AUTO: 96 FL (ref 82–98)
MCV RBC AUTO: 96 FL (ref 82–98)
MICROALBUMIN UR DL<=1MG/L-MCNC: 2161 UG/ML
MICROSCOPIC COMMENT: ABNORMAL
MONOCYTES # BLD AUTO: 0.6 K/UL (ref 0.3–1)
MONOCYTES NFR BLD: 10.8 % (ref 4–15)
NEUTROPHILS # BLD AUTO: 3.6 K/UL (ref 1.8–7.7)
NEUTROPHILS NFR BLD: 71.6 % (ref 38–73)
NITRITE UR QL STRIP: NEGATIVE
NONHDLC SERPL-MCNC: 87 MG/DL
NRBC BLD-RTO: 0 /100 WBC
PH UR STRIP: 6 [PH] (ref 5–8)
PHOSPHATE SERPL-MCNC: 4.2 MG/DL (ref 2.7–4.5)
PLATELET # BLD AUTO: 195 K/UL (ref 150–450)
PLATELET # BLD AUTO: 195 K/UL (ref 150–450)
PMV BLD AUTO: 10.7 FL (ref 9.2–12.9)
PMV BLD AUTO: 10.7 FL (ref 9.2–12.9)
POTASSIUM SERPL-SCNC: 3.9 MMOL/L (ref 3.5–5.1)
POTASSIUM SERPL-SCNC: 3.9 MMOL/L (ref 3.5–5.1)
PROT SERPL-MCNC: 5.2 G/DL (ref 6–8.4)
PROT SERPL-MCNC: 5.2 G/DL (ref 6–8.4)
PROT UR QL STRIP: ABNORMAL
PROT UR-MCNC: 273 MG/DL (ref 0–15)
PROT/CREAT UR: 4.01 MG/G{CREAT} (ref 0–0.2)
PTH-INTACT SERPL-MCNC: 181.5 PG/ML (ref 9–77)
RBC # BLD AUTO: 2.95 M/UL (ref 4.6–6.2)
RBC # BLD AUTO: 2.95 M/UL (ref 4.6–6.2)
RBC #/AREA URNS HPF: 1 /HPF (ref 0–4)
SATURATED IRON: 39 % (ref 20–50)
SODIUM SERPL-SCNC: 138 MMOL/L (ref 136–145)
SODIUM SERPL-SCNC: 138 MMOL/L (ref 136–145)
SP GR UR STRIP: 1.02 (ref 1–1.03)
TOTAL IRON BINDING CAPACITY: 207 UG/DL (ref 250–450)
TRANSFERRIN SERPL-MCNC: 140 MG/DL (ref 200–375)
TRIGL SERPL-MCNC: 91 MG/DL (ref 30–150)
URATE SERPL-MCNC: 7 MG/DL (ref 3.4–7)
URN SPEC COLLECT METH UR: ABNORMAL
UROBILINOGEN UR STRIP-ACNC: NEGATIVE EU/DL
WBC # BLD AUTO: 5.08 K/UL (ref 3.9–12.7)
WBC # BLD AUTO: 5.08 K/UL (ref 3.9–12.7)
WBC #/AREA URNS HPF: 1 /HPF (ref 0–5)

## 2023-10-19 PROCEDURE — 85025 COMPLETE CBC W/AUTO DIFF WBC: CPT | Mod: HCNC | Performed by: INTERNAL MEDICINE

## 2023-10-19 PROCEDURE — 84550 ASSAY OF BLOOD/URIC ACID: CPT | Mod: HCNC | Performed by: INTERNAL MEDICINE

## 2023-10-19 PROCEDURE — 36415 COLL VENOUS BLD VENIPUNCTURE: CPT | Mod: HCNC | Performed by: STUDENT IN AN ORGANIZED HEALTH CARE EDUCATION/TRAINING PROGRAM

## 2023-10-19 PROCEDURE — 81000 URINALYSIS NONAUTO W/SCOPE: CPT | Mod: HCNC | Performed by: INTERNAL MEDICINE

## 2023-10-19 PROCEDURE — 80061 LIPID PANEL: CPT | Mod: HCNC | Performed by: INTERNAL MEDICINE

## 2023-10-19 PROCEDURE — 84466 ASSAY OF TRANSFERRIN: CPT | Mod: HCNC | Performed by: INTERNAL MEDICINE

## 2023-10-19 PROCEDURE — 80076 HEPATIC FUNCTION PANEL: CPT | Mod: HCNC | Performed by: INTERNAL MEDICINE

## 2023-10-19 PROCEDURE — 82043 UR ALBUMIN QUANTITATIVE: CPT | Mod: HCNC | Performed by: STUDENT IN AN ORGANIZED HEALTH CARE EDUCATION/TRAINING PROGRAM

## 2023-10-19 PROCEDURE — 84156 ASSAY OF PROTEIN URINE: CPT | Mod: HCNC | Performed by: INTERNAL MEDICINE

## 2023-10-19 PROCEDURE — 83540 ASSAY OF IRON: CPT | Mod: HCNC | Performed by: INTERNAL MEDICINE

## 2023-10-19 PROCEDURE — 83735 ASSAY OF MAGNESIUM: CPT | Mod: HCNC | Performed by: INTERNAL MEDICINE

## 2023-10-19 PROCEDURE — 80053 COMPREHEN METABOLIC PANEL: CPT | Mod: HCNC | Performed by: INTERNAL MEDICINE

## 2023-10-19 PROCEDURE — 84100 ASSAY OF PHOSPHORUS: CPT | Mod: HCNC | Performed by: INTERNAL MEDICINE

## 2023-10-19 PROCEDURE — 82306 VITAMIN D 25 HYDROXY: CPT | Mod: HCNC | Performed by: STUDENT IN AN ORGANIZED HEALTH CARE EDUCATION/TRAINING PROGRAM

## 2023-10-19 PROCEDURE — 83970 ASSAY OF PARATHORMONE: CPT | Mod: HCNC | Performed by: INTERNAL MEDICINE

## 2023-10-19 PROCEDURE — 82728 ASSAY OF FERRITIN: CPT | Mod: HCNC | Performed by: INTERNAL MEDICINE

## 2023-10-19 PROCEDURE — 83036 HEMOGLOBIN GLYCOSYLATED A1C: CPT | Mod: HCNC | Performed by: STUDENT IN AN ORGANIZED HEALTH CARE EDUCATION/TRAINING PROGRAM

## 2023-10-23 ENCOUNTER — OFFICE VISIT (OUTPATIENT)
Dept: DERMATOLOGY | Facility: CLINIC | Age: 78
End: 2023-10-23
Payer: MEDICARE

## 2023-10-23 VITALS
HEIGHT: 70 IN | WEIGHT: 227.06 LBS | BODY MASS INDEX: 32.51 KG/M2 | SYSTOLIC BLOOD PRESSURE: 145 MMHG | HEART RATE: 61 BPM | DIASTOLIC BLOOD PRESSURE: 70 MMHG

## 2023-10-23 DIAGNOSIS — D03.22 MELANOMA IN SITU OF LEFT EAR: Primary | ICD-10-CM

## 2023-10-23 PROCEDURE — 1159F MED LIST DOCD IN RCRD: CPT | Mod: HCNC,CPTII,S$GLB, | Performed by: DERMATOLOGY

## 2023-10-23 PROCEDURE — 1101F PT FALLS ASSESS-DOCD LE1/YR: CPT | Mod: HCNC,CPTII,S$GLB, | Performed by: DERMATOLOGY

## 2023-10-23 PROCEDURE — 1101F PR PT FALLS ASSESS DOC 0-1 FALLS W/OUT INJ PAST YR: ICD-10-PCS | Mod: HCNC,CPTII,S$GLB, | Performed by: DERMATOLOGY

## 2023-10-23 PROCEDURE — 3078F DIAST BP <80 MM HG: CPT | Mod: HCNC,CPTII,S$GLB, | Performed by: DERMATOLOGY

## 2023-10-23 PROCEDURE — 1160F RVW MEDS BY RX/DR IN RCRD: CPT | Mod: HCNC,CPTII,S$GLB, | Performed by: DERMATOLOGY

## 2023-10-23 PROCEDURE — 99999 PR PBB SHADOW E&M-EST. PATIENT-LVL IV: CPT | Mod: PBBFAC,HCNC,, | Performed by: DERMATOLOGY

## 2023-10-23 PROCEDURE — 99203 PR OFFICE/OUTPT VISIT, NEW, LEVL III, 30-44 MIN: ICD-10-PCS | Mod: HCNC,S$GLB,, | Performed by: DERMATOLOGY

## 2023-10-23 PROCEDURE — 3078F PR MOST RECENT DIASTOLIC BLOOD PRESSURE < 80 MM HG: ICD-10-PCS | Mod: HCNC,CPTII,S$GLB, | Performed by: DERMATOLOGY

## 2023-10-23 PROCEDURE — 99203 OFFICE O/P NEW LOW 30 MIN: CPT | Mod: HCNC,S$GLB,, | Performed by: DERMATOLOGY

## 2023-10-23 PROCEDURE — 1159F PR MEDICATION LIST DOCUMENTED IN MEDICAL RECORD: ICD-10-PCS | Mod: HCNC,CPTII,S$GLB, | Performed by: DERMATOLOGY

## 2023-10-23 PROCEDURE — 1126F AMNT PAIN NOTED NONE PRSNT: CPT | Mod: HCNC,CPTII,S$GLB, | Performed by: DERMATOLOGY

## 2023-10-23 PROCEDURE — 3077F PR MOST RECENT SYSTOLIC BLOOD PRESSURE >= 140 MM HG: ICD-10-PCS | Mod: HCNC,CPTII,S$GLB, | Performed by: DERMATOLOGY

## 2023-10-23 PROCEDURE — 1126F PR PAIN SEVERITY QUANTIFIED, NO PAIN PRESENT: ICD-10-PCS | Mod: HCNC,CPTII,S$GLB, | Performed by: DERMATOLOGY

## 2023-10-23 PROCEDURE — 3077F SYST BP >= 140 MM HG: CPT | Mod: HCNC,CPTII,S$GLB, | Performed by: DERMATOLOGY

## 2023-10-23 PROCEDURE — 3288F PR FALLS RISK ASSESSMENT DOCUMENTED: ICD-10-PCS | Mod: HCNC,CPTII,S$GLB, | Performed by: DERMATOLOGY

## 2023-10-23 PROCEDURE — 1160F PR REVIEW ALL MEDS BY PRESCRIBER/CLIN PHARMACIST DOCUMENTED: ICD-10-PCS | Mod: HCNC,CPTII,S$GLB, | Performed by: DERMATOLOGY

## 2023-10-23 PROCEDURE — 99999 PR PBB SHADOW E&M-EST. PATIENT-LVL IV: ICD-10-PCS | Mod: PBBFAC,HCNC,, | Performed by: DERMATOLOGY

## 2023-10-23 PROCEDURE — 3288F FALL RISK ASSESSMENT DOCD: CPT | Mod: HCNC,CPTII,S$GLB, | Performed by: DERMATOLOGY

## 2023-10-24 NOTE — PROGRESS NOTES
REFERRING PROVIDER:  Ken Simons M.D.    CHIEF COMPLAINT:  New patient being consulted for Mohs' surgery evaluation.    HISTORY OF PRESENT ILLNESS:  78 y.o. male presents with a 2 month(s) history of growth on the L ear. Denies bleeding, crusting, scabbing. Patient states it looked like a freckle. Found on skin check.  Not sure if it was changing.     Negative for scabbing.  Negative for crusting.  Negative for bleeding.  Negative for itching.    Biopsy consistent with melanoma in situ.     No prior treatment.    Pacemaker: Yes  Defibrillator: No  Artificial joints: No  Artificial heart valves: No    PAST MEDICAL HISTORY:  Past Medical History:   Diagnosis Date    Acute respiratory failure with hypoxia 08/18/2022    daughter denies    Anxiety disorder, unspecified     CHF (congestive heart failure)     COVID 11/23/2022    Dehydration 12/22/2021    medication induced    Depression     Diabetes mellitus type II     Gout, unspecified     Hypertension     Melanoma in situ 10/23/2023    left ear    Positive D dimer 12/22/2021    Sick sinus syndrome     Sleep apnea     cpap machine    TIA (transient ischemic attack) 08/18/2022    daughter denies       PAST SURGICAL HISTORY:    Past Surgical History:   Procedure Laterality Date    APPENDECTOMY      BACK SURGERY      CHOLECYSTECTOMY      EXTRACTION OF CATARACT Left 8/23/2023    Procedure: EXTRACTION, CATARACT;  Surgeon: Alonso Rodriguez MD;  Location: [facility] OR;  Service: Ophthalmology;  Laterality: Left;  DiB00 +22.5D    INNER EAR SURGERY      unilateral    INSERTION OF PACEMAKER      KNEE SURGERY      unilateral    PHACOEMULSIFICATION, CATARACT, WITH IOL INSERTION Left 8/23/2023    Procedure: PHACOEMULSIFICATION, CATARACT, WITH IOL INSERTION;  Surgeon: Alonso Rodriguez MD;  Location: [facility] OR;  Service: Ophthalmology;  Laterality: Left;        SOCIAL HISTORY:  Dependencies:  never smoked    PERTINENT MEDICATIONS:  See medications list.  aspirin and fish  oil    ALLERGIES:  Adhesive, Lisinopril, and Statins-hmg-coa reductase inhibitors    ROS:  Skin: See HPI  Constitutional: No fatigue, fever, malaise, weight loss, or night sweats.  Cardiovascular: No chest pain, palpitations, or edema.  Respiratory: No coughing, wheezing, SOB, or sputum production.    Physical Exam   HENT:   Ears:        General: Mood and affect normal. Alert and orient X3. Normal appearance.  Eyelids:  no suspicious lesions  Head/Face: R superior regino of antihelix with a 4 x 4 mm pink bx site located 3.5 cm inferiorly from the left superior ear, 5.5 cm superiorly from the left inferior lobe attachment, and 2 cm laterally from the tragal attachment. No nodularity or residual pigmentation.   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    PLAN:  The diagnosis and the pathology report were discussed in detail with the patient. Treatment options were reviewed, including Mohs Micrographic Surgery, radiation, topical therapy, and standard excision.  After careful review of patient's history and physical exam, and after discussion of treatment options, the decision was made to perform 360 degree continuous en face margin controlled excision with 5 mm margins with rush permanent sections and subsequent delayed reconstruction pending negative margins.    Scheduled patient for slow Mohs. Risks, benefits, and alternatives of Mohs' surgery discussed with the patient. Discussed repair options including complex closure, skin flap, skin graft and second intention healing with the patient. Pre-operative instructions provided to the patient. Okay to stay on aspirin. Stop fish oil a week prior to procedure.  Pt scheduled for cataract surgery in 2 weeks - okay to wait until after.     Disc full body checks q 3 months and annual eye exam.

## 2023-11-02 ENCOUNTER — OFFICE VISIT (OUTPATIENT)
Dept: FAMILY MEDICINE | Facility: CLINIC | Age: 78
End: 2023-11-02
Payer: MEDICARE

## 2023-11-02 VITALS
DIASTOLIC BLOOD PRESSURE: 70 MMHG | RESPIRATION RATE: 16 BRPM | SYSTOLIC BLOOD PRESSURE: 150 MMHG | WEIGHT: 228.06 LBS | HEART RATE: 67 BPM | HEIGHT: 70 IN | BODY MASS INDEX: 32.65 KG/M2 | OXYGEN SATURATION: 94 %
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
## 2023-11-02 DIAGNOSIS — T14.8XXA ABRASION OF SKIN: ICD-10-CM

## 2023-11-02 DIAGNOSIS — S00.83XA CONTUSION OF FACE, INITIAL ENCOUNTER: ICD-10-CM

## 2023-11-02 PROCEDURE — 99999 PR PBB SHADOW E&M-EST. PATIENT-LVL III: CPT | Mod: PBBFAC,HCNC,, | Performed by: STUDENT IN AN ORGANIZED HEALTH CARE EDUCATION/TRAINING PROGRAM

## 2023-11-02 PROCEDURE — 99999 PR PBB SHADOW E&M-EST. PATIENT-LVL III: ICD-10-PCS | Mod: PBBFAC,HCNC,, | Performed by: STUDENT IN AN ORGANIZED HEALTH CARE EDUCATION/TRAINING PROGRAM

## 2023-11-02 PROCEDURE — 1100F PR PT FALLS ASSESS DOC 2+ FALLS/FALL W/INJURY/YR: ICD-10-PCS | Mod: HCNC,CPTII,S$GLB, | Performed by: STUDENT IN AN ORGANIZED HEALTH CARE EDUCATION/TRAINING PROGRAM

## 2023-11-02 PROCEDURE — 3288F PR FALLS RISK ASSESSMENT DOCUMENTED: ICD-10-PCS | Mod: HCNC,CPTII,S$GLB, | Performed by: STUDENT IN AN ORGANIZED HEALTH CARE EDUCATION/TRAINING PROGRAM

## 2023-11-02 PROCEDURE — 1100F PTFALLS ASSESS-DOCD GE2>/YR: CPT | Mod: HCNC,CPTII,S$GLB, | Performed by: STUDENT IN AN ORGANIZED HEALTH CARE EDUCATION/TRAINING PROGRAM

## 2023-11-02 PROCEDURE — 99213 OFFICE O/P EST LOW 20 MIN: CPT | Mod: HCNC,S$GLB,, | Performed by: STUDENT IN AN ORGANIZED HEALTH CARE EDUCATION/TRAINING PROGRAM

## 2023-11-02 PROCEDURE — 3078F PR MOST RECENT DIASTOLIC BLOOD PRESSURE < 80 MM HG: ICD-10-PCS | Mod: HCNC,CPTII,S$GLB, | Performed by: STUDENT IN AN ORGANIZED HEALTH CARE EDUCATION/TRAINING PROGRAM

## 2023-11-02 PROCEDURE — 1159F MED LIST DOCD IN RCRD: CPT | Mod: HCNC,CPTII,S$GLB, | Performed by: STUDENT IN AN ORGANIZED HEALTH CARE EDUCATION/TRAINING PROGRAM

## 2023-11-02 PROCEDURE — 1126F PR PAIN SEVERITY QUANTIFIED, NO PAIN PRESENT: ICD-10-PCS | Mod: HCNC,CPTII,S$GLB, | Performed by: STUDENT IN AN ORGANIZED HEALTH CARE EDUCATION/TRAINING PROGRAM

## 2023-11-02 PROCEDURE — 1126F AMNT PAIN NOTED NONE PRSNT: CPT | Mod: HCNC,CPTII,S$GLB, | Performed by: STUDENT IN AN ORGANIZED HEALTH CARE EDUCATION/TRAINING PROGRAM

## 2023-11-02 PROCEDURE — 3078F DIAST BP <80 MM HG: CPT | Mod: HCNC,CPTII,S$GLB, | Performed by: STUDENT IN AN ORGANIZED HEALTH CARE EDUCATION/TRAINING PROGRAM

## 2023-11-02 PROCEDURE — 3077F PR MOST RECENT SYSTOLIC BLOOD PRESSURE >= 140 MM HG: ICD-10-PCS | Mod: HCNC,CPTII,S$GLB, | Performed by: STUDENT IN AN ORGANIZED HEALTH CARE EDUCATION/TRAINING PROGRAM

## 2023-11-02 PROCEDURE — 1159F PR MEDICATION LIST DOCUMENTED IN MEDICAL RECORD: ICD-10-PCS | Mod: HCNC,CPTII,S$GLB, | Performed by: STUDENT IN AN ORGANIZED HEALTH CARE EDUCATION/TRAINING PROGRAM

## 2023-11-02 PROCEDURE — 3077F SYST BP >= 140 MM HG: CPT | Mod: HCNC,CPTII,S$GLB, | Performed by: STUDENT IN AN ORGANIZED HEALTH CARE EDUCATION/TRAINING PROGRAM

## 2023-11-02 PROCEDURE — 3288F FALL RISK ASSESSMENT DOCD: CPT | Mod: HCNC,CPTII,S$GLB, | Performed by: STUDENT IN AN ORGANIZED HEALTH CARE EDUCATION/TRAINING PROGRAM

## 2023-11-02 PROCEDURE — 99213 PR OFFICE/OUTPT VISIT, EST, LEVL III, 20-29 MIN: ICD-10-PCS | Mod: HCNC,S$GLB,, | Performed by: STUDENT IN AN ORGANIZED HEALTH CARE EDUCATION/TRAINING PROGRAM

## 2023-11-02 RX ORDER — DOXAZOSIN 2 MG/1
4 TABLET ORAL EVERY 12 HOURS
COMMUNITY
Start: 2023-10-24

## 2023-11-02 NOTE — PROGRESS NOTES
Subjective:       Patient ID: Nestor Garcia is a 78 y.o. male.    Chief Complaint: Fall (Pt here for fall that happened this week and tripped while moving garbage can. Would like to have arm checked out due him using something at home to stop ongoing bleeding. )    Pt here for recent fall. He reports he was gardening and had the garbage can to place weeds and when to move and the lid flipped open and he fell and and hit his head on the lid. He also has abrasions to his left arm. He was having oozing of blood and he used a nitro stick at home to stop the bleeding.     Review of Systems   Constitutional:  Negative for chills and fever.   HENT:  Negative for congestion and sore throat.    Respiratory:  Negative for cough and shortness of breath.    Cardiovascular:  Negative for chest pain.   Gastrointestinal:  Negative for abdominal pain, diarrhea, nausea and vomiting.   Genitourinary:  Negative for dysuria and hematuria.   Neurological:  Negative for dizziness, syncope and light-headedness.   Hematological:  Bruises/bleeds easily.       Objective:      Physical Exam  Vitals reviewed.   Constitutional:       General: He is not in acute distress.  HENT:      Head: Normocephalic and atraumatic.   Cardiovascular:      Rate and Rhythm: Normal rate and regular rhythm.      Heart sounds: Normal heart sounds. No murmur heard.  Pulmonary:      Effort: Pulmonary effort is normal. No respiratory distress.      Breath sounds: Normal breath sounds.   Skin:     Comments: Multiple skin abrasions to left forearm with bruising   Neurological:      Mental Status: He is alert.         Assessment:       1. Fall, initial encounter    2. Abrasion of skin    3. Contusion of face, initial encounter        Plan:           1. Fall, initial encounter    2. Abrasion of skin    3. Contusion of face, initial encounter    Mupirocin to skin abrasions  Cont current meds  Taper down on insulin as needed to avoid any hypoglycemia episodes  RTC for  worsening symptoms or failure to improve, or RTC PRN/as scheduled

## 2023-11-13 ENCOUNTER — PROCEDURE VISIT (OUTPATIENT)
Dept: DERMATOLOGY | Facility: CLINIC | Age: 78
End: 2023-11-13
Payer: MEDICARE

## 2023-11-13 VITALS
DIASTOLIC BLOOD PRESSURE: 65 MMHG | HEIGHT: 70 IN | BODY MASS INDEX: 32.13 KG/M2 | WEIGHT: 224.44 LBS | HEART RATE: 63 BPM | SYSTOLIC BLOOD PRESSURE: 149 MMHG

## 2023-11-13 DIAGNOSIS — D03.22 MELANOMA IN SITU OF LEFT EAR: Primary | ICD-10-CM

## 2023-11-13 PROCEDURE — 99499 NO LOS: ICD-10-PCS | Mod: HCNC,,, | Performed by: DERMATOLOGY

## 2023-11-13 PROCEDURE — 88305 TISSUE EXAM BY PATHOLOGIST: ICD-10-PCS | Mod: 26,HCNC,, | Performed by: PATHOLOGY

## 2023-11-13 PROCEDURE — 88305 TISSUE EXAM BY PATHOLOGIST: CPT | Mod: HCNC | Performed by: PATHOLOGY

## 2023-11-13 PROCEDURE — 11642 PR EXC SKIN MALIG 1.1-2 CM FACE,FACIAL: ICD-10-PCS | Mod: HCNC,,, | Performed by: DERMATOLOGY

## 2023-11-13 PROCEDURE — 99499 UNLISTED E&M SERVICE: CPT | Mod: HCNC,,, | Performed by: DERMATOLOGY

## 2023-11-13 PROCEDURE — 11642 EXC F/E/E/N/L MAL+MRG 1.1-2: CPT | Mod: HCNC,,, | Performed by: DERMATOLOGY

## 2023-11-13 PROCEDURE — 88305 TISSUE EXAM BY PATHOLOGIST: CPT | Mod: 26,HCNC,, | Performed by: PATHOLOGY

## 2023-11-13 NOTE — PROGRESS NOTES
PROCEDURE: 360-degree continuous en face margin-controlled excision with rush permanent sections and subsequent delayed repair    REFERRING PROVIDER: Ken Simons M.D.    ANESTHETIC: 3 cc 1% Lidocaine with Epinephrine 1:200,000    SURGICAL PREP: Betadine, prepped by  Carey [facility]ies Surg Tech    SURGEON: Ashok Garber MD    ASSISTANTS: Aisha Crowe PA-C and Marlee Clifton    PREOPERATIVE DIAGNOSIS: Melanoma in situ,  path # [id number]    POSTOPERATIVE DIAGNOSIS: Melanoma in situ    PATHOLOGIC DIAGNOSIS: Pending    LOCATION: left ear. Location verified with Dr. Simons's clinical photograph. Patient also verified location by looking at photo taken prior to procedure.     INITIAL LESION SIZE: 0.5 x 0.5 cm    DEFECT SIZE: 1.5 x 1.5 cm    THICKNESS OF MELANOMA: in situ    EXCISED MARGINS: 5 mm    DEPTH OF EXCISION DOWN TO FASCIA: yes (perichondrium)    PREPARATION:  The diagnosis, procedure, alternatives, benefits and risks, including but not limited to: drug reactions, pain, scar or cosmetic defect, local sensation disturbances, and/or recurrence of present condition were explained to the patient. The patient elected to proceed.    PROCEDURE:  The area involved by the melanoma was marked out with a surgical marking pen. The area of left ear was prepped, draped, and anesthetized in the usual sterile fashion. Lesional tissue was carefully marked with at least 5 mm margins of clinically normal skin in all directions. An excision was performed with a #15 blade carried down completely through the dermis to the deep subcutaneous tissue plane. A short suture was placed superiorly at the 12 o'clock position and a long suture was placed laterally at the 3 o'clock position. The lesion was then removed in total and submitted for histological margin evaluation. Then, bipolar forceps were used to obtain hemostasis. Blood loss was minimal.    The patient tolerated the procedure well.    The area was cleaned and dressed  "appropriately and the patient was given wound care instructions, as well as an appointment for follow-up evaluation tomorrow for possible further excision pending pathology report.    Vitals:    11/13/23 1009   BP: (!) 149/65   BP Location: Left arm   Patient Position: Sitting   BP Method: Medium (Automatic)   Pulse: 63   Weight: 101.8 kg (224 lb 6.9 oz)   Height: 5' 10" (1.778 m)         "

## 2023-11-14 ENCOUNTER — PROCEDURE VISIT (OUTPATIENT)
Dept: DERMATOLOGY | Facility: CLINIC | Age: 78
End: 2023-11-14
Payer: MEDICARE

## 2023-11-14 VITALS — HEART RATE: 59 BPM | DIASTOLIC BLOOD PRESSURE: 60 MMHG | SYSTOLIC BLOOD PRESSURE: 142 MMHG

## 2023-11-14 DIAGNOSIS — C44.219 BASAL CELL CARCINOMA (BCC) OF SKIN OF LEFT EAR: Primary | ICD-10-CM

## 2023-11-14 DIAGNOSIS — D03.22 MELANOMA IN SITU OF LEFT EAR: ICD-10-CM

## 2023-11-14 LAB
FINAL PATHOLOGIC DIAGNOSIS: NORMAL
GROSS: NORMAL
Lab: NORMAL
MICROSCOPIC EXAM: NORMAL

## 2023-11-14 PROCEDURE — 99499 NO LOS: ICD-10-PCS | Mod: HCNC,S$GLB,, | Performed by: DERMATOLOGY

## 2023-11-14 PROCEDURE — 99499 UNLISTED E&M SERVICE: CPT | Mod: HCNC,S$GLB,, | Performed by: DERMATOLOGY

## 2023-11-14 PROCEDURE — 17311 MOHS 1 STAGE H/N/HF/G: CPT | Mod: 58,HCNC,S$GLB, | Performed by: DERMATOLOGY

## 2023-11-14 PROCEDURE — 17311: ICD-10-PCS | Mod: 58,HCNC,S$GLB, | Performed by: DERMATOLOGY

## 2023-11-14 RX ORDER — GENTAMICIN SULFATE 1 MG/G
OINTMENT TOPICAL 3 TIMES DAILY
Qty: 1 EACH | Refills: 1 | Status: ON HOLD | OUTPATIENT
Start: 2023-11-14 | End: 2023-12-08 | Stop reason: HOSPADM

## 2023-11-14 NOTE — PROGRESS NOTES
Pt here for f/u 360 degree continuous en face margin controlled excision of melanoma in situ of L ear yesterday. Verbal of clear margins for melanoma in situ. However, with incidental superficial basal cell carcinoma from 9:15 to 11:00, per Dr. Doty. Will proceed with traditional Mohs surgery today.      PROCEDURE: Mohs' Micrographic Surgery    INDICATION: Location in mask areas of face including central face, nose, eyelids, eyebrows, lips, chin, preauricular, temple, and ear. Biopsy-proven skin cancer of cosmetically and functionally important areas, including head, neck, genital, hand, foot, or areas known for having difficulty in healing, such as the lower anterior legs. Tumor with ill-defined borders. In patient with proven history of difficult or aggressive skin cancer.    REFERRING PROVIDER: Ken Simons M.D.    CASE NUMBER:     ANESTHETIC: 3 cc 0.5% Lidocaine with Epi 1:200,000 mixed 1:1 with 0.5% Bupivacaine    SURGICAL PREP: Betadine    SURGEON: Ashok Garber MD    ASSISTANTS: Aisha Crowe PA-C    PREOPERATIVE DIAGNOSIS: melanoma in situ; basal cell carcinoma- superficial    POSTOPERATIVE DIAGNOSIS: melanoma in situ; basal cell carcinoma    PATHOLOGIC DIAGNOSIS: melanoma in situ; basal cell carcinoma- superficial    HISTOLOGY OF SPECIMENS IN FIRST STAGE:   Tumor Type:  No tumor seen.    STAGES OF MOHS' SURGERY PERFORMED: 1    TUMOR-FREE PLANE ACHIEVED: Yes    HEMOSTASIS: bipolar forceps     SPECIMENS: 1     LOCATION: left ear (scaphoid fossa), from 9:15 to 11:00.    INITIAL LESION SIZE: 1.2 x 1.5 cm    FINAL DEFECT SIZE: 1.5 x 1.7 cm    WOUND REPAIR/DISPOSITION: When the tumor was completely removed, repair options were discussed with the patient including full thickness skin graft, and it was decided by the patient to let the wound heal by second intention. The patient tolerated the procedure well and will consider delayed reconstruction or repair if necessary.    The area was cleaned and  dressed appropriately, and the patient was given wound care instructions, as well as an appointment for follow-up evaluation in one month. Patient was placed on gentamicin 0.1% ointment to apply topically to wound bid x 7 days.     Vitals:    11/14/23 1210 11/14/23 1326   BP: (!) 142/65 (!) 142/60   BP Location: Right arm    Patient Position: Sitting    BP Method: Medium (Automatic)    Pulse: 66 (!) 59

## 2023-11-23 ENCOUNTER — PATIENT MESSAGE (OUTPATIENT)
Dept: PODIATRY | Facility: CLINIC | Age: 78
End: 2023-11-23
Payer: MEDICARE

## 2023-11-24 ENCOUNTER — TELEPHONE (OUTPATIENT)
Dept: PODIATRY | Facility: CLINIC | Age: 78
End: 2023-11-24
Payer: MEDICARE

## 2023-11-24 NOTE — TELEPHONE ENCOUNTER
I called pt back to let them know that we don't do refunds for co pays. They would have to go back to the place where they paid the co pay at the  and they can direct them better. No one answered. I left a voicemail.    detailed exam

## 2023-12-07 PROBLEM — N17.9 AKI (ACUTE KIDNEY INJURY): Status: ACTIVE | Noted: 2023-12-07

## 2023-12-07 PROBLEM — C44.91 BASAL CELL CARCINOMA: Status: ACTIVE | Noted: 2023-12-07

## 2023-12-07 PROBLEM — N18.32 STAGE 3B CHRONIC KIDNEY DISEASE: Status: ACTIVE | Noted: 2023-12-07

## 2023-12-07 PROBLEM — D64.9 NORMOCYTIC ANEMIA: Status: ACTIVE | Noted: 2023-12-07

## 2023-12-18 ENCOUNTER — OFFICE VISIT (OUTPATIENT)
Dept: DERMATOLOGY | Facility: CLINIC | Age: 78
End: 2023-12-18
Payer: MEDICARE

## 2023-12-18 DIAGNOSIS — D03.22 MELANOMA IN SITU OF LEFT EAR: Primary | ICD-10-CM

## 2023-12-18 PROCEDURE — 1126F PR PAIN SEVERITY QUANTIFIED, NO PAIN PRESENT: ICD-10-PCS | Mod: HCNC,CPTII,S$GLB, | Performed by: DERMATOLOGY

## 2023-12-18 PROCEDURE — 1101F PR PT FALLS ASSESS DOC 0-1 FALLS W/OUT INJ PAST YR: ICD-10-PCS | Mod: HCNC,CPTII,S$GLB, | Performed by: DERMATOLOGY

## 2023-12-18 PROCEDURE — 1101F PT FALLS ASSESS-DOCD LE1/YR: CPT | Mod: HCNC,CPTII,S$GLB, | Performed by: DERMATOLOGY

## 2023-12-18 PROCEDURE — 3288F FALL RISK ASSESSMENT DOCD: CPT | Mod: HCNC,CPTII,S$GLB, | Performed by: DERMATOLOGY

## 2023-12-18 PROCEDURE — 1126F AMNT PAIN NOTED NONE PRSNT: CPT | Mod: HCNC,CPTII,S$GLB, | Performed by: DERMATOLOGY

## 2023-12-18 PROCEDURE — 3288F PR FALLS RISK ASSESSMENT DOCUMENTED: ICD-10-PCS | Mod: HCNC,CPTII,S$GLB, | Performed by: DERMATOLOGY

## 2023-12-18 PROCEDURE — 99212 PR OFFICE/OUTPT VISIT, EST, LEVL II, 10-19 MIN: ICD-10-PCS | Mod: HCNC,S$GLB,, | Performed by: DERMATOLOGY

## 2023-12-18 PROCEDURE — 1159F MED LIST DOCD IN RCRD: CPT | Mod: HCNC,CPTII,S$GLB, | Performed by: DERMATOLOGY

## 2023-12-18 PROCEDURE — 99999 PR PBB SHADOW E&M-EST. PATIENT-LVL III: ICD-10-PCS | Mod: PBBFAC,HCNC,, | Performed by: DERMATOLOGY

## 2023-12-18 PROCEDURE — 1159F PR MEDICATION LIST DOCUMENTED IN MEDICAL RECORD: ICD-10-PCS | Mod: HCNC,CPTII,S$GLB, | Performed by: DERMATOLOGY

## 2023-12-18 PROCEDURE — 99999 PR PBB SHADOW E&M-EST. PATIENT-LVL III: CPT | Mod: PBBFAC,HCNC,, | Performed by: DERMATOLOGY

## 2023-12-18 PROCEDURE — 99212 OFFICE O/P EST SF 10 MIN: CPT | Mod: HCNC,S$GLB,, | Performed by: DERMATOLOGY

## 2023-12-18 NOTE — PROGRESS NOTES
78 y.o. male patient is here for wound check after surgery.    Patient reports no problems.    WOUND PE:  Left ear wound with 60% re-epithelialization.  Good skin edges. Still with some exposed cartilage superiorly, no tenderness.         IMPRESSION:  Healing operative site MIS left ear s/p 360 degree continuous en face excision and incidental BCC left ear s/p Mohs, with 2nd intention healing, postop week #5, healing in nicely.     PLAN:  Continue wound care.   Will likely take another month  Wound rebandaged today.  Keep moist and covered daily.     RTC:  In 1 month

## 2024-01-02 ENCOUNTER — OFFICE VISIT (OUTPATIENT)
Dept: FAMILY MEDICINE | Facility: CLINIC | Age: 79
End: 2024-01-02
Payer: MEDICARE

## 2024-01-02 VITALS
SYSTOLIC BLOOD PRESSURE: 128 MMHG | WEIGHT: 208.69 LBS | BODY MASS INDEX: 30.91 KG/M2 | HEIGHT: 69 IN | HEART RATE: 63 BPM | OXYGEN SATURATION: 95 % | RESPIRATION RATE: 16 BRPM | DIASTOLIC BLOOD PRESSURE: 64 MMHG

## 2024-01-02 DIAGNOSIS — I25.10 ATHEROSCLEROSIS OF NATIVE CORONARY ARTERY OF NATIVE HEART WITHOUT ANGINA PECTORIS: ICD-10-CM

## 2024-01-02 DIAGNOSIS — Z79.4 TYPE 2 DIABETES MELLITUS WITH DIABETIC POLYNEUROPATHY, WITH LONG-TERM CURRENT USE OF INSULIN: ICD-10-CM

## 2024-01-02 DIAGNOSIS — I49.5 SICK SINUS SYNDROME: ICD-10-CM

## 2024-01-02 DIAGNOSIS — N18.32 STAGE 3B CHRONIC KIDNEY DISEASE: ICD-10-CM

## 2024-01-02 DIAGNOSIS — N18.4 CHRONIC KIDNEY DISEASE (CKD), STAGE IV (SEVERE): ICD-10-CM

## 2024-01-02 DIAGNOSIS — Z09 HOSPITAL DISCHARGE FOLLOW-UP: Primary | ICD-10-CM

## 2024-01-02 DIAGNOSIS — D03.22 MELANOMA IN SITU OF LEFT EAR: ICD-10-CM

## 2024-01-02 DIAGNOSIS — E66.01 MORBID (SEVERE) OBESITY DUE TO EXCESS CALORIES: ICD-10-CM

## 2024-01-02 DIAGNOSIS — I50.32 CHRONIC DIASTOLIC HEART FAILURE: ICD-10-CM

## 2024-01-02 DIAGNOSIS — E11.42 TYPE 2 DIABETES MELLITUS WITH DIABETIC POLYNEUROPATHY, WITH LONG-TERM CURRENT USE OF INSULIN: ICD-10-CM

## 2024-01-02 PROCEDURE — 1101F PT FALLS ASSESS-DOCD LE1/YR: CPT | Mod: HCNC,CPTII,S$GLB, | Performed by: STUDENT IN AN ORGANIZED HEALTH CARE EDUCATION/TRAINING PROGRAM

## 2024-01-02 PROCEDURE — 99214 OFFICE O/P EST MOD 30 MIN: CPT | Mod: HCNC,S$GLB,, | Performed by: STUDENT IN AN ORGANIZED HEALTH CARE EDUCATION/TRAINING PROGRAM

## 2024-01-02 PROCEDURE — 99999 PR PBB SHADOW E&M-EST. PATIENT-LVL IV: CPT | Mod: PBBFAC,HCNC,, | Performed by: STUDENT IN AN ORGANIZED HEALTH CARE EDUCATION/TRAINING PROGRAM

## 2024-01-02 PROCEDURE — 1126F AMNT PAIN NOTED NONE PRSNT: CPT | Mod: HCNC,CPTII,S$GLB, | Performed by: STUDENT IN AN ORGANIZED HEALTH CARE EDUCATION/TRAINING PROGRAM

## 2024-01-02 PROCEDURE — 1159F MED LIST DOCD IN RCRD: CPT | Mod: HCNC,CPTII,S$GLB, | Performed by: STUDENT IN AN ORGANIZED HEALTH CARE EDUCATION/TRAINING PROGRAM

## 2024-01-02 PROCEDURE — 3074F SYST BP LT 130 MM HG: CPT | Mod: HCNC,CPTII,S$GLB, | Performed by: STUDENT IN AN ORGANIZED HEALTH CARE EDUCATION/TRAINING PROGRAM

## 2024-01-02 PROCEDURE — 3078F DIAST BP <80 MM HG: CPT | Mod: HCNC,CPTII,S$GLB, | Performed by: STUDENT IN AN ORGANIZED HEALTH CARE EDUCATION/TRAINING PROGRAM

## 2024-01-02 PROCEDURE — 3288F FALL RISK ASSESSMENT DOCD: CPT | Mod: HCNC,CPTII,S$GLB, | Performed by: STUDENT IN AN ORGANIZED HEALTH CARE EDUCATION/TRAINING PROGRAM

## 2024-01-02 RX ORDER — TIRZEPATIDE 2.5 MG/.5ML
2.5 INJECTION, SOLUTION SUBCUTANEOUS
Qty: 4 PEN | Refills: 2 | Status: SHIPPED | OUTPATIENT
Start: 2024-01-02

## 2024-01-02 NOTE — PROGRESS NOTES
Subjective:       Patient ID: Nestor Garcia is a 78 y.o. male.    Chief Complaint: Hospital Follow Up (Pt was hospitalized at [facility] for Fluid retention on 12/2023 )    Pt here for hospital follow-up.     He was admitted 12/5/23-12/8/23 for CHF exacerbation. He was diuresed with IV lasix. He has been doing well since discharge. They sent him home on oxygen but he has not needed to use it, family checks sats and they have all been in the mid to upper 90s.     He is requesting refill of mounjaro.    Transitional Care Note    Family and/or Caretaker present at visit?  Yes.  Diagnostic tests reviewed/disposition: I have reviewed all completed as well as pending diagnostic tests at the time of discharge.  Disease/illness education: HFpEF, DM2, HTN  Home health/community services discussion/referrals: Patient does not have home health established from hospital visit.  They do not need home health.  If needed, we will set up home health for the patient.   Establishment or re-establishment of referral orders for community resources: No other necessary community resources.   Discussion with other health care providers: No discussion with other health care providers necessary.     Review of Systems   Constitutional:  Negative for chills and fever.   HENT:  Negative for congestion and sore throat.    Respiratory:  Negative for cough and shortness of breath.    Cardiovascular:  Negative for chest pain.   Gastrointestinal:  Negative for abdominal pain, diarrhea, nausea and vomiting.   Genitourinary:  Negative for dysuria and hematuria.   Neurological:  Negative for dizziness, syncope and light-headedness.   Hematological:  Bruises/bleeds easily.       Objective:      Physical Exam  Vitals reviewed.   Constitutional:       General: He is not in acute distress.  HENT:      Head: Normocephalic and atraumatic.   Eyes:      Conjunctiva/sclera: Conjunctivae normal.   Cardiovascular:      Rate and Rhythm: Normal rate and  regular rhythm.      Heart sounds: Normal heart sounds. No murmur heard.  Pulmonary:      Effort: Pulmonary effort is normal. No respiratory distress.      Breath sounds: Normal breath sounds.   Skin:     Comments: Multiple skin abrasions to left forearm with bruising   Neurological:      General: No focal deficit present.      Mental Status: He is alert and oriented to person, place, and time.   Psychiatric:         Mood and Affect: Mood normal.         Behavior: Behavior normal.         Assessment:       1. Hospital discharge follow-up    2. Type 2 diabetes mellitus with diabetic polyneuropathy, with long-term current use of insulin    3. Stage 3b chronic kidney disease    4. Chronic diastolic heart failure    5. Sick sinus syndrome    6. Chronic kidney disease (CKD), stage IV (severe)    7. Melanoma in situ of left ear    8. Morbid (severe) obesity due to excess calories    9. Atherosclerosis of native coronary artery of native heart without angina pectoris        Plan:           1. Hospital discharge follow-up    2. Type 2 diabetes mellitus with diabetic polyneuropathy, with long-term current use of insulin  Overview:  Cont lantus  Lab Results   Component Value Date    HGBA1C 7.5 (H) 08/19/2022         Orders:  -     Comprehensive Metabolic Panel; Future; Expected date: 01/02/2024  -     tirzepatide (MOUNJARO) 2.5 mg/0.5 mL PnIj; Inject 2.5 mg into the skin every 7 days.  Dispense: 4 Pen; Refill: 2    3. Stage 3b chronic kidney disease  -     Comprehensive Metabolic Panel; Future; Expected date: 01/02/2024    4. Chronic diastolic heart failure  -     Comprehensive Metabolic Panel; Future; Expected date: 01/02/2024    5. Sick sinus syndrome    6. Chronic kidney disease (CKD), stage IV (severe)    7. Melanoma in situ of left ear  Overview:  11/14/23: MIS L ear s/p slow Mohs with second intention healing, Dr. Garber.      8. Morbid (severe) obesity due to excess calories    9. Atherosclerosis of native coronary  artery of native heart without angina pectoris    Discussed starting SGLT-2; he had hypotension with farxiga in the past. He would like to discuss this with his nephrologist  Cont mounjaro, refill sent  RTC for worsening symptoms or failure to improve, or RTC PRN/as scheduled

## 2024-01-09 ENCOUNTER — LAB VISIT (OUTPATIENT)
Dept: LAB | Facility: HOSPITAL | Age: 79
End: 2024-01-09
Attending: STUDENT IN AN ORGANIZED HEALTH CARE EDUCATION/TRAINING PROGRAM
Payer: MEDICARE

## 2024-01-09 DIAGNOSIS — E21.1 HYPERPARATHYROIDISM DUE TO VITAMIN D DEFICIENCY: ICD-10-CM

## 2024-01-09 DIAGNOSIS — N18.32 ANEMIA IN STAGE 3B CHRONIC KIDNEY DISEASE: ICD-10-CM

## 2024-01-09 DIAGNOSIS — R80.1 PERSISTENT PROTEINURIA: ICD-10-CM

## 2024-01-09 DIAGNOSIS — Z79.4 TYPE 2 DIABETES MELLITUS WITH DIABETIC POLYNEUROPATHY, WITH LONG-TERM CURRENT USE OF INSULIN: ICD-10-CM

## 2024-01-09 DIAGNOSIS — E83.39 HYPERPHOSPHATEMIA: ICD-10-CM

## 2024-01-09 DIAGNOSIS — N18.32 ANEMIA OF CHRONIC RENAL FAILURE, STAGE 3B: ICD-10-CM

## 2024-01-09 DIAGNOSIS — D63.1 ANEMIA IN STAGE 3B CHRONIC KIDNEY DISEASE: ICD-10-CM

## 2024-01-09 DIAGNOSIS — N18.32 STAGE 3B CHRONIC KIDNEY DISEASE: ICD-10-CM

## 2024-01-09 DIAGNOSIS — D50.9 IRON DEFICIENCY ANEMIA, UNSPECIFIED IRON DEFICIENCY ANEMIA TYPE: ICD-10-CM

## 2024-01-09 DIAGNOSIS — I10 HYPERTENSION, ESSENTIAL: ICD-10-CM

## 2024-01-09 DIAGNOSIS — E79.0 HYPERURICEMIA: ICD-10-CM

## 2024-01-09 DIAGNOSIS — D63.1 ANEMIA OF CHRONIC RENAL FAILURE, STAGE 3B: ICD-10-CM

## 2024-01-09 DIAGNOSIS — I50.32 CHRONIC DIASTOLIC HEART FAILURE: ICD-10-CM

## 2024-01-09 DIAGNOSIS — E11.42 TYPE 2 DIABETES MELLITUS WITH DIABETIC POLYNEUROPATHY, WITH LONG-TERM CURRENT USE OF INSULIN: ICD-10-CM

## 2024-01-09 LAB
25(OH)D3+25(OH)D2 SERPL-MCNC: 36 NG/ML (ref 30–96)
ALBUMIN SERPL BCP-MCNC: 3 G/DL (ref 3.5–5.2)
ALBUMIN SERPL BCP-MCNC: 3 G/DL (ref 3.5–5.2)
ALP SERPL-CCNC: 109 U/L (ref 55–135)
ALP SERPL-CCNC: 109 U/L (ref 55–135)
ALT SERPL W/O P-5'-P-CCNC: 15 U/L (ref 10–44)
ALT SERPL W/O P-5'-P-CCNC: 15 U/L (ref 10–44)
ANION GAP SERPL CALC-SCNC: 8 MMOL/L (ref 8–16)
ANION GAP SERPL CALC-SCNC: 8 MMOL/L (ref 8–16)
AST SERPL-CCNC: 17 U/L (ref 10–40)
AST SERPL-CCNC: 17 U/L (ref 10–40)
BACTERIA #/AREA URNS HPF: ABNORMAL /HPF
BILIRUB SERPL-MCNC: 0.3 MG/DL (ref 0.1–1)
BILIRUB SERPL-MCNC: 0.3 MG/DL (ref 0.1–1)
BILIRUB UR QL STRIP: NEGATIVE
BUN SERPL-MCNC: 55 MG/DL (ref 8–23)
BUN SERPL-MCNC: 55 MG/DL (ref 8–23)
CALCIUM SERPL-MCNC: 8.5 MG/DL (ref 8.7–10.5)
CALCIUM SERPL-MCNC: 8.5 MG/DL (ref 8.7–10.5)
CHLORIDE SERPL-SCNC: 110 MMOL/L (ref 95–110)
CHLORIDE SERPL-SCNC: 110 MMOL/L (ref 95–110)
CLARITY UR: CLEAR
CO2 SERPL-SCNC: 22 MMOL/L (ref 23–29)
CO2 SERPL-SCNC: 22 MMOL/L (ref 23–29)
COLOR UR: YELLOW
CREAT SERPL-MCNC: 2.5 MG/DL (ref 0.5–1.4)
CREAT SERPL-MCNC: 2.5 MG/DL (ref 0.5–1.4)
CREAT UR-MCNC: 27 MG/DL (ref 23–375)
EST. GFR  (NO RACE VARIABLE): 25 ML/MIN/1.73 M^2
EST. GFR  (NO RACE VARIABLE): 25 ML/MIN/1.73 M^2
GLUCOSE SERPL-MCNC: 134 MG/DL (ref 70–110)
GLUCOSE SERPL-MCNC: 134 MG/DL (ref 70–110)
GLUCOSE UR QL STRIP: NEGATIVE
HGB UR QL STRIP: ABNORMAL
HYALINE CASTS #/AREA URNS LPF: 0 /LPF
KETONES UR QL STRIP: NEGATIVE
LEUKOCYTE ESTERASE UR QL STRIP: NEGATIVE
MAGNESIUM SERPL-MCNC: 1.8 MG/DL (ref 1.6–2.6)
MICROSCOPIC COMMENT: ABNORMAL
NITRITE UR QL STRIP: NEGATIVE
PH UR STRIP: 6 [PH] (ref 5–8)
PHOSPHATE SERPL-MCNC: 4.3 MG/DL (ref 2.7–4.5)
POTASSIUM SERPL-SCNC: 4.5 MMOL/L (ref 3.5–5.1)
POTASSIUM SERPL-SCNC: 4.5 MMOL/L (ref 3.5–5.1)
PROT SERPL-MCNC: 5.7 G/DL (ref 6–8.4)
PROT SERPL-MCNC: 5.7 G/DL (ref 6–8.4)
PROT UR QL STRIP: ABNORMAL
PROT UR-MCNC: 200 MG/DL (ref 0–15)
PROT/CREAT UR: 7.41 MG/G{CREAT} (ref 0–0.2)
PTH-INTACT SERPL-MCNC: 128.5 PG/ML (ref 9–77)
RBC #/AREA URNS HPF: 4 /HPF (ref 0–4)
SODIUM SERPL-SCNC: 140 MMOL/L (ref 136–145)
SODIUM SERPL-SCNC: 140 MMOL/L (ref 136–145)
SP GR UR STRIP: 1.02 (ref 1–1.03)
URN SPEC COLLECT METH UR: ABNORMAL
UROBILINOGEN UR STRIP-ACNC: NEGATIVE EU/DL
WBC #/AREA URNS HPF: 8 /HPF (ref 0–5)

## 2024-01-09 PROCEDURE — 36415 COLL VENOUS BLD VENIPUNCTURE: CPT | Mod: HCNC | Performed by: INTERNAL MEDICINE

## 2024-01-09 PROCEDURE — 83970 ASSAY OF PARATHORMONE: CPT | Mod: HCNC | Performed by: INTERNAL MEDICINE

## 2024-01-09 PROCEDURE — 84156 ASSAY OF PROTEIN URINE: CPT | Mod: HCNC | Performed by: INTERNAL MEDICINE

## 2024-01-09 PROCEDURE — 83735 ASSAY OF MAGNESIUM: CPT | Mod: HCNC | Performed by: INTERNAL MEDICINE

## 2024-01-09 PROCEDURE — 81000 URINALYSIS NONAUTO W/SCOPE: CPT | Mod: HCNC | Performed by: INTERNAL MEDICINE

## 2024-01-09 PROCEDURE — 84100 ASSAY OF PHOSPHORUS: CPT | Mod: HCNC | Performed by: INTERNAL MEDICINE

## 2024-01-09 PROCEDURE — 82306 VITAMIN D 25 HYDROXY: CPT | Mod: HCNC | Performed by: INTERNAL MEDICINE

## 2024-01-09 PROCEDURE — 80053 COMPREHEN METABOLIC PANEL: CPT | Mod: HCNC | Performed by: INTERNAL MEDICINE

## 2024-01-17 ENCOUNTER — PATIENT OUTREACH (OUTPATIENT)
Dept: ADMINISTRATIVE | Facility: HOSPITAL | Age: 79
End: 2024-01-17
Payer: MEDICARE

## 2024-01-17 NOTE — LETTER
AUTHORIZATION FOR RELEASE OF   CONFIDENTIAL INFORMATION    Dear Dr. Alonso Rodriguez,    We are seeing Nestor Garcia, date of birth 1945, in the clinic at [facility]. Kashmir Conn MD is the patient's PCP. Nestor Garcia has an outstanding lab/procedure at the time we reviewed his chart. In order to help keep his health information updated, he has authorized us to request the following medical record(s):          ( X )  EYE EXAM (Diabetic Eye Exam)          Please fax records to [facility], Bacon, Michael C., MD Laura Rogers, LPN  Clinical Care Coordinator  [facility]  Phone: [phone]  Fax: [phone]             Patient Name: Nestor Garcia  : 1945  Patient Phone #: [phone]

## 2024-01-17 NOTE — PROGRESS NOTES
[facility] eAWV [facility] Report.   Chart reviewed, immunization record updated.  No new results noted on [facility] or [facility] web site.  Care Everywhere updated.   Patient care coordination note  Upcoming PCP visit updated.  Next PCP visit 4/4/2024.  GRECIA sent to Dr. Alonso Rodriguez for diabetic eye exam, provider added to patient care team.  Patient declined to schedule eAWV.

## 2024-01-22 ENCOUNTER — LAB VISIT (OUTPATIENT)
Dept: LAB | Facility: HOSPITAL | Age: 79
End: 2024-01-22
Attending: INTERNAL MEDICINE
Payer: MEDICARE

## 2024-01-22 DIAGNOSIS — N18.30 CKD (CHRONIC KIDNEY DISEASE), STAGE III: ICD-10-CM

## 2024-01-22 DIAGNOSIS — N18.4 CHRONIC KIDNEY DISEASE, STAGE IV (SEVERE): ICD-10-CM

## 2024-01-22 LAB
ANION GAP SERPL CALC-SCNC: 9 MMOL/L (ref 8–16)
BASOPHILS # BLD AUTO: 0.03 K/UL (ref 0–0.2)
BASOPHILS NFR BLD: 0.5 % (ref 0–1.9)
BUN SERPL-MCNC: 46 MG/DL (ref 8–23)
CALCIUM SERPL-MCNC: 7.9 MG/DL (ref 8.7–10.5)
CHLORIDE SERPL-SCNC: 109 MMOL/L (ref 95–110)
CO2 SERPL-SCNC: 21 MMOL/L (ref 23–29)
CREAT SERPL-MCNC: 2.5 MG/DL (ref 0.5–1.4)
DIFFERENTIAL METHOD BLD: ABNORMAL
EOSINOPHIL # BLD AUTO: 0.1 K/UL (ref 0–0.5)
EOSINOPHIL NFR BLD: 2.3 % (ref 0–8)
ERYTHROCYTE [DISTWIDTH] IN BLOOD BY AUTOMATED COUNT: 15.7 % (ref 11.5–14.5)
EST. GFR  (NO RACE VARIABLE): 25 ML/MIN/1.73 M^2
GLUCOSE SERPL-MCNC: 175 MG/DL (ref 70–110)
HCT VFR BLD AUTO: 31.8 % (ref 40–54)
HGB BLD-MCNC: 10 G/DL (ref 14–18)
IMM GRANULOCYTES # BLD AUTO: 0.02 K/UL (ref 0–0.04)
IMM GRANULOCYTES NFR BLD AUTO: 0.3 % (ref 0–0.5)
LYMPHOCYTES # BLD AUTO: 0.7 K/UL (ref 1–4.8)
LYMPHOCYTES NFR BLD: 12 % (ref 18–48)
MCH RBC QN AUTO: 30.4 PG (ref 27–31)
MCHC RBC AUTO-ENTMCNC: 31.4 G/DL (ref 32–36)
MCV RBC AUTO: 97 FL (ref 82–98)
MONOCYTES # BLD AUTO: 0.6 K/UL (ref 0.3–1)
MONOCYTES NFR BLD: 9.6 % (ref 4–15)
NEUTROPHILS # BLD AUTO: 4.6 K/UL (ref 1.8–7.7)
NEUTROPHILS NFR BLD: 75.3 % (ref 38–73)
NRBC BLD-RTO: 0 /100 WBC
PLATELET # BLD AUTO: 188 K/UL (ref 150–450)
PMV BLD AUTO: 11.6 FL (ref 9.2–12.9)
POTASSIUM SERPL-SCNC: 5.1 MMOL/L (ref 3.5–5.1)
RBC # BLD AUTO: 3.29 M/UL (ref 4.6–6.2)
SODIUM SERPL-SCNC: 139 MMOL/L (ref 136–145)
WBC # BLD AUTO: 6.16 K/UL (ref 3.9–12.7)

## 2024-01-22 PROCEDURE — 85025 COMPLETE CBC W/AUTO DIFF WBC: CPT | Mod: HCNC | Performed by: INTERNAL MEDICINE

## 2024-01-22 PROCEDURE — 36415 COLL VENOUS BLD VENIPUNCTURE: CPT | Mod: HCNC | Performed by: INTERNAL MEDICINE

## 2024-01-22 PROCEDURE — 80048 BASIC METABOLIC PNL TOTAL CA: CPT | Mod: HCNC | Performed by: INTERNAL MEDICINE

## 2024-01-24 ENCOUNTER — OFFICE VISIT (OUTPATIENT)
Dept: DERMATOLOGY | Facility: CLINIC | Age: 79
End: 2024-01-24
Payer: MEDICARE

## 2024-01-24 DIAGNOSIS — D03.22 MELANOMA IN SITU OF LEFT EAR: Primary | ICD-10-CM

## 2024-01-24 PROCEDURE — 1126F AMNT PAIN NOTED NONE PRSNT: CPT | Mod: HCNC,CPTII,S$GLB, | Performed by: DERMATOLOGY

## 2024-01-24 PROCEDURE — 1160F RVW MEDS BY RX/DR IN RCRD: CPT | Mod: HCNC,CPTII,S$GLB, | Performed by: DERMATOLOGY

## 2024-01-24 PROCEDURE — 1159F MED LIST DOCD IN RCRD: CPT | Mod: HCNC,CPTII,S$GLB, | Performed by: DERMATOLOGY

## 2024-01-24 PROCEDURE — 99999 PR PBB SHADOW E&M-EST. PATIENT-LVL III: CPT | Mod: PBBFAC,HCNC,, | Performed by: DERMATOLOGY

## 2024-01-24 PROCEDURE — 3288F FALL RISK ASSESSMENT DOCD: CPT | Mod: HCNC,CPTII,S$GLB, | Performed by: DERMATOLOGY

## 2024-01-24 PROCEDURE — 99212 OFFICE O/P EST SF 10 MIN: CPT | Mod: HCNC,S$GLB,, | Performed by: DERMATOLOGY

## 2024-01-24 PROCEDURE — 1101F PT FALLS ASSESS-DOCD LE1/YR: CPT | Mod: HCNC,CPTII,S$GLB, | Performed by: DERMATOLOGY

## 2024-01-24 NOTE — PROGRESS NOTES
79 y.o. male patient is here for wound check after surgery.    Patient reports no problems.    WOUND PE:  The left ear wound is healing well, with 85% re-epithelialization.  Small superomedial aspect still left to heal. Mild firmness and erythema of scar. No nodularity.      IMPRESSION:  Healing operative site from slow Mohs' surgery MIS and traditional Mohs BCC left ear, with 2nd intention healing, postop week # 8, healing well, almost all healed in.    PLAN:  Cont wound care just to medial aspect of ear - no need to cover, keep moist with aquaphor daily for next 2 weeks  Call if not fully healed by then.  Daily spf  Regular skin checks    RTC:  In 3 months with Ken Simons M.D. for skin check or sooner if new concern arises.

## 2024-01-29 ENCOUNTER — LAB VISIT (OUTPATIENT)
Dept: LAB | Facility: HOSPITAL | Age: 79
End: 2024-01-29
Attending: INTERNAL MEDICINE
Payer: MEDICARE

## 2024-01-29 DIAGNOSIS — N18.32 ANEMIA OF CHRONIC RENAL FAILURE, STAGE 3B: ICD-10-CM

## 2024-01-29 DIAGNOSIS — D63.1 ANEMIA OF CHRONIC RENAL FAILURE, STAGE 3B: ICD-10-CM

## 2024-01-29 DIAGNOSIS — N18.4 CHRONIC KIDNEY DISEASE, STAGE IV (SEVERE): ICD-10-CM

## 2024-01-29 LAB
ANION GAP SERPL CALC-SCNC: 11 MMOL/L (ref 8–16)
BASOPHILS # BLD AUTO: 0.04 K/UL (ref 0–0.2)
BASOPHILS NFR BLD: 0.8 % (ref 0–1.9)
BUN SERPL-MCNC: 51 MG/DL (ref 8–23)
CALCIUM SERPL-MCNC: 7.9 MG/DL (ref 8.7–10.5)
CHLORIDE SERPL-SCNC: 110 MMOL/L (ref 95–110)
CO2 SERPL-SCNC: 19 MMOL/L (ref 23–29)
CREAT SERPL-MCNC: 2.7 MG/DL (ref 0.5–1.4)
DIFFERENTIAL METHOD BLD: ABNORMAL
EOSINOPHIL # BLD AUTO: 0.2 K/UL (ref 0–0.5)
EOSINOPHIL NFR BLD: 2.9 % (ref 0–8)
ERYTHROCYTE [DISTWIDTH] IN BLOOD BY AUTOMATED COUNT: 14.9 % (ref 11.5–14.5)
EST. GFR  (NO RACE VARIABLE): 23 ML/MIN/1.73 M^2
GLUCOSE SERPL-MCNC: 145 MG/DL (ref 70–110)
HCT VFR BLD AUTO: 32.4 % (ref 40–54)
HGB BLD-MCNC: 10.2 G/DL (ref 14–18)
IMM GRANULOCYTES # BLD AUTO: 0.02 K/UL (ref 0–0.04)
IMM GRANULOCYTES NFR BLD AUTO: 0.4 % (ref 0–0.5)
LYMPHOCYTES # BLD AUTO: 0.9 K/UL (ref 1–4.8)
LYMPHOCYTES NFR BLD: 16.4 % (ref 18–48)
MCH RBC QN AUTO: 30.9 PG (ref 27–31)
MCHC RBC AUTO-ENTMCNC: 31.5 G/DL (ref 32–36)
MCV RBC AUTO: 98 FL (ref 82–98)
MONOCYTES # BLD AUTO: 0.5 K/UL (ref 0.3–1)
MONOCYTES NFR BLD: 9.6 % (ref 4–15)
NEUTROPHILS # BLD AUTO: 3.7 K/UL (ref 1.8–7.7)
NEUTROPHILS NFR BLD: 69.9 % (ref 38–73)
NRBC BLD-RTO: 0 /100 WBC
PLATELET # BLD AUTO: 174 K/UL (ref 150–450)
PMV BLD AUTO: 12.1 FL (ref 9.2–12.9)
POTASSIUM SERPL-SCNC: 4.7 MMOL/L (ref 3.5–5.1)
RBC # BLD AUTO: 3.3 M/UL (ref 4.6–6.2)
SODIUM SERPL-SCNC: 140 MMOL/L (ref 136–145)
WBC # BLD AUTO: 5.23 K/UL (ref 3.9–12.7)

## 2024-01-29 PROCEDURE — 80048 BASIC METABOLIC PNL TOTAL CA: CPT | Mod: HCNC | Performed by: INTERNAL MEDICINE

## 2024-01-29 PROCEDURE — 85025 COMPLETE CBC W/AUTO DIFF WBC: CPT | Mod: HCNC | Performed by: INTERNAL MEDICINE

## 2024-01-29 PROCEDURE — 36415 COLL VENOUS BLD VENIPUNCTURE: CPT | Mod: HCNC | Performed by: INTERNAL MEDICINE

## 2024-02-12 ENCOUNTER — LAB VISIT (OUTPATIENT)
Dept: LAB | Facility: HOSPITAL | Age: 79
End: 2024-02-12
Attending: INTERNAL MEDICINE
Payer: MEDICARE

## 2024-02-12 DIAGNOSIS — D63.1 ANEMIA OF CHRONIC RENAL FAILURE, STAGE 3B: ICD-10-CM

## 2024-02-12 DIAGNOSIS — D64.9 ANEMIA, UNSPECIFIED TYPE: ICD-10-CM

## 2024-02-12 DIAGNOSIS — E55.9 VITAMIN D DEFICIENCY: ICD-10-CM

## 2024-02-12 DIAGNOSIS — N18.4 CHRONIC KIDNEY DISEASE, STAGE IV (SEVERE): ICD-10-CM

## 2024-02-12 DIAGNOSIS — N18.32 STAGE 3B CHRONIC KIDNEY DISEASE: ICD-10-CM

## 2024-02-12 DIAGNOSIS — N18.32 ANEMIA OF CHRONIC RENAL FAILURE, STAGE 3B: ICD-10-CM

## 2024-02-12 LAB
ALBUMIN SERPL BCP-MCNC: 3 G/DL (ref 3.5–5.2)
ANION GAP SERPL CALC-SCNC: 11 MMOL/L (ref 8–16)
ANION GAP SERPL CALC-SCNC: 11 MMOL/L (ref 8–16)
BASOPHILS # BLD AUTO: 0.05 K/UL (ref 0–0.2)
BASOPHILS NFR BLD: 0.7 % (ref 0–1.9)
BUN SERPL-MCNC: 55 MG/DL (ref 8–23)
BUN SERPL-MCNC: 55 MG/DL (ref 8–23)
CALCIUM SERPL-MCNC: 7.9 MG/DL (ref 8.7–10.5)
CALCIUM SERPL-MCNC: 7.9 MG/DL (ref 8.7–10.5)
CHLORIDE SERPL-SCNC: 109 MMOL/L (ref 95–110)
CHLORIDE SERPL-SCNC: 109 MMOL/L (ref 95–110)
CO2 SERPL-SCNC: 21 MMOL/L (ref 23–29)
CO2 SERPL-SCNC: 21 MMOL/L (ref 23–29)
CREAT SERPL-MCNC: 2.6 MG/DL (ref 0.5–1.4)
CREAT SERPL-MCNC: 2.6 MG/DL (ref 0.5–1.4)
DIFFERENTIAL METHOD BLD: ABNORMAL
EOSINOPHIL # BLD AUTO: 0.2 K/UL (ref 0–0.5)
EOSINOPHIL NFR BLD: 2.4 % (ref 0–8)
ERYTHROCYTE [DISTWIDTH] IN BLOOD BY AUTOMATED COUNT: 15.3 % (ref 11.5–14.5)
EST. GFR  (NO RACE VARIABLE): 24 ML/MIN/1.73 M^2
EST. GFR  (NO RACE VARIABLE): 24 ML/MIN/1.73 M^2
GLUCOSE SERPL-MCNC: 160 MG/DL (ref 70–110)
GLUCOSE SERPL-MCNC: 160 MG/DL (ref 70–110)
HCT VFR BLD AUTO: 29.8 % (ref 40–54)
HGB BLD-MCNC: 9.3 G/DL (ref 14–18)
IMM GRANULOCYTES # BLD AUTO: 0.12 K/UL (ref 0–0.04)
IMM GRANULOCYTES NFR BLD AUTO: 1.7 % (ref 0–0.5)
LYMPHOCYTES # BLD AUTO: 0.9 K/UL (ref 1–4.8)
LYMPHOCYTES NFR BLD: 12.2 % (ref 18–48)
MCH RBC QN AUTO: 30.3 PG (ref 27–31)
MCHC RBC AUTO-ENTMCNC: 31.2 G/DL (ref 32–36)
MCV RBC AUTO: 97 FL (ref 82–98)
MONOCYTES # BLD AUTO: 0.8 K/UL (ref 0.3–1)
MONOCYTES NFR BLD: 11 % (ref 4–15)
NEUTROPHILS # BLD AUTO: 5.1 K/UL (ref 1.8–7.7)
NEUTROPHILS NFR BLD: 72 % (ref 38–73)
NRBC BLD-RTO: 0 /100 WBC
PHOSPHATE SERPL-MCNC: 4.4 MG/DL (ref 2.7–4.5)
PLATELET # BLD AUTO: 262 K/UL (ref 150–450)
PMV BLD AUTO: 11.2 FL (ref 9.2–12.9)
POTASSIUM SERPL-SCNC: 4.6 MMOL/L (ref 3.5–5.1)
POTASSIUM SERPL-SCNC: 4.6 MMOL/L (ref 3.5–5.1)
RBC # BLD AUTO: 3.07 M/UL (ref 4.6–6.2)
SODIUM SERPL-SCNC: 141 MMOL/L (ref 136–145)
SODIUM SERPL-SCNC: 141 MMOL/L (ref 136–145)
WBC # BLD AUTO: 7.12 K/UL (ref 3.9–12.7)

## 2024-02-12 PROCEDURE — 85025 COMPLETE CBC W/AUTO DIFF WBC: CPT | Mod: HCNC | Performed by: INTERNAL MEDICINE

## 2024-02-12 PROCEDURE — 80069 RENAL FUNCTION PANEL: CPT | Mod: HCNC | Performed by: GENERAL PRACTICE

## 2024-02-12 PROCEDURE — 36415 COLL VENOUS BLD VENIPUNCTURE: CPT | Mod: HCNC | Performed by: GENERAL PRACTICE

## 2024-03-11 PROBLEM — N17.9 AKI (ACUTE KIDNEY INJURY): Status: RESOLVED | Noted: 2023-12-07 | Resolved: 2024-03-11

## 2024-03-11 PROBLEM — J96.01 ACUTE HYPOXEMIC RESPIRATORY FAILURE: Status: RESOLVED | Noted: 2022-08-18 | Resolved: 2024-03-11

## 2024-03-22 DIAGNOSIS — F41.9 ANXIETY: ICD-10-CM

## 2024-03-22 NOTE — TELEPHONE ENCOUNTER
Care Due:                  Date            Visit Type   Department     Provider  --------------------------------------------------------------------------------                                MYCHART                              FOLLOWUP/OF  [facility]  Last Visit: 01-      FICE VISIT   MEDICINE       Kashmir Conn                              EP -                              PRIMARY      [facility]  Next Visit: 04-      CARE ([facility])   MEDICINE       Kashmir Conn                                                            Last  Test          Frequency    Reason                     Performed    Due Date  --------------------------------------------------------------------------------    HBA1C.......  6 months...  insulin, tirzepatide.....  10-   04-    [facility] Embedded Care Due Messages. Reference number: [id number].   3/22/2024 6:14:15 PM CDT

## 2024-03-26 RX ORDER — ESCITALOPRAM OXALATE 10 MG/1
10 TABLET ORAL DAILY
Qty: 90 TABLET | Refills: 1 | Status: SHIPPED | OUTPATIENT
Start: 2024-03-26

## 2024-03-27 ENCOUNTER — PATIENT MESSAGE (OUTPATIENT)
Dept: FAMILY MEDICINE | Facility: CLINIC | Age: 79
End: 2024-03-27
Payer: MEDICARE

## 2024-04-04 ENCOUNTER — OFFICE VISIT (OUTPATIENT)
Dept: FAMILY MEDICINE | Facility: CLINIC | Age: 79
End: 2024-04-04
Payer: MEDICARE

## 2024-04-04 VITALS
RESPIRATION RATE: 18 BRPM | OXYGEN SATURATION: 96 % | HEIGHT: 70 IN | WEIGHT: 207 LBS | HEART RATE: 66 BPM | BODY MASS INDEX: 29.63 KG/M2 | DIASTOLIC BLOOD PRESSURE: 62 MMHG | SYSTOLIC BLOOD PRESSURE: 160 MMHG

## 2024-04-04 DIAGNOSIS — D63.1 ANEMIA OF CHRONIC RENAL FAILURE, STAGE 4 (SEVERE): ICD-10-CM

## 2024-04-04 DIAGNOSIS — E61.1 IRON DEFICIENCY: ICD-10-CM

## 2024-04-04 DIAGNOSIS — I11.0: ICD-10-CM

## 2024-04-04 DIAGNOSIS — I10 BENIGN ESSENTIAL HYPERTENSION: ICD-10-CM

## 2024-04-04 DIAGNOSIS — E55.9 VITAMIN D DEFICIENCY: ICD-10-CM

## 2024-04-04 DIAGNOSIS — F41.1 GAD (GENERALIZED ANXIETY DISORDER): ICD-10-CM

## 2024-04-04 DIAGNOSIS — Z79.4 TYPE 2 DIABETES MELLITUS WITH DIABETIC POLYNEUROPATHY, WITH LONG-TERM CURRENT USE OF INSULIN: Primary | ICD-10-CM

## 2024-04-04 DIAGNOSIS — I49.5 SICK SINUS SYNDROME: ICD-10-CM

## 2024-04-04 DIAGNOSIS — N18.4 ANEMIA OF CHRONIC RENAL FAILURE, STAGE 4 (SEVERE): ICD-10-CM

## 2024-04-04 DIAGNOSIS — F33.0 MDD (MAJOR DEPRESSIVE DISORDER), RECURRENT EPISODE, MILD: ICD-10-CM

## 2024-04-04 DIAGNOSIS — I50.30: ICD-10-CM

## 2024-04-04 DIAGNOSIS — J43.8 OTHER EMPHYSEMA: ICD-10-CM

## 2024-04-04 DIAGNOSIS — E66.3 OVERWEIGHT (BMI 25.0-29.9): ICD-10-CM

## 2024-04-04 DIAGNOSIS — E11.42 TYPE 2 DIABETES MELLITUS WITH DIABETIC POLYNEUROPATHY, WITH LONG-TERM CURRENT USE OF INSULIN: Primary | ICD-10-CM

## 2024-04-04 DIAGNOSIS — N18.4 CHRONIC KIDNEY DISEASE (CKD), STAGE IV (SEVERE): ICD-10-CM

## 2024-04-04 DIAGNOSIS — I73.9 PAD (PERIPHERAL ARTERY DISEASE): ICD-10-CM

## 2024-04-04 PROBLEM — I50.33 ACUTE ON CHRONIC DIASTOLIC HEART FAILURE: Status: RESOLVED | Noted: 2022-08-18 | Resolved: 2024-04-04

## 2024-04-04 PROBLEM — I50.33 ACUTE ON CHRONIC HEART FAILURE WITH PRESERVED EJECTION FRACTION (HFPEF): Status: RESOLVED | Noted: 2023-04-14 | Resolved: 2024-04-04

## 2024-04-04 PROCEDURE — 3078F DIAST BP <80 MM HG: CPT | Mod: HCNC,CPTII,S$GLB, | Performed by: STUDENT IN AN ORGANIZED HEALTH CARE EDUCATION/TRAINING PROGRAM

## 2024-04-04 PROCEDURE — 3077F SYST BP >= 140 MM HG: CPT | Mod: HCNC,CPTII,S$GLB, | Performed by: STUDENT IN AN ORGANIZED HEALTH CARE EDUCATION/TRAINING PROGRAM

## 2024-04-04 PROCEDURE — 99214 OFFICE O/P EST MOD 30 MIN: CPT | Mod: HCNC,S$GLB,, | Performed by: STUDENT IN AN ORGANIZED HEALTH CARE EDUCATION/TRAINING PROGRAM

## 2024-04-04 PROCEDURE — 1126F AMNT PAIN NOTED NONE PRSNT: CPT | Mod: HCNC,CPTII,S$GLB, | Performed by: STUDENT IN AN ORGANIZED HEALTH CARE EDUCATION/TRAINING PROGRAM

## 2024-04-04 PROCEDURE — 1159F MED LIST DOCD IN RCRD: CPT | Mod: HCNC,CPTII,S$GLB, | Performed by: STUDENT IN AN ORGANIZED HEALTH CARE EDUCATION/TRAINING PROGRAM

## 2024-04-04 PROCEDURE — 99999 PR PBB SHADOW E&M-EST. PATIENT-LVL IV: CPT | Mod: PBBFAC,HCNC,, | Performed by: STUDENT IN AN ORGANIZED HEALTH CARE EDUCATION/TRAINING PROGRAM

## 2024-04-04 PROCEDURE — G2211 COMPLEX E/M VISIT ADD ON: HCPCS | Mod: HCNC,S$GLB,, | Performed by: STUDENT IN AN ORGANIZED HEALTH CARE EDUCATION/TRAINING PROGRAM

## 2024-04-04 NOTE — PROGRESS NOTES
Subjective:       Patient ID: Nestor Garcia is a 79 y.o. male.    Chief Complaint: Follow-up (3 month follow up)    Pt here for follow-up    DM2: follows with Dr. Juancarlos adams. Current meds include lantus and mounjaro 2.5mg weekly. Last A1c 4.8. no hypoglycemia episodes.    HTN: Coreg 25 mg BID, Imdur 60 mg QD, Hydralazine 100 mg TID, Nifedipine 30 mg BID, Doxazosin & Lasix QD. He has a pacemaker placed for sick sinus syndrome. Followed by Dr. Charles, [facility].    HLD/PAD: on statin/zetia.    CKD4/secondary hyperparathyroidism: follows with nephrology.     Anemia of chronic disease/iron def: on PO iron    Anxiety/MDD: on lexapro 10mg daily    Vit d def: on supplement.    Emphysema: found on CT. Stable from resp standpoint.    Review of Systems   Constitutional:  Negative for chills and fever.   HENT:  Negative for congestion, hearing loss, rhinorrhea, sore throat and trouble swallowing.    Eyes:  Negative for visual disturbance.   Respiratory:  Negative for cough, chest tightness, shortness of breath and wheezing.    Cardiovascular:  Negative for chest pain.   Gastrointestinal:  Negative for abdominal pain, constipation, diarrhea, nausea and vomiting.   Genitourinary:  Negative for difficulty urinating, dysuria and hematuria.   Musculoskeletal:  Negative for arthralgias and joint swelling.   Neurological:  Negative for dizziness, weakness and light-headedness.   Psychiatric/Behavioral:  Negative for confusion.        Objective:      Physical Exam  Vitals reviewed.   Constitutional:       General: He is not in acute distress.  HENT:      Head: Normocephalic and atraumatic.      Mouth/Throat:      Mouth: Mucous membranes are moist.   Eyes:      Conjunctiva/sclera: Conjunctivae normal.   Cardiovascular:      Rate and Rhythm: Normal rate and regular rhythm.      Heart sounds: Normal heart sounds. No murmur heard.  Pulmonary:      Effort: Pulmonary effort is normal. No respiratory distress.      Breath sounds: Normal breath  sounds.   Musculoskeletal:      Cervical back: Neck supple.   Neurological:      Mental Status: He is alert and oriented to person, place, and time.   Psychiatric:         Mood and Affect: Mood normal.         Behavior: Behavior normal.         Assessment:       1. Type 2 diabetes mellitus with diabetic polyneuropathy, with long-term current use of insulin    2. MADISYN (generalized anxiety disorder)    3. Other emphysema    4. PAD (peripheral artery disease)    5. Sick sinus syndrome    6. Benign hypertensive heart disease with diastolic CHF, NYHA class 1    7. Benign essential hypertension    8. Vitamin D deficiency    9. Overweight (BMI 25.0-29.9)    10. Anemia of chronic renal failure, stage 4 (severe)    11. Chronic kidney disease (CKD), stage IV (severe)    12. Iron deficiency    13. MDD (major depressive disorder), recurrent episode, mild          Plan:           1. Type 2 diabetes mellitus with diabetic polyneuropathy, with long-term current use of insulin  Overview:  Cont lantus  Lab Results   Component Value Date    HGBA1C 7.5 (H) 08/19/2022         Orders:  -     Hemoglobin A1C; Future; Expected date: 04/04/2024    2. MADISYN (generalized anxiety disorder)  Overview:  On lexapro 5mg daily        3. Other emphysema    4. PAD (peripheral artery disease)    5. Sick sinus syndrome    6. Benign hypertensive heart disease with diastolic CHF, NYHA class 1    7. Benign essential hypertension  Overview:  Stable on procardia XL 30mg daily, toprol XL 25mg BID, imdur 60mg daily, and hydralazine      8. Vitamin D deficiency    9. Overweight (BMI 25.0-29.9)    10. Anemia of chronic renal failure, stage 4 (severe)    11. Chronic kidney disease (CKD), stage IV (severe)    12. Iron deficiency    13. MDD (major depressive disorder), recurrent episode, mild      Labs as ordered  Cont current meds. He is seeing cardiology today, will defer changes in BP meds to them as he has home BP monitoring through [facility] as well  Recent labs  reviewed  Follow-up nephrology as scheduled  Follow-up endocrinology as scheduled  RTC 6 months or sooner if needed

## 2024-04-10 DIAGNOSIS — E11.42 TYPE 2 DIABETES MELLITUS WITH DIABETIC POLYNEUROPATHY, WITH LONG-TERM CURRENT USE OF INSULIN: ICD-10-CM

## 2024-04-10 DIAGNOSIS — Z79.4 TYPE 2 DIABETES MELLITUS WITH DIABETIC POLYNEUROPATHY, WITH LONG-TERM CURRENT USE OF INSULIN: ICD-10-CM

## 2024-04-10 RX ORDER — TIRZEPATIDE 2.5 MG/.5ML
2.5 INJECTION, SOLUTION SUBCUTANEOUS
Qty: 4 PEN | Refills: 2 | Status: SHIPPED | OUTPATIENT
Start: 2024-04-10

## 2024-04-10 NOTE — TELEPHONE ENCOUNTER
No care due was identified.  [facility] Embedded Care Due Messages. Reference number: [id number].   4/10/2024 6:46:57 AM CDT

## 2024-04-11 ENCOUNTER — LAB VISIT (OUTPATIENT)
Dept: LAB | Facility: HOSPITAL | Age: 79
End: 2024-04-11
Attending: STUDENT IN AN ORGANIZED HEALTH CARE EDUCATION/TRAINING PROGRAM
Payer: MEDICARE

## 2024-04-11 DIAGNOSIS — I10 HYPERTENSION, ESSENTIAL: ICD-10-CM

## 2024-04-11 DIAGNOSIS — E11.42 TYPE 2 DIABETES MELLITUS WITH DIABETIC POLYNEUROPATHY, WITH LONG-TERM CURRENT USE OF INSULIN: ICD-10-CM

## 2024-04-11 DIAGNOSIS — N25.81 SECONDARY HYPERPARATHYROIDISM OF RENAL ORIGIN: ICD-10-CM

## 2024-04-11 DIAGNOSIS — E79.0 HYPERURICEMIA: ICD-10-CM

## 2024-04-11 DIAGNOSIS — R80.1 PERSISTENT PROTEINURIA: ICD-10-CM

## 2024-04-11 DIAGNOSIS — R60.9 EDEMA, UNSPECIFIED TYPE: ICD-10-CM

## 2024-04-11 DIAGNOSIS — E83.39 HYPERPHOSPHATEMIA: ICD-10-CM

## 2024-04-11 DIAGNOSIS — D50.9 IRON DEFICIENCY ANEMIA, UNSPECIFIED IRON DEFICIENCY ANEMIA TYPE: ICD-10-CM

## 2024-04-11 DIAGNOSIS — N18.4 CHRONIC KIDNEY DISEASE, STAGE IV (SEVERE): ICD-10-CM

## 2024-04-11 DIAGNOSIS — D63.1 ANEMIA OF CHRONIC RENAL FAILURE, STAGE 4 (SEVERE): ICD-10-CM

## 2024-04-11 DIAGNOSIS — N18.4 ANEMIA OF CHRONIC RENAL FAILURE, STAGE 4 (SEVERE): ICD-10-CM

## 2024-04-11 DIAGNOSIS — Z79.4 TYPE 2 DIABETES MELLITUS WITH DIABETIC POLYNEUROPATHY, WITH LONG-TERM CURRENT USE OF INSULIN: ICD-10-CM

## 2024-04-11 LAB
ANION GAP SERPL CALC-SCNC: 8 MMOL/L (ref 8–16)
BUN SERPL-MCNC: 64 MG/DL (ref 8–23)
CALCIUM SERPL-MCNC: 8.2 MG/DL (ref 8.7–10.5)
CHLORIDE SERPL-SCNC: 110 MMOL/L (ref 95–110)
CO2 SERPL-SCNC: 21 MMOL/L (ref 23–29)
CREAT SERPL-MCNC: 2.9 MG/DL (ref 0.5–1.4)
EST. GFR  (NO RACE VARIABLE): 21 ML/MIN/1.73 M^2
ESTIMATED AVG GLUCOSE: 105 MG/DL (ref 68–131)
GLUCOSE SERPL-MCNC: 193 MG/DL (ref 70–110)
HBA1C MFR BLD: 5.3 % (ref 4–5.6)
POTASSIUM SERPL-SCNC: 4.1 MMOL/L (ref 3.5–5.1)
SODIUM SERPL-SCNC: 139 MMOL/L (ref 136–145)

## 2024-04-11 PROCEDURE — 83036 HEMOGLOBIN GLYCOSYLATED A1C: CPT | Mod: HCNC | Performed by: STUDENT IN AN ORGANIZED HEALTH CARE EDUCATION/TRAINING PROGRAM

## 2024-04-11 PROCEDURE — 80048 BASIC METABOLIC PNL TOTAL CA: CPT | Mod: HCNC

## 2024-04-11 PROCEDURE — 36415 COLL VENOUS BLD VENIPUNCTURE: CPT | Mod: HCNC

## 2024-05-07 ENCOUNTER — TELEPHONE (OUTPATIENT)
Dept: FAMILY MEDICINE | Facility: CLINIC | Age: 79
End: 2024-05-07
Payer: MEDICARE

## 2024-05-11 ENCOUNTER — NURSE TRIAGE (OUTPATIENT)
Dept: ADMINISTRATIVE | Facility: CLINIC | Age: 79
End: 2024-05-11
Payer: MEDICARE

## 2024-05-11 ENCOUNTER — HOSPITAL ENCOUNTER (EMERGENCY)
Facility: HOSPITAL | Age: 79
Discharge: HOME | End: 2024-05-11
Attending: STUDENT IN AN ORGANIZED HEALTH CARE EDUCATION/TRAINING PROGRAM
Payer: MEDICARE

## 2024-05-11 VITALS
DIASTOLIC BLOOD PRESSURE: 77 MMHG | OXYGEN SATURATION: 96 % | TEMPERATURE: 98 F | SYSTOLIC BLOOD PRESSURE: 174 MMHG | WEIGHT: 202.69 LBS | RESPIRATION RATE: 18 BRPM | BODY MASS INDEX: 29.09 KG/M2 | HEART RATE: 65 BPM
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
## 2024-05-11 PROCEDURE — 99285 EMERGENCY DEPT VISIT HI MDM: CPT | Mod: 25,HCNC

## 2024-05-11 NOTE — DISCHARGE INSTRUCTIONS
Please follow up with your primary care physician within 2 days. Ensure that you review all lab work results and/or imaging results. If you have any questions about your discharge paperwork please call the Emergency Department.     Return to the ED for any headache, vision changes, dizziness, lightheadedness, nausea, vomiting, speech changes, numbness or tingling in extremitites, or any new or worsening symptoms.      If you were prescribed antibiotics, please take them to completion. If you were prescribed a narcotic or any sedating medication, do not drive or operate heavy equipment or machinery while taking these medications.  If you were diagnosed with a seizure, syncope, any loss of consciousness or decreased alertness, do not drive, swim, operate heavy machinery, or put yourself in any position where a sudden loss of consciousness could put yourself or others in danger.    Thank you for visiting [facility], Department of Emergency Medicine. Please see the entirety of the educational materials provided. Please note that a visit to the emergency department does not substitute ongoing care from a primary medical provider or specialist. Please ensure to follow up as recommended. However, please return to the emergency department immediately if symptoms do not improve as discussed, symptoms worsen, new symptoms develop, difficulty in following up or for any of your concerns or issues. Please note on discharge you are acknowledging understanding and agreement on medical evaluation, management recommendations and follow up recommendations.

## 2024-05-11 NOTE — ED TRIAGE NOTES
Pt arrived to ED c/o fall after standing on a chair attempting to reach an item and he fell on his back. Obvious hematoma noted to the posterior head. Pt rates pain 2/10. Pt is on aspirin. Pt AAOx4.

## 2024-05-11 NOTE — ED PROVIDER NOTES
Encounter Date: 5/11/2024       History     Chief Complaint   Patient presents with    Fall     Pt arrived to ED c/o fall after standing on a chair attempting to reach an item and he fell on his back. Obvious hematoma noted to the posterior head. Pt rates pain 2/10. Pt is on aspirin. Pt AAOx4.      79-year-old male with history of CHF, diabetes, hypertension, on daily aspirin, presenting after a mechanical fall.  Patient was standing on a chair attempting to reach something, and fell backwards hitting his head.  No loss of consciousness.  No vision changes, numbness, weakness.  Patient's only other complaint is his right elbow, where he has a skin tear.  He denies any pain with range of motion of any joint throughout his body.  Also reporting some mid upper back pain.  No other complaints.  Denies any prodrome to the fall, no chest pain or shortness of breath.      Review of patient's allergies indicates:   Allergen Reactions    Adhesive Other (See Comments)     Skin irritation    Lisinopril Other (See Comments)     Kidney failure    Statins-hmg-coa reductase inhibitors Other (See Comments)     pain     Past Medical History:   Diagnosis Date    Acute respiratory failure with hypoxia 08/18/2022    daughter denies    Anxiety disorder, unspecified     CHF (congestive heart failure)     COVID 11/23/2022    Dehydration 12/22/2021    medication induced    Depression     Diabetes mellitus type II     Gout, unspecified     Hypertension     Melanoma in situ 10/23/2023    left ear    Melanoma in situ of left ear 11/13/2023    left ear    Positive D dimer 12/22/2021    Sick sinus syndrome     Sleep apnea     cpap machine    TIA (transient ischemic attack) 08/18/2022    daughter denies     Past Surgical History:   Procedure Laterality Date    APPENDECTOMY      BACK SURGERY      CHOLECYSTECTOMY      EXTRACTION OF CATARACT Left 08/23/2023    Procedure: EXTRACTION, CATARACT;  Surgeon: Alonso Rodriguez MD;  Location: [facility] OR;   Service: Ophthalmology;  Laterality: Left;  DiB00 +22.5D    EXTRACTION OF CATARACT Right 2023    Procedure: EXTRACTION, CATARACT;  Surgeon: Alonso Rodriguez MD;  Location: [facility] OR;  Service: Ophthalmology;  Laterality: Right;  DiB00 +22.5D    INNER EAR SURGERY      unilateral    INSERTION OF PACEMAKER      INSERTION OF PACEMAKER      KNEE SURGERY      unilateral    PHACOEMULSIFICATION, CATARACT, WITH IOL INSERTION Left 2023    Procedure: PHACOEMULSIFICATION, CATARACT, WITH IOL INSERTION;  Surgeon: Alonso Rodriguez MD;  Location: [facility] OR;  Service: Ophthalmology;  Laterality: Left;    PHACOEMULSIFICATION, CATARACT, WITH IOL INSERTION Right 2023    Procedure: PHACOEMULSIFICATION, CATARACT, WITH IOL INSERTION;  Surgeon: Alonso Rodriguez MD;  Location: [facility] OR;  Service: Ophthalmology;  Laterality: Right;     Family History   Problem Relation Name Age of Onset    Alzheimer's disease Mother      Heart disease Father Nestor Garcia          at 64 with a heart attack    Hearing loss Sister Jennifer Wynn         Otosclerosis    Hearing loss Daughter Candace Palomino         Otosclerosis     Social History     Tobacco Use    Smoking status: Never    Smokeless tobacco: Never   Substance Use Topics    Alcohol use: Never    Drug use: Never     Review of Systems   Constitutional:  Negative for fever.   HENT:  Negative for sore throat.    Respiratory:  Negative for shortness of breath.    Cardiovascular:  Negative for chest pain.   Gastrointestinal:  Negative for nausea.   Genitourinary:  Negative for dysuria.   Musculoskeletal:  Negative for back pain.        Right elbow skin tear   Skin:  Negative for rash.   Neurological:  Positive for headaches. Negative for weakness.   Hematological:  Does not bruise/bleed easily.       Physical Exam     Initial Vitals [24 0706]   BP Pulse Resp Temp SpO2   (!) 177/76 64 20 97.8 °F (36.6 °C) (!) 92 %      MAP       --         Physical Exam    Nursing note and  vitals reviewed.  Constitutional: He appears well-developed. He is not diaphoretic. No distress.   HENT:   Hematoma to posterior scalp with very small laceration, no active bleeding. No other signs of ecchymosis, abrasions, lacerations, or hematomas to head. No signs of trauma to nose, no septal hematoma. No blood in nose. No blood in oropharynx. No signs of skull fracture, no racoon eyes or rao's sign. No neck TTP. No pain when ranging neck. No hemotympanum bilaterally. No opthalmoplegia.     Eyes: EOM are normal.   Neck:   Normal range of motion.  Cardiovascular:            No murmur heard.  Pulmonary/Chest: No respiratory distress. He has no wheezes. He has no rales.   No chest wall tenderness to palpation or skin changes.   Abdominal: He exhibits no distension.   Musculoskeletal:         General: Normal range of motion.      Cervical back: Normal range of motion.      Comments: Moving all extremities. No pain with palpation to bilateral shoulders, elbows, wrists, hips, knees, ankles. No spinal midline tenderness.        Neurological: He is alert and oriented to person, place, and time.   Skin: Skin is warm.   Psychiatric: He has a normal mood and affect.         ED Course   Procedures  Labs Reviewed - No data to display       Imaging Results              CT Chest Without Contrast (Final result)  Result time 05/11/24 08:36:06      Final result by Evy Balderrama MD (05/11/24 08:36:06)                   Impression:      Mosaic appearance of the lungs and bilateral pleural effusions suggesting CHF.  There is also small pericardial effusion and there is small volume of ascites in the visualized upper abdomen.  These findings are new compared to the prior study 12/29/2021.      Electronically signed by: Evy Balderrama MD  Date:    05/11/2024  Time:    08:36               Narrative:    EXAMINATION:  CT CHEST WITHOUT CONTRAST    CLINICAL HISTORY:  Chest trauma, blunt;    TECHNIQUE:  Low dose axial images,  sagittal and coronal reformations were obtained from the thoracic inlet to the lung bases. Contrast was not administered.    COMPARISON:  CTA chest 12/29/2021    FINDINGS:  Moderate size bilateral pleural effusions.  Anterior chest wall cardiac pacemaker with multiple pacing leads.  Vascular calcifications including coronary artery calcifications.  Prominent pulmonary artery suggesting pulmonary artery hypertension and similar in appearance to the prior exam.  Old granulomatous disease with calcified mediastinal and hilar nodes and calcified granulomas.  Small pericardial effusion. Pericardial effusion measures 1.2 cm along the left side of the heart.  Small volume of ascites in the visualized upper abdomen.  Cholecystectomy clips.  Mosaic appearance of the lungs.  The findings of CHF with the mosaic appearance of the lungs, ascites, pleural effusions new compared to the prior exam and pericardial effusion new.    Osseous structures show degenerative change without obvious osseous destruction or acute fracture.                                       CT Cervical Spine Without Contrast (Final result)  Result time 05/11/24 08:27:50      Final result by Evy Balderrama MD (05/11/24 08:27:50)                   Impression:      Severe degenerative change.  Very slight positional change appearing degenerative without acute fracture, compression or subluxation    Bilateral pleural effusions noted    Findings detailed above      Electronically signed by: Evy Balderrama MD  Date:    05/11/2024  Time:    08:27               Narrative:    EXAMINATION:  CT CERVICAL SPINE WITHOUT CONTRAST    CLINICAL HISTORY:  Neck trauma (Age >= 65y);    TECHNIQUE:  Low dose axial images, sagittal and coronal reformations were performed though the cervical spine.  Contrast was not administered.    COMPARISON:  None    FINDINGS:  There is straightening of the usual cervical lordosis.  Severe disc space narrowing C6-C7.  Mild disc space  narrowing C5-C6.  Very minimal anterior positioning of C3 relative to C4 and C4 relative to C5 and C5 relative to C6 appearing degenerative without acute compression, subluxation or fracture.  There are degenerative changes.  Severe facet arthropathy posterior facets left C2-C3, C3-C4, C4-C5.  Evaluation of spinal canal contents limited without intrathecal or IV contrast but with no epidural hematoma or prevertebral soft tissue swelling or hematoma seen and as visualized no appreciable significant spinal canal narrowing.  There is multilevel bony neural foraminal narrowing moderately severe left C3-C4 and right C6-C7 disc osteophyte complex with apparent mild spinal canal narrowing C6-C7.    Bilateral pleural effusions and cardiac pacing lead partially visualized noted                                       CT Head Without Contrast (Final result)  Result time 05/11/24 08:20:31      Final result by Evy Balderrama MD (05/11/24 08:20:31)                   Impression:      No acute intracranial findings      Electronically signed by: Evy Balderrama MD  Date:    05/11/2024  Time:    08:20               Narrative:    EXAMINATION:  CT HEAD WITHOUT CONTRAST    CLINICAL HISTORY:  Facial trauma, blunt;    TECHNIQUE:  Low dose axial images were obtained through the head.  Coronal and sagittal reformations were also performed. Contrast was not administered.    COMPARISON:  08/18/2022    FINDINGS:  There is mild dilatation of ventricles, sulci, fissures. There is decreased density in white matter suggesting microvascular ischemic change. There is no acute intracranial hemorrhage.  There is no intracranial mass effect.  There is no acute major vascular territory infarct. Note is made that MRI is typically more sensitive than CT particularly for detection of early or small nonhemorrhagic infarcts. The calvarium appears intact.  Visualized paranasal sinuses very slight opacification in sphenoid sinus.  Mastoids well  pneumatized.  There are calcifications in parasellar portions of internal carotid arteries and in vertebral arteries.  Hematoma in the scalp posteriorly.                                       Medications - No data to display  Medical Decision Making  DDX:  Mechanical fall with head injury.  Skin tear to right elbow is superficial and will require bandaging.  Will rule out intracranial hemorrhage and skull fracture, cervical spine fracture given age.  Also given patient's right upper back pain, will CT chest to rule out rib fracture.  DX:  CT head, cervical spine, chest  TX:  Analgesia PRN  Dispo:  Pending workup        Amount and/or Complexity of Data Reviewed  Radiology: ordered.               ED Course as of 05/11/24 0846   Sat May 11, 2024   0844 Patient has known diagnosis of CHF, he is known to suffer from pulmonary effusions.  Patient will contact his cardiologist on Monday to speak about possibly increasing his Lasix dose.  Patient has no shortness of breath at this time.  Will discharge home. [NB]      ED Course User Index  [NB] Rolando Bee MD                           Clinical Impression:  Final diagnoses:  [W19.XXXA] Fall, initial encounter (Primary)          ED Disposition Condition    Discharge Stable          ED Prescriptions    None       Follow-up Information       Follow up With Specialties Details Why Contact Info    Kashmir Conn MD Family Medicine Schedule an appointment as soon as possible for a visit in 2 days  [address] [facility] [address]  [phone]      [facility] - Emergency Dept Emergency Medicine  If symptoms worsen [address] [place] [id number] [phone]             Rolando Bee MD  05/11/24 0729       Rolando Bee MD  05/11/24 0877

## 2024-05-11 NOTE — TELEPHONE ENCOUNTER
Spoke with daughter.   Pt fell this morning, seen in ER after. & cleared. Daughter states forgot to ask for pain rx since pt unable to take IBU. Has taken tylenol already & still hurting. Discussed triage process based on symptoms, declines triage. Requesting message be routed to provider.     Reason for Disposition   Health Information question, no triage required and triager able to answer question    Protocols used: Information Only Call - No Triage-A-

## 2024-05-13 ENCOUNTER — PATIENT OUTREACH (OUTPATIENT)
Dept: EMERGENCY MEDICINE | Facility: HOSPITAL | Age: 79
End: 2024-05-13
Payer: MEDICARE

## 2024-05-13 NOTE — PROGRESS NOTES
Daughter declined assistance with appointments for pt.    Margret Taylor  ED Navigator  [phone]

## 2024-05-14 VITALS — DIASTOLIC BLOOD PRESSURE: 55 MMHG | SYSTOLIC BLOOD PRESSURE: 134 MMHG

## 2024-05-20 PROBLEM — N18.4 ANEMIA OF CHRONIC RENAL FAILURE, STAGE 4 (SEVERE): Status: ACTIVE | Noted: 2024-05-20

## 2024-05-20 PROBLEM — D63.1 ANEMIA OF CHRONIC RENAL FAILURE, STAGE 4 (SEVERE): Status: ACTIVE | Noted: 2024-05-20

## 2024-06-07 ENCOUNTER — PATIENT MESSAGE (OUTPATIENT)
Dept: FAMILY MEDICINE | Facility: CLINIC | Age: 79
End: 2024-06-07
Payer: MEDICARE

## 2024-06-28 ENCOUNTER — OFFICE VISIT (OUTPATIENT)
Dept: INTERNAL MEDICINE | Facility: CLINIC | Age: 79
End: 2024-06-28
Payer: MEDICARE

## 2024-06-28 VITALS
OXYGEN SATURATION: 95 % | WEIGHT: 201.25 LBS | SYSTOLIC BLOOD PRESSURE: 112 MMHG | BODY MASS INDEX: 28.81 KG/M2 | RESPIRATION RATE: 18 BRPM | DIASTOLIC BLOOD PRESSURE: 60 MMHG | HEIGHT: 70 IN | HEART RATE: 59 BPM

## 2024-06-28 DIAGNOSIS — F41.9 ANXIETY: ICD-10-CM

## 2024-06-28 DIAGNOSIS — N18.4 CHRONIC KIDNEY DISEASE (CKD), STAGE IV (SEVERE): ICD-10-CM

## 2024-06-28 DIAGNOSIS — Z00.00 ENCOUNTER FOR PREVENTIVE HEALTH EXAMINATION: Primary | ICD-10-CM

## 2024-06-28 DIAGNOSIS — I25.10 CORONARY ARTERY DISEASE, UNSPECIFIED VESSEL OR LESION TYPE, UNSPECIFIED WHETHER ANGINA PRESENT, UNSPECIFIED WHETHER NATIVE OR TRANSPLANTED HEART: ICD-10-CM

## 2024-06-28 DIAGNOSIS — E78.5 DYSLIPIDEMIA: ICD-10-CM

## 2024-06-28 DIAGNOSIS — Z00.00 ENCOUNTER FOR MEDICARE ANNUAL WELLNESS EXAM: ICD-10-CM

## 2024-06-28 DIAGNOSIS — G47.33 OSA (OBSTRUCTIVE SLEEP APNEA): ICD-10-CM

## 2024-06-28 DIAGNOSIS — Z79.4 TYPE 2 DIABETES MELLITUS WITH DIABETIC POLYNEUROPATHY, WITH LONG-TERM CURRENT USE OF INSULIN: ICD-10-CM

## 2024-06-28 DIAGNOSIS — D63.1 ANEMIA OF CHRONIC RENAL FAILURE, STAGE 4 (SEVERE): ICD-10-CM

## 2024-06-28 DIAGNOSIS — I10 BENIGN ESSENTIAL HYPERTENSION: ICD-10-CM

## 2024-06-28 DIAGNOSIS — Z95.0 PACEMAKER: ICD-10-CM

## 2024-06-28 DIAGNOSIS — D69.2 SENILE PURPURA: ICD-10-CM

## 2024-06-28 DIAGNOSIS — E11.42 TYPE 2 DIABETES MELLITUS WITH DIABETIC POLYNEUROPATHY, WITH LONG-TERM CURRENT USE OF INSULIN: ICD-10-CM

## 2024-06-28 DIAGNOSIS — H34.8322 TRIBUTARY (BRANCH) RETINAL VEIN OCCLUSION, LEFT EYE, STABLE: ICD-10-CM

## 2024-06-28 DIAGNOSIS — N25.81 SECONDARY HYPERPARATHYROIDISM OF RENAL ORIGIN: ICD-10-CM

## 2024-06-28 DIAGNOSIS — N18.4 ANEMIA OF CHRONIC RENAL FAILURE, STAGE 4 (SEVERE): ICD-10-CM

## 2024-06-28 DIAGNOSIS — I50.32 CHRONIC DIASTOLIC HEART FAILURE, NYHA CLASS 2: ICD-10-CM

## 2024-06-28 PROBLEM — F33.0 MDD (MAJOR DEPRESSIVE DISORDER), RECURRENT EPISODE, MILD: Status: RESOLVED | Noted: 2023-04-15 | Resolved: 2024-06-28

## 2024-06-28 PROCEDURE — 99999 PR PBB SHADOW E&M-EST. PATIENT-LVL V: CPT | Mod: PBBFAC,HCNC,, | Performed by: NURSE PRACTITIONER

## 2024-06-28 RX ORDER — DOXAZOSIN 4 MG/1
TABLET ORAL
COMMUNITY
Start: 2024-04-04

## 2024-06-28 NOTE — PROGRESS NOTES
"Nestor Garcia presented for a  Medicare AWV and comprehensive Health Risk Assessment today. The following components were reviewed and updated:    Medical history  Family History  Social history  Allergies and Current Medications  Health Risk Assessment  Health Maintenance  Care Team         ** See Completed Assessments for Annual Wellness Visit within the encounter summary.**         The following assessments were completed:  Living Situation  CAGE  Depression Screening  Timed Get Up and Go  Whisper Test  Cognitive Function Screening  Nutrition Screening  ADL Screening  PAQ Screening        Vitals:    06/28/24 0739   BP: 112/60   Pulse: (!) 59   Resp: 18   SpO2: 95%   Weight: 91.3 kg (201 lb 4.5 oz)   Height: 5' 10" (1.778 m)     Body mass index is 28.88 kg/m².  Physical Exam  Vitals and nursing note reviewed.   Constitutional:       Appearance: Normal appearance. He is not diaphoretic.   HENT:      Head: Normocephalic.      Comments: Bilateral hearing aids      Right Ear: Decreased hearing noted.      Left Ear: Decreased hearing noted.      Mouth/Throat:      Mouth: Mucous membranes are moist.   Eyes:      Pupils: Pupils are equal, round, and reactive to light.   Cardiovascular:      Rate and Rhythm: Normal rate. Rhythm irregular.      Comments: Left pectoral PPM palpated   Pulmonary:      Effort: Pulmonary effort is normal. No respiratory distress.      Breath sounds: Normal breath sounds. No stridor. No wheezing, rhonchi or rales.   Chest:      Chest wall: No tenderness.   Abdominal:      General: Abdomen is flat.      Palpations: Abdomen is soft.   Musculoskeletal:         General: Normal range of motion.      Right lower leg: No edema.      Left lower leg: No edema.   Skin:     General: Skin is warm.      Capillary Refill: Capillary refill takes less than 2 seconds.      Findings: Bruising (bilateral hands and arms) present.   Neurological:      General: No focal deficit present.      Mental Status: He is " alert and oriented to person, place, and time.   Psychiatric:         Mood and Affect: Mood normal.         Behavior: Behavior normal.         Thought Content: Thought content normal.         Judgment: Judgment normal.               Diagnoses and health risks identified today and associated recommendations/orders:    1. Encounter for preventive health examination    Mr. Garcia  was seen today for AMW. Health maintenance reviewed.   Overdue health maintenance shows indications for Shingrix, RSV and Covid; discussed available at pharmcy and Rx provided.    LA  reviewed; Patient is not using or prescribed any Opioids  Exam stable. Patient remains mostly independent with ADLS at home;he drives and goes to the store, cooks and cleans. Has a garden.  Notes he lost around 50 pounds over the past 2 years and this has seemed to help with chronic medical issues.   ACP documents reviewed and provided.          2. Encounter for Medicare annual wellness exam  -     Ambulatory Referral/Consult to Enhanced Annual Wellness Visit (eAWV)    3. Chronic diastolic heart failure, NYHA class 2  Overview:  Dx Per cardiology   Left ventricular ejection fraction 60% May 2024 1+ MR, small pericardial effusion PA pressure 32   4/14/2023 ECHO Left ventricular diastolic function is abnormal (stage II   Coreg   Lisinopril stopped due to CKD, worsening renal fx   LE compression stockings     Assessment & Plan:  Chronic, stable No acute symptoms, lungs are clear  Wearing compression stockings       4. Coronary artery disease, unspecified vessel or lesion type, unspecified whether angina present, unspecified whether native or transplanted heart  Overview:  CAD 40% LAD disease February 2022.       5. Benign essential hypertension  Overview:  Chronic   Left ventricular ejection fraction 60% May 2024 1+ MR, small pericardial effusion PA pressure 32   4/14/2023 ECHO Left ventricular diastolic function is abnormal (stage II)     Stable on procardia XL  30mg daily, coreg 25mg bid , imdur 60mg daily, and hydralazine 100mg tid   Doxazosin 4mg q 12       Assessment & Plan:  BP stable with Rx   Notes he has lost around 50lbs over the past 2 years;   Stable       6. Chronic kidney disease (CKD), stage IV (severe)  Overview:  May 2024 positive proteinuria, positive casts, hemoglobin A1c 5.3, creatinine 3.0 potassium 4.3 hematocrit 24.2%.     Follows with nephrologist    s/p IV Iron load, re-start Retacrit protocol       7. Dyslipidemia  Overview:  Statin intolerance  On fish oil    Assessment & Plan:  Lab Results   Component Value Date    LDLCALC 68.8 10/19/2023   Stable   Labs/lipids Planned  per CIS         8. Type 2 diabetes mellitus with diabetic polyneuropathy, with long-term current use of insulin  Overview:  Chronic    insulin glargine U-100 (Lantus) - 100 unit/mL  Tirzepatide       Cont lantus  Lab Results   Component Value Date    HGBA1C 7.5 (H) 08/19/2022     DM polyneuropathy- documented since around 2015 - initially tx with Gabapentin; 2021 : Pt did well with lyrica 25mg 11/8/22  switched his neuropathy meds from Lyrica to Cymbalta. The Cymbalta helped with both his feet and the anxiety. However, a few months ago my dad was hospitalized for HTN event and the Cymbalta was discontinued bc of his kidneys. He was switched to Zoloft while in the hospital but he was hallucinating with that and he said it made him feel funny.      Assessment & Plan:  Lab Results   Component Value Date    HGBA1C 5.3 05/15/2024     Notes he still has a little numbness but no burning   Stable, chronic       9. Anemia of chronic renal failure, stage 4 (severe)  Overview:  Lab Results   Component Value Date    WBC 5.08 06/27/2024    HGB 9.4 (L) 06/27/2024    HCT 28.5 (L) 06/27/2024    MCV 95 06/27/2024     06/27/2024     erythropoietin injection soon. ?       Assessment & Plan:  - s/p IV Iron load, re-start Retacrit protocol   Chronic stable       10. Pacemaker  Overview:  Status  post pacemaker for bradycardia March 2023.  History of PVCs, nonsustained atrial tachycardia, nonspecific dizziness     Assessment & Plan:  Stable, monitored per cardiology       11. TARA (obstructive sleep apnea)  Overview:  7/2021 TARA: Pt had sleep study last month and had moderate sleep apnea followed by titration study   Tx with CPAP   Notes he can't sleep without CPAP     Assessment & Plan:  Uses CPAP nightly with benefit       12. Secondary hyperparathyroidism of renal origin  Overview:  Per nephrology   Lab Results   Component Value Date    .0 (H) 05/15/2024    CALCIUM 8.0 (L) 05/15/2024    PHOS 4.4 05/15/2024     continue Ca-Vit D OTC  - continue to monitor PTH  - consider Calcitriol if persistent next visit      13. Tributary (branch) retinal vein occlusion, left eye, stable  Overview:  Notes he had recent eye injection to left eye     Assessment & Plan:  Stable, monitor       14. Anxiety  Overview:  1/8/22 Around this time noted to have ^ anxiety with chronic medical problems with known neuropathy; Lyrica was stopped and changed to Cymbalta which helped both neuropathy and anxiety.  However, around this time he was hospitalized for hypertensive event and the Cymbalta was discontinued due to his kidney function.  He was switched to Zoloft while in the hospital but he was noted to have hallucinations and it made him feel funny.  Later started on lexapro 5mg and titrated up to 10mg         Assessment & Plan:  Stable with Rx ; states I dont get depressed, I dont worry about anything any more       15. Senile purpura  Overview:  Bruises easily to arms, hands  Wearing long sleeve  Takes aspirin  Lab Results   Component Value Date    WBC 5.08 06/27/2024    HGB 9.4 (L) 06/27/2024    HCT 28.5 (L) 06/27/2024    MCV 95 06/27/2024     06/27/2024           Assessment & Plan:  No active bleeding  Stable, monitor  Prevention discussed            Provided Nestor with a 5-10 year written screening schedule and  personal prevention plan. Recommendations were developed using the USPSTF age appropriate recommendations. Education, counseling, and referrals were provided as needed. After Visit Summary printed and given to patient which includes a list of additional screenings\tests needed.    Follow up in about 1 year (around 6/28/2025).    FLORENTINO Anguiano offered to discuss advanced care planning, including how to pick a person who would make decisions for you if you were unable to make them for yourself, called a health care power of , and what kind of decisions you might make such as use of life sustaining treatments such as ventilators and tube feeding when faced with a life limiting illness recorded on a living will that they will need to know. (How you want to be cared for as you near the end of your natural life)     X Patient is interested in learning more about how to make advanced directives.  I provided them paperwork and offered to discuss this with them.

## 2024-06-28 NOTE — Clinical Note
Mr. Garcia  was seen today for AMW. Health maintenance reviewed.  Overdue health maintenance shows indications for Shingrix, RSV and Covid; discussed available at pharmcy and Rx provided.   LA  reviewed; Patient is not using or prescribed any Opioids Exam stable. Patient remains mostly independent with ADLS at home;he drives and goes to the store, cooks and cleans. Has a garden.  Notes he lost around 50 pounds over the past 2 years and this has seemed to help with chronic medical issues.   ACP documents reviewed and provided.    Thank you,  Maru MANSFIELD

## 2024-06-28 NOTE — ASSESSMENT & PLAN NOTE
Lab Results   Component Value Date    LDLCALC 68.8 10/19/2023   Stable   Labs/lipids Planned  per CIS

## 2024-06-28 NOTE — ASSESSMENT & PLAN NOTE
Lab Results   Component Value Date    HGBA1C 5.3 05/15/2024     Notes he still has a little numbness but no burning   Stable, chronic

## 2024-06-28 NOTE — PATIENT INSTRUCTIONS
Counseling and Referral of Other Preventative  (Italic type indicates deductible and co-insurance are waived)    Patient Name: Nestor Garcia  Today's Date: 6/28/2024    Health Maintenance       Date Due Completion Date    Shingles Vaccine (1 of 2) Never done ---    RSV Vaccine (Age 60+ and Pregnant patients) (1 - 1-dose 60+ series) Never done ---    COVID-19 Vaccine (3 - Moderna risk series) 01/12/2022 12/15/2021    Eye Exam 07/13/2024 7/13/2023    Diabetes Urine Screening 10/19/2024 10/19/2023    Lipid Panel 10/19/2024 10/19/2023    Hemoglobin A1c 11/15/2024 5/15/2024    TETANUS VACCINE 09/25/2026 9/25/2016        No orders of the defined types were placed in this encounter.      The following information is provided to all patients.  This information is to help you find resources for any of the problems found today that may be affecting your health:                  Living healthy guide: www.[facility].[place].[facility]      Understanding Diabetes: www.diabetes.org      Eating healthy: www.cdc.gov/healthyweight      [facility] home safety checklist: www.cdc.gov/steadi/patient.html      Agency on Aging: www.goea.[place].gov      Alcoholics anonymous (AA): www.aa.org      Physical Activity: www.[place].nih.gov/cj7juga      Tobacco use: www.quitwithusla.org

## 2024-07-25 ENCOUNTER — OFFICE VISIT (OUTPATIENT)
Dept: FAMILY MEDICINE | Facility: CLINIC | Age: 79
End: 2024-07-25
Payer: MEDICARE

## 2024-07-25 VITALS
HEART RATE: 65 BPM | BODY MASS INDEX: 29.81 KG/M2 | RESPIRATION RATE: 14 BRPM | WEIGHT: 208.25 LBS | DIASTOLIC BLOOD PRESSURE: 60 MMHG | HEIGHT: 70 IN | OXYGEN SATURATION: 95 % | SYSTOLIC BLOOD PRESSURE: 130 MMHG

## 2024-07-25 DIAGNOSIS — N18.4 ANEMIA OF CHRONIC RENAL FAILURE, STAGE 4 (SEVERE): ICD-10-CM

## 2024-07-25 DIAGNOSIS — E55.9 VITAMIN D DEFICIENCY: ICD-10-CM

## 2024-07-25 DIAGNOSIS — I10 BENIGN ESSENTIAL HYPERTENSION: ICD-10-CM

## 2024-07-25 DIAGNOSIS — E11.42 TYPE 2 DIABETES MELLITUS WITH DIABETIC POLYNEUROPATHY, WITH LONG-TERM CURRENT USE OF INSULIN: ICD-10-CM

## 2024-07-25 DIAGNOSIS — Z79.4 TYPE 2 DIABETES MELLITUS WITH DIABETIC POLYNEUROPATHY, WITH LONG-TERM CURRENT USE OF INSULIN: ICD-10-CM

## 2024-07-25 DIAGNOSIS — D63.1 ANEMIA OF CHRONIC RENAL FAILURE, STAGE 4 (SEVERE): ICD-10-CM

## 2024-07-25 DIAGNOSIS — N18.4 CHRONIC KIDNEY DISEASE (CKD), STAGE IV (SEVERE): Primary | ICD-10-CM

## 2024-07-25 PROCEDURE — 99999 PR PBB SHADOW E&M-EST. PATIENT-LVL IV: CPT | Mod: PBBFAC,HCNC,, | Performed by: STUDENT IN AN ORGANIZED HEALTH CARE EDUCATION/TRAINING PROGRAM

## 2024-07-25 NOTE — PROGRESS NOTES
Subjective:       Patient ID: Nestor Garcia is a 79 y.o. male.    Chief Complaint: Follow-up (Patient is here today for a 3 month follow up visit. )    Pt here for follow-up    DM2: follows with Dr. Juancarlos adams. Current meds include lantus and mounjaro 2.5mg weekly. Last A1c 5.3. no hypoglycemia episodes.    HTN: Coreg 25 mg BID, Imdur 60 mg QD, Hydralazine 100 mg TID, Nifedipine 30 mg BID, Doxazosin 4mg daily & Lasix 20mg QD. He has a pacemaker placed for sick sinus syndrome. Followed by Dr. Charles, [facility].    HLD/PAD: on statin/zetia.    CKD4/secondary hyperparathyroidism: follows with nephrology. They are considering peritoneal dialysis.     Anemia of chronic disease/iron def: on PO iron    Anxiety/MDD: on lexapro 10mg daily    Vit d def: on supplement.    Emphysema: found on CT. Stable from resp standpoint.    Review of Systems   Constitutional:  Negative for chills and fever.   HENT:  Negative for congestion, rhinorrhea and sore throat.    Eyes:  Negative for visual disturbance.   Respiratory:  Negative for cough, chest tightness, shortness of breath and wheezing.    Cardiovascular:  Negative for chest pain.   Gastrointestinal:  Negative for abdominal pain, constipation, diarrhea, nausea and vomiting.   Genitourinary:  Negative for dysuria and hematuria.   Musculoskeletal:  Negative for arthralgias and joint swelling.   Neurological:  Negative for dizziness, weakness and light-headedness.   Psychiatric/Behavioral:  Negative for confusion.        Objective:      Physical Exam  Vitals reviewed.   Constitutional:       General: He is not in acute distress.  HENT:      Head: Normocephalic and atraumatic.      Mouth/Throat:      Mouth: Mucous membranes are moist.   Eyes:      Conjunctiva/sclera: Conjunctivae normal.   Cardiovascular:      Rate and Rhythm: Normal rate and regular rhythm.      Heart sounds: Normal heart sounds. No murmur heard.  Pulmonary:      Effort: Pulmonary effort is normal. No respiratory distress.       Breath sounds: Normal breath sounds.   Musculoskeletal:      Cervical back: Neck supple.   Neurological:      Mental Status: He is alert and oriented to person, place, and time.   Psychiatric:         Mood and Affect: Mood normal.         Behavior: Behavior normal.         Assessment:       1. Chronic kidney disease (CKD), stage IV (severe)    2. Benign essential hypertension    3. Type 2 diabetes mellitus with diabetic polyneuropathy, with long-term current use of insulin    4. Anemia of chronic renal failure, stage 4 (severe)    5. Vitamin D deficiency            Plan:           1. Chronic kidney disease (CKD), stage IV (severe)  Overview:  May 2024 positive proteinuria, positive casts, hemoglobin A1c 5.3, creatinine 3.0 potassium 4.3 hematocrit 24.2%.     Follows with nephrologist    s/p IV Iron load, re-start Retacrit protocol     Orders:  -     Comprehensive Metabolic Panel; Future; Expected date: 07/25/2024  -     CBC Auto Differential; Future; Expected date: 07/25/2024  -     Iron and TIBC; Future; Expected date: 10/23/2024    2. Benign essential hypertension  Overview:  Chronic   Left ventricular ejection fraction 60% May 2024 1+ MR, small pericardial effusion PA pressure 32   4/14/2023 ECHO Left ventricular diastolic function is abnormal (stage II)     Stable on procardia XL 30mg daily, coreg 25mg bid , imdur 60mg daily, and hydralazine 100mg tid   Doxazosin 4mg q 12         3. Type 2 diabetes mellitus with diabetic polyneuropathy, with long-term current use of insulin  Overview:  Chronic    insulin glargine U-100 (Lantus) - 100 unit/mL  Tirzepatide       Cont lantus  Lab Results   Component Value Date    HGBA1C 7.5 (H) 08/19/2022     DM polyneuropathy- documented since around 2015 - initially tx with Gabapentin; 2021 : Pt did well with lyrica 25mg 11/8/22  switched his neuropathy meds from Lyrica to Cymbalta. The Cymbalta helped with both his feet and the anxiety. However, a few months ago my dad was  hospitalized for HTN event and the Cymbalta was discontinued bc of his kidneys. He was switched to Zoloft while in the hospital but he was hallucinating with that and he said it made him feel funny.      Orders:  -     Comprehensive Metabolic Panel; Future; Expected date: 07/25/2024  -     Hemoglobin A1C; Future; Expected date: 07/25/2024  -     CBC Auto Differential; Future; Expected date: 07/25/2024    4. Anemia of chronic renal failure, stage 4 (severe)  Overview:  Lab Results   Component Value Date    WBC 5.08 06/27/2024    HGB 9.4 (L) 06/27/2024    HCT 28.5 (L) 06/27/2024    MCV 95 06/27/2024     06/27/2024     erythropoietin injection soon. ?       Orders:  -     Comprehensive Metabolic Panel; Future; Expected date: 07/25/2024  -     CBC Auto Differential; Future; Expected date: 07/25/2024  -     Iron and TIBC; Future; Expected date: 10/23/2024  -     Vitamin D; Future; Expected date: 10/23/2024    5. Vitamin D deficiency  -     Vitamin D; Future; Expected date: 10/23/2024      Labs as ordered  Cont current meds. He is seeing cardiology today, will defer changes in BP meds to them as he has home BP monitoring through [facility] as well  Recent labs reviewed  Follow-up nephrology as scheduled  Follow-up endocrinology as scheduled  RTC 3 months or sooner if needed

## 2024-07-29 PROBLEM — R80.1 PERSISTENT PROTEINURIA: Status: ACTIVE | Noted: 2024-07-29

## 2024-07-29 PROBLEM — E79.0 HYPERURICEMIA: Status: ACTIVE | Noted: 2024-07-29

## 2024-07-29 PROBLEM — R31.29 MICROHEMATURIA: Status: ACTIVE | Noted: 2024-07-29

## 2024-07-29 PROBLEM — E83.39 HYPERPHOSPHATEMIA: Status: ACTIVE | Noted: 2024-07-29

## 2024-08-05 DIAGNOSIS — E11.42 TYPE 2 DIABETES MELLITUS WITH DIABETIC POLYNEUROPATHY, WITH LONG-TERM CURRENT USE OF INSULIN: ICD-10-CM

## 2024-08-05 DIAGNOSIS — Z79.4 TYPE 2 DIABETES MELLITUS WITH DIABETIC POLYNEUROPATHY, WITH LONG-TERM CURRENT USE OF INSULIN: ICD-10-CM

## 2024-08-06 RX ORDER — TIRZEPATIDE 2.5 MG/.5ML
2.5 INJECTION, SOLUTION SUBCUTANEOUS
Qty: 4 PEN | Refills: 2 | Status: SHIPPED | OUTPATIENT
Start: 2024-08-06

## 2024-08-23 ENCOUNTER — E-VISIT (OUTPATIENT)
Dept: FAMILY MEDICINE | Facility: CLINIC | Age: 79
End: 2024-08-23
Payer: MEDICARE

## 2024-08-23 DIAGNOSIS — N18.4 TYPE 2 DIABETES MELLITUS WITH STAGE 4 CHRONIC KIDNEY DISEASE, WITH LONG-TERM CURRENT USE OF INSULIN: Primary | ICD-10-CM

## 2024-08-23 DIAGNOSIS — E11.22 TYPE 2 DIABETES MELLITUS WITH STAGE 4 CHRONIC KIDNEY DISEASE, WITH LONG-TERM CURRENT USE OF INSULIN: Primary | ICD-10-CM

## 2024-08-23 DIAGNOSIS — Z79.4 TYPE 2 DIABETES MELLITUS WITH STAGE 4 CHRONIC KIDNEY DISEASE, WITH LONG-TERM CURRENT USE OF INSULIN: Primary | ICD-10-CM

## 2024-08-23 RX ORDER — INSULIN LISPRO 100 [IU]/ML
INJECTION, SUSPENSION SUBCUTANEOUS
Qty: 9 ML | Refills: 1 | Status: SHIPPED | OUTPATIENT
Start: 2024-08-23

## 2024-08-23 NOTE — PROGRESS NOTES
Patient ID: Nestor Garcia is a 79 y.o. male.    Chief Complaint: General Illness (Entered automatically based on patient selection in [employer].)    The patient initiated a request through [employer] on 8/23/2024 for evaluation and management with a chief complaint of General Illness (Entered automatically based on patient selection in [employer].)     I evaluated the questionnaire submission on 08/23/2024.    [facility] Peq Evisit Supergroup-Chronic Conditions    8/23/2024  7:54 AM CDT - Filed by Patient   What do you need help with? Diabetes   Do you agree to participate in an E-Visit? Yes   If you have any of the following symptoms, please do not complete an E-Visit. Instead, schedule an appointment with your provider. I acknowledge   What is the main issue you would like addressed today? Sliding scale or other management   Do you have a new concern related to your diabetes or are you following up on a recent treatment change? New concern with my diabetes   What is your diabetes-related concern? High Blood Sugar (Hyperglycemia)   Have you recently developed any of the following symptoms?  No   Have you recently been ill (for example with a cold or flu)? No   Do you take medications known as steroids? No   Please list the diabetes medications you take, the dose of each, and how often you take each. Mounjaro 2.5mg/weekly   Are you taking your medications exactly as prescribed? Yes   How long have you been on your current medication routine? More than 14 days   Have you tried other diabetes medications that you had to stop? Yes, I had to stop one or more medications   Please list the medications you had to stop and why. All other insulin-- R, NPH, and Lantus   Have you noticed trends in your blood sugar readings? Have not noticed trends   How are you monitoring your glucose? Self monitoring blood glucose checks (Such as finger sticks)   Please enter up to 5 of your most recent blood sugar readings.    Reading 1 ?   Reading 2     Reading 3    Reading 4    Reading 5    If you have an image of your blood sugar readings, upload it here.    Provide any additional information you feel is important. Nephrologist indicated better control bc of latest bloodwork. Before labs, I ate pancakes w/ syrup. Normally BS in norm range. Neph wants it below 200. Rare thing to be above. Should I use a sliding scale on these rare occasions?   Are you able to take your vital signs? Yes   Systolic Blood Pressure: 119   Diastolic Blood Pressure: 52   Pulse: 66   Temperature:    Weight: 193   Height: 68   Respiration rate:    Pulse Oxygen:          Encounter Diagnosis   Name Primary?    Type 2 diabetes mellitus with stage 4 chronic kidney disease, with long-term current use of insulin Yes        Orders Placed This Encounter   Procedures    Ambulatory referral/consult to Diabetes Education     Standing Status:   Future     Standing Expiration Date:   8/23/2025     Referral Priority:   Routine     Referral Type:   Consultation     Referral Reason:   Specialty Services Required     Requested Specialty:   Diabetes     Number of Visits Requested:   1     Expiration Date:   8/23/2025      Medications Ordered This Encounter   Medications    insulin lispro protamin-lispro (HUMALOG MIX 50-50 KWIKPEN) 100 unit/mL (50-50) InPn     Sig: Sliding scale with meals based on glucose as follows  - 0 units; 131-180 - 2 units; 181- 240 - 4 units; 241-300 - 6 units; 301-350 - 8 units; 351-400 - 10 units     Dispense:  9 mL     Refill:  1        Follow up if symptoms worsen or fail to improve.      E-Visit Time Tracking:    Day 1 Time (in minutes): 22    Total Time (in minutes): 22

## 2024-08-23 NOTE — PATIENT INSTRUCTIONS
Sliding scale with meals based on glucose as follows:   - 0 units  131-180 - 2 units  181- 240 - 4 units  241-300 - 6 units  301-350 - 8 units  351-400 - 10 units

## 2024-09-04 ENCOUNTER — E-VISIT (OUTPATIENT)
Dept: FAMILY MEDICINE | Facility: CLINIC | Age: 79
End: 2024-09-04
Payer: MEDICARE

## 2024-09-04 ENCOUNTER — CLINICAL SUPPORT (OUTPATIENT)
Dept: DIABETES | Facility: CLINIC | Age: 79
End: 2024-09-04
Payer: MEDICARE

## 2024-09-04 VITALS — BODY MASS INDEX: 29.54 KG/M2 | WEIGHT: 205.88 LBS

## 2024-09-04 DIAGNOSIS — E11.8 TYPE II DIABETES MELLITUS WITH COMPLICATION: Primary | ICD-10-CM

## 2024-09-04 DIAGNOSIS — Z79.4 TYPE 2 DIABETES MELLITUS WITH STAGE 4 CHRONIC KIDNEY DISEASE, WITH LONG-TERM CURRENT USE OF INSULIN: ICD-10-CM

## 2024-09-04 DIAGNOSIS — G47.30 INSOMNIA WITH SLEEP APNEA: Primary | ICD-10-CM

## 2024-09-04 DIAGNOSIS — E11.22 TYPE 2 DIABETES MELLITUS WITH STAGE 4 CHRONIC KIDNEY DISEASE, WITH LONG-TERM CURRENT USE OF INSULIN: ICD-10-CM

## 2024-09-04 DIAGNOSIS — N18.4 TYPE 2 DIABETES MELLITUS WITH STAGE 4 CHRONIC KIDNEY DISEASE, WITH LONG-TERM CURRENT USE OF INSULIN: Primary | ICD-10-CM

## 2024-09-04 DIAGNOSIS — E11.22 TYPE 2 DIABETES MELLITUS WITH STAGE 4 CHRONIC KIDNEY DISEASE, WITH LONG-TERM CURRENT USE OF INSULIN: Primary | ICD-10-CM

## 2024-09-04 DIAGNOSIS — N18.4 TYPE 2 DIABETES MELLITUS WITH STAGE 4 CHRONIC KIDNEY DISEASE, WITH LONG-TERM CURRENT USE OF INSULIN: ICD-10-CM

## 2024-09-04 DIAGNOSIS — G47.00 INSOMNIA WITH SLEEP APNEA: Primary | ICD-10-CM

## 2024-09-04 DIAGNOSIS — Z79.4 TYPE 2 DIABETES MELLITUS WITH STAGE 4 CHRONIC KIDNEY DISEASE, WITH LONG-TERM CURRENT USE OF INSULIN: Primary | ICD-10-CM

## 2024-09-04 PROCEDURE — G0108 DIAB MANAGE TRN  PER INDIV: HCPCS | Mod: HCNC,S$GLB,, | Performed by: STUDENT IN AN ORGANIZED HEALTH CARE EDUCATION/TRAINING PROGRAM

## 2024-09-04 PROCEDURE — 99999 PR PBB SHADOW E&M-EST. PATIENT-LVL I: CPT | Mod: PBBFAC,HCNC,,

## 2024-09-04 RX ORDER — TRAZODONE HYDROCHLORIDE 50 MG/1
50 TABLET ORAL NIGHTLY PRN
Qty: 30 TABLET | Refills: 5 | Status: SHIPPED | OUTPATIENT
Start: 2024-09-04 | End: 2025-09-04

## 2024-09-04 RX ORDER — INSULIN LISPRO 100 [IU]/ML
0-10 INJECTION, SOLUTION INTRAVENOUS; SUBCUTANEOUS
Qty: 9 ML | Refills: 11 | Status: SHIPPED | OUTPATIENT
Start: 2024-09-04 | End: 2025-09-04

## 2024-09-04 NOTE — PROGRESS NOTES
Patient ID: Nestor Garcia is a 79 y.o. male.    Chief Complaint: General Illness (Entered automatically based on patient selection in [employer].)    The patient initiated a request through [employer] on 9/4/2024 for evaluation and management with a chief complaint of General Illness (Entered automatically based on patient selection in [employer].)     I evaluated the questionnaire submission on 09/04/2024.    [facility] Peq Evisit Supergroup-Common Problems    9/4/2024  6:26 AM CDT - Filed by Patient   What do you need help with? Other Concern   Do you agree to participate in an E-Visit? Yes   If you have any of the following symptoms, please present to your local emergency room or call 911:  I acknowledge   What is the main issue you would like addressed today? Difficulty sleeping   Please describe your symptoms Feels like my brain doesnt turn off and it is affecting my sleep   Where is your problem located? ?   How severe are your symptoms? Moderate   Have you had these symptoms before? Yes   How long have you been having these symptoms? For more than a month   Please list any medications or treatments you have used for your condition and indicate if it was effective or not. Xanax   What makes this feel better? Feels like my brain stops visiting every memory and every childhood event . Then I can go yo sleep. od   What makes this feel worse? Melatonin--- i feel dragging the next morning   Are these symptoms related to a condition that you currently have? I am not sure   What is the condition? ?   When were you last seen for this condition?    Please describe any probable cause for these symptoms I am old and my body is tired but my mind is going   Provide any additional information you feel is important.    Please attach any relevant images or files    Are you able to take your vital signs? Yes   Systolic Blood Pressure: 139   Diastolic Blood Pressure: 60   Weight: 198   Height: 70   Pulse: 69   Temperature:    Respiration  rate:    Pulse Oxygen:          Encounter Diagnosis   Name Primary?    Insomnia with sleep apnea Yes        No orders of the defined types were placed in this encounter.     Medications Ordered This Encounter   Medications    traZODone (DESYREL) 50 MG tablet     Sig: Take 1 tablet (50 mg total) by mouth nightly as needed for Insomnia.     Dispense:  30 tablet     Refill:  5        Follow up if symptoms worsen or fail to improve.      E-Visit Time Tracking:    Day 1 Time (in minutes): 7    Total Time (in minutes): 7

## 2024-09-04 NOTE — PROGRESS NOTES
"Diabetes Care Specialist Progress Note  Author: Kylee Frazier RN  Date: 9/4/2024    Intake    Program Intake  Reason for Diabetes Program Visit:: Initial Diabetes Assessment  Current diabetes risk level:: low    Current Diabetes Treatment: DM Injectables  DM Injectables Type/Dose: mounjaro weekly    Continuous Glucose Monitoring  Patient has CGM: No    Lab Results   Component Value Date    HGBA1C 5.3 05/15/2024       Weight: 93.4 kg (205 lb 14.4 oz)       Body mass index is 29.54 kg/m².    Lifestyle Coping Support & Clinical  Daughter present at visit  Lifestyle/Coping/Support  Compared to other people your age, how would you rate your health?: Fair  List anything about Diabetes that causes you stress?: "I don't really know why I'm here"  Learning Barriers:: None  Culture or Rastafari beliefs that may impact ability to access healthcare: No  Psychosocial/Coping Skills Assessment Completed: : No  Area of need?: Deferred    Problem Review  Active Comorbidities: Chronic Kidney Disease, End Stage Renal Disease, Hypertension    Diabetes Self-Management Skills Assessment    Medication Skills Assessment  Patient is able to identify current diabetes medications, dosages, and appropriate timing of medications.: yes  Patient reports problems or concerns with current medication regimen.: no  Patient is  aware that some diabetes medications can cause low blood sugar?: Yes  Medication Skills Assessment Completed:: Yes  Assessment indicates:: Adequate understanding  Area of need?: No    Diabetes Disease Process/Treatment Options  Diabetes Type?: Type II  When were you diagnosed?: 20 years ago  Is patient aware of what causes diabetes?: Yes  Does patient understand the pathophysiology of diabetes?: Yes  Area of need?: No    Nutrition/Healthy Eating  Patient can identify foods that impact blood sugar.: yes  Nutrition/Healthy Eating Skills Assessment Completed:: Yes  Assessment indicates:: Adequate understanding  Area of need?: " New to examiner. Patient continues to follow with gastroenterology at [facility]. Patient had EGD and colonoscopy completed in November 2021. EGD shows intestinal metaplasia type I and it is recommended for patient to take lifelong omeprazole 20 mg/day and repeat EGD in 3 years. Requesting medication refill.   No    Physical Activity/Exercise  Area of need?: Deferred    Home Blood Glucose Monitoring  Patient states that blood sugar is checked at home daily.: yes  Monitoring Method:: home glucometer  Fasting BG range history:: 100's  How often do you check your blood sugar?: 2x/day  Home Blood Glucose Monitoring Skills Assessment Completed: : Yes  Assessment indicates:: Adequate understanding (refuses CGM)    Acute Complications  Area of need?: Deferred    Chronic Complications  Area of need?: Deferred      Assessment Summary and Plan    Based on today's diabetes care assessment, the following areas of need were identified:          9/4/2024    12:01 AM   Areas of Need   Medications/Current Diabetes Treatment No   Lifestyle Coping Support Deferred   Diabetes Disease Process/Treatment Options No   Nutrition/Healthy Eating No Pt states he eats 2 meals a day and is controlled   Physical Activity/Exercise Deferred   Acute Complications Deferred   Chronic Complications Deferred       Today's interventions were provided through individual discussion, instruction, and written materials were provided.      Patient verbalized understanding of instruction and written materials.  Pt was able to return back demonstration of instructions today. Patient understood key points, needs reinforcement and further instruction.     Diabetes Self-Management Care Plan:    Today's Diabetes Self-Management Care Plan was developed with Nestor's input. Nestor has agreed to work toward the following goal(s) to improve his/her overall diabetes control.      Care Plan: Diabetes Management   Updates made since 8/5/2024 12:00 AM        Problem: Disease Process         Long-Range Goal: Patient agrees to take steps toward understanding the diabetes disease process and treatment options .    Start Date: 9/4/2024   Priority: Medium   Barriers: Knowledge deficit   Note:    Pt states he has had Diabetes for the past 20 years. Pt has end stage renal disease and  may be starting HOME PD soon. Last HA1C was 5%. With the last  office visit pt started Humalog with a correction scale. Pt states he doesn't have to take Humalog often. It was noted that Humalog 50/50 mix was ordered. I consulted Dr Conn, instructed to d/c 50/50 and a new prescription will be sent to the pharmacy for Humalog. Instructed pt to stop the Humalog 50/50 at home and to use the new Humalog prescription according to the correction scale as needed. Voices understanding.  Instructed of the possibility of Blood sugars increasing when starting PD.       Task: Emphasized on the importance of controlling diabetes to prevent complications and reviewed both the short-term and long-term effects of uncontrolled diabetes. Completed 9/4/2024          Follow Up Plan         Today's care plan and follow up schedule was discussed with patient.  Nestor verbalized understanding of the care plan, goals, and agrees to follow up plan.        The patient was encouraged to communicate with his/her health care provider/physician and care team regarding his/her condition(s) and treatment.  I provided the patient with my contact information today and encouraged to contact me via phone or [facility] Patient Portal as needed.     Length of Visit   Total Time: 60Minutes

## 2024-09-12 ENCOUNTER — HOSPITAL ENCOUNTER (INPATIENT)
Facility: HOSPITAL | Age: 79
LOS: 3 days | Discharge: HOSPICE | DRG: 291 | End: 2024-09-15
Attending: STUDENT IN AN ORGANIZED HEALTH CARE EDUCATION/TRAINING PROGRAM | Admitting: STUDENT IN AN ORGANIZED HEALTH CARE EDUCATION/TRAINING PROGRAM
Payer: MEDICARE

## 2024-09-12 DIAGNOSIS — N30.01 ACUTE CYSTITIS WITH HEMATURIA: ICD-10-CM

## 2024-09-12 DIAGNOSIS — I49.9 ARRHYTHMIA: ICD-10-CM

## 2024-09-12 DIAGNOSIS — N18.9 CKD (CHRONIC KIDNEY DISEASE): ICD-10-CM

## 2024-09-12 DIAGNOSIS — R07.9 CHEST PAIN: ICD-10-CM

## 2024-09-12 DIAGNOSIS — Z01.818 PRE-OP EVALUATION: ICD-10-CM

## 2024-09-12 DIAGNOSIS — N18.4 ACUTE WORSENING OF STAGE 4 CHRONIC KIDNEY DISEASE: ICD-10-CM

## 2024-09-12 DIAGNOSIS — N19 UREMIA: ICD-10-CM

## 2024-09-12 DIAGNOSIS — N18.6 ESRD (END STAGE RENAL DISEASE): Primary | ICD-10-CM

## 2024-09-12 LAB
ABO + RH BLD: NORMAL
ALBUMIN SERPL BCP-MCNC: 2.7 G/DL (ref 3.5–5.2)
ALP SERPL-CCNC: 150 U/L (ref 55–135)
ALT SERPL W/O P-5'-P-CCNC: 16 U/L (ref 10–44)
ANION GAP SERPL CALC-SCNC: 12 MMOL/L (ref 8–16)
APTT PPP: 29.3 SEC (ref 21–32)
AST SERPL-CCNC: 17 U/L (ref 10–40)
BASOPHILS # BLD AUTO: 0.02 K/UL (ref 0–0.2)
BASOPHILS NFR BLD: 0.3 % (ref 0–1.9)
BILIRUB SERPL-MCNC: 0.5 MG/DL (ref 0.1–1)
BLD GP AB SCN CELLS X3 SERPL QL: NORMAL
BUN SERPL-MCNC: 114 MG/DL (ref 8–23)
CALCIUM SERPL-MCNC: 8.4 MG/DL (ref 8.7–10.5)
CHLORIDE SERPL-SCNC: 110 MMOL/L (ref 95–110)
CO2 SERPL-SCNC: 16 MMOL/L (ref 23–29)
CREAT SERPL-MCNC: 5.6 MG/DL (ref 0.5–1.4)
DIFFERENTIAL METHOD BLD: ABNORMAL
EOSINOPHIL # BLD AUTO: 0.1 K/UL (ref 0–0.5)
EOSINOPHIL NFR BLD: 2.2 % (ref 0–8)
ERYTHROCYTE [DISTWIDTH] IN BLOOD BY AUTOMATED COUNT: 16.7 % (ref 11.5–14.5)
EST. GFR  (NO RACE VARIABLE): 10 ML/MIN/1.73 M^2
FERRITIN SERPL-MCNC: 197 NG/ML (ref 20–300)
GLUCOSE SERPL-MCNC: 106 MG/DL (ref 70–110)
HCT VFR BLD AUTO: 29.6 % (ref 40–54)
HGB BLD-MCNC: 9.2 G/DL (ref 14–18)
IMM GRANULOCYTES # BLD AUTO: 0.06 K/UL (ref 0–0.04)
IMM GRANULOCYTES NFR BLD AUTO: 1 % (ref 0–0.5)
INR PPP: 1.1 (ref 0.8–1.2)
IRON SERPL-MCNC: 20 UG/DL (ref 45–160)
LYMPHOCYTES # BLD AUTO: 0.8 K/UL (ref 1–4.8)
LYMPHOCYTES NFR BLD: 12.3 % (ref 18–48)
MAGNESIUM SERPL-MCNC: 1.6 MG/DL (ref 1.6–2.6)
MCH RBC QN AUTO: 29.6 PG (ref 27–31)
MCHC RBC AUTO-ENTMCNC: 31.1 G/DL (ref 32–36)
MCV RBC AUTO: 95 FL (ref 82–98)
MONOCYTES # BLD AUTO: 0.7 K/UL (ref 0.3–1)
MONOCYTES NFR BLD: 10.6 % (ref 4–15)
NEUTROPHILS # BLD AUTO: 4.6 K/UL (ref 1.8–7.7)
NEUTROPHILS NFR BLD: 73.6 % (ref 38–73)
NRBC BLD-RTO: 0 /100 WBC
PHOSPHATE SERPL-MCNC: 5.4 MG/DL (ref 2.7–4.5)
PLATELET # BLD AUTO: 228 K/UL (ref 150–450)
PMV BLD AUTO: 10.8 FL (ref 9.2–12.9)
POTASSIUM SERPL-SCNC: 4.2 MMOL/L (ref 3.5–5.1)
PROT SERPL-MCNC: 5.4 G/DL (ref 6–8.4)
PROTHROMBIN TIME: 12.1 SEC (ref 9–12.5)
PTH-INTACT SERPL-MCNC: 79.4 PG/ML (ref 9–77)
PTH-INTACT SERPL-MCNC: 79.4 PG/ML (ref 9–77)
RBC # BLD AUTO: 3.11 M/UL (ref 4.6–6.2)
SATURATED IRON: 12 % (ref 20–50)
SODIUM SERPL-SCNC: 138 MMOL/L (ref 136–145)
SPECIMEN OUTDATE: NORMAL
TOTAL IRON BINDING CAPACITY: 170 UG/DL (ref 250–450)
TRANSFERRIN SERPL-MCNC: 115 MG/DL (ref 200–375)
URATE SERPL-MCNC: 7.3 MG/DL (ref 3.4–7)
WBC # BLD AUTO: 6.25 K/UL (ref 3.9–12.7)

## 2024-09-12 PROCEDURE — 93010 ELECTROCARDIOGRAM REPORT: CPT | Mod: 76,HCNC,, | Performed by: INTERNAL MEDICINE

## 2024-09-12 PROCEDURE — 82330 ASSAY OF CALCIUM: CPT | Mod: HCNC | Performed by: INTERNAL MEDICINE

## 2024-09-12 PROCEDURE — 94761 N-INVAS EAR/PLS OXIMETRY MLT: CPT | Mod: HCNC

## 2024-09-12 PROCEDURE — 86850 RBC ANTIBODY SCREEN: CPT | Mod: HCNC | Performed by: INTERNAL MEDICINE

## 2024-09-12 PROCEDURE — 83735 ASSAY OF MAGNESIUM: CPT | Mod: HCNC | Performed by: INTERNAL MEDICINE

## 2024-09-12 PROCEDURE — 93005 ELECTROCARDIOGRAM TRACING: CPT | Mod: HCNC

## 2024-09-12 PROCEDURE — 82728 ASSAY OF FERRITIN: CPT | Mod: HCNC | Performed by: INTERNAL MEDICINE

## 2024-09-12 PROCEDURE — 84100 ASSAY OF PHOSPHORUS: CPT | Mod: HCNC | Performed by: INTERNAL MEDICINE

## 2024-09-12 PROCEDURE — 99900035 HC TECH TIME PER 15 MIN (STAT): Mod: HCNC

## 2024-09-12 PROCEDURE — 63600175 PHARM REV CODE 636 W HCPCS: Mod: HCNC | Performed by: INTERNAL MEDICINE

## 2024-09-12 PROCEDURE — 83970 ASSAY OF PARATHORMONE: CPT | Mod: HCNC | Performed by: INTERNAL MEDICINE

## 2024-09-12 PROCEDURE — 83540 ASSAY OF IRON: CPT | Mod: HCNC | Performed by: INTERNAL MEDICINE

## 2024-09-12 PROCEDURE — 25000003 PHARM REV CODE 250: Mod: HCNC | Performed by: INTERNAL MEDICINE

## 2024-09-12 PROCEDURE — 85025 COMPLETE CBC W/AUTO DIFF WBC: CPT | Mod: 91,HCNC | Performed by: INTERNAL MEDICINE

## 2024-09-12 PROCEDURE — 27000190 HC CPAP FULL FACE MASK W/VALVE: Mod: HCNC

## 2024-09-12 PROCEDURE — 86901 BLOOD TYPING SEROLOGIC RH(D): CPT | Mod: HCNC | Performed by: INTERNAL MEDICINE

## 2024-09-12 PROCEDURE — 86900 BLOOD TYPING SEROLOGIC ABO: CPT | Mod: HCNC | Performed by: INTERNAL MEDICINE

## 2024-09-12 PROCEDURE — 85730 THROMBOPLASTIN TIME PARTIAL: CPT | Mod: HCNC | Performed by: INTERNAL MEDICINE

## 2024-09-12 PROCEDURE — 84550 ASSAY OF BLOOD/URIC ACID: CPT | Mod: HCNC | Performed by: INTERNAL MEDICINE

## 2024-09-12 PROCEDURE — 94660 CPAP INITIATION&MGMT: CPT | Mod: HCNC

## 2024-09-12 PROCEDURE — 93010 ELECTROCARDIOGRAM REPORT: CPT | Mod: HCNC,,, | Performed by: INTERNAL MEDICINE

## 2024-09-12 PROCEDURE — 36415 COLL VENOUS BLD VENIPUNCTURE: CPT | Mod: HCNC | Performed by: STUDENT IN AN ORGANIZED HEALTH CARE EDUCATION/TRAINING PROGRAM

## 2024-09-12 PROCEDURE — 85610 PROTHROMBIN TIME: CPT | Mod: HCNC | Performed by: INTERNAL MEDICINE

## 2024-09-12 PROCEDURE — 83036 HEMOGLOBIN GLYCOSYLATED A1C: CPT | Mod: HCNC | Performed by: INTERNAL MEDICINE

## 2024-09-12 PROCEDURE — 21400001 HC TELEMETRY ROOM: Mod: HCNC

## 2024-09-12 PROCEDURE — 80053 COMPREHEN METABOLIC PANEL: CPT | Mod: 91,HCNC | Performed by: INTERNAL MEDICINE

## 2024-09-12 RX ORDER — HEPARIN SODIUM 5000 [USP'U]/ML
5000 INJECTION, SOLUTION INTRAVENOUS; SUBCUTANEOUS EVERY 8 HOURS
Status: DISCONTINUED | OUTPATIENT
Start: 2024-09-12 | End: 2024-09-15 | Stop reason: HOSPADM

## 2024-09-12 RX ORDER — TALC
6 POWDER (GRAM) TOPICAL NIGHTLY PRN
Status: DISCONTINUED | OUTPATIENT
Start: 2024-09-12 | End: 2024-09-15 | Stop reason: HOSPADM

## 2024-09-12 RX ORDER — EZETIMIBE 10 MG/1
10 TABLET ORAL NIGHTLY
Status: DISCONTINUED | OUTPATIENT
Start: 2024-09-12 | End: 2024-09-15 | Stop reason: HOSPADM

## 2024-09-12 RX ORDER — DOCUSATE SODIUM 100 MG/1
100 CAPSULE, LIQUID FILLED ORAL DAILY
Status: DISCONTINUED | OUTPATIENT
Start: 2024-09-13 | End: 2024-09-15 | Stop reason: HOSPADM

## 2024-09-12 RX ORDER — POLYETHYLENE GLYCOL 3350 17 G/17G
17 POWDER, FOR SOLUTION ORAL 2 TIMES DAILY PRN
Status: DISCONTINUED | OUTPATIENT
Start: 2024-09-12 | End: 2024-09-15 | Stop reason: HOSPADM

## 2024-09-12 RX ORDER — CARVEDILOL 12.5 MG/1
25 TABLET ORAL 2 TIMES DAILY WITH MEALS
Status: DISCONTINUED | OUTPATIENT
Start: 2024-09-13 | End: 2024-09-12

## 2024-09-12 RX ORDER — ASPIRIN 81 MG/1
81 TABLET ORAL DAILY
Status: DISCONTINUED | OUTPATIENT
Start: 2024-09-14 | End: 2024-09-15 | Stop reason: HOSPADM

## 2024-09-12 RX ORDER — ONDANSETRON HYDROCHLORIDE 2 MG/ML
4 INJECTION, SOLUTION INTRAVENOUS EVERY 8 HOURS PRN
Status: DISCONTINUED | OUTPATIENT
Start: 2024-09-12 | End: 2024-09-15 | Stop reason: HOSPADM

## 2024-09-12 RX ORDER — SODIUM CHLORIDE 0.9 % (FLUSH) 0.9 %
10 SYRINGE (ML) INJECTION EVERY 12 HOURS PRN
Status: DISCONTINUED | OUTPATIENT
Start: 2024-09-12 | End: 2024-09-15 | Stop reason: HOSPADM

## 2024-09-12 RX ORDER — ACETAMINOPHEN 325 MG/1
650 TABLET ORAL EVERY 4 HOURS PRN
Status: DISCONTINUED | OUTPATIENT
Start: 2024-09-12 | End: 2024-09-15 | Stop reason: HOSPADM

## 2024-09-12 RX ORDER — ESCITALOPRAM OXALATE 10 MG/1
10 TABLET ORAL DAILY
Status: DISCONTINUED | OUTPATIENT
Start: 2024-09-13 | End: 2024-09-15 | Stop reason: HOSPADM

## 2024-09-12 RX ORDER — IBUPROFEN 200 MG
24 TABLET ORAL
Status: DISCONTINUED | OUTPATIENT
Start: 2024-09-12 | End: 2024-09-15 | Stop reason: HOSPADM

## 2024-09-12 RX ORDER — IBUPROFEN 200 MG
16 TABLET ORAL
Status: DISCONTINUED | OUTPATIENT
Start: 2024-09-12 | End: 2024-09-15 | Stop reason: HOSPADM

## 2024-09-12 RX ORDER — HYDRALAZINE HYDROCHLORIDE 20 MG/ML
10 INJECTION INTRAMUSCULAR; INTRAVENOUS EVERY 6 HOURS PRN
Status: DISCONTINUED | OUTPATIENT
Start: 2024-09-12 | End: 2024-09-15 | Stop reason: HOSPADM

## 2024-09-12 RX ORDER — ALLOPURINOL 100 MG/1
100 TABLET ORAL DAILY
Status: DISCONTINUED | OUTPATIENT
Start: 2024-09-13 | End: 2024-09-15 | Stop reason: HOSPADM

## 2024-09-12 RX ORDER — GLUCAGON 1 MG
1 KIT INJECTION
Status: DISCONTINUED | OUTPATIENT
Start: 2024-09-12 | End: 2024-09-15 | Stop reason: HOSPADM

## 2024-09-12 RX ORDER — TRAZODONE HYDROCHLORIDE 50 MG/1
50 TABLET ORAL NIGHTLY PRN
Status: DISCONTINUED | OUTPATIENT
Start: 2024-09-12 | End: 2024-09-15 | Stop reason: HOSPADM

## 2024-09-12 RX ORDER — CALCITRIOL 0.25 UG/1
0.25 CAPSULE ORAL DAILY
Status: DISCONTINUED | OUTPATIENT
Start: 2024-09-13 | End: 2024-09-14

## 2024-09-12 RX ORDER — CARVEDILOL 6.25 MG/1
6.25 TABLET ORAL 2 TIMES DAILY WITH MEALS
Status: DISCONTINUED | OUTPATIENT
Start: 2024-09-13 | End: 2024-09-13

## 2024-09-12 RX ORDER — INSULIN ASPART 100 [IU]/ML
0-5 INJECTION, SOLUTION INTRAVENOUS; SUBCUTANEOUS
Status: DISCONTINUED | OUTPATIENT
Start: 2024-09-12 | End: 2024-09-15 | Stop reason: HOSPADM

## 2024-09-12 RX ORDER — SIMETHICONE 80 MG
1 TABLET,CHEWABLE ORAL 4 TIMES DAILY PRN
Status: DISCONTINUED | OUTPATIENT
Start: 2024-09-12 | End: 2024-09-15 | Stop reason: HOSPADM

## 2024-09-12 RX ADMIN — HEPARIN SODIUM 5000 UNITS: 5000 INJECTION INTRAVENOUS; SUBCUTANEOUS at 09:09

## 2024-09-12 RX ADMIN — EZETIMIBE 10 MG: 10 TABLET ORAL at 10:09

## 2024-09-12 NOTE — Clinical Note
Right: Chest.   Scrubbed with Chlorhexidine/Alcohol.    Hair: N/A.  Skin prep dry before draping.  Prepped by: Kashmir Hare, RT 9/13/2024 1:26 PM.

## 2024-09-13 PROBLEM — R06.02 SOB (SHORTNESS OF BREATH): Status: RESOLVED | Noted: 2019-12-08 | Resolved: 2024-09-13

## 2024-09-13 PROBLEM — N18.4 CHRONIC KIDNEY DISEASE (CKD), STAGE IV (SEVERE): Status: RESOLVED | Noted: 2024-01-02 | Resolved: 2024-09-13

## 2024-09-13 PROBLEM — R07.89 ATYPICAL CHEST PAIN: Status: RESOLVED | Noted: 2019-12-08 | Resolved: 2024-09-13

## 2024-09-13 PROBLEM — E87.6 HYPOKALEMIA: Status: RESOLVED | Noted: 2021-12-22 | Resolved: 2024-09-13

## 2024-09-13 PROBLEM — N19 UREMIA: Status: ACTIVE | Noted: 2024-09-13

## 2024-09-13 PROBLEM — N18.4 ACUTE WORSENING OF STAGE 4 CHRONIC KIDNEY DISEASE: Status: ACTIVE | Noted: 2024-09-13

## 2024-09-13 PROBLEM — R07.9 CHEST PAIN: Status: RESOLVED | Noted: 2019-12-08 | Resolved: 2024-09-13

## 2024-09-13 PROBLEM — N18.32 STAGE 3B CHRONIC KIDNEY DISEASE: Status: RESOLVED | Noted: 2023-12-07 | Resolved: 2024-09-13

## 2024-09-13 PROBLEM — I16.1 HYPERTENSIVE EMERGENCY: Status: RESOLVED | Noted: 2022-08-18 | Resolved: 2024-09-13

## 2024-09-13 PROBLEM — R00.1 BRADYCARDIA: Status: RESOLVED | Noted: 2022-08-18 | Resolved: 2024-09-13

## 2024-09-13 LAB
ANION GAP SERPL CALC-SCNC: 11 MMOL/L (ref 8–16)
BASOPHILS # BLD AUTO: 0.03 K/UL (ref 0–0.2)
BASOPHILS NFR BLD: 0.5 % (ref 0–1.9)
BUN SERPL-MCNC: 110 MG/DL (ref 8–23)
CA-I BLDV-SCNC: 1.19 MMOL/L (ref 1.06–1.42)
CALCIUM SERPL-MCNC: 8.2 MG/DL (ref 8.7–10.5)
CHLORIDE SERPL-SCNC: 111 MMOL/L (ref 95–110)
CO2 SERPL-SCNC: 18 MMOL/L (ref 23–29)
CREAT SERPL-MCNC: 5.5 MG/DL (ref 0.5–1.4)
DIFFERENTIAL METHOD BLD: ABNORMAL
EOSINOPHIL # BLD AUTO: 0.2 K/UL (ref 0–0.5)
EOSINOPHIL NFR BLD: 3.2 % (ref 0–8)
ERYTHROCYTE [DISTWIDTH] IN BLOOD BY AUTOMATED COUNT: 16.8 % (ref 11.5–14.5)
EST. GFR  (NO RACE VARIABLE): 10 ML/MIN/1.73 M^2
ESTIMATED AVG GLUCOSE: 100 MG/DL (ref 68–131)
ESTIMATED AVG GLUCOSE: 103 MG/DL (ref 68–131)
GLUCOSE SERPL-MCNC: 84 MG/DL (ref 70–110)
HBA1C MFR BLD: 5.1 % (ref 4–5.6)
HBA1C MFR BLD: 5.2 % (ref 4–5.6)
HCT VFR BLD AUTO: 28 % (ref 40–54)
HGB BLD-MCNC: 8.9 G/DL (ref 14–18)
IMM GRANULOCYTES # BLD AUTO: 0.06 K/UL (ref 0–0.04)
IMM GRANULOCYTES NFR BLD AUTO: 1 % (ref 0–0.5)
LYMPHOCYTES # BLD AUTO: 1 K/UL (ref 1–4.8)
LYMPHOCYTES NFR BLD: 16 % (ref 18–48)
MCH RBC QN AUTO: 30.5 PG (ref 27–31)
MCHC RBC AUTO-ENTMCNC: 31.8 G/DL (ref 32–36)
MCV RBC AUTO: 96 FL (ref 82–98)
MONOCYTES # BLD AUTO: 0.8 K/UL (ref 0.3–1)
MONOCYTES NFR BLD: 13.3 % (ref 4–15)
NEUTROPHILS # BLD AUTO: 3.9 K/UL (ref 1.8–7.7)
NEUTROPHILS NFR BLD: 66 % (ref 38–73)
NRBC BLD-RTO: 0 /100 WBC
OHS QRS DURATION: 124 MS
OHS QRS DURATION: 124 MS
OHS QRS DURATION: 134 MS
OHS QTC CALCULATION: 436 MS
OHS QTC CALCULATION: 492 MS
OHS QTC CALCULATION: 509 MS
PLATELET # BLD AUTO: 219 K/UL (ref 150–450)
PMV BLD AUTO: 10.7 FL (ref 9.2–12.9)
POCT GLUCOSE: 139 MG/DL (ref 70–110)
POCT GLUCOSE: 80 MG/DL (ref 70–110)
POCT GLUCOSE: 86 MG/DL (ref 70–110)
POCT GLUCOSE: 97 MG/DL (ref 70–110)
POTASSIUM SERPL-SCNC: 4.4 MMOL/L (ref 3.5–5.1)
RBC # BLD AUTO: 2.92 M/UL (ref 4.6–6.2)
SODIUM SERPL-SCNC: 140 MMOL/L (ref 136–145)
WBC # BLD AUTO: 5.92 K/UL (ref 3.9–12.7)

## 2024-09-13 PROCEDURE — 86706 HEP B SURFACE ANTIBODY: CPT | Mod: 91,HCNC | Performed by: INTERNAL MEDICINE

## 2024-09-13 PROCEDURE — 80048 BASIC METABOLIC PNL TOTAL CA: CPT | Mod: HCNC | Performed by: INTERNAL MEDICINE

## 2024-09-13 PROCEDURE — 36415 COLL VENOUS BLD VENIPUNCTURE: CPT | Mod: HCNC | Performed by: INTERNAL MEDICINE

## 2024-09-13 PROCEDURE — 25000003 PHARM REV CODE 250: Mod: HCNC | Performed by: INTERNAL MEDICINE

## 2024-09-13 PROCEDURE — 83036 HEMOGLOBIN GLYCOSYLATED A1C: CPT | Mod: HCNC | Performed by: INTERNAL MEDICINE

## 2024-09-13 PROCEDURE — 63600175 PHARM REV CODE 636 W HCPCS: Mod: HCNC | Performed by: INTERNAL MEDICINE

## 2024-09-13 PROCEDURE — 5A09357 ASSISTANCE WITH RESPIRATORY VENTILATION, LESS THAN 24 CONSECUTIVE HOURS, CONTINUOUS POSITIVE AIRWAY PRESSURE: ICD-10-PCS | Performed by: STUDENT IN AN ORGANIZED HEALTH CARE EDUCATION/TRAINING PROGRAM

## 2024-09-13 PROCEDURE — 94660 CPAP INITIATION&MGMT: CPT | Mod: HCNC

## 2024-09-13 PROCEDURE — 93005 ELECTROCARDIOGRAM TRACING: CPT | Mod: HCNC

## 2024-09-13 PROCEDURE — 02H633Z INSERTION OF INFUSION DEVICE INTO RIGHT ATRIUM, PERCUTANEOUS APPROACH: ICD-10-PCS | Performed by: INTERNAL MEDICINE

## 2024-09-13 PROCEDURE — 94761 N-INVAS EAR/PLS OXIMETRY MLT: CPT | Mod: HCNC

## 2024-09-13 PROCEDURE — 99223 1ST HOSP IP/OBS HIGH 75: CPT | Mod: HCNC,,, | Performed by: NURSE PRACTITIONER

## 2024-09-13 PROCEDURE — 25000003 PHARM REV CODE 250: Mod: HCNC | Performed by: STUDENT IN AN ORGANIZED HEALTH CARE EDUCATION/TRAINING PROGRAM

## 2024-09-13 PROCEDURE — 5A1D70Z PERFORMANCE OF URINARY FILTRATION, INTERMITTENT, LESS THAN 6 HOURS PER DAY: ICD-10-PCS | Performed by: INTERNAL MEDICINE

## 2024-09-13 PROCEDURE — 0JH63XZ INSERTION OF TUNNELED VASCULAR ACCESS DEVICE INTO CHEST SUBCUTANEOUS TISSUE AND FASCIA, PERCUTANEOUS APPROACH: ICD-10-PCS | Performed by: INTERNAL MEDICINE

## 2024-09-13 PROCEDURE — 87340 HEPATITIS B SURFACE AG IA: CPT | Mod: HCNC | Performed by: INTERNAL MEDICINE

## 2024-09-13 PROCEDURE — 85025 COMPLETE CBC W/AUTO DIFF WBC: CPT | Mod: HCNC | Performed by: INTERNAL MEDICINE

## 2024-09-13 PROCEDURE — 90935 HEMODIALYSIS ONE EVALUATION: CPT | Mod: HCNC

## 2024-09-13 PROCEDURE — 21400001 HC TELEMETRY ROOM: Mod: HCNC

## 2024-09-13 PROCEDURE — 99900035 HC TECH TIME PER 15 MIN (STAT): Mod: HCNC

## 2024-09-13 PROCEDURE — 93010 ELECTROCARDIOGRAM REPORT: CPT | Mod: HCNC,,, | Performed by: INTERNAL MEDICINE

## 2024-09-13 RX ORDER — CARVEDILOL 12.5 MG/1
25 TABLET ORAL 2 TIMES DAILY WITH MEALS
Status: DISCONTINUED | OUTPATIENT
Start: 2024-09-13 | End: 2024-09-15 | Stop reason: HOSPADM

## 2024-09-13 RX ORDER — SODIUM CHLORIDE 9 MG/ML
INJECTION, SOLUTION INTRAVENOUS
Status: DISCONTINUED | OUTPATIENT
Start: 2024-09-13 | End: 2024-09-15 | Stop reason: HOSPADM

## 2024-09-13 RX ORDER — MIDAZOLAM HYDROCHLORIDE 1 MG/ML
INJECTION, SOLUTION INTRAMUSCULAR; INTRAVENOUS
Status: COMPLETED | OUTPATIENT
Start: 2024-09-13 | End: 2024-09-13

## 2024-09-13 RX ORDER — LIDOCAINE HYDROCHLORIDE 10 MG/ML
INJECTION, SOLUTION INFILTRATION; PERINEURAL
Status: COMPLETED | OUTPATIENT
Start: 2024-09-13 | End: 2024-09-13

## 2024-09-13 RX ORDER — FUROSEMIDE 40 MG/1
80 TABLET ORAL 2 TIMES DAILY
Status: DISCONTINUED | OUTPATIENT
Start: 2024-09-14 | End: 2024-09-15 | Stop reason: HOSPADM

## 2024-09-13 RX ORDER — SODIUM CHLORIDE 9 MG/ML
INJECTION, SOLUTION INTRAVENOUS ONCE
Status: DISCONTINUED | OUTPATIENT
Start: 2024-09-13 | End: 2024-09-15 | Stop reason: HOSPADM

## 2024-09-13 RX ORDER — FENTANYL CITRATE 50 UG/ML
INJECTION, SOLUTION INTRAMUSCULAR; INTRAVENOUS
Status: COMPLETED | OUTPATIENT
Start: 2024-09-13 | End: 2024-09-13

## 2024-09-13 RX ORDER — MUPIROCIN 20 MG/G
OINTMENT TOPICAL 2 TIMES DAILY
Status: DISCONTINUED | OUTPATIENT
Start: 2024-09-13 | End: 2024-09-15 | Stop reason: HOSPADM

## 2024-09-13 RX ADMIN — MIDAZOLAM HYDROCHLORIDE 1 MG: 1 INJECTION, SOLUTION INTRAMUSCULAR; INTRAVENOUS at 01:09

## 2024-09-13 RX ADMIN — HEPARIN SODIUM 5000 UNITS: 5000 INJECTION INTRAVENOUS; SUBCUTANEOUS at 09:09

## 2024-09-13 RX ADMIN — ESCITALOPRAM OXALATE 10 MG: 10 TABLET ORAL at 08:09

## 2024-09-13 RX ADMIN — DOCUSATE SODIUM 100 MG: 100 CAPSULE, LIQUID FILLED ORAL at 08:09

## 2024-09-13 RX ADMIN — HEPARIN SODIUM 5000 UNITS: 5000 INJECTION INTRAVENOUS; SUBCUTANEOUS at 06:09

## 2024-09-13 RX ADMIN — ALLOPURINOL 100 MG: 100 TABLET ORAL at 08:09

## 2024-09-13 RX ADMIN — CARVEDILOL 25 MG: 12.5 TABLET, FILM COATED ORAL at 06:09

## 2024-09-13 RX ADMIN — MUPIROCIN: 20 OINTMENT TOPICAL at 09:09

## 2024-09-13 RX ADMIN — CALCITRIOL CAPSULES 0.25 MCG 0.25 MCG: 0.25 CAPSULE ORAL at 08:09

## 2024-09-13 RX ADMIN — FENTANYL CITRATE 50 MCG: 50 INJECTION INTRAMUSCULAR; INTRAVENOUS at 01:09

## 2024-09-13 RX ADMIN — EZETIMIBE 10 MG: 10 TABLET ORAL at 09:09

## 2024-09-13 RX ADMIN — MUPIROCIN: 20 OINTMENT TOPICAL at 08:09

## 2024-09-13 RX ADMIN — HEPARIN SODIUM 5000 UNITS: 5000 INJECTION INTRAVENOUS; SUBCUTANEOUS at 02:09

## 2024-09-13 RX ADMIN — LIDOCAINE HYDROCHLORIDE 5 ML: 10 INJECTION, SOLUTION INFILTRATION; PERINEURAL at 01:09

## 2024-09-13 NOTE — PLAN OF CARE
Problem: Adult Inpatient Plan of Care  Goal: Plan of Care Review  Outcome: Progressing  Goal: Patient-Specific Goal (Individualized)  Outcome: Progressing  Goal: Absence of Hospital-Acquired Illness or Injury  Outcome: Progressing  Goal: Optimal Comfort and Wellbeing  Outcome: Progressing  Goal: Readiness for Transition of Care  Outcome: Progressing     Problem: Diabetes Comorbidity  Goal: Blood Glucose Level Within Targeted Range  Outcome: Progressing     Problem: Infection  Goal: Absence of Infection Signs and Symptoms  Outcome: Progressing     Problem: Skin Injury Risk Increased  Goal: Skin Health and Integrity  Outcome: Progressing     Problem: Hemodialysis  Goal: Safe, Effective Therapy Delivery  Outcome: Progressing  Goal: Effective Tissue Perfusion  Outcome: Progressing  Goal: Absence of Infection Signs and Symptoms  Outcome: Progressing     Problem: Wound  Goal: Optimal Coping  Outcome: Progressing  Goal: Optimal Functional Ability  Outcome: Progressing  Goal: Absence of Infection Signs and Symptoms  Outcome: Progressing  Goal: Improved Oral Intake  Outcome: Progressing  Goal: Optimal Pain Control and Function  Outcome: Progressing  Goal: Skin Health and Integrity  Outcome: Progressing  Goal: Optimal Wound Healing  Outcome: Progressing

## 2024-09-13 NOTE — NURSING
Pt back in room from dialysis. BP elevated (see flowsheet). Scheduled Coreg admin. Will recheck BP in 30 minutes. Pt asymptomatic. Sitting up in bed eating dinner. Daughters at bedside. Plan of care ongoing.

## 2024-09-13 NOTE — SUBJECTIVE & OBJECTIVE
Past Medical History:   Diagnosis Date    Acute respiratory failure with hypoxia 08/18/2022    daughter denies    Anxiety disorder, unspecified     CHF (congestive heart failure)     COVID 11/23/2022    Dehydration 12/22/2021    medication induced    Depression     Diabetes mellitus type II     Gout, unspecified     Hypertension     MDD (major depressive disorder), recurrent episode, mild 04/15/2023    Documented since 2022   Chronic medical conditions  Lexapro 10mg       Melanoma in situ 10/23/2023    left ear    Melanoma in situ of left ear 11/13/2023    left ear    Positive D dimer 12/22/2021    Sick sinus syndrome     Sleep apnea     cpap machine    TIA (transient ischemic attack) 08/18/2022    daughter denies       Past Surgical History:   Procedure Laterality Date    APPENDECTOMY      BACK SURGERY      CHOLECYSTECTOMY      EXTRACTION OF CATARACT Left 08/23/2023    Procedure: EXTRACTION, CATARACT;  Surgeon: Alonso Rodriguez MD;  Location: [facility] OR;  Service: Ophthalmology;  Laterality: Left;  DiB00 +22.5D    EXTRACTION OF CATARACT Right 11/08/2023    Procedure: EXTRACTION, CATARACT;  Surgeon: Alonso Rodriguez MD;  Location: [facility] the [facility] OR;  Service: Ophthalmology;  Laterality: Right;  DiB00 +22.5D    INNER EAR SURGERY      unilateral    INSERTION OF PACEMAKER      INSERTION OF PACEMAKER  2023    KNEE SURGERY      unilateral    PHACOEMULSIFICATION, CATARACT, WITH IOL INSERTION Left 08/23/2023    Procedure: PHACOEMULSIFICATION, CATARACT, WITH IOL INSERTION;  Surgeon: Alonso Rodriguez MD;  Location: [facility] the [facility] OR;  Service: Ophthalmology;  Laterality: Left;    PHACOEMULSIFICATION, CATARACT, WITH IOL INSERTION Right 11/08/2023    Procedure: PHACOEMULSIFICATION, CATARACT, WITH IOL INSERTION;  Surgeon: Alonso Rodriguez MD;  Location: [facility] the [facility] OR;  Service: Ophthalmology;  Laterality: Right;       Review of patient's allergies indicates:   Allergen Reactions    Adhesive Other (See Comments)     Skin irritation    Lisinopril Other (See  "Comments)     Kidney failure    Statins-hmg-coa reductase inhibitors Other (See Comments)     pain       Current Facility-Administered Medications on File Prior to Encounter   Medication    [COMPLETED] furosemide injection 120 mg    LIDOcaine (PF) 10 mg/ml (1%) injection 10 mg    LIDOcaine (PF) 10 mg/ml (1%) injection 10 mg    sodium chloride 0.9% flush 10 mL    sodium chloride 0.9% flush 10 mL     Current Outpatient Medications on File Prior to Encounter   Medication Sig    acetaminophen (TYLENOL) 325 MG tablet Take 2 tablets (650 mg total) by mouth every 6 (six) hours as needed for Pain.    allopurinoL (ZYLOPRIM) 100 MG tablet Take 1 tablet (100 mg total) by mouth once daily.    aspirin (ECOTRIN) 81 MG EC tablet Take 81 mg by mouth once daily.    BD INSULIN SYRINGE ULTRA-FINE 1/2 mL 31 gauge x 15/64" Syrg     BD VEO INSULIN SYRINGE UF 1 mL 31 gauge x 15/64" Syrg USE SYRINGE FIVE TIMES DAILY SINGLE USE    calcitRIOL (ROCALTROL) 0.25 MCG Cap Take 1 capsule (0.25 mcg total) by mouth once daily.    CALCIUM ORAL Take 1 tablet by mouth Daily.    carvediloL (COREG) 25 MG tablet Take 25 mg by mouth 2 (two) times daily with meals.    doxazosin (CARDURA) 4 MG tablet Take 4 mg by mouth every 12 (twelve) hours.    EScitalopram oxalate (LEXAPRO) 10 MG tablet Take 1 tablet (10 mg total) by mouth once daily.    ezetimibe (ZETIA) 10 mg tablet Take 10 mg by mouth every evening.    furosemide (LASIX) 20 MG tablet Take 1 tablet (20 mg total) by mouth once daily.    hydrALAZINE (APRESOLINE) 100 MG tablet Take 100 mg by mouth 3 (three) times daily.    insulin lispro 100 unit/mL injection Inject 0-10 Units into the skin 3 (three) times daily before meals. Sliding scale with meals based on glucose as follows  - 0 units; 131-180 - 2 units; 181- 240 - 4 units; 241-300 - 6 units; 301-350 - 8 units; 351-400 - 10 units    isosorbide mononitrate (IMDUR) 60 MG 24 hr tablet Take 60 mg by mouth once daily.    NIFEdipine (ADALAT CC) 30 MG " TbSR Take 30 mg by mouth 2 (two) times daily.    tirzepatide (MOUNJARO) 2.5 mg/0.5 mL PnIj Inject 2.5 mg into the skin every 7 days. (Patient taking differently: Inject 2.5 mg into the skin every Sunday.)    traZODone (DESYREL) 50 MG tablet Take 1 tablet (50 mg total) by mouth nightly as needed for Insomnia.    vitamin D (VITAMIN D3) 1000 units Tab Take 2,000 Units by mouth once daily.     Family History       Problem Relation (Age of Onset)    Alzheimer's disease Mother    Hearing loss Sister, Daughter    Heart disease Father          Tobacco Use    Smoking status: Never    Smokeless tobacco: Never   Substance and Sexual Activity    Alcohol use: No    Drug use: No    Sexual activity: Not Currently     Partners: Female     Birth control/protection: None     Review of Systems   Constitutional:  Positive for activity change, fatigue and unexpected weight change (increased fluid retension). Negative for appetite change, chills, diaphoresis and fever.   HENT:  Negative for congestion, rhinorrhea and sore throat.    Eyes:  Negative for visual disturbance.   Respiratory:  Negative for cough, chest tightness, shortness of breath and wheezing.    Cardiovascular:  Positive for leg swelling. Negative for chest pain and palpitations.   Gastrointestinal:  Negative for abdominal pain, constipation, diarrhea, nausea and vomiting.   Endocrine: Negative for polyuria.   Genitourinary:  Positive for decreased urine volume. Negative for dysuria.   Musculoskeletal:  Positive for gait problem (due to swelling in legs). Negative for arthralgias and myalgias.   Neurological:  Negative for dizziness, syncope, light-headedness and headaches.   Psychiatric/Behavioral:  Negative for decreased concentration. The patient is not nervous/anxious.      Objective:     Vital Signs (Most Recent):  Temp: 97.7 °F (36.5 °C) (09/12/24 2316)  Pulse: 78 (09/12/24 2316)  Resp: 16 (09/12/24 2316)  BP: 113/62 (09/12/24 2316)  SpO2: 95 % (09/12/24 2316) Vital  Signs (24h Range):  Temp:  [97.7 °F (36.5 °C)-98.5 °F (36.9 °C)] 97.7 °F (36.5 °C)  Pulse:  [] 78  Resp:  [16-17] 16  SpO2:  [94 %-97 %] 95 %  BP: (107-140)/(44-65) 113/62     Weight: 93 kg (205 lb 0.4 oz)  Body mass index is 29.42 kg/m².     Physical Exam  Vitals and nursing note reviewed.   Constitutional:       General: He is not in acute distress.     Appearance: Normal appearance. He is not ill-appearing, toxic-appearing or diaphoretic.   HENT:      Head: Normocephalic and atraumatic.      Nose: Nose normal. No congestion or rhinorrhea.      Mouth/Throat:      Mouth: Mucous membranes are moist.      Pharynx: Oropharynx is clear. No oropharyngeal exudate or posterior oropharyngeal erythema.   Eyes:      General: No scleral icterus.     Extraocular Movements: Extraocular movements intact.      Conjunctiva/sclera: Conjunctivae normal.      Pupils: Pupils are equal, round, and reactive to light.   Neck:      Comments: Noted elevated JVD  Cardiovascular:      Rate and Rhythm: Normal rate and regular rhythm.      Pulses: Normal pulses.      Heart sounds: Murmur heard.      No friction rub. No gallop.      Comments: Pacemaker noted left upper chest   Pulmonary:      Effort: Pulmonary effort is normal. No respiratory distress.      Breath sounds: Normal breath sounds. No wheezing, rhonchi or rales.   Abdominal:      General: Bowel sounds are normal. There is no distension.      Palpations: Abdomen is soft.      Tenderness: There is no abdominal tenderness. There is no guarding or rebound.   Musculoskeletal:         General: Swelling present. Normal range of motion.      Cervical back: Normal range of motion and neck supple.      Right lower leg: Edema present.      Left lower leg: Edema present.   Skin:     General: Skin is warm.      Capillary Refill: Capillary refill takes less than 2 seconds.      Coloration: Skin is pale.   Neurological:      Mental Status: He is oriented to person, place, and time.       Cranial Nerves: No cranial nerve deficit.      Motor: No weakness.      Coordination: Coordination normal.   Psychiatric:         Mood and Affect: Mood normal.         Behavior: Behavior normal.              CRANIAL NERVES     CN III, IV, VI   Pupils are equal, round, and reactive to light.       Recent Results (from the past 24 hour(s))   EKG 12-lead    Collection Time: 09/12/24  2:59 PM   Result Value Ref Range    QRS Duration 128 ms    OHS QTC Calculation 431 ms   CBC auto differential    Collection Time: 09/12/24  3:23 PM   Result Value Ref Range    WBC 7.33 3.90 - 12.70 K/uL    RBC 2.93 (L) 4.60 - 6.20 M/uL    Hemoglobin 8.9 (L) 14.0 - 18.0 g/dL    Hematocrit 27.1 (L) 40.0 - 54.0 %    MCV 93 82 - 98 fL    MCH 30.4 27.0 - 31.0 pg    MCHC 32.8 32.0 - 36.0 g/dL    RDW 16.2 (H) 11.5 - 14.5 %    Platelets 217 150 - 450 K/uL    MPV 10.1 9.2 - 12.9 fL    Immature Granulocytes 1.4 (H) 0.0 - 0.5 %    Gran # (ANC) 5.4 1.8 - 7.7 K/uL    Immature Grans (Abs) 0.10 (H) 0.00 - 0.04 K/uL    Lymph # 0.8 (L) 1.0 - 4.8 K/uL    Mono # 0.9 0.3 - 1.0 K/uL    Eos # 0.2 0.0 - 0.5 K/uL    Baso # 0.05 0.00 - 0.20 K/uL    nRBC 0 0 /100 WBC    Gran % 73.7 (H) 38.0 - 73.0 %    Lymph % 11.5 (L) 18.0 - 48.0 %    Mono % 11.9 4.0 - 15.0 %    Eosinophil % 2.2 0.0 - 8.0 %    Basophil % 0.7 0.0 - 1.9 %    Differential Method Automated    Comprehensive metabolic panel    Collection Time: 09/12/24  3:23 PM   Result Value Ref Range    Sodium 142 136 - 145 mmol/L    Potassium 4.4 3.5 - 5.1 mmol/L    Chloride 112 (H) 95 - 110 mmol/L    CO2 18 (L) 23 - 29 mmol/L    Glucose 129 (H) 70 - 110 mg/dL     (H) 8 - 23 mg/dL    Creatinine 5.6 (H) 0.5 - 1.4 mg/dL    Calcium 8.4 (L) 8.7 - 10.5 mg/dL    Total Protein 5.5 (L) 6.0 - 8.4 g/dL    Albumin 2.7 (L) 3.5 - 5.2 g/dL    Total Bilirubin 0.4 0.1 - 1.0 mg/dL    Alkaline Phosphatase 146 (H) 55 - 135 U/L    AST 20 10 - 40 U/L    ALT 16 10 - 44 U/L    eGFR 9.6 (A) >60 mL/min/1.73 m^2    Anion Gap 12 8 - 16  mmol/L   Troponin I    Collection Time: 09/12/24  3:23 PM   Result Value Ref Range    Troponin I 0.024 0.000 - 0.026 ng/mL   BNP    Collection Time: 09/12/24  3:23 PM   Result Value Ref Range     (H) 0 - 99 pg/mL   ISTAT PROCEDURE    Collection Time: 09/12/24  3:24 PM   Result Value Ref Range    POC PH 7.323 (L) 7.35 - 7.45    POC PCO2 36.4 35 - 45 mmHg    POC PO2 44 40 - 60 mmHg    POC HCO3 18.9 (L) 24 - 28 mmol/L    POC BE -7 (L) -2 to 2 mmol/L    POC SATURATED O2 76 95 - 100 %    POC TCO2 20 (L) 24 - 29 mmol/L    Sample VENOUS     Site Other     Allens Test N/A     DelSys Room Air    Urinalysis, Reflex to Urine Culture Urine, Clean Catch    Collection Time: 09/12/24  6:16 PM    Specimen: Urine   Result Value Ref Range    Specimen UA Urine, Clean Catch     Color, UA Yellow Yellow, Straw, Charlee    Appearance, UA Clear Clear    pH, UA 6.0 5.0 - 8.0    Specific Gravity, UA 1.015 1.005 - 1.030    Protein, UA 2+ (A) Negative    Glucose, UA Negative Negative    Ketones, UA Negative Negative    Bilirubin (UA) Negative Negative    Occult Blood UA 3+ (A) Negative    Nitrite, UA Negative Negative    Urobilinogen, UA Negative <2.0 EU/dL    Leukocytes, UA 1+ (A) Negative   Urinalysis Microscopic    Collection Time: 09/12/24  6:16 PM   Result Value Ref Range    RBC, UA 5 (H) 0 - 4 /hpf    WBC, UA 20 (H) 0 - 5 /hpf    WBC Clumps, UA Rare None-Rare    Bacteria Few (A) None-Occ /hpf    Yeast, UA None None    Squam Epithel, UA 3 /hpf    Hyaline Casts, UA 0 0-1/lpf /lpf    Microscopic Comment SEE COMMENT    Comprehensive metabolic panel    Collection Time: 09/12/24  9:03 PM   Result Value Ref Range    Sodium 138 136 - 145 mmol/L    Potassium 4.2 3.5 - 5.1 mmol/L    Chloride 110 95 - 110 mmol/L    CO2 16 (L) 23 - 29 mmol/L    Glucose 106 70 - 110 mg/dL     (H) 8 - 23 mg/dL    Creatinine 5.6 (H) 0.5 - 1.4 mg/dL    Calcium 8.4 (L) 8.7 - 10.5 mg/dL    Total Protein 5.4 (L) 6.0 - 8.4 g/dL    Albumin 2.7 (L) 3.5 - 5.2 g/dL     Total Bilirubin 0.5 0.1 - 1.0 mg/dL    Alkaline Phosphatase 150 (H) 55 - 135 U/L    AST 17 10 - 40 U/L    ALT 16 10 - 44 U/L    eGFR 10 (A) >60 mL/min/1.73 m^2    Anion Gap 12 8 - 16 mmol/L   Magnesium    Collection Time: 09/12/24  9:03 PM   Result Value Ref Range    Magnesium 1.6 1.6 - 2.6 mg/dL   Phosphorus    Collection Time: 09/12/24  9:03 PM   Result Value Ref Range    Phosphorus 5.4 (H) 2.7 - 4.5 mg/dL   PTH, intact    Collection Time: 09/12/24  9:03 PM   Result Value Ref Range    PTH, Intact 79.4 (H) 9.0 - 77.0 pg/mL   Ferritin    Collection Time: 09/12/24  9:03 PM   Result Value Ref Range    Ferritin 197 20.0 - 300.0 ng/mL   Iron and TIBC    Collection Time: 09/12/24  9:03 PM   Result Value Ref Range    Iron 20 (L) 45 - 160 ug/dL    Transferrin 115 (L) 200 - 375 mg/dL    TIBC 170 (L) 250 - 450 ug/dL    Saturated Iron 12 (L) 20 - 50 %   Protime-INR    Collection Time: 09/12/24  9:03 PM   Result Value Ref Range    Prothrombin Time 12.1 9.0 - 12.5 sec    INR 1.1 0.8 - 1.2   APTT    Collection Time: 09/12/24  9:03 PM   Result Value Ref Range    aPTT 29.3 21.0 - 32.0 sec   CBC auto differential    Collection Time: 09/12/24  9:03 PM   Result Value Ref Range    WBC 6.25 3.90 - 12.70 K/uL    RBC 3.11 (L) 4.60 - 6.20 M/uL    Hemoglobin 9.2 (L) 14.0 - 18.0 g/dL    Hematocrit 29.6 (L) 40.0 - 54.0 %    MCV 95 82 - 98 fL    MCH 29.6 27.0 - 31.0 pg    MCHC 31.1 (L) 32.0 - 36.0 g/dL    RDW 16.7 (H) 11.5 - 14.5 %    Platelets 228 150 - 450 K/uL    MPV 10.8 9.2 - 12.9 fL    Immature Granulocytes 1.0 (H) 0.0 - 0.5 %    Gran # (ANC) 4.6 1.8 - 7.7 K/uL    Immature Grans (Abs) 0.06 (H) 0.00 - 0.04 K/uL    Lymph # 0.8 (L) 1.0 - 4.8 K/uL    Mono # 0.7 0.3 - 1.0 K/uL    Eos # 0.1 0.0 - 0.5 K/uL    Baso # 0.02 0.00 - 0.20 K/uL    nRBC 0 0 /100 WBC    Gran % 73.6 (H) 38.0 - 73.0 %    Lymph % 12.3 (L) 18.0 - 48.0 %    Mono % 10.6 4.0 - 15.0 %    Eosinophil % 2.2 0.0 - 8.0 %    Basophil % 0.3 0.0 - 1.9 %    Differential Method  Automated    Type & Screen    Collection Time: 09/12/24  9:03 PM   Result Value Ref Range    Group & Rh O POS     Indirect Kamini NEG     Specimen Outdate 09/15/2024 23:59    PTH, intact    Collection Time: 09/12/24  9:03 PM   Result Value Ref Range    PTH, Intact 79.4 (H) 9.0 - 77.0 pg/mL   Uric acid    Collection Time: 09/12/24  9:03 PM   Result Value Ref Range    Uric Acid 7.3 (H) 3.4 - 7.0 mg/dL       Microbiology Results (last 7 days)       ** No results found for the last 168 hours. **

## 2024-09-13 NOTE — PLAN OF CARE
Case Management Assessment     PCP: Kashmir Conn MD    Pharmacy:   [id number]  [facility] [address] [facility]  [address]  [place] [address]  Phone: [phone] Fax: [phone]        Patient Arrived From: Home  Existing Help at Home: Daughter    Barriers to Discharge: None    Discharge Plan:    A. Home with family   B. Home with family  Spoke with patient's daughters at bedside while patient was in dialysis.  Daughters reported that patient is independent and lives with his daughter Sonia, though they are both available to assist him at home as needed.  Patient's daughters stated patient currently only uses a CPAP machine and a glucometer.  Prior to admit, patient was not on dialysis, but began his first treatment today.  CM to assist with setting up OP HD.  Daughters stated they will provide transportation home upon discharge; CM to continue to assess for and until discharge.    09/13/24 1635   Discharge Assessment   Assessment Type Discharge Planning Assessment   Confirmed/corrected address, phone number and insurance Yes   Confirmed Demographics Correct on Facesheet   Source of Information family   Does patient/caregiver understand observation status Yes   Communicated LESLIE with patient/caregiver Yes   Reason For Admission Acute worsening of stage 4 chronic kidney disease   People in Home child(claus), adult   Facility Arrived From: Home   Do you expect to return to your current living situation? Yes   Do you have help at home or someone to help you manage your care at home? Yes   Who are your caregiver(s) and their phone number(s)? Candace Palomino Daughter [phone]; [phone]   SONIA HWANG Daughter [phone];  [phone]   Prior to hospitilization cognitive status: Unable to Assess   Current cognitive status: Unable to Assess   Equipment Currently Used at Home CPAP;glucometer   Readmission within 30 days? No   Patient currently being followed by outpatient case  management? No   Do you currently have service(s) that help you manage your care at home? No   Do you take prescription medications? Yes   Do you have prescription coverage? Yes   Coverage [employer] MANAGED MEDICARE - [employer] MEDICARE HMO - CAPITATED   Do you have any problems affording any of your prescribed medications? No   Is the patient taking medications as prescribed? yes   Who is going to help you get home at discharge? Daughters   How do you get to doctors appointments? car, drives self   Are you on dialysis? No   Do you take coumadin? No   Discharge Plan A Home with family   DME Needed Upon Discharge  other (see comments)  (TBD)   Transition of Care Barriers None

## 2024-09-13 NOTE — H&P
[facility] Medicine  History & Physical    Patient Name: Nestor Garcia  MRN: [id number]  Patient Class: IP- Inpatient  Admission Date: 9/12/2024  Attending Physician: Dr. Rhonda Harkins   Primary Care Provider: Kashmir Conn MD         Patient information was obtained from patient, past medical records, ER records, and D/w Sub specialist  .     Subjective:     Principal Problem: Acute worsening of CKD 4 possible need for HD now.     Chief Complaint: Confusion and worsenig lower extremity edema despite his diurectic.     HPI: 79-year-old male with a history of CKD 4, essential hypertension, anemia, secondary hyperparathyroidism, gout, CHF(grade 2 diastolic) and follows the [facility] Aceves (s/p Pace-maker), HLD, NIDDM type 2 (on mounjaro), sleep apnea, and TIA presented to [facility] Emergency Department on September 12 with lower extremity swelling up to the abdomen along with confusion and dyspnea. He had no chest pain. States decreased UOP despite his diuretics (he states he is on lasix). He has been following with Nephrology and discussions about PD have been made recently.  NO fevers or chills.     Labs at the OSH-ED noted:  White blood cells 7.33, hemoglobin 8.9, hematocrit 27.1, platelets 217, sodium 142, potassium 4.4, chloride 112, CO2 18, , creatinine 5.6, glucose 129, AST 20, ALT 16, , troponin 0.024  -venous pH 7.323  Was given IV lasix 120mg x1   CXR: clear right lung. Left basilar opacity with associated pleural fluid. No pneumothorax. Enlarged cardiac silhouette. Left-sided cardiac defibrillator.  EKG showed atrial paced complexes, left axis deviation, left bundle-branch block.    They spoke with the Dr. Luis (patient's Nephrologist) who recommended transfer to [facility] for further eval of possible HD new start.             Past Medical History:   Diagnosis Date    Acute respiratory failure with hypoxia 08/18/2022     daughter denies    Anxiety disorder, unspecified     CHF (congestive heart failure)     COVID 11/23/2022    Dehydration 12/22/2021    medication induced    Depression     Diabetes mellitus type II     Gout, unspecified     Hypertension     MDD (major depressive disorder), recurrent episode, mild 04/15/2023    Documented since 2022   Chronic medical conditions  Lexapro 10mg       Melanoma in situ 10/23/2023    left ear    Melanoma in situ of left ear 11/13/2023    left ear    Positive D dimer 12/22/2021    Sick sinus syndrome     Sleep apnea     cpap machine    TIA (transient ischemic attack) 08/18/2022    daughter denies       Past Surgical History:   Procedure Laterality Date    APPENDECTOMY      BACK SURGERY      CHOLECYSTECTOMY      EXTRACTION OF CATARACT Left 08/23/2023    Procedure: EXTRACTION, CATARACT;  Surgeon: Alonso Rodriguez MD;  Location: [facility] OR;  Service: Ophthalmology;  Laterality: Left;  DiB00 +22.5D    EXTRACTION OF CATARACT Right 11/08/2023    Procedure: EXTRACTION, CATARACT;  Surgeon: Alonso Rodriguez MD;  Location: [facility] OR;  Service: Ophthalmology;  Laterality: Right;  DiB00 +22.5D    INNER EAR SURGERY      unilateral    INSERTION OF PACEMAKER      INSERTION OF PACEMAKER  2023    KNEE SURGERY      unilateral    PHACOEMULSIFICATION, CATARACT, WITH IOL INSERTION Left 08/23/2023    Procedure: PHACOEMULSIFICATION, CATARACT, WITH IOL INSERTION;  Surgeon: Alonso Rodriguez MD;  Location: [facility] OR;  Service: Ophthalmology;  Laterality: Left;    PHACOEMULSIFICATION, CATARACT, WITH IOL INSERTION Right 11/08/2023    Procedure: PHACOEMULSIFICATION, CATARACT, WITH IOL INSERTION;  Surgeon: Alonso Rodriguez MD;  Location: [facility] OR;  Service: Ophthalmology;  Laterality: Right;       Review of patient's allergies indicates:   Allergen Reactions    Adhesive Other (See Comments)     Skin irritation    Lisinopril Other (See Comments)     Kidney failure    Statins-hmg-coa reductase inhibitors Other (See Comments)     pain  "      Current Facility-Administered Medications on File Prior to Encounter   Medication    [COMPLETED] furosemide injection 120 mg    LIDOcaine (PF) 10 mg/ml (1%) injection 10 mg    LIDOcaine (PF) 10 mg/ml (1%) injection 10 mg    sodium chloride 0.9% flush 10 mL    sodium chloride 0.9% flush 10 mL     Current Outpatient Medications on File Prior to Encounter   Medication Sig    acetaminophen (TYLENOL) 325 MG tablet Take 2 tablets (650 mg total) by mouth every 6 (six) hours as needed for Pain.    allopurinoL (ZYLOPRIM) 100 MG tablet Take 1 tablet (100 mg total) by mouth once daily.    aspirin (ECOTRIN) 81 MG EC tablet Take 81 mg by mouth once daily.    BD INSULIN SYRINGE ULTRA-FINE 1/2 mL 31 gauge x 15/64" Syrg     BD VEO INSULIN SYRINGE UF 1 mL 31 gauge x 15/64" Syrg USE SYRINGE FIVE TIMES DAILY SINGLE USE    calcitRIOL (ROCALTROL) 0.25 MCG Cap Take 1 capsule (0.25 mcg total) by mouth once daily.    CALCIUM ORAL Take 1 tablet by mouth Daily.    carvediloL (COREG) 25 MG tablet Take 25 mg by mouth 2 (two) times daily with meals.    doxazosin (CARDURA) 4 MG tablet Take 4 mg by mouth every 12 (twelve) hours.    EScitalopram oxalate (LEXAPRO) 10 MG tablet Take 1 tablet (10 mg total) by mouth once daily.    ezetimibe (ZETIA) 10 mg tablet Take 10 mg by mouth every evening.    furosemide (LASIX) 20 MG tablet Take 1 tablet (20 mg total) by mouth once daily.    hydrALAZINE (APRESOLINE) 100 MG tablet Take 100 mg by mouth 3 (three) times daily.    insulin lispro 100 unit/mL injection Inject 0-10 Units into the skin 3 (three) times daily before meals. Sliding scale with meals based on glucose as follows  - 0 units; 131-180 - 2 units; 181- 240 - 4 units; 241-300 - 6 units; 301-350 - 8 units; 351-400 - 10 units    isosorbide mononitrate (IMDUR) 60 MG 24 hr tablet Take 60 mg by mouth once daily.    NIFEdipine (ADALAT CC) 30 MG TbSR Take 30 mg by mouth 2 (two) times daily.    tirzepatide (MOUNJARO) 2.5 mg/0.5 mL PnIj Inject " 2.5 mg into the skin every 7 days. (Patient taking differently: Inject 2.5 mg into the skin every Sunday.)    traZODone (DESYREL) 50 MG tablet Take 1 tablet (50 mg total) by mouth nightly as needed for Insomnia.    vitamin D (VITAMIN D3) 1000 units Tab Take 2,000 Units by mouth once daily.     Family History       Problem Relation (Age of Onset)    Alzheimer's disease Mother    Hearing loss Sister, Daughter    Heart disease Father          Tobacco Use    Smoking status: Never    Smokeless tobacco: Never   Substance and Sexual Activity    Alcohol use: No    Drug use: No    Sexual activity: Not Currently     Partners: Female     Birth control/protection: None     Review of Systems   Constitutional:  Positive for activity change, fatigue and unexpected weight change (increased fluid retension). Negative for appetite change, chills, diaphoresis and fever.   HENT:  Negative for congestion, rhinorrhea and sore throat.    Eyes:  Negative for visual disturbance.   Respiratory:  Negative for cough, chest tightness, shortness of breath and wheezing.    Cardiovascular:  Positive for leg swelling. Negative for chest pain and palpitations.   Gastrointestinal:  Negative for abdominal pain, constipation, diarrhea, nausea and vomiting.   Endocrine: Negative for polyuria.   Genitourinary:  Positive for decreased urine volume. Negative for dysuria.   Musculoskeletal:  Positive for gait problem (due to swelling in legs). Negative for arthralgias and myalgias.   Neurological:  Negative for dizziness, syncope, light-headedness and headaches.   Psychiatric/Behavioral:  Negative for decreased concentration. The patient is not nervous/anxious.      Objective:     Vital Signs (Most Recent):  Temp: 97.7 °F (36.5 °C) (09/12/24 2316)  Pulse: 78 (09/12/24 2316)  Resp: 16 (09/12/24 2316)  BP: 113/62 (09/12/24 2316)  SpO2: 95 % (09/12/24 2316) Vital Signs (24h Range):  Temp:  [97.7 °F (36.5 °C)-98.5 °F (36.9 °C)] 97.7 °F (36.5 °C)  Pulse:   [] 78  Resp:  [16-17] 16  SpO2:  [94 %-97 %] 95 %  BP: (107-140)/(44-65) 113/62     Weight: 93 kg (205 lb 0.4 oz)  Body mass index is 29.42 kg/m².     Physical Exam  Vitals and nursing note reviewed.   Constitutional:       General: He is not in acute distress.     Appearance: Normal appearance. He is not ill-appearing, toxic-appearing or diaphoretic.   HENT:      Head: Normocephalic and atraumatic.      Nose: Nose normal. No congestion or rhinorrhea.      Mouth/Throat:      Mouth: Mucous membranes are moist.      Pharynx: Oropharynx is clear. No oropharyngeal exudate or posterior oropharyngeal erythema.   Eyes:      General: No scleral icterus.     Extraocular Movements: Extraocular movements intact.      Conjunctiva/sclera: Conjunctivae normal.      Pupils: Pupils are equal, round, and reactive to light.   Neck:      Comments: Noted elevated JVD  Cardiovascular:      Rate and Rhythm: Normal rate and regular rhythm.      Pulses: Normal pulses.      Heart sounds: Murmur heard.      No friction rub. No gallop.      Comments: Pacemaker noted left upper chest   Pulmonary:      Effort: Pulmonary effort is normal. No respiratory distress.      Breath sounds: Normal breath sounds. No wheezing, rhonchi or rales.   Abdominal:      General: Bowel sounds are normal. There is no distension.      Palpations: Abdomen is soft.      Tenderness: There is no abdominal tenderness. There is no guarding or rebound.   Musculoskeletal:         General: Swelling present. Normal range of motion.      Cervical back: Normal range of motion and neck supple.      Right lower leg: Edema present.      Left lower leg: Edema present.   Skin:     General: Skin is warm.      Capillary Refill: Capillary refill takes less than 2 seconds.      Coloration: Skin is pale.   Neurological:      Mental Status: He is oriented to person, place, and time.      Cranial Nerves: No cranial nerve deficit.      Motor: No weakness.      Coordination:  Coordination normal.   Psychiatric:         Mood and Affect: Mood normal.         Behavior: Behavior normal.              CRANIAL NERVES     CN III, IV, VI   Pupils are equal, round, and reactive to light.       Recent Results (from the past 24 hour(s))   EKG 12-lead    Collection Time: 09/12/24  2:59 PM   Result Value Ref Range    QRS Duration 128 ms    OHS QTC Calculation 431 ms   CBC auto differential    Collection Time: 09/12/24  3:23 PM   Result Value Ref Range    WBC 7.33 3.90 - 12.70 K/uL    RBC 2.93 (L) 4.60 - 6.20 M/uL    Hemoglobin 8.9 (L) 14.0 - 18.0 g/dL    Hematocrit 27.1 (L) 40.0 - 54.0 %    MCV 93 82 - 98 fL    MCH 30.4 27.0 - 31.0 pg    MCHC 32.8 32.0 - 36.0 g/dL    RDW 16.2 (H) 11.5 - 14.5 %    Platelets 217 150 - 450 K/uL    MPV 10.1 9.2 - 12.9 fL    Immature Granulocytes 1.4 (H) 0.0 - 0.5 %    Gran # (ANC) 5.4 1.8 - 7.7 K/uL    Immature Grans (Abs) 0.10 (H) 0.00 - 0.04 K/uL    Lymph # 0.8 (L) 1.0 - 4.8 K/uL    Mono # 0.9 0.3 - 1.0 K/uL    Eos # 0.2 0.0 - 0.5 K/uL    Baso # 0.05 0.00 - 0.20 K/uL    nRBC 0 0 /100 WBC    Gran % 73.7 (H) 38.0 - 73.0 %    Lymph % 11.5 (L) 18.0 - 48.0 %    Mono % 11.9 4.0 - 15.0 %    Eosinophil % 2.2 0.0 - 8.0 %    Basophil % 0.7 0.0 - 1.9 %    Differential Method Automated    Comprehensive metabolic panel    Collection Time: 09/12/24  3:23 PM   Result Value Ref Range    Sodium 142 136 - 145 mmol/L    Potassium 4.4 3.5 - 5.1 mmol/L    Chloride 112 (H) 95 - 110 mmol/L    CO2 18 (L) 23 - 29 mmol/L    Glucose 129 (H) 70 - 110 mg/dL     (H) 8 - 23 mg/dL    Creatinine 5.6 (H) 0.5 - 1.4 mg/dL    Calcium 8.4 (L) 8.7 - 10.5 mg/dL    Total Protein 5.5 (L) 6.0 - 8.4 g/dL    Albumin 2.7 (L) 3.5 - 5.2 g/dL    Total Bilirubin 0.4 0.1 - 1.0 mg/dL    Alkaline Phosphatase 146 (H) 55 - 135 U/L    AST 20 10 - 40 U/L    ALT 16 10 - 44 U/L    eGFR 9.6 (A) >60 mL/min/1.73 m^2    Anion Gap 12 8 - 16 mmol/L   Troponin I    Collection Time: 09/12/24  3:23 PM   Result Value Ref Range     Troponin I 0.024 0.000 - 0.026 ng/mL   BNP    Collection Time: 09/12/24  3:23 PM   Result Value Ref Range     (H) 0 - 99 pg/mL   ISTAT PROCEDURE    Collection Time: 09/12/24  3:24 PM   Result Value Ref Range    POC PH 7.323 (L) 7.35 - 7.45    POC PCO2 36.4 35 - 45 mmHg    POC PO2 44 40 - 60 mmHg    POC HCO3 18.9 (L) 24 - 28 mmol/L    POC BE -7 (L) -2 to 2 mmol/L    POC SATURATED O2 76 95 - 100 %    POC TCO2 20 (L) 24 - 29 mmol/L    Sample VENOUS     Site Other     Allens Test N/A     DelSys Room Air    Urinalysis, Reflex to Urine Culture Urine, Clean Catch    Collection Time: 09/12/24  6:16 PM    Specimen: Urine   Result Value Ref Range    Specimen UA Urine, Clean Catch     Color, UA Yellow Yellow, Straw, Charlee    Appearance, UA Clear Clear    pH, UA 6.0 5.0 - 8.0    Specific Gravity, UA 1.015 1.005 - 1.030    Protein, UA 2+ (A) Negative    Glucose, UA Negative Negative    Ketones, UA Negative Negative    Bilirubin (UA) Negative Negative    Occult Blood UA 3+ (A) Negative    Nitrite, UA Negative Negative    Urobilinogen, UA Negative <2.0 EU/dL    Leukocytes, UA 1+ (A) Negative   Urinalysis Microscopic    Collection Time: 09/12/24  6:16 PM   Result Value Ref Range    RBC, UA 5 (H) 0 - 4 /hpf    WBC, UA 20 (H) 0 - 5 /hpf    WBC Clumps, UA Rare None-Rare    Bacteria Few (A) None-Occ /hpf    Yeast, UA None None    Squam Epithel, UA 3 /hpf    Hyaline Casts, UA 0 0-1/lpf /lpf    Microscopic Comment SEE COMMENT    Comprehensive metabolic panel    Collection Time: 09/12/24  9:03 PM   Result Value Ref Range    Sodium 138 136 - 145 mmol/L    Potassium 4.2 3.5 - 5.1 mmol/L    Chloride 110 95 - 110 mmol/L    CO2 16 (L) 23 - 29 mmol/L    Glucose 106 70 - 110 mg/dL     (H) 8 - 23 mg/dL    Creatinine 5.6 (H) 0.5 - 1.4 mg/dL    Calcium 8.4 (L) 8.7 - 10.5 mg/dL    Total Protein 5.4 (L) 6.0 - 8.4 g/dL    Albumin 2.7 (L) 3.5 - 5.2 g/dL    Total Bilirubin 0.5 0.1 - 1.0 mg/dL    Alkaline Phosphatase 150 (H) 55 - 135 U/L     AST 17 10 - 40 U/L    ALT 16 10 - 44 U/L    eGFR 10 (A) >60 mL/min/1.73 m^2    Anion Gap 12 8 - 16 mmol/L   Magnesium    Collection Time: 09/12/24  9:03 PM   Result Value Ref Range    Magnesium 1.6 1.6 - 2.6 mg/dL   Phosphorus    Collection Time: 09/12/24  9:03 PM   Result Value Ref Range    Phosphorus 5.4 (H) 2.7 - 4.5 mg/dL   PTH, intact    Collection Time: 09/12/24  9:03 PM   Result Value Ref Range    PTH, Intact 79.4 (H) 9.0 - 77.0 pg/mL   Ferritin    Collection Time: 09/12/24  9:03 PM   Result Value Ref Range    Ferritin 197 20.0 - 300.0 ng/mL   Iron and TIBC    Collection Time: 09/12/24  9:03 PM   Result Value Ref Range    Iron 20 (L) 45 - 160 ug/dL    Transferrin 115 (L) 200 - 375 mg/dL    TIBC 170 (L) 250 - 450 ug/dL    Saturated Iron 12 (L) 20 - 50 %   Protime-INR    Collection Time: 09/12/24  9:03 PM   Result Value Ref Range    Prothrombin Time 12.1 9.0 - 12.5 sec    INR 1.1 0.8 - 1.2   APTT    Collection Time: 09/12/24  9:03 PM   Result Value Ref Range    aPTT 29.3 21.0 - 32.0 sec   CBC auto differential    Collection Time: 09/12/24  9:03 PM   Result Value Ref Range    WBC 6.25 3.90 - 12.70 K/uL    RBC 3.11 (L) 4.60 - 6.20 M/uL    Hemoglobin 9.2 (L) 14.0 - 18.0 g/dL    Hematocrit 29.6 (L) 40.0 - 54.0 %    MCV 95 82 - 98 fL    MCH 29.6 27.0 - 31.0 pg    MCHC 31.1 (L) 32.0 - 36.0 g/dL    RDW 16.7 (H) 11.5 - 14.5 %    Platelets 228 150 - 450 K/uL    MPV 10.8 9.2 - 12.9 fL    Immature Granulocytes 1.0 (H) 0.0 - 0.5 %    Gran # (ANC) 4.6 1.8 - 7.7 K/uL    Immature Grans (Abs) 0.06 (H) 0.00 - 0.04 K/uL    Lymph # 0.8 (L) 1.0 - 4.8 K/uL    Mono # 0.7 0.3 - 1.0 K/uL    Eos # 0.1 0.0 - 0.5 K/uL    Baso # 0.02 0.00 - 0.20 K/uL    nRBC 0 0 /100 WBC    Gran % 73.6 (H) 38.0 - 73.0 %    Lymph % 12.3 (L) 18.0 - 48.0 %    Mono % 10.6 4.0 - 15.0 %    Eosinophil % 2.2 0.0 - 8.0 %    Basophil % 0.3 0.0 - 1.9 %    Differential Method Automated    Type & Screen    Collection Time: 09/12/24  9:03 PM   Result Value Ref Range     "Group & Rh O POS     Indirect Kamini NEG     Specimen Outdate 09/15/2024 23:59    PTH, intact    Collection Time: 09/12/24  9:03 PM   Result Value Ref Range    PTH, Intact 79.4 (H) 9.0 - 77.0 pg/mL   Uric acid    Collection Time: 09/12/24  9:03 PM   Result Value Ref Range    Uric Acid 7.3 (H) 3.4 - 7.0 mg/dL       Microbiology Results (last 7 days)       ** No results found for the last 168 hours. **                  Assessment/Plan:     Uremia  Mild confusion noted. F/u with Nephrology consult and possible new start HD.       Acute worsening of stage 4 chronic kidney disease  Possible now ESRD. D/w Nephrology who will see in am. Consult IR for possible tunneled HD catheter. F/u on repeat labs. No indications for emergent HD tonight.     Chronic diastolic heart failure, NYHA class 2  No signs of acute exacerbation but he is not responding to the Lasix with worsening lower extremity edema/anasarca extending to his hips and back. F/u with Nephrology for possible HD new start. IV lasix given in the ED. Bladder scan in place. Monitor I/O.   Results for orders placed during the hospital encounter of 08/18/22    Echo    Interpretation Summary  · Eccentric hypertrophy and  · The estimated ejection fraction is 60%.  · Grade I left ventricular diastolic dysfunction with inconclusive left atrial pressure.  · Normal right ventricular size with normal right ventricular systolic function.      Type 2 diabetes mellitus, without long-term current use of insulin  Patient's FSGs are controlled on current medication regimen.  Last A1c reviewed-   Lab Results   Component Value Date    HGBA1C 5.3 05/15/2024     Most recent fingerstick glucose reviewed- No results for input(s): "POCTGLUCOSE" in the last 24 hours.  Current correctional scale  Low  Maintain anti-hyperglycemic dose as follows-   Antihyperglycemics (From admission, onward)      Start     Stop Route Frequency Ordered    09/12/24 6485  insulin aspart U-100 pen 0-5 Units      "    -- SubQ Before meals & nightly PRN 09/12/24 2209          Hold Mounjaro for now. Will d/w Nephrology continuing this as outpatient or not based on HD needs now.       Benign essential hypertension  Chronic, controlled. Latest blood pressure and vitals reviewed-     Temp:  [97.7 °F (36.5 °C)-98.5 °F (36.9 °C)]   Pulse:  []   Resp:  [16-17]   BP: (107-140)/(44-65)   SpO2:  [94 %-97 %] .   Home meds for hypertension were reviewed and noted below.   Hypertension Medications               carvediloL (COREG) 25 MG tablet Take 25 mg by mouth 2 (two) times daily with meals.    doxazosin (CARDURA) 4 MG tablet Take 4 mg by mouth every 12 (twelve) hours.    furosemide (LASIX) 20 MG tablet Take 1 tablet (20 mg total) by mouth once daily.    hydrALAZINE (APRESOLINE) 100 MG tablet Take 100 mg by mouth 3 (three) times daily.    isosorbide mononitrate (IMDUR) 60 MG 24 hr tablet Take 60 mg by mouth once daily.    NIFEdipine (ADALAT CC) 30 MG TbSR Take 30 mg by mouth 2 (two) times daily.            While in the hospital, will manage blood pressure as follows; Adjust home antihypertensive regimen as follows- Appears to be low/normal now and will HOLD BP meds and decreased he coreg to 6.25mg PO BID and see how he trends. Resume other BP meds as able pending repeat BP checks.     Will utilize p.r.n. blood pressure medication only if patient's blood pressure greater than  180/90  and he develops symptoms such as worsening chest pain or shortness of breath.      VTE Risk Mitigation (From admission, onward)           Ordered     heparin (porcine) injection 5,000 Units  Every 8 hours         09/12/24 2028     IP VTE HIGH RISK PATIENT  Once         09/12/24 2028     Place sequential compression device  Until discontinued         09/12/24 2028                   CODE STATUS: FULL CODE                  Rhonda Harkins MD  Department of Hospital Medicine  [facility] - Med Surg

## 2024-09-13 NOTE — HPI
79-year-old male with a history of CKD 4, essential hypertension, anemia, secondary hyperparathyroidism, gout, CHF(grade 2 diastolic) and follows the [facility] (s/p Pace-maker), HLD, NIDDM type 2 (on mounjaro), sleep apnea, and TIA presented to [facility] Emergency Department on September 12 with lower extremity swelling up to the abdomen along with confusion and dyspnea. He had no chest pain. States decreased UOP despite his diuretics (he states he is on lasix). He has been following with Nephrology and discussions about PD have been made recently.  NO fevers or chills.     Labs at the OSH-ED noted:  White blood cells 7.33, hemoglobin 8.9, hematocrit 27.1, platelets 217, sodium 142, potassium 4.4, chloride 112, CO2 18, , creatinine 5.6, glucose 129, AST 20, ALT 16, , troponin 0.024  -venous pH 7.323  Was given IV lasix 120mg x1   CXR: clear right lung. Left basilar opacity with associated pleural fluid. No pneumothorax. Enlarged cardiac silhouette. Left-sided cardiac defibrillator.  EKG showed atrial paced complexes, left axis deviation, left bundle-branch block.    They spoke with the Dr. Luis (patient's Nephrologist) who recommended transfer to [facility] for further eval of possible HD new start.

## 2024-09-13 NOTE — PLAN OF CARE
CM sent referral for OP HD in [facility].  CM to continue to assess for and until discharge.

## 2024-09-13 NOTE — NURSING
Pt arrived on unit, awake alert and oriented. No complaints of pain/discomfort. Accompanied by daughters. IV site noted, saline lock. Pt on room air, no distress noted. Skin assessed, redness noted to sacral area mepilex placed; bruising and edema noted. Vitals assessed. Male purewick in place. Tele #[id number] and called in. Safety measures maintained. Will cont to monitor.

## 2024-09-13 NOTE — CARE UPDATE
79-year-old male with a history of CKD 4, essential hypertension, HLD, DM, anemia, secondary hyperparathyroidism, gout, CHF(grade 2 diastolic) transfer to [facility] and admitted on 09/12/24 for acute on chronic CKD4. Presented with worsening lower extremity swelling and some confusion.  Reported decreased urine output despite taking his diuretics.  Patient had ongoing discussion about PD with his nephrologist outpatient.  Labs significant for BUN of 114 and creatinine 5.6 on admission.  Also found to have hyperphosphatemia with phosphorus level of 5.4.  Nephrology and IR consulted.    Nephrology consulted-plan to initiate dialysis.  IR consulted for tunneled HD line placement today. Daughter at bedside. Patient's mental status improving. Family and patient agreeable to HD.  consulted.    I reviewed the patient's medications, past medical/surgical history and the H&P note. I agree with the physical exam and assessment and plan.

## 2024-09-13 NOTE — PROCEDURES
VIR procedure note      Pre Op Diagnosis: ESRD  Post Op Diagnosis: Same    Procedure: Tunneled HD cath placement    Procedure performed by:  Shavon Ojeda MD     Written Informed Consent Obtained: Yes  Specimen Removed: No  Estimated Blood Loss: Minimal    Findings:     Successful placement of dual lumen 14.5 Fr tunneled HD catheter with the tip in the right atrium.      Plan:    Catheter can be used immediately.      Shavon Ojeda MD  VIR

## 2024-09-13 NOTE — ASSESSMENT & PLAN NOTE
No signs of acute exacerbation but he is not responding to the Lasix with worsening lower extremity edema/anasarca extending to his hips and back. F/u with Nephrology for possible HD new start. IV lasix given in the ED. Bladder scan in place. Monitor I/O.   Results for orders placed during the hospital encounter of 08/18/22    Echo    Interpretation Summary  · Eccentric hypertrophy and  · The estimated ejection fraction is 60%.  · Grade I left ventricular diastolic dysfunction with inconclusive left atrial pressure.  · Normal right ventricular size with normal right ventricular systolic function.

## 2024-09-13 NOTE — ASSESSMENT & PLAN NOTE
Possible now ESRD. D/w Nephrology who will see in am. Consult IR for possible tunneled HD catheter. F/u on repeat labs. No indications for emergent HD tonight.

## 2024-09-13 NOTE — ASSESSMENT & PLAN NOTE
Chronic, controlled. Latest blood pressure and vitals reviewed-     Temp:  [97.7 °F (36.5 °C)-98.5 °F (36.9 °C)]   Pulse:  []   Resp:  [16-17]   BP: (107-140)/(44-65)   SpO2:  [94 %-97 %] .   Home meds for hypertension were reviewed and noted below.   Hypertension Medications               carvediloL (COREG) 25 MG tablet Take 25 mg by mouth 2 (two) times daily with meals.    doxazosin (CARDURA) 4 MG tablet Take 4 mg by mouth every 12 (twelve) hours.    furosemide (LASIX) 20 MG tablet Take 1 tablet (20 mg total) by mouth once daily.    hydrALAZINE (APRESOLINE) 100 MG tablet Take 100 mg by mouth 3 (three) times daily.    isosorbide mononitrate (IMDUR) 60 MG 24 hr tablet Take 60 mg by mouth once daily.    NIFEdipine (ADALAT CC) 30 MG TbSR Take 30 mg by mouth 2 (two) times daily.            While in the hospital, will manage blood pressure as follows; Adjust home antihypertensive regimen as follows- Appears to be low/normal now and will HOLD BP meds and decreased he coreg to 6.25mg PO BID and see how he trends. Resume other BP meds as able pending repeat BP checks.     Will utilize p.r.n. blood pressure medication only if patient's blood pressure greater than  180/90  and he develops symptoms such as worsening chest pain or shortness of breath.

## 2024-09-13 NOTE — PT/OT/SLP PROGRESS
Physical Therapy      Patient Name:  Nestor Garcia   MRN:  [id number]    Patient not seen at this time for PT eval secondary to Off the floor for procedure/surgery (pt in IR for tunneled HD cath placement). Will follow-up.

## 2024-09-13 NOTE — ASSESSMENT & PLAN NOTE
"Patient's FSGs are controlled on current medication regimen.  Last A1c reviewed-   Lab Results   Component Value Date    HGBA1C 5.3 05/15/2024     Most recent fingerstick glucose reviewed- No results for input(s): "POCTGLUCOSE" in the last 24 hours.  Current correctional scale  Low  Maintain anti-hyperglycemic dose as follows-   Antihyperglycemics (From admission, onward)      Start     Stop Route Frequency Ordered    09/12/24 2309  insulin aspart U-100 pen 0-5 Units         -- SubQ Before meals & nightly PRN 09/12/24 2209          Hold Mounjaro for now. Will d/w Nephrology continuing this as outpatient or not based on HD needs now.     "

## 2024-09-13 NOTE — CONSULTS
Tunneled HD Catheter Placement Consult Note  Interventional Radiology    Consult Requested By: Rhonda Harkins MD  Reason for Consult: eval for tunneled HD line    SUBJECTIVE:     Chief Complaint:  worsening of CKD, possible need for HD. Request for eval of tunneled line placement    History of Present Illness:  Nestor Garcia is a 79 y.o. male with CKD 4, HTN, gout, HFpEF, tachy-willian syndrom (s/p pacemaker), HLD, DM II, TIA and sleep apnea who presented to OSH on 9/12 for lower extremity swelling and confusion. Creatinine noted to be 5.6 (previously 4.1 in August). Patient was transferred to [facility] for Nephrology eval and possible initiation of HD. Pt is afebrile, has no leukocytosis and there is no concern for blood stream infection. Per outpatient notes, patient had previously stated he did not want HD, however on discussion this, patient is amenable to initiating either HD or PD if it is recommended by Nephrology.     Interventional Radiology has been consulted for consideration of placement for tunneled HD line for initiation of HD (pending Nephro recs).          Review of Systems   Constitutional:  Negative for chills and fever.   Respiratory:  Negative for shortness of breath.    Cardiovascular:  Positive for leg swelling. Negative for chest pain.   Gastrointestinal:  Negative for nausea and vomiting.   Neurological:  Negative for headaches.       Scheduled Meds:   allopurinoL  100 mg Oral Daily    [START ON 9/14/2024] aspirin  81 mg Oral Daily    calcitRIOL  0.25 mcg Oral Daily    carvediloL  25 mg Oral BID WM    docusate sodium  100 mg Oral Daily    EScitalopram oxalate  10 mg Oral Daily    ezetimibe  10 mg Oral QHS    heparin (porcine)  5,000 Units Subcutaneous Q8H    mupirocin   Nasal BID     Continuous Infusions:  PRN Meds:  Current Facility-Administered Medications:     acetaminophen, 650 mg, Oral, Q4H PRN    dextrose 10%, 12.5 g, Intravenous, PRN    dextrose 10%, 25 g, Intravenous, PRN    glucagon  (human recombinant), 1 mg, Intramuscular, PRN    glucose, 16 g, Oral, PRN    glucose, 24 g, Oral, PRN    hydrALAZINE, 10 mg, Intravenous, Q6H PRN    insulin aspart U-100, 0-5 Units, Subcutaneous, QID (AC + HS) PRN    melatonin, 6 mg, Oral, Nightly PRN    ondansetron, 4 mg, Intravenous, Q8H PRN    polyethylene glycol, 17 g, Oral, BID PRN    simethicone, 1 tablet, Oral, QID PRN    sodium chloride 0.9%, 10 mL, Intravenous, Q12H PRN    traZODone, 50 mg, Oral, Nightly PRN    Review of patient's allergies indicates:   Allergen Reactions    Adhesive Other (See Comments)     Skin irritation    Lisinopril Other (See Comments)     Kidney failure    Statins-hmg-coa reductase inhibitors Other (See Comments)     pain       Past Medical History:   Diagnosis Date    Acute respiratory failure with hypoxia 08/18/2022    daughter denies    Anxiety disorder, unspecified     CHF (congestive heart failure)     COVID 11/23/2022    Dehydration 12/22/2021    medication induced    Depression     Diabetes mellitus type II     Gout, unspecified     Hypertension     MDD (major depressive disorder), recurrent episode, mild 04/15/2023    Documented since 2022   Chronic medical conditions  Lexapro 10mg       Melanoma in situ 10/23/2023    left ear    Melanoma in situ of left ear 11/13/2023    left ear    Positive D dimer 12/22/2021    Sick sinus syndrome     Sleep apnea     cpap machine    TIA (transient ischemic attack) 08/18/2022    daughter denies     Past Surgical History:   Procedure Laterality Date    APPENDECTOMY      BACK SURGERY      CHOLECYSTECTOMY      EXTRACTION OF CATARACT Left 08/23/2023    Procedure: EXTRACTION, CATARACT;  Surgeon: Alonso Rodriguez MD;  Location: [facility] OR;  Service: Ophthalmology;  Laterality: Left;  DiB00 +22.5D    EXTRACTION OF CATARACT Right 11/08/2023    Procedure: EXTRACTION, CATARACT;  Surgeon: Alonso Rodriguez MD;  Location: [facility] OR;  Service: Ophthalmology;  Laterality: Right;  DiB00 +22.5D    INNER EAR  SURGERY      unilateral    INSERTION OF PACEMAKER      INSERTION OF PACEMAKER      KNEE SURGERY      unilateral    PHACOEMULSIFICATION, CATARACT, WITH IOL INSERTION Left 2023    Procedure: PHACOEMULSIFICATION, CATARACT, WITH IOL INSERTION;  Surgeon: Alonso Rodriguez MD;  [facility];  Service: Ophthalmology;  Laterality: Left;    PHACOEMULSIFICATION, CATARACT, WITH IOL INSERTION Right 2023    Procedure: PHACOEMULSIFICATION, CATARACT, WITH IOL INSERTION;  Surgeon: Alonso Rodriguez MD;  [facility];  Service: Ophthalmology;  Laterality: Right;     Family History   Problem Relation Name Age of Onset    Alzheimer's disease Mother      Heart disease Father Nestor Garcia          at 64 with a heart attack    Hearing loss Sister Jennifer Wynn         Otosclerosis    Hearing loss Daughter Candace Palomino         Otosclerosis     Social History     Tobacco Use    Smoking status: Never    Smokeless tobacco: Never   Substance Use Topics    Alcohol use: No    Drug use: No       OBJECTIVE:     Vital Signs (Most Recent)  Temp: 98.8 °F (37.1 °C) (24)  Pulse: 85 (24)  Resp: 18 (24)  BP: (!) 154/73 (24)  SpO2: (!) 94 % (24)    Physical Exam:  Physical Exam  Vitals and nursing note reviewed.   Constitutional:       General: He is not in acute distress.  HENT:      Head: Normocephalic and atraumatic.      Mouth/Throat:      Pharynx: Oropharynx is clear.   Cardiovascular:      Rate and Rhythm: Normal rate.   Pulmonary:      Effort: Pulmonary effort is normal. No respiratory distress.   Abdominal:      General: There is no distension.   Musculoskeletal:         General: Normal range of motion.   Skin:     General: Skin is warm and dry.      Capillary Refill: Capillary refill takes less than 2 seconds.   Neurological:      General: No focal deficit present.      Mental Status: He is alert and oriented to person, place, and time.   Psychiatric:          Mood and Affect: Mood normal.         Behavior: Behavior normal.         Thought Content: Thought content normal.         Judgment: Judgment normal.         Laboratory  I have reviewed all pertinent lab results within the past 24 hours.    ASA/Mallampati  ASA: III  Mallampati: II    Imaging:  Recent imaging studies reviewed.     ASSESSMENT/PLAN:     Assessment:  79 y.o. male with CKD 4, HTN, gout, HFpEF, tachy-willian syndrom (s/p pacemaker), HLD, DM II, TIA and sleep apnea  who has been referred to IR for tunneled HD catheter placement for dialysis. The procedure was discussed in great detail with the patient including thorough explanations of the potential risks and benefits of tunneled HD catheter placement. Risks include bleeding at the puncture site, infection, catheter related thrombus, catheter dysfunction, and central vein stenosis. The patient is a candidate for tunneled HD catheter placement under moderate sedation. Plan discussed with ordering physician.The pt verbalized understanding of the plan and would like to proceed.    Plan:  Will proceed with tunneled HD catheter placement under moderate sedation on today, if Nephrology has plans to initiate HD.   Please keep pt NPO pending procedure (has been NPO since midnight).   Anticoagulation history reviewed. No need to hold asa or heparin ppx prior to line placement.  Please contact IR when decision has been made regarding initiation of HD has been made so that patient can be placed on the IR schedule.       Thank you for this consult. Please contact via Epic secure chat with questions.     Gabrielle Mejia NP  Interventional Radiology

## 2024-09-14 PROBLEM — N30.01 ACUTE CYSTITIS WITH HEMATURIA: Status: ACTIVE | Noted: 2024-09-14

## 2024-09-14 LAB
ANION GAP SERPL CALC-SCNC: 12 MMOL/L (ref 8–16)
BASOPHILS # BLD AUTO: 0.03 K/UL (ref 0–0.2)
BASOPHILS NFR BLD: 0.5 % (ref 0–1.9)
BUN SERPL-MCNC: 84 MG/DL (ref 8–23)
CALCIUM SERPL-MCNC: 8 MG/DL (ref 8.7–10.5)
CHLORIDE SERPL-SCNC: 111 MMOL/L (ref 95–110)
CO2 SERPL-SCNC: 20 MMOL/L (ref 23–29)
CREAT SERPL-MCNC: 4.4 MG/DL (ref 0.5–1.4)
DIFFERENTIAL METHOD BLD: ABNORMAL
EOSINOPHIL # BLD AUTO: 0.2 K/UL (ref 0–0.5)
EOSINOPHIL NFR BLD: 2.7 % (ref 0–8)
ERYTHROCYTE [DISTWIDTH] IN BLOOD BY AUTOMATED COUNT: 16.8 % (ref 11.5–14.5)
EST. GFR  (NO RACE VARIABLE): 13 ML/MIN/1.73 M^2
GLUCOSE SERPL-MCNC: 95 MG/DL (ref 70–110)
HAV IGM SERPL QL IA: NORMAL
HBV CORE AB SERPL QL IA: NORMAL
HBV CORE IGM SERPL QL IA: NORMAL
HBV SURFACE AB SER-ACNC: <3 MIU/ML
HBV SURFACE AB SER-ACNC: <3 MIU/ML
HBV SURFACE AB SER-ACNC: NORMAL M[IU]/ML
HBV SURFACE AB SER-ACNC: NORMAL M[IU]/ML
HBV SURFACE AG SERPL QL IA: NORMAL
HBV SURFACE AG SERPL QL IA: NORMAL
HCT VFR BLD AUTO: 30.6 % (ref 40–54)
HCV AB SERPL QL IA: NORMAL
HGB BLD-MCNC: 9.5 G/DL (ref 14–18)
IMM GRANULOCYTES # BLD AUTO: 0.05 K/UL (ref 0–0.04)
IMM GRANULOCYTES NFR BLD AUTO: 0.8 % (ref 0–0.5)
LYMPHOCYTES # BLD AUTO: 0.7 K/UL (ref 1–4.8)
LYMPHOCYTES NFR BLD: 12.2 % (ref 18–48)
MAGNESIUM SERPL-MCNC: 1.7 MG/DL (ref 1.6–2.6)
MCH RBC QN AUTO: 29.3 PG (ref 27–31)
MCHC RBC AUTO-ENTMCNC: 31 G/DL (ref 32–36)
MCV RBC AUTO: 94 FL (ref 82–98)
MONOCYTES # BLD AUTO: 0.7 K/UL (ref 0.3–1)
MONOCYTES NFR BLD: 12.1 % (ref 4–15)
NEUTROPHILS # BLD AUTO: 4.3 K/UL (ref 1.8–7.7)
NEUTROPHILS NFR BLD: 71.7 % (ref 38–73)
NRBC BLD-RTO: 0 /100 WBC
PHOSPHATE SERPL-MCNC: 5.3 MG/DL (ref 2.7–4.5)
PLATELET # BLD AUTO: 212 K/UL (ref 150–450)
PMV BLD AUTO: 11.2 FL (ref 9.2–12.9)
POCT GLUCOSE: 125 MG/DL (ref 70–110)
POCT GLUCOSE: 132 MG/DL (ref 70–110)
POCT GLUCOSE: 142 MG/DL (ref 70–110)
POCT GLUCOSE: 152 MG/DL (ref 70–110)
POTASSIUM SERPL-SCNC: 3.8 MMOL/L (ref 3.5–5.1)
RBC # BLD AUTO: 3.24 M/UL (ref 4.6–6.2)
SODIUM SERPL-SCNC: 143 MMOL/L (ref 136–145)
WBC # BLD AUTO: 5.97 K/UL (ref 3.9–12.7)

## 2024-09-14 PROCEDURE — 84100 ASSAY OF PHOSPHORUS: CPT | Mod: HCNC | Performed by: STUDENT IN AN ORGANIZED HEALTH CARE EDUCATION/TRAINING PROGRAM

## 2024-09-14 PROCEDURE — 63600175 PHARM REV CODE 636 W HCPCS: Mod: JZ,JG,HCNC | Performed by: INTERNAL MEDICINE

## 2024-09-14 PROCEDURE — 63600175 PHARM REV CODE 636 W HCPCS: Mod: HCNC | Performed by: STUDENT IN AN ORGANIZED HEALTH CARE EDUCATION/TRAINING PROGRAM

## 2024-09-14 PROCEDURE — 63600175 PHARM REV CODE 636 W HCPCS: Mod: HCNC | Performed by: INTERNAL MEDICINE

## 2024-09-14 PROCEDURE — 87340 HEPATITIS B SURFACE AG IA: CPT | Mod: HCNC | Performed by: INTERNAL MEDICINE

## 2024-09-14 PROCEDURE — 86704 HEP B CORE ANTIBODY TOTAL: CPT | Mod: HCNC | Performed by: INTERNAL MEDICINE

## 2024-09-14 PROCEDURE — 94660 CPAP INITIATION&MGMT: CPT | Mod: HCNC

## 2024-09-14 PROCEDURE — 3E00X4Z INTRODUCTION OF SERUM, TOXOID AND VACCINE INTO SKIN AND MUCOUS MEMBRANES, EXTERNAL APPROACH: ICD-10-PCS | Performed by: STUDENT IN AN ORGANIZED HEALTH CARE EDUCATION/TRAINING PROGRAM

## 2024-09-14 PROCEDURE — 97161 PT EVAL LOW COMPLEX 20 MIN: CPT | Mod: HCNC

## 2024-09-14 PROCEDURE — 25000003 PHARM REV CODE 250: Mod: HCNC | Performed by: STUDENT IN AN ORGANIZED HEALTH CARE EDUCATION/TRAINING PROGRAM

## 2024-09-14 PROCEDURE — 83735 ASSAY OF MAGNESIUM: CPT | Mod: HCNC | Performed by: STUDENT IN AN ORGANIZED HEALTH CARE EDUCATION/TRAINING PROGRAM

## 2024-09-14 PROCEDURE — 86706 HEP B SURFACE ANTIBODY: CPT | Mod: 91,HCNC | Performed by: INTERNAL MEDICINE

## 2024-09-14 PROCEDURE — 85025 COMPLETE CBC W/AUTO DIFF WBC: CPT | Mod: HCNC | Performed by: STUDENT IN AN ORGANIZED HEALTH CARE EDUCATION/TRAINING PROGRAM

## 2024-09-14 PROCEDURE — 25000003 PHARM REV CODE 250: Mod: HCNC | Performed by: INTERNAL MEDICINE

## 2024-09-14 PROCEDURE — 80074 ACUTE HEPATITIS PANEL: CPT | Mod: HCNC | Performed by: INTERNAL MEDICINE

## 2024-09-14 PROCEDURE — 80048 BASIC METABOLIC PNL TOTAL CA: CPT | Mod: HCNC | Performed by: STUDENT IN AN ORGANIZED HEALTH CARE EDUCATION/TRAINING PROGRAM

## 2024-09-14 PROCEDURE — 97116 GAIT TRAINING THERAPY: CPT | Mod: HCNC

## 2024-09-14 PROCEDURE — 21400001 HC TELEMETRY ROOM: Mod: HCNC

## 2024-09-14 PROCEDURE — 94761 N-INVAS EAR/PLS OXIMETRY MLT: CPT | Mod: HCNC

## 2024-09-14 PROCEDURE — 36415 COLL VENOUS BLD VENIPUNCTURE: CPT | Mod: HCNC | Performed by: INTERNAL MEDICINE

## 2024-09-14 PROCEDURE — 86580 TB INTRADERMAL TEST: CPT | Mod: HCNC | Performed by: STUDENT IN AN ORGANIZED HEALTH CARE EDUCATION/TRAINING PROGRAM

## 2024-09-14 PROCEDURE — 30200315 PPD INTRADERMAL TEST REV CODE 302: Mod: HCNC | Performed by: STUDENT IN AN ORGANIZED HEALTH CARE EDUCATION/TRAINING PROGRAM

## 2024-09-14 PROCEDURE — 99900035 HC TECH TIME PER 15 MIN (STAT): Mod: HCNC

## 2024-09-14 PROCEDURE — 90935 HEMODIALYSIS ONE EVALUATION: CPT | Mod: HCNC

## 2024-09-14 RX ORDER — ISOSORBIDE MONONITRATE 30 MG/1
60 TABLET, EXTENDED RELEASE ORAL DAILY
Status: DISCONTINUED | OUTPATIENT
Start: 2024-09-14 | End: 2024-09-15 | Stop reason: HOSPADM

## 2024-09-14 RX ORDER — SODIUM CHLORIDE 9 MG/ML
INJECTION, SOLUTION INTRAVENOUS
Status: CANCELLED | OUTPATIENT
Start: 2024-09-16

## 2024-09-14 RX ORDER — SODIUM CHLORIDE 9 MG/ML
INJECTION, SOLUTION INTRAVENOUS ONCE
Status: CANCELLED | OUTPATIENT
Start: 2024-09-14 | End: 2024-09-14

## 2024-09-14 RX ORDER — NIFEDIPINE 30 MG/1
30 TABLET, EXTENDED RELEASE ORAL DAILY
Status: DISCONTINUED | OUTPATIENT
Start: 2024-09-14 | End: 2024-09-14

## 2024-09-14 RX ORDER — NIFEDIPINE 30 MG/1
30 TABLET, EXTENDED RELEASE ORAL ONCE
Status: COMPLETED | OUTPATIENT
Start: 2024-09-14 | End: 2024-09-14

## 2024-09-14 RX ORDER — SODIUM CHLORIDE 9 MG/ML
INJECTION, SOLUTION INTRAVENOUS
Status: CANCELLED | OUTPATIENT
Start: 2024-09-14

## 2024-09-14 RX ORDER — HYDRALAZINE HYDROCHLORIDE 25 MG/1
100 TABLET, FILM COATED ORAL EVERY 8 HOURS
Status: DISCONTINUED | OUTPATIENT
Start: 2024-09-14 | End: 2024-09-15 | Stop reason: HOSPADM

## 2024-09-14 RX ORDER — NIFEDIPINE 30 MG/1
60 TABLET, EXTENDED RELEASE ORAL DAILY
Status: DISCONTINUED | OUTPATIENT
Start: 2024-09-15 | End: 2024-09-15

## 2024-09-14 RX ADMIN — NIFEDIPINE 30 MG: 30 TABLET, FILM COATED, EXTENDED RELEASE ORAL at 11:09

## 2024-09-14 RX ADMIN — CARVEDILOL 25 MG: 12.5 TABLET, FILM COATED ORAL at 06:09

## 2024-09-14 RX ADMIN — FUROSEMIDE 80 MG: 40 TABLET ORAL at 08:09

## 2024-09-14 RX ADMIN — HYDRALAZINE HYDROCHLORIDE 100 MG: 25 TABLET ORAL at 09:09

## 2024-09-14 RX ADMIN — NIFEDIPINE 30 MG: 30 TABLET, FILM COATED, EXTENDED RELEASE ORAL at 06:09

## 2024-09-14 RX ADMIN — ACETAMINOPHEN 650 MG: 325 TABLET ORAL at 07:09

## 2024-09-14 RX ADMIN — EPOETIN ALFA-EPBX 20000 UNITS: 10000 INJECTION, SOLUTION INTRAVENOUS; SUBCUTANEOUS at 03:09

## 2024-09-14 RX ADMIN — ESCITALOPRAM OXALATE 10 MG: 10 TABLET ORAL at 08:09

## 2024-09-14 RX ADMIN — ACETAMINOPHEN 650 MG: 325 TABLET ORAL at 09:09

## 2024-09-14 RX ADMIN — FUROSEMIDE 80 MG: 40 TABLET ORAL at 06:09

## 2024-09-14 RX ADMIN — MUPIROCIN: 20 OINTMENT TOPICAL at 08:09

## 2024-09-14 RX ADMIN — HEPARIN SODIUM 5000 UNITS: 5000 INJECTION INTRAVENOUS; SUBCUTANEOUS at 09:09

## 2024-09-14 RX ADMIN — HYDRALAZINE HYDROCHLORIDE 100 MG: 25 TABLET ORAL at 01:09

## 2024-09-14 RX ADMIN — HEPARIN SODIUM 5000 UNITS: 5000 INJECTION INTRAVENOUS; SUBCUTANEOUS at 05:09

## 2024-09-14 RX ADMIN — HEPARIN SODIUM 5000 UNITS: 5000 INJECTION INTRAVENOUS; SUBCUTANEOUS at 01:09

## 2024-09-14 RX ADMIN — Medication 1 CAPSULE: at 08:09

## 2024-09-14 RX ADMIN — ASPIRIN 81 MG: 81 TABLET, COATED ORAL at 08:09

## 2024-09-14 RX ADMIN — CARVEDILOL 25 MG: 12.5 TABLET, FILM COATED ORAL at 08:09

## 2024-09-14 RX ADMIN — EZETIMIBE 10 MG: 10 TABLET ORAL at 08:09

## 2024-09-14 RX ADMIN — HYDRALAZINE HYDROCHLORIDE 100 MG: 25 TABLET ORAL at 08:09

## 2024-09-14 RX ADMIN — CEFTRIAXONE 2 G: 2 INJECTION, POWDER, FOR SOLUTION INTRAMUSCULAR; INTRAVENOUS at 08:09

## 2024-09-14 RX ADMIN — ALLOPURINOL 100 MG: 100 TABLET ORAL at 08:09

## 2024-09-14 RX ADMIN — TUBERCULIN PURIFIED PROTEIN DERIVATIVE 5 UNITS: 5 INJECTION, SOLUTION INTRADERMAL at 09:09

## 2024-09-14 RX ADMIN — ISOSORBIDE MONONITRATE 60 MG: 30 TABLET, EXTENDED RELEASE ORAL at 08:09

## 2024-09-14 RX ADMIN — HYDRALAZINE HYDROCHLORIDE 10 MG: 20 INJECTION INTRAMUSCULAR; INTRAVENOUS at 12:09

## 2024-09-14 NOTE — NURSING
[facility] [place]  Nurses Note -- 4 Eyes      9/14/2024   Redness noted to sacral area mepilex placed. Purewick place. Safety measures maintained. Will cont to monitor    Skin assessed on: Admit      [x] No Pressure Injuries Present    [x]Prevention Measures Documented    [] Yes LDA  for Pressure Injury Previously documented     [] Yes New Pressure Injury Discovered   [] LDA for New Pressure Injury Added      Attending RN:  Rand Porter LPN     Second RN:  MARTÍNEZ Muhammad

## 2024-09-14 NOTE — PROGRESS NOTES
[facility] Surg  Nephrology  Progress Note    Patient Name: Nestor Garcia  MRN: [id number]  Admission Date: 9/12/2024  Hospital Length of Stay: 2 days  Attending Provider: Caridad Zavala DO   Primary Care Physician: Kashmir Conn MD  Principal Problem:Acute worsening of stage 4 chronic kidney disease  Date of service 9/14/2024    Subjective:     HPI: S/p HD session #1, UF 1500. On RA. Session #2 today. Pt feels better. Denies SOB.     Past Medical History:   Diagnosis Date    Acute respiratory failure with hypoxia 08/18/2022    daughter denies    Anxiety disorder, unspecified     CHF (congestive heart failure)     COVID 11/23/2022    Dehydration 12/22/2021    medication induced    Depression     Diabetes mellitus type II     Gout, unspecified     Hypertension     MDD (major depressive disorder), recurrent episode, mild 04/15/2023    Documented since 2022   Chronic medical conditions  Lexapro 10mg       Melanoma in situ 10/23/2023    left ear    Melanoma in situ of left ear 11/13/2023    left ear    Positive D dimer 12/22/2021    Sick sinus syndrome     Sleep apnea     cpap machine    TIA (transient ischemic attack) 08/18/2022    daughter denies       Past Surgical History:   Procedure Laterality Date    APPENDECTOMY      BACK SURGERY      CHOLECYSTECTOMY      EXTRACTION OF CATARACT Left 08/23/2023    Procedure: EXTRACTION, CATARACT;  Surgeon: Alonso Rodriguez MD;  Location: [facility] OR;  Service: Ophthalmology;  Laterality: Left;  DiB00 +22.5D    EXTRACTION OF CATARACT Right 11/08/2023    Procedure: EXTRACTION, CATARACT;  Surgeon: Alonso Rodriguez MD;  Location: [facility] OR;  Service: Ophthalmology;  Laterality: Right;  DiB00 +22.5D    INNER EAR SURGERY      unilateral    INSERTION OF PACEMAKER      INSERTION OF PACEMAKER  2023    KNEE SURGERY      unilateral    PHACOEMULSIFICATION, CATARACT, WITH IOL INSERTION Left 08/23/2023    Procedure: PHACOEMULSIFICATION, CATARACT, WITH IOL INSERTION;  Surgeon: Alonso Rodriguez  MD;  Location: [facility] OR;  Service: Ophthalmology;  Laterality: Left;    PHACOEMULSIFICATION, CATARACT, WITH IOL INSERTION Right 11/08/2023    Procedure: PHACOEMULSIFICATION, CATARACT, WITH IOL INSERTION;  Surgeon: Alonso Rodriguez MD;  Location: [facility];  Service: Ophthalmology;  Laterality: Right;       Review of patient's allergies indicates:   Allergen Reactions    Adhesive Other (See Comments)     Skin irritation    Lisinopril Other (See Comments)     Kidney failure    Statins-hmg-coa reductase inhibitors Other (See Comments)     pain     Current Facility-Administered Medications   Medication Frequency    0.9%  NaCl infusion PRN    0.9%  NaCl infusion Once    acetaminophen tablet 650 mg Q4H PRN    allopurinoL tablet 100 mg Daily    aspirin EC tablet 81 mg Daily    carvediloL tablet 25 mg BID WM    cefTRIAXone (ROCEPHIN) 2 g in D5W 100 mL IVPB (MB+) Q24H    dextrose 10% bolus 125 mL 125 mL PRN    dextrose 10% bolus 250 mL 250 mL PRN    docusate sodium capsule 100 mg Daily    epoetin jim-epbx injection 20,000 Units Every Tues, Thurs, Sat    EScitalopram oxalate tablet 10 mg Daily    ezetimibe tablet 10 mg QHS    furosemide tablet 80 mg BID    glucagon (human recombinant) injection 1 mg PRN    glucose chewable tablet 16 g PRN    glucose chewable tablet 24 g PRN    heparin (porcine) injection 5,000 Units Q8H    hydrALAZINE injection 10 mg Q6H PRN    hydrALAZINE tablet 100 mg Q8H    insulin aspart U-100 pen 0-5 Units QID (AC + HS) PRN    isosorbide mononitrate 24 hr tablet 60 mg Daily    melatonin tablet 6 mg Nightly PRN    mupirocin 2 % ointment BID    NIFEdipine 24 hr tablet 30 mg Daily    ondansetron injection 4 mg Q8H PRN    polyethylene glycol packet 17 g BID PRN    simethicone chewable tablet 80 mg QID PRN    sodium chloride 0.9% bolus 250 mL 250 mL PRN    sodium chloride 0.9% flush 10 mL Q12H PRN    traZODone tablet 50 mg Nightly PRN    vitamin renal formula (B-complex-vitamin c-folic acid) 1 mg per capsule 1  capsule Daily     Facility-Administered Medications Ordered in Other Encounters   Medication Frequency    LIDOcaine (PF) 10 mg/ml (1%) injection 10 mg Once    LIDOcaine (PF) 10 mg/ml (1%) injection 10 mg Once    sodium chloride 0.9% flush 10 mL PRN    sodium chloride 0.9% flush 10 mL PRN     Family History       Problem Relation (Age of Onset)    Alzheimer's disease Mother    Hearing loss Sister, Daughter    Heart disease Father          Tobacco Use    Smoking status: Never    Smokeless tobacco: Never   Substance and Sexual Activity    Alcohol use: No    Drug use: No    Sexual activity: Not Currently     Partners: Female     Birth control/protection: None     Review of Systems   Constitutional:  Positive for activity change and fatigue.   HENT:  Positive for hearing loss.    Eyes: Negative.    Cardiovascular:  Positive for leg swelling.   Gastrointestinal: Negative.    Endocrine: Negative.    Genitourinary:  Positive for decreased urine volume.   Allergic/Immunologic: Negative.    Neurological:  Positive for weakness.   Hematological:  Bruises/bleeds easily.   Psychiatric/Behavioral: Negative.     All other systems reviewed and are negative.    Objective:     Vital Signs (Most Recent):  Temp: 98.7 °F (37.1 °C) (09/14/24 0845)  Pulse: 66 (09/14/24 1133)  Resp: 12 (09/14/24 0845)  BP: (!) 153/89 (09/14/24 0847)  SpO2: (!) 92 % (09/14/24 0845) Vital Signs (24h Range):  Temp:  [97.9 °F (36.6 °C)-98.7 °F (37.1 °C)] 98.7 °F (37.1 °C)  Pulse:  [60-75] 66  Resp:  [12-20] 12  SpO2:  [91 %-98 %] 92 %  BP: (149-196)/(72-95) 153/89     Weight: 93 kg (205 lb 0.4 oz) (09/12/24 2030)  Body mass index is 29.42 kg/m².  Body surface area is 2.14 meters squared.    I/O last 3 completed shifts:  In: 0   Out: 3850 [Urine:2350; Other:1500]    Physical Exam  Vitals and nursing note reviewed.   Constitutional:       General: He is not in acute distress.     Appearance: Normal appearance. He is well-developed. He is not ill-appearing or  diaphoretic.   HENT:      Head: Normocephalic and atraumatic.   Eyes:      General: No scleral icterus.        Right eye: No discharge.         Left eye: No discharge.   Cardiovascular:      Rate and Rhythm: Normal rate and regular rhythm.      Heart sounds: Murmur heard.      No friction rub.   Pulmonary:      Effort: Pulmonary effort is normal. No respiratory distress.      Breath sounds: Normal breath sounds. No wheezing or rales.   Chest:      Comments: RIJ permcath  Abdominal:      General: There is no distension.      Palpations: Abdomen is soft. There is no mass.      Tenderness: There is no abdominal tenderness.   Musculoskeletal:         General: No deformity.      Right lower leg: Edema present.      Left lower leg: Edema present.   Skin:     General: Skin is warm and dry.      Findings: No erythema or rash.   Neurological:      Mental Status: He is alert and oriented to person, place, and time.   Psychiatric:         Mood and Affect: Mood normal.         Behavior: Behavior normal.         Thought Content: Thought content normal.         Significant Labs:  CBC:   Recent Labs   Lab 09/14/24  0459   WBC 5.97   RBC 3.24*   HGB 9.5*   HCT 30.6*      MCV 94   MCH 29.3   MCHC 31.0*     CMP:   Recent Labs   Lab 09/12/24  2103 09/13/24  0503 09/14/24  0459      < > 95   CALCIUM 8.4*   < > 8.0*   ALBUMIN 2.7*  --   --    PROT 5.4*  --   --       < > 143   K 4.2   < > 3.8   CO2 16*   < > 20*      < > 111*   *   < > 84*   CREATININE 5.6*   < > 4.4*   ALKPHOS 150*  --   --    ALT 16  --   --    AST 17  --   --    BILITOT 0.5  --   --     < > = values in this interval not displayed.     All labs within the past 24 hours have been reviewed.    Significant Imaging:  X-Ray: Reviewed      PTH 79 (9/12/24)        Assessment/Plan:     Active Diagnoses:    Diagnosis Date Noted POA    PRINCIPAL PROBLEM:  Acute worsening of stage 4 chronic kidney disease [N18.4] 09/13/2024 Yes    Acute cystitis  with hematuria [N30.01] 09/14/2024 Yes    Uremia [N19] 09/13/2024 Yes    Hyperphosphatemia [E83.39] 07/29/2024 Yes    Anemia of chronic renal failure, stage 4 (severe) [N18.4, D63.1] 05/20/2024 Yes    Chronic diastolic heart failure, NYHA class 2 [I50.32] 08/18/2022 Yes    Benign essential hypertension [I10] 08/04/2015 Yes    Type 2 diabetes mellitus, without long-term current use of insulin [E11.9] 08/04/2015 Yes    Dyslipidemia [E78.5] 08/04/2015 Yes      Problems Resolved During this Admission:       1. Progression to ESRD - new start, 9/13/24  - HD today, session #2 x3hrs, and HD again Monday x4hrs  - consult CM for outpatient HD placement to [facility] under my care, w/ plans for PD catheter placement w/ Dr Causey tentatively next week w/ transition to early start PD  - start lasix PO BID  - renally dose medications for dialysis  2. HTN - continue current medications, UF as tolerated on dialysis  3. Anemia of chronic kidney disease - start Epo with HD, continue to monitor CBC, check iron studies  4. MBD/secondary hyperparathyroidism - no indication for binders, continue to monitor phos  - hold calcitriol for now  5. Access - RIJ permcath, PD catheter planned as above  6. Hypoalbuminemia/Nutrition - renal diet, start nepro and renal vitamins    Thank you for your consult. I will follow-up with patient. Please contact us if you have any additional questions.    Mary Anne Simon, NP  Nephrology  [facility] [place] - Med Surg

## 2024-09-14 NOTE — ASSESSMENT & PLAN NOTE
Anemia is likely due to chronic disease due to Chronic Kidney Disease. Most recent hemoglobin and hematocrit are listed below.  Recent Labs     09/12/24  2103 09/13/24  0503 09/14/24  0459   HGB 9.2* 8.9* 9.5*   HCT 29.6* 28.0* 30.6*     Plan  - Monitor serial CBC: Daily  - Transfuse PRBC if patient becomes hemodynamically unstable, symptomatic or H/H drops below 7/21.  - Patient has not received any PRBC transfusions to date  - Patient's anemia is currently stable  - EPO per nephrology

## 2024-09-14 NOTE — NURSING
Pt resting in bed asleep, no complaints of pain/discomfort. Scheduled medications given without difficulty. IV saline lock. Tele #[id number]. Vitals assessed. Accu checks w/ no insulin coverage. Pt on cpap; no distress noted. BSC at bedside; pt remains free from fall/injury. Safety measures maintained. Will cont to monitor

## 2024-09-14 NOTE — NURSING
[facility] [place]  Nurses Note -- 4 Eyes      9/14/2024       Skin assessed on: Q Shift      [x] No Pressure Injuries Present    [x]Prevention Measures Documented    [] Yes LDA  for Pressure Injury Previously documented     [] Yes New Pressure Injury Discovered   [] LDA for New Pressure Injury Added      Attending RN:  Alexandra Ruiz RN     Second RN:  JITENDRA Gordillo

## 2024-09-14 NOTE — ASSESSMENT & PLAN NOTE
-now progression to ESRD  -IR consulted: s/p THDC placement on 09/13/2024  -Nephrology consulted:  Initiated hemodialysis on 09/13/2024    -- nephrology plan for HD again on Sunday x3hrs, and HD again Monday x4hrs  -consult CM for outpatient HD placement   -continue Lasix 80 mg p.o. b.i.d.  -renal dose all meds.   -Monitor Is/Os

## 2024-09-14 NOTE — ASSESSMENT & PLAN NOTE
-presented with decreased urine output and mild confusion  -UA with few bacteria and +3 blood  -urine cx with GNR  -start ceftriaxone

## 2024-09-14 NOTE — HOSPITAL COURSE
79-year-old male with a history of CKD 4, essential hypertension, HLD, DM, anemia, secondary hyperparathyroidism, gout, CHF(grade 2 diastolic) transfer to [facility] and admitted on 09/12/24 for acute on chronic CKD4. Presented with worsening lower extremity swelling and some confusion.  Reported decreased urine output despite taking his diuretics.  Patient had ongoing discussion about PD with his nephrologist outpatient.  Labs significant for BUN of 114 and creatinine 5.6 on admission.  Also found to have hyperphosphatemia with phosphorus level of 5.4.  IR consulted- s/p tunneled HD line placement on 09/13. Nephrology following, initiated dialysis on 09/13/2024. Urine cx w/GNR, Given initial presentation of confusion and decreased urine output, will treat for acute cystitis.  /case management consulted HD needs    After second dialysis session on 09/14/2024, patient has decided to decline further hemodialysis.  Patient states that he would like his code status change to DNR and be enrolled in hospice. He declined to further proceed with dialysis nurse.  States he would like to live the rest of his life as is and would like to focus on quality of life. Nephrology made aware. IR consulted to have THDC removed but No IR available on site over the weekend. Discussed with IR, okay to have THDC line removed outpatient. Family prefer to have line removal done in [facility] or [facility]. Okay per IR but if there are any issues (no IR availablilty there or general surgery unable to remove line) then [facility] or [facility] can assist with line removal. / consulted for discharge planning and for hospice.    Pt denies any fever, headaches, vision changes, chest pain, shortness of breath, palpitations, abdominal pain, nausea, vomiting, or any new weaknesses. Feels ready to go home with hospice. Patient's exam on discharge was as follow: Patient is alert and oriented, appears in no  acute distress, heart with regular rate and rhythm, lungs clear to asculation with non-labored breathing, abdomen soft, and no new weaknesses or focal deficits seen. Bilateral lower extremities with edema, but improved. THDC on right upper chest wall. Plan to have line removed on outpatient basis. Discussed with IR (). If facility in [facility] or [facility] unable to remove line, discussed with family to contact [facility] or [facility]. Dr. Sánchez can assist to have that scheduled if needed.     Patient was counseled regarding any abnormal labs, differential diagnosis, treatment options, risk-benefit, lifestyle changes, prognosis, current condition, and medications. Patient was interactive and attentive.  Patient's questions were answered in a respectful and timely manner. Patient was instructed to follow-up with home hospice and to continue taking medications as prescribed. Also, extensively discussed the risks, benefits, and side effects of patient's medications. Discussed with patient about any medication changes. Patient verbalized understanding and agrees to treatment plan.  Patient is stable for discharge.  Patient has no other questions or concerns at this time.  ED precautions discussed with the patient.    Vital signs are stable. Ambulating without any difficulty. Tolerating p.o. intake without any nausea or vomiting. Afebrile for over 24 hours. Patient is in stable condition and has no questions or concerns. Patient will be discharge to home with home hospice once that is set up and transportation secured . Patient accepted to [facility]. Prescriptions sent to pharmacy.  CM/SW to assist with discharge planning.     Vitals:    09/15/24 0744 09/15/24 0845 09/15/24 1121 09/15/24 1124   BP: 123/60 (!) 117/58 132/62    BP Location: Left arm Left arm Left arm    Patient Position: Lying Lying Lying    Pulse: 62 61 62 63   Resp: 18  18    Temp: 99.1 °F (37.3 °C)  98.3 °F (36.8 °C)    TempSrc: Oral  Oral     SpO2: (!) 91%  (!) 90%    Weight:       Height:

## 2024-09-14 NOTE — ASSESSMENT & PLAN NOTE
-No signs of acute exacerbation but he was not responding to the home Lasix with worsening lower extremity edema/anasarca extending to his hips and back.    -IV lasix given in the ED. Bladder scan in place. Monitor I/O.   -Nephrology following. HD for volume removal per nephrology       Results for orders placed during the hospital encounter of 08/18/22    Echo    Interpretation Summary  · Eccentric hypertrophy and  · The estimated ejection fraction is 60%.  · Grade I left ventricular diastolic dysfunction with inconclusive left atrial pressure.  · Normal right ventricular size with normal right ventricular systolic function.

## 2024-09-14 NOTE — ASSESSMENT & PLAN NOTE
Chronic, uncontrolled.   -resume home hydralazine, isosorbide mononitrate, carvedilol, and nifedipine   -titrate medication as needed  -resume furosemide her increased dose of 80 mg b.i.d. per Nephrology    Latest blood pressure and vitals reviewed-     Temp:  [97.9 °F (36.6 °C)-98.7 °F (37.1 °C)]   Pulse:  [60-75]   Resp:  [12-20]   BP: (149-196)/(72-95)   SpO2:  [91 %-98 %] .   Home meds for hypertension were reviewed and noted below.   Hypertension Medications               carvediloL (COREG) 25 MG tablet Take 25 mg by mouth 2 (two) times daily with meals.    doxazosin (CARDURA) 4 MG tablet Take 4 mg by mouth every 12 (twelve) hours.    furosemide (LASIX) 20 MG tablet Take 1 tablet (20 mg total) by mouth once daily.    hydrALAZINE (APRESOLINE) 100 MG tablet Take 100 mg by mouth 3 (three) times daily.    isosorbide mononitrate (IMDUR) 60 MG 24 hr tablet Take 60 mg by mouth once daily.    NIFEdipine (ADALAT CC) 30 MG TbSR Take 30 mg by mouth 2 (two) times daily.            While in the hospital, will manage blood pressure as follows; Adjust home antihypertensive regimen as follows- Appears to be low/normal now and will HOLD BP meds and decreased he coreg to 6.25mg PO BID and see how he trends. Resume other BP meds as able pending repeat BP checks.     Will utilize p.r.n. blood pressure medication only if patient's blood pressure greater than  180/90  and he develops symptoms such as worsening chest pain or shortness of breath.

## 2024-09-14 NOTE — ASSESSMENT & PLAN NOTE
Patient's FSGs are controlled on current medication regimen.  Last A1c reviewed-   Lab Results   Component Value Date    HGBA1C 5.1 09/13/2024     Most recent fingerstick glucose reviewed-   Recent Labs   Lab 09/13/24  1804 09/13/24 2055 09/14/24  0844   POCTGLUCOSE 80 139* 152*     Current correctional scale  Low  Maintain anti-hyperglycemic dose as follows-   Antihyperglycemics (From admission, onward)      Start     Stop Route Frequency Ordered    09/12/24 2309  insulin aspart U-100 pen 0-5 Units         -- SubQ Before meals & nightly PRN 09/12/24 2209          Hold Mounjaro for now.   Need to discuss with nephrology if Mounjaro should be discontinued on DC given HD needs

## 2024-09-14 NOTE — SUBJECTIVE & OBJECTIVE
Interval History:  No acute overnight events.  Patient remained afebrile.  Daughter at bedside.  Patient states he tolerated dialysis well yesterday.  Blood pressure elevated this morning, we will resume home meds.  Appears confusion has resolved.LE swelling improving     Review of Systems   Constitutional:  Negative for chills, fatigue and fever.   HENT:          Hard of hearing   Respiratory:  Negative for cough and shortness of breath.    Cardiovascular:  Positive for leg swelling (improving). Negative for chest pain and palpitations.   Gastrointestinal:  Negative for abdominal pain, nausea and vomiting.   Genitourinary:  Positive for decreased urine volume. Negative for difficulty urinating, dysuria and urgency.   Musculoskeletal:  Negative for back pain.   Neurological:  Negative for dizziness, weakness and headaches.   Psychiatric/Behavioral:  Negative for confusion and decreased concentration.      Objective:     Vital Signs (Most Recent):  Temp: 98.7 °F (37.1 °C) (09/14/24 0845)  Pulse: 65 (09/14/24 0847)  Resp: 12 (09/14/24 0845)  BP: (!) 153/89 (09/14/24 0847)  SpO2: (!) 92 % (09/14/24 0845) Vital Signs (24h Range):  Temp:  [97.9 °F (36.6 °C)-98.7 °F (37.1 °C)] 98.7 °F (37.1 °C)  Pulse:  [60-75] 65  Resp:  [12-20] 12  SpO2:  [90 %-98 %] 92 %  BP: (149-196)/(72-95) 153/89     Weight: 93 kg (205 lb 0.4 oz)  Body mass index is 29.42 kg/m².    Intake/Output Summary (Last 24 hours) at 9/14/2024 1108  Last data filed at 9/14/2024 0911  Gross per 24 hour   Intake 100.92 ml   Output 2950 ml   Net -2849.08 ml         Physical Exam  Vitals and nursing note reviewed.   Constitutional:       General: He is not in acute distress.     Appearance: He is not ill-appearing.   HENT:      Head: Atraumatic.      Nose: Nose normal.      Mouth/Throat:      Mouth: Mucous membranes are moist.   Eyes:      Extraocular Movements: Extraocular movements intact.   Neck:      Comments: Noted elevated JVD  Cardiovascular:      Rate and  Rhythm: Normal rate and regular rhythm.      Pulses: Normal pulses.      Heart sounds: Murmur heard.      No friction rub. No gallop.      Comments: Pacemaker noted left upper chest   Pulmonary:      Effort: Pulmonary effort is normal. No respiratory distress.      Breath sounds: Normal breath sounds. No wheezing, rhonchi or rales.   Abdominal:      General: There is no distension.      Palpations: Abdomen is soft.      Tenderness: There is no abdominal tenderness.   Musculoskeletal:         General: Swelling present.      Right lower leg: Edema present.      Left lower leg: Edema present.   Skin:     General: Skin is warm.      Capillary Refill: Capillary refill takes less than 2 seconds.      Findings: No erythema or rash.      Comments: Tunneled HD cath on right upper chest wall   Neurological:      Mental Status: He is alert and oriented to person, place, and time. Mental status is at baseline.   Psychiatric:         Mood and Affect: Mood normal.         Behavior: Behavior normal.         Thought Content: Thought content normal.             Significant Labs: All pertinent labs within the past 24 hours have been reviewed.    Significant Imaging: I have reviewed all pertinent imaging results/findings within the past 24 hours.

## 2024-09-14 NOTE — CONSULTS
[facility] Surg  Nephrology  Consult Note    Patient Name: Nestor Garcia  MRN: [id number]  Admission Date: 9/12/2024  Hospital Length of Stay: 1 days  Attending Provider: Caridad Zavala DO   Primary Care Physician: Kashmir Conn MD  Principal Problem:Acute worsening of stage 4 chronic kidney disease  Date of service 9/13/2024  Inpatient consult to Nephrology  Consult performed by: Natalya Luis MD  Consult ordered by: Rhonda Harkins MD  Reason for consult: worsening CKD        Subjective:     HPI: 78yo M w/ PMH CKD4 follows in my outpatient clinic who presented to outside facility for BLE edema, increased confusion and dyspnea. Symptoms persisted despite taking PO lasix at home.  We have been discussing RRT modalities as outpatient.  He has had associated decline in UOP.  Labs were significant for  and Cr 5.6.  Daughter is at bedside whom he lives with and they are opting for PD outpatient.  We discussed HD initiation w/ transition to PD and they are amenable.    Chart and outside records reviewed including pertinent progress notes, radiology reports, labs from Dr Oconnor.  Case was discussed with Gogo Oconnor, Shahana, Padilla, and Lynette.      Past Medical History:   Diagnosis Date    Acute respiratory failure with hypoxia 08/18/2022    daughter denies    Anxiety disorder, unspecified     CHF (congestive heart failure)     COVID 11/23/2022    Dehydration 12/22/2021    medication induced    Depression     Diabetes mellitus type II     Gout, unspecified     Hypertension     MDD (major depressive disorder), recurrent episode, mild 04/15/2023    Documented since 2022   Chronic medical conditions  Lexapro 10mg       Melanoma in situ 10/23/2023    left ear    Melanoma in situ of left ear 11/13/2023    left ear    Positive D dimer 12/22/2021    Sick sinus syndrome     Sleep apnea     cpap machine    TIA (transient ischemic attack) 08/18/2022    daughter denies       Past Surgical History:   Procedure  Laterality Date    APPENDECTOMY      BACK SURGERY      CHOLECYSTECTOMY      EXTRACTION OF CATARACT Left 08/23/2023    Procedure: EXTRACTION, CATARACT;  Surgeon: Alonso Rodriguez MD;  Location: [facility] OR;  Service: Ophthalmology;  Laterality: Left;  DiB00 +22.5D    EXTRACTION OF CATARACT Right 11/08/2023    Procedure: EXTRACTION, CATARACT;  Surgeon: Alonso Rodriguez MD;  Location: [facility] OR;  Service: Ophthalmology;  Laterality: Right;  DiB00 +22.5D    INNER EAR SURGERY      unilateral    INSERTION OF PACEMAKER      INSERTION OF PACEMAKER  2023    KNEE SURGERY      unilateral    PHACOEMULSIFICATION, CATARACT, WITH IOL INSERTION Left 08/23/2023    Procedure: PHACOEMULSIFICATION, CATARACT, WITH IOL INSERTION;  Surgeon: Alonso Rodriguez MD;  Location: [facility] OR;  Service: Ophthalmology;  Laterality: Left;    PHACOEMULSIFICATION, CATARACT, WITH IOL INSERTION Right 11/08/2023    Procedure: PHACOEMULSIFICATION, CATARACT, WITH IOL INSERTION;  Surgeon: Alonso Rodriguez MD;  Location: [facility] OR;  Service: Ophthalmology;  Laterality: Right;       Review of patient's allergies indicates:   Allergen Reactions    Adhesive Other (See Comments)     Skin irritation    Lisinopril Other (See Comments)     Kidney failure    Statins-hmg-coa reductase inhibitors Other (See Comments)     pain     Current Facility-Administered Medications   Medication Frequency    0.9%  NaCl infusion PRN    0.9%  NaCl infusion Once    acetaminophen tablet 650 mg Q4H PRN    allopurinoL tablet 100 mg Daily    [START ON 9/14/2024] aspirin EC tablet 81 mg Daily    calcitRIOL capsule 0.25 mcg Daily    carvediloL tablet 25 mg BID WM    dextrose 10% bolus 125 mL 125 mL PRN    dextrose 10% bolus 250 mL 250 mL PRN    docusate sodium capsule 100 mg Daily    EScitalopram oxalate tablet 10 mg Daily    ezetimibe tablet 10 mg QHS    glucagon (human recombinant) injection 1 mg PRN    glucose chewable tablet 16 g PRN    glucose chewable tablet 24 g PRN    heparin (porcine) injection  5,000 Units Q8H    hydrALAZINE injection 10 mg Q6H PRN    insulin aspart U-100 pen 0-5 Units QID (AC + HS) PRN    melatonin tablet 6 mg Nightly PRN    mupirocin 2 % ointment BID    ondansetron injection 4 mg Q8H PRN    polyethylene glycol packet 17 g BID PRN    simethicone chewable tablet 80 mg QID PRN    sodium chloride 0.9% bolus 250 mL 250 mL PRN    sodium chloride 0.9% flush 10 mL Q12H PRN    traZODone tablet 50 mg Nightly PRN     Facility-Administered Medications Ordered in Other Encounters   Medication Frequency    LIDOcaine (PF) 10 mg/ml (1%) injection 10 mg Once    LIDOcaine (PF) 10 mg/ml (1%) injection 10 mg Once    sodium chloride 0.9% flush 10 mL PRN    sodium chloride 0.9% flush 10 mL PRN     Family History       Problem Relation (Age of Onset)    Alzheimer's disease Mother    Hearing loss Sister, Daughter    Heart disease Father          Tobacco Use    Smoking status: Never    Smokeless tobacco: Never   Substance and Sexual Activity    Alcohol use: No    Drug use: No    Sexual activity: Not Currently     Partners: Female     Birth control/protection: None     Review of Systems   All other systems reviewed and are negative.    Objective:     Vital Signs (Most Recent):  Temp: 98.2 °F (36.8 °C) (09/13/24 1805)  Pulse: 74 (09/13/24 1926)  Resp: 18 (09/13/24 1805)  BP: (!) 167/72 (09/13/24 1855)  SpO2: (!) 91 % (09/13/24 1805) Vital Signs (24h Range):  Temp:  [97.7 °F (36.5 °C)-98.8 °F (37.1 °C)] 98.2 °F (36.8 °C)  Pulse:  [] 74  Resp:  [16-20] 18  SpO2:  [90 %-98 %] 91 %  BP: (107-196)/(62-95) 167/72     Weight: 93 kg (205 lb 0.4 oz) (09/12/24 2030)  Body mass index is 29.42 kg/m².  Body surface area is 2.14 meters squared.    I/O last 3 completed shifts:  In: 0   Out: 3600 [Urine:2100; Other:1500]    Physical Exam  Vitals and nursing note reviewed.   Constitutional:       General: He is not in acute distress.     Appearance: Normal appearance. He is well-developed. He is not ill-appearing or  diaphoretic.   HENT:      Head: Normocephalic and atraumatic.   Eyes:      General: No scleral icterus.        Right eye: No discharge.         Left eye: No discharge.   Cardiovascular:      Rate and Rhythm: Normal rate and regular rhythm.      Heart sounds: Murmur heard.      No friction rub.   Pulmonary:      Effort: Pulmonary effort is normal. No respiratory distress.      Breath sounds: Normal breath sounds. No wheezing or rales.   Abdominal:      General: There is no distension.      Palpations: Abdomen is soft. There is no mass.      Tenderness: There is no abdominal tenderness.   Musculoskeletal:         General: No deformity.      Right lower leg: Edema present.      Left lower leg: Edema present.   Skin:     General: Skin is warm and dry.      Findings: No erythema or rash.   Neurological:      Mental Status: He is alert and oriented to person, place, and time.   Psychiatric:         Mood and Affect: Mood normal.         Behavior: Behavior normal.         Thought Content: Thought content normal.         Significant Labs:  CBC:   Recent Labs   Lab 09/13/24  0503   WBC 5.92   RBC 2.92*   HGB 8.9*   HCT 28.0*      MCV 96   MCH 30.5   MCHC 31.8*     CMP:   Recent Labs   Lab 09/12/24  2103 09/13/24  0503    84   CALCIUM 8.4* 8.2*   ALBUMIN 2.7*  --    PROT 5.4*  --     140   K 4.2 4.4   CO2 16* 18*    111*   * 110*   CREATININE 5.6* 5.5*   ALKPHOS 150*  --    ALT 16  --    AST 17  --    BILITOT 0.5  --      All labs within the past 24 hours have been reviewed.    Significant Imaging:  X-Ray: Reviewed      PTH 79 (9/12/24)        Assessment/Plan:     Active Diagnoses:    Diagnosis Date Noted POA    PRINCIPAL PROBLEM:  Acute worsening of stage 4 chronic kidney disease [N18.4] 09/13/2024 Yes    Uremia [N19] 09/13/2024 Yes    Hyperphosphatemia [E83.39] 07/29/2024 Yes    Anemia of chronic renal failure, stage 4 (severe) [N18.4, D63.1] 05/20/2024 Yes    Chronic diastolic heart failure,  NYHA class 2 [I50.32] 08/18/2022 Yes    Benign essential hypertension [I10] 08/04/2015 Yes    Type 2 diabetes mellitus, without long-term current use of insulin [E11.9] 08/04/2015 Yes    Dyslipidemia [E78.5] 08/04/2015 Yes      Problems Resolved During this Admission:       1. Progression to ESRD - new start, 9/13/24  - HD today x2hrs, HD tomorrow x3hrs, and HD again Monday x4hrs  - consult CM for outpatient HD placement to [facility] under my care, w/ plans for PD catheter placement w/ Dr Causey tentatively next week w/ transition to early start PD  - start lasix PO BID  - renally dose medications for dialysis  2. HTN - continue current medications, UF as tolerated on dialysis  3. Anemia of chronic kidney disease - start Epo with HD, continue to monitor CBC, check iron studies  4. MBD/secondary hyperparathyroidism - no indication for binders, continue to monitor phos  - hold calcitriol for now  5. Access - consult to IR for TDC placement, PD catheter planned as above  6. Nutrition - renal diet, start nepro and renal vitamins    Thank you for your consult. I will follow-up with patient. Please contact us if you have any additional questions.    Katarina Luis MD  Nephrology  [facility] - Med Surg

## 2024-09-14 NOTE — PROGRESS NOTES
[facility]  Progress Note    Patient Name: Nestor Garcia  MRN: [id number]  Patient Class: IP- Inpatient   Admission Date: 9/12/2024  Length of Stay: 2 days  Attending Physician: Caridad Zavala DO  Primary Care Provider: Kashmir Conn MD        Subjective:     Principal Problem:Acute worsening of stage 4 chronic kidney disease        HPI:  79-year-old male with a history of CKD 4, essential hypertension, anemia, secondary hyperparathyroidism, gout, CHF(grade 2 diastolic) and follows the [facility] (s/p Pace-maker), HLD, NIDDM type 2 (on mounjaro), sleep apnea, and TIA presented to [facility] Emergency Department on September 12 with lower extremity swelling up to the abdomen along with confusion and dyspnea. He had no chest pain. States decreased UOP despite his diuretics (he states he is on lasix). He has been following with Nephrology and discussions about PD have been made recently.  NO fevers or chills.     Labs at the OSH-ED noted:  White blood cells 7.33, hemoglobin 8.9, hematocrit 27.1, platelets 217, sodium 142, potassium 4.4, chloride 112, CO2 18, , creatinine 5.6, glucose 129, AST 20, ALT 16, , troponin 0.024  -venous pH 7.323  Was given IV lasix 120mg x1   CXR: clear right lung. Left basilar opacity with associated pleural fluid. No pneumothorax. Enlarged cardiac silhouette. Left-sided cardiac defibrillator.  EKG showed atrial paced complexes, left axis deviation, left bundle-branch block.    They spoke with the Dr. Luis (patient's Nephrologist) who recommended transfer to [facility] for further eval of possible HD new start.             Overview/Hospital Course:  79-year-old male with a history of CKD 4, essential hypertension, HLD, DM, anemia, secondary hyperparathyroidism, gout, CHF(grade 2 diastolic) transfer to [facility] and admitted on 09/12/24 for acute on chronic CKD4. Presented with worsening  lower extremity swelling and some confusion.  Reported decreased urine output despite taking his diuretics.  Patient had ongoing discussion about PD with his nephrologist outpatient.  Labs significant for BUN of 114 and creatinine 5.6 on admission.  Also found to have hyperphosphatemia with phosphorus level of 5.4.  IR consulted- s/p tunneled HD line placement on 09/13. Nephrology following, initiated dialysis on 09/13/2024.  /case management consulted HD needs    Interval History:  No acute overnight events.  Patient remained afebrile.  Daughter at bedside.  Patient states he tolerated dialysis well yesterday.  Blood pressure elevated this morning, we will resume home meds.  Appears confusion has resolved.LE swelling improving     Review of Systems   Constitutional:  Negative for chills, fatigue and fever.   HENT:          Hard of hearing   Respiratory:  Negative for cough and shortness of breath.    Cardiovascular:  Positive for leg swelling (improving). Negative for chest pain and palpitations.   Gastrointestinal:  Negative for abdominal pain, nausea and vomiting.   Genitourinary:  Positive for decreased urine volume. Negative for difficulty urinating, dysuria and urgency.   Musculoskeletal:  Negative for back pain.   Neurological:  Negative for dizziness, weakness and headaches.   Psychiatric/Behavioral:  Negative for confusion and decreased concentration.      Objective:     Vital Signs (Most Recent):  Temp: 98.7 °F (37.1 °C) (09/14/24 0845)  Pulse: 65 (09/14/24 0847)  Resp: 12 (09/14/24 0845)  BP: (!) 153/89 (09/14/24 0847)  SpO2: (!) 92 % (09/14/24 0845) Vital Signs (24h Range):  Temp:  [97.9 °F (36.6 °C)-98.7 °F (37.1 °C)] 98.7 °F (37.1 °C)  Pulse:  [60-75] 65  Resp:  [12-20] 12  SpO2:  [90 %-98 %] 92 %  BP: (149-196)/(72-95) 153/89     Weight: 93 kg (205 lb 0.4 oz)  Body mass index is 29.42 kg/m².    Intake/Output Summary (Last 24 hours) at 9/14/2024 1108  Last data filed at 9/14/2024  0911  Gross per 24 hour   Intake 100.92 ml   Output 2950 ml   Net -2849.08 ml         Physical Exam  Vitals and nursing note reviewed.   Constitutional:       General: He is not in acute distress.     Appearance: He is not ill-appearing.   HENT:      Head: Atraumatic.      Nose: Nose normal.      Mouth/Throat:      Mouth: Mucous membranes are moist.   Eyes:      Extraocular Movements: Extraocular movements intact.   Neck:      Comments: Noted elevated JVD  Cardiovascular:      Rate and Rhythm: Normal rate and regular rhythm.      Pulses: Normal pulses.      Heart sounds: Murmur heard.      No friction rub. No gallop.      Comments: Pacemaker noted left upper chest   Pulmonary:      Effort: Pulmonary effort is normal. No respiratory distress.      Breath sounds: Normal breath sounds. No wheezing, rhonchi or rales.   Abdominal:      General: There is no distension.      Palpations: Abdomen is soft.      Tenderness: There is no abdominal tenderness.   Musculoskeletal:         General: Swelling present.      Right lower leg: Edema present.      Left lower leg: Edema present.   Skin:     General: Skin is warm.      Capillary Refill: Capillary refill takes less than 2 seconds.      Findings: No erythema or rash.      Comments: Tunneled HD cath on right upper chest wall   Neurological:      Mental Status: He is alert and oriented to person, place, and time. Mental status is at baseline.   Psychiatric:         Mood and Affect: Mood normal.         Behavior: Behavior normal.         Thought Content: Thought content normal.             Significant Labs: All pertinent labs within the past 24 hours have been reviewed.    Significant Imaging: I have reviewed all pertinent imaging results/findings within the past 24 hours.    Assessment/Plan:      * Acute worsening of stage 4 chronic kidney disease  -now progression to ESRD  -IR consulted: s/p THDC placement on 09/13/2024  -Nephrology consulted:  Initiated hemodialysis on  09/13/2024    -- nephrology plan for HD again on Sunday x3hrs, and HD again Monday x4hrs  -consult CM for outpatient HD placement   -continue Lasix 80 mg p.o. b.i.d.  -renal dose all meds.   -Monitor Is/Os      Uremia  Mild confusion noted on admission.   Nephrology following:  Initiated dialysis on 09/13/24  Improving      Hyperphosphatemia  -Phos elevated at 5.4 on admission  -secondary to progression to ESRD  -Nephrology following  -HD per nephrology       Chronic diastolic heart failure, NYHA class 2  -No signs of acute exacerbation but he was not responding to the home Lasix with worsening lower extremity edema/anasarca extending to his hips and back.    -IV lasix given in the ED. Bladder scan in place. Monitor I/O.   -Nephrology following. HD for volume removal per nephrology       Results for orders placed during the hospital encounter of 08/18/22    Echo    Interpretation Summary  · Eccentric hypertrophy and  · The estimated ejection fraction is 60%.  · Grade I left ventricular diastolic dysfunction with inconclusive left atrial pressure.  · Normal right ventricular size with normal right ventricular systolic function.      Benign essential hypertension  Chronic, uncontrolled.   -resume home hydralazine, isosorbide mononitrate, carvedilol, and nifedipine   -titrate medication as needed  -resume furosemide her increased dose of 80 mg b.i.d. per Nephrology    Latest blood pressure and vitals reviewed-     Temp:  [97.9 °F (36.6 °C)-98.7 °F (37.1 °C)]   Pulse:  [60-75]   Resp:  [12-20]   BP: (149-196)/(72-95)   SpO2:  [91 %-98 %] .   Home meds for hypertension were reviewed and noted below.   Hypertension Medications               carvediloL (COREG) 25 MG tablet Take 25 mg by mouth 2 (two) times daily with meals.    doxazosin (CARDURA) 4 MG tablet Take 4 mg by mouth every 12 (twelve) hours.    furosemide (LASIX) 20 MG tablet Take 1 tablet (20 mg total) by mouth once daily.    hydrALAZINE (APRESOLINE) 100 MG  tablet Take 100 mg by mouth 3 (three) times daily.    isosorbide mononitrate (IMDUR) 60 MG 24 hr tablet Take 60 mg by mouth once daily.    NIFEdipine (ADALAT CC) 30 MG TbSR Take 30 mg by mouth 2 (two) times daily.            While in the hospital, will manage blood pressure as follows; Adjust home antihypertensive regimen as follows- Appears to be low/normal now and will HOLD BP meds and decreased he coreg to 6.25mg PO BID and see how he trends. Resume other BP meds as able pending repeat BP checks.     Will utilize p.r.n. blood pressure medication only if patient's blood pressure greater than  180/90  and he develops symptoms such as worsening chest pain or shortness of breath.    Type 2 diabetes mellitus, without long-term current use of insulin  Patient's FSGs are controlled on current medication regimen.  Last A1c reviewed-   Lab Results   Component Value Date    HGBA1C 5.1 09/13/2024     Most recent fingerstick glucose reviewed-   Recent Labs   Lab 09/13/24  1804 09/13/24 2055 09/14/24  0844   POCTGLUCOSE 80 139* 152*     Current correctional scale  Low  Maintain anti-hyperglycemic dose as follows-   Antihyperglycemics (From admission, onward)      Start     Stop Route Frequency Ordered    09/12/24 2309  insulin aspart U-100 pen 0-5 Units         -- SubQ Before meals & nightly PRN 09/12/24 2209          Hold Mounjaro for now.   Need to discuss with nephrology if Mounjaro should be discontinued on DC given HD needs    Dyslipidemia  -continue home exetimibe nightly    Anemia of chronic renal failure, stage 4 (severe)  Anemia is likely due to chronic disease due to Chronic Kidney Disease. Most recent hemoglobin and hematocrit are listed below.  Recent Labs     09/12/24  2103 09/13/24  0503 09/14/24  0459   HGB 9.2* 8.9* 9.5*   HCT 29.6* 28.0* 30.6*     Plan  - Monitor serial CBC: Daily  - Transfuse PRBC if patient becomes hemodynamically unstable, symptomatic or H/H drops below 7/21.  - Patient has not received  any PRBC transfusions to date  - Patient's anemia is currently stable  - EPO per nephrology     Acute cystitis with hematuria  -presented with decreased urine output and mild confusion  -UA with few bacteria and +3 blood  -urine cx with GNR  -start ceftriaxone      VTE Risk Mitigation (From admission, onward)           Ordered     heparin (porcine) injection 5,000 Units  Every 8 hours         09/12/24 2028     IP VTE HIGH RISK PATIENT  Once         09/12/24 2028     Place sequential compression device  Until discontinued         09/12/24 2028                    Discharge Planning   LESLIE:      Code Status: Full Code   Is the patient medically ready for discharge?:     Reason for patient still in hospital (select all that apply): Patient trending condition, Treatment, Consult recommendations, and PT / OT recommendations  Discharge Plan A: Home with family                  Jaz-Amina Zavala DO  Department of Hospital Medicine   [facility]

## 2024-09-14 NOTE — PLAN OF CARE
Problem: Physical Therapy  Goal: Physical Therapy Goal  Description: Goals to be met by: 24     Patient will increase functional independence with mobility by performin. Supine to sit with Modified [place]  2. Sit to supine with Modified [place]  3. Sit to stand transfer with Modified [place]  4. Gait  x 250 feet with [place] and Modified [place] using Rolling Walker.   5. Lower extremity exercise program x30 reps per handout, with independence    Outcome: Progressing     Pt would benefit from low intensity w/ caregiver assistant at current time.

## 2024-09-14 NOTE — PT/OT/SLP EVAL
Physical Therapy Evaluation    Patient Name:  Nestor Garcia   MRN:  [id number]    Recommendations:     Discharge Recommendations: Low Intensity Therapy   Discharge Equipment Recommendations: none   Barriers to discharge: fall risk    Assessment:     Nestor Garcia is a 79 y.o. male admitted with a medical diagnosis of Acute worsening of stage 4 chronic kidney disease.  He presents with the following impairments/functional limitations: weakness, impaired functional mobility, gait instability. Pt was able to trasnfer with SBAx1. Pt was able to ambulated 100 ft using RW and CGAx1. Pt did not have any lost of blaance, knee giving out or SOB. Pt responded well to therapy today. Pt is fall risk    Rehab Prognosis: Good; patient would benefit from acute skilled PT services to address these deficits and reach maximum level of function.    Recent Surgery: * No surgery found *      Plan:     During this hospitalization, patient to be seen 2 x/week (2-3x per week) to address the identified rehab impairments via gait training, therapeutic activities, therapeutic exercises, neuromuscular re-education and progress toward the following goals:    Plan of Care Expires:  09/21/24    Subjective     Chief Complaint: No pain  Patient/Family Comments/goals: Go home  Pain/Comfort:  Pain Rating 1: 0/10    Patients cultural, spiritual, Pentec[employer]al conflicts given the current situation: no    Living Environment:  Lives in house with his daughter. Has ramp to get in house. Has handicap toilet. Has walk-in shower.   Prior to admission, patients level of function was independent with ADL'a, IAD;s', ambulation and driving.  Equipment used at home: CPAP, bath bench, cane, straight, glucometer, raised toilet, walker, rolling, wheelchair.  DME owned (not currently used): rolling walker, single point cane, and wheelchair.  Upon discharge, patient will have assistance from Family.    Objective:     Communicated with nurse prior to session.  Patient  found supine with Condom Catheter  upon PT entry to room.    General Precautions: Standard, fall  Orthopedic Precautions:N/A   Braces: N/A  Respiratory Status: Room air    Exams:  Cognitive Exam:  Patient is oriented to Person, Place, and Situation  Postural Exam:  Patient presented with the following abnormalities:    -       No postural abnormalities identified  Sensation:    -       Intact  Skin Integrity/Edema:      -       Skin integrity: Visible skin intact  RLE ROM: WNL  RLE Strength: WNL  LLE ROM: WNL  LLE Strength: WNL    Functional Mobility:  Bed Mobility:     Supine to Sit: stand by assistance  Sit to Supine: stand by assistance  Transfers:     Sit to Stand:  stand by assistance with no AD  Gait: Ambulated 100 ft using RW and CGAx1. No lost of balance, no knee buckling. No dizziness.  Balance: seated and standing static balance: Good      AM-PAC 6 CLICK MOBILITY  Total Score:21       Treatment & Education:  Gait training Ambulated 100 ft using RW and CGAx1. V/c's required to proper hand placement on RW and shital    Patient left supine with all lines intact, call button in reach, nurse notified, and daughter present.    GOALS:   Multidisciplinary Problems       Physical Therapy Goals          Problem: Physical Therapy    Goal Priority Disciplines Outcome Goal Variances Interventions   Physical Therapy Goal     PT, PT/OT Progressing     Description: Goals to be met by: 24     Patient will increase functional independence with mobility by performin. Supine to sit with Modified [place]  2. Sit to supine with Modified [place]  3. Sit to stand transfer with Modified [place]  4. Gait  x 250 feet with [place] and Modified [place] using Rolling Walker.   5. Lower extremity exercise program x30 reps per handout, with independence                         History:     Past Medical History:   Diagnosis Date    Acute respiratory failure with hypoxia 2022    daughter denies     Anxiety disorder, unspecified     CHF (congestive heart failure)     COVID 11/23/2022    Dehydration 12/22/2021    medication induced    Depression     Diabetes mellitus type II     Gout, unspecified     Hypertension     MDD (major depressive disorder), recurrent episode, mild 04/15/2023    Documented since 2022   Chronic medical conditions  Lexapro 10mg       Melanoma in situ 10/23/2023    left ear    Melanoma in situ of left ear 11/13/2023    left ear    Positive D dimer 12/22/2021    Sick sinus syndrome     Sleep apnea     cpap machine    TIA (transient ischemic attack) 08/18/2022    daughter denies       Past Surgical History:   Procedure Laterality Date    APPENDECTOMY      BACK SURGERY      CHOLECYSTECTOMY      EXTRACTION OF CATARACT Left 08/23/2023    Procedure: EXTRACTION, CATARACT;  Surgeon: Alonso Rodriguez MD;  Location: [facility] OR;  Service: Ophthalmology;  Laterality: Left;  DiB00 +22.5D    EXTRACTION OF CATARACT Right 11/08/2023    Procedure: EXTRACTION, CATARACT;  Surgeon: Alonso Rodriugez MD;  Location: [facility] OR;  Service: Ophthalmology;  Laterality: Right;  DiB00 +22.5D    INNER EAR SURGERY      unilateral    INSERTION OF PACEMAKER      INSERTION OF PACEMAKER  2023    KNEE SURGERY      unilateral    PHACOEMULSIFICATION, CATARACT, WITH IOL INSERTION Left 08/23/2023    Procedure: PHACOEMULSIFICATION, CATARACT, WITH IOL INSERTION;  Surgeon: Alonso Rodriguez MD;  Location: [facility] OR;  Service: Ophthalmology;  Laterality: Left;    PHACOEMULSIFICATION, CATARACT, WITH IOL INSERTION Right 11/08/2023    Procedure: PHACOEMULSIFICATION, CATARACT, WITH IOL INSERTION;  Surgeon: Alonso Rodriguez MD;  Location: [facility] OR;  Service: Ophthalmology;  Laterality: Right;       Time Tracking:     PT Received On: 09/14/24  PT Start Time: 0852     PT Stop Time: 0910  PT Total Time (min): 18 min     Billable Minutes: Evaluation 10 min and Gait Training 8 min      09/14/2024

## 2024-09-15 VITALS
HEIGHT: 70 IN | BODY MASS INDEX: 29.35 KG/M2 | SYSTOLIC BLOOD PRESSURE: 132 MMHG | TEMPERATURE: 98 F | HEART RATE: 63 BPM | DIASTOLIC BLOOD PRESSURE: 62 MMHG | OXYGEN SATURATION: 90 % | WEIGHT: 205 LBS | RESPIRATION RATE: 18 BRPM

## 2024-09-15 LAB
ANION GAP SERPL CALC-SCNC: 10 MMOL/L (ref 8–16)
BASOPHILS # BLD AUTO: 0.02 K/UL (ref 0–0.2)
BASOPHILS NFR BLD: 0.3 % (ref 0–1.9)
BUN SERPL-MCNC: 51 MG/DL (ref 8–23)
CALCIUM SERPL-MCNC: 7.7 MG/DL (ref 8.7–10.5)
CHLORIDE SERPL-SCNC: 106 MMOL/L (ref 95–110)
CO2 SERPL-SCNC: 23 MMOL/L (ref 23–29)
CREAT SERPL-MCNC: 3.3 MG/DL (ref 0.5–1.4)
DIFFERENTIAL METHOD BLD: ABNORMAL
EOSINOPHIL # BLD AUTO: 0.1 K/UL (ref 0–0.5)
EOSINOPHIL NFR BLD: 1.8 % (ref 0–8)
ERYTHROCYTE [DISTWIDTH] IN BLOOD BY AUTOMATED COUNT: 16.5 % (ref 11.5–14.5)
EST. GFR  (NO RACE VARIABLE): 18 ML/MIN/1.73 M^2
FERRITIN SERPL-MCNC: 200 NG/ML (ref 20–300)
GLUCOSE SERPL-MCNC: 102 MG/DL (ref 70–110)
HBV SURFACE AG SERPL QL IA: NORMAL
HCT VFR BLD AUTO: 28.4 % (ref 40–54)
HGB BLD-MCNC: 9 G/DL (ref 14–18)
IMM GRANULOCYTES # BLD AUTO: 0.03 K/UL (ref 0–0.04)
IMM GRANULOCYTES NFR BLD AUTO: 0.5 % (ref 0–0.5)
IRON SERPL-MCNC: 72 UG/DL (ref 45–160)
LYMPHOCYTES # BLD AUTO: 0.8 K/UL (ref 1–4.8)
LYMPHOCYTES NFR BLD: 12.1 % (ref 18–48)
MAGNESIUM SERPL-MCNC: 1.6 MG/DL (ref 1.6–2.6)
MCH RBC QN AUTO: 29.7 PG (ref 27–31)
MCHC RBC AUTO-ENTMCNC: 31.7 G/DL (ref 32–36)
MCV RBC AUTO: 94 FL (ref 82–98)
MONOCYTES # BLD AUTO: 0.9 K/UL (ref 0.3–1)
MONOCYTES NFR BLD: 15 % (ref 4–15)
NEUTROPHILS # BLD AUTO: 4.4 K/UL (ref 1.8–7.7)
NEUTROPHILS NFR BLD: 70.3 % (ref 38–73)
NRBC BLD-RTO: 0 /100 WBC
PHOSPHATE SERPL-MCNC: 3.4 MG/DL (ref 2.7–4.5)
PLATELET # BLD AUTO: 193 K/UL (ref 150–450)
PMV BLD AUTO: 11.3 FL (ref 9.2–12.9)
POCT GLUCOSE: 138 MG/DL (ref 70–110)
POTASSIUM SERPL-SCNC: 3.3 MMOL/L (ref 3.5–5.1)
RBC # BLD AUTO: 3.03 M/UL (ref 4.6–6.2)
SATURATED IRON: 47 % (ref 20–50)
SODIUM SERPL-SCNC: 139 MMOL/L (ref 136–145)
TOTAL IRON BINDING CAPACITY: 152 UG/DL (ref 250–450)
TRANSFERRIN SERPL-MCNC: 103 MG/DL (ref 200–375)
WBC # BLD AUTO: 6.28 K/UL (ref 3.9–12.7)

## 2024-09-15 PROCEDURE — 84100 ASSAY OF PHOSPHORUS: CPT | Mod: HCNC | Performed by: STUDENT IN AN ORGANIZED HEALTH CARE EDUCATION/TRAINING PROGRAM

## 2024-09-15 PROCEDURE — 82728 ASSAY OF FERRITIN: CPT | Mod: HCNC | Performed by: INTERNAL MEDICINE

## 2024-09-15 PROCEDURE — 94761 N-INVAS EAR/PLS OXIMETRY MLT: CPT | Mod: HCNC

## 2024-09-15 PROCEDURE — 83735 ASSAY OF MAGNESIUM: CPT | Mod: HCNC | Performed by: STUDENT IN AN ORGANIZED HEALTH CARE EDUCATION/TRAINING PROGRAM

## 2024-09-15 PROCEDURE — 63600175 PHARM REV CODE 636 W HCPCS: Mod: HCNC | Performed by: STUDENT IN AN ORGANIZED HEALTH CARE EDUCATION/TRAINING PROGRAM

## 2024-09-15 PROCEDURE — 80048 BASIC METABOLIC PNL TOTAL CA: CPT | Mod: HCNC | Performed by: STUDENT IN AN ORGANIZED HEALTH CARE EDUCATION/TRAINING PROGRAM

## 2024-09-15 PROCEDURE — 25000003 PHARM REV CODE 250: Mod: HCNC | Performed by: INTERNAL MEDICINE

## 2024-09-15 PROCEDURE — 94660 CPAP INITIATION&MGMT: CPT | Mod: HCNC

## 2024-09-15 PROCEDURE — 99900035 HC TECH TIME PER 15 MIN (STAT): Mod: HCNC

## 2024-09-15 PROCEDURE — 63600175 PHARM REV CODE 636 W HCPCS: Mod: HCNC | Performed by: INTERNAL MEDICINE

## 2024-09-15 PROCEDURE — 36415 COLL VENOUS BLD VENIPUNCTURE: CPT | Mod: HCNC | Performed by: STUDENT IN AN ORGANIZED HEALTH CARE EDUCATION/TRAINING PROGRAM

## 2024-09-15 PROCEDURE — 85025 COMPLETE CBC W/AUTO DIFF WBC: CPT | Mod: HCNC | Performed by: STUDENT IN AN ORGANIZED HEALTH CARE EDUCATION/TRAINING PROGRAM

## 2024-09-15 PROCEDURE — 25000003 PHARM REV CODE 250: Mod: HCNC | Performed by: STUDENT IN AN ORGANIZED HEALTH CARE EDUCATION/TRAINING PROGRAM

## 2024-09-15 PROCEDURE — 84466 ASSAY OF TRANSFERRIN: CPT | Mod: HCNC | Performed by: INTERNAL MEDICINE

## 2024-09-15 RX ORDER — FUROSEMIDE 80 MG/1
80 TABLET ORAL 2 TIMES DAILY
Start: 2024-09-15 | End: 2024-09-15

## 2024-09-15 RX ORDER — CEFUROXIME AXETIL 500 MG/1
500 TABLET ORAL EVERY 12 HOURS
Qty: 6 TABLET | Refills: 0 | Status: SHIPPED | OUTPATIENT
Start: 2024-09-16 | End: 2024-09-19

## 2024-09-15 RX ORDER — NIFEDIPINE 30 MG/1
30 TABLET, EXTENDED RELEASE ORAL DAILY
Status: DISCONTINUED | OUTPATIENT
Start: 2024-09-15 | End: 2024-09-15 | Stop reason: HOSPADM

## 2024-09-15 RX ORDER — LANOLIN ALCOHOL/MO/W.PET/CERES
800 CREAM (GRAM) TOPICAL ONCE
Status: COMPLETED | OUTPATIENT
Start: 2024-09-15 | End: 2024-09-15

## 2024-09-15 RX ORDER — NIFEDIPINE 30 MG/1
30 TABLET, EXTENDED RELEASE ORAL DAILY
Start: 2024-09-15

## 2024-09-15 RX ORDER — ISOSORBIDE MONONITRATE 60 MG/1
60 TABLET, EXTENDED RELEASE ORAL DAILY
Start: 2024-09-15

## 2024-09-15 RX ORDER — POTASSIUM CHLORIDE 20 MEQ/1
40 TABLET, EXTENDED RELEASE ORAL ONCE
Status: COMPLETED | OUTPATIENT
Start: 2024-09-15 | End: 2024-09-15

## 2024-09-15 RX ORDER — FUROSEMIDE 80 MG/1
80 TABLET ORAL DAILY
Start: 2024-09-15 | End: 2024-10-15

## 2024-09-15 RX ORDER — HYDRALAZINE HYDROCHLORIDE 100 MG/1
100 TABLET, FILM COATED ORAL 3 TIMES DAILY
Start: 2024-09-15

## 2024-09-15 RX ADMIN — MUPIROCIN: 20 OINTMENT TOPICAL at 08:09

## 2024-09-15 RX ADMIN — CARVEDILOL 25 MG: 12.5 TABLET, FILM COATED ORAL at 08:09

## 2024-09-15 RX ADMIN — POTASSIUM CHLORIDE 40 MEQ: 1500 TABLET, EXTENDED RELEASE ORAL at 08:09

## 2024-09-15 RX ADMIN — ISOSORBIDE MONONITRATE 60 MG: 30 TABLET, EXTENDED RELEASE ORAL at 08:09

## 2024-09-15 RX ADMIN — FUROSEMIDE 80 MG: 40 TABLET ORAL at 08:09

## 2024-09-15 RX ADMIN — ASPIRIN 81 MG: 81 TABLET, COATED ORAL at 08:09

## 2024-09-15 RX ADMIN — Medication 1 CAPSULE: at 08:09

## 2024-09-15 RX ADMIN — Medication 800 MG: at 08:09

## 2024-09-15 RX ADMIN — HEPARIN SODIUM 5000 UNITS: 5000 INJECTION INTRAVENOUS; SUBCUTANEOUS at 02:09

## 2024-09-15 RX ADMIN — ALLOPURINOL 100 MG: 100 TABLET ORAL at 08:09

## 2024-09-15 RX ADMIN — NIFEDIPINE 30 MG: 30 TABLET, FILM COATED, EXTENDED RELEASE ORAL at 08:09

## 2024-09-15 RX ADMIN — CEFTRIAXONE 2 G: 2 INJECTION, POWDER, FOR SOLUTION INTRAMUSCULAR; INTRAVENOUS at 08:09

## 2024-09-15 RX ADMIN — ESCITALOPRAM OXALATE 10 MG: 10 TABLET ORAL at 08:09

## 2024-09-15 RX ADMIN — HEPARIN SODIUM 5000 UNITS: 5000 INJECTION INTRAVENOUS; SUBCUTANEOUS at 05:09

## 2024-09-15 NOTE — ASSESSMENT & PLAN NOTE
-Phos elevated at 5.4 on admission  -secondary to progression to ESRD  -Nephrology following  -improved with HD  -09/15/24, patient no longer want to proceed with dialysis.  Request for home hospice  -continue Lasix 80 mg p.o. daily to preserve residual renal function per nephrology

## 2024-09-15 NOTE — ASSESSMENT & PLAN NOTE
Patient's FSGs are controlled on current medication regimen.  Last A1c reviewed-   Lab Results   Component Value Date    HGBA1C 5.1 09/13/2024     Most recent fingerstick glucose reviewed-   Recent Labs   Lab 09/14/24  1805 09/14/24  1931 09/15/24  0839   POCTGLUCOSE 125* 132* 138*       Current correctional scale  Low  Maintain anti-hyperglycemic dose as follows-   Antihyperglycemics (From admission, onward)    Start     Stop Route Frequency Ordered    09/12/24 2309  insulin aspart U-100 pen 0-5 Units         -- SubQ Before meals & nightly PRN 09/12/24 2209        Hold Mounjaro for now.   Need to discuss with nephrology if Mounjaro should be discontinued on DC given HD needs

## 2024-09-15 NOTE — ASSESSMENT & PLAN NOTE
-No signs of acute exacerbation but he was not responding to the home Lasix with worsening lower extremity edema/anasarca extending to his hips and back.    -IV lasix given in the ED. Bladder scan in place. Monitor I/O.   -Nephrology following. Pt decline to proceed with HD 9/15/24      Results for orders placed during the hospital encounter of 08/18/22    Echo    Interpretation Summary  · Eccentric hypertrophy and  · The estimated ejection fraction is 60%.  · Grade I left ventricular diastolic dysfunction with inconclusive left atrial pressure.  · Normal right ventricular size with normal right ventricular systolic function.

## 2024-09-15 NOTE — PLAN OF CARE
[facility]  Department of Hospital Medicine  [address] [place] [address]  [phone] after hours  [phone] fax    HOSPICE  ORDERS    09/15/2024    Admit to Hospice:  Home Service      Diagnoses:   Active Hospital Problems    Diagnosis  POA    *Acute worsening of stage 4 chronic kidney disease [N18.4]  Yes     Priority: 1 - High    Uremia [N19]  Yes     Priority: 2     Hyperphosphatemia [E83.39]  Yes     Priority: 3     Chronic diastolic heart failure, NYHA class 2 [I50.32]  Yes     Priority: 4      Dx Per cardiology   Left ventricular ejection fraction 60% May 2024 1+ MR, small pericardial effusion PA pressure 32   4/14/2023 ECHO Left ventricular diastolic function is abnormal (stage II   Coreg   Lisinopril stopped due to CKD, worsening renal fx   LE compression stockings       Benign essential hypertension [I10]  Yes     Priority: 5      Chronic   Left ventricular ejection fraction 60% May 2024 1+ MR, small pericardial effusion PA pressure 32   4/14/2023 ECHO Left ventricular diastolic function is abnormal (stage II)     Stable on procardia XL 30mg daily, coreg 25mg bid , imdur 60mg daily, and hydralazine 100mg tid   Doxazosin 4mg q 12         Type 2 diabetes mellitus, without long-term current use of insulin [E11.9]  Yes     Priority: 6     Dyslipidemia [E78.5]  Yes     Priority: 7      Statin intolerance  On fish oil      Anemia of chronic renal failure, stage 4 (severe) [N18.4, D63.1]  Yes     Priority: 8      Lab Results   Component Value Date    WBC 5.08 06/27/2024    HGB 9.4 (L) 06/27/2024    HCT 28.5 (L) 06/27/2024    MCV 95 06/27/2024     06/27/2024     erythropoietin injection soon. ?         Acute cystitis with hematuria [N30.01]  Yes     Priority: 9       Resolved Hospital Problems   No resolved problems to display.       Hospice Qualifying Diagnoses:        Patient has a life expectancy < 6 months due to:  Primary Hospice Diagnosis:  ESRD,  does not want to be on dialysis   Comorbid Conditions Contributing to Decline:  Uremia, hypotension, anemia, secondary hyperparathyroidism, gout, congestive heart failure, hyperlipidemia, diabetes, sleep apnea, TIA    Vital Signs: Routine per Hospice Protocol.    Code Status: DNR    Allergies:   Review of patient's allergies indicates:   Allergen Reactions    Adhesive Other (See Comments)     Skin irritation    Lisinopril Other (See Comments)     Kidney failure    Statins-hmg-coa reductase inhibitors Other (See Comments)     pain       Diet: renal/cardiac    Activities: As tolerated    Goals of Care Treatment Preferences:  Code Status: DNR          What is most important right now is to focus on spending time at home, avoiding the hospital, remaining as independent as possible, quality of life, even if it means sacrificing a little time.  Accordingly, we have decided that the best plan to meet the patient's goals includes enrolling in hospice care.      Nursing: Per Hospice Routine.    Routine Skin for Bedridden Patients: Apply moisture barrier cream to all skin folds and   wet areas in perineal area daily and after baths and all bowel movements.      Oxygen: room air    Medications:          Medication List        START taking these medications      cefUROXime 500 MG tablet  Commonly known as: CEFTIN  Take 1 tablet (500 mg total) by mouth every 12 (twelve) hours. for 3 days  Start taking on: September 16, 2024            CHANGE how you take these medications      furosemide 80 MG tablet  Commonly known as: LASIX  Take 1 tablet (80 mg total) by mouth once daily.  What changed:   medication strength  how much to take     MOUNJARO 2.5 mg/0.5 mL Pnij  Generic drug: tirzepatide  Inject 2.5 mg into the skin every 7 days.  What changed: when to take this     NIFEdipine 30 MG Tbsr  Commonly known as: ADALAT CC  Take 1 tablet (30 mg total) by mouth once daily.  What changed: when to take this  Notes to patient: HOLD DOSE IF  "YOUR SYSTOLIC BLOOD PRESSURE(TOP #) IS LESS THAN 130            CONTINUE taking these medications      acetaminophen 325 MG tablet  Commonly known as: TYLENOL  Take 2 tablets (650 mg total) by mouth every 6 (six) hours as needed for Pain.     allopurinoL 100 MG tablet  Commonly known as: ZYLOPRIM  Take 1 tablet (100 mg total) by mouth once daily.     aspirin 81 MG EC tablet  Commonly known as: ECOTRIN  Take 81 mg by mouth once daily.     BD INSULIN SYRINGE ULTRA-FINE 1/2 mL 31 gauge x 15/64" Syrg  Generic drug: insulin syringe-needle U-100     BD VEO INSULIN SYRINGE UF 1 mL 31 gauge x 15/64" Syrg  Generic drug: insulin syringe-needle U-100  USE SYRINGE FIVE TIMES DAILY SINGLE USE     calcitRIOL 0.25 MCG Cap  Commonly known as: ROCALTROL  Take 1 capsule (0.25 mcg total) by mouth once daily.     CALCIUM ORAL  Take 1 tablet by mouth Daily.     carvediloL 25 MG tablet  Commonly known as: COREG  Take 25 mg by mouth 2 (two) times daily with meals.     EScitalopram oxalate 10 MG tablet  Commonly known as: LEXAPRO  Take 1 tablet (10 mg total) by mouth once daily.     ezetimibe 10 mg tablet  Commonly known as: ZETIA  Take 10 mg by mouth every evening.     hydrALAZINE 100 MG tablet  Commonly known as: APRESOLINE  Take 1 tablet (100 mg total) by mouth 3 (three) times daily.     insulin lispro 100 unit/mL injection  Inject 0-10 Units into the skin 3 (three) times daily before meals. Sliding scale with meals based on glucose as follows  - 0 units; 131-180 - 2 units; 181- 240 - 4 units; 241-300 - 6 units; 301-350 - 8 units; 351-400 - 10 units     isosorbide mononitrate 60 MG 24 hr tablet  Commonly known as: IMDUR  Take 1 tablet (60 mg total) by mouth once daily.     traZODone 50 MG tablet  Commonly known as: DESYREL  Take 1 tablet (50 mg total) by mouth nightly as needed for Insomnia.     vitamin D 1000 units Tab  Commonly known as: VITAMIN D3  Take 2,000 Units by mouth once daily.            STOP taking these medications  "     doxazosin 4 MG tablet  Commonly known as: CARDURA                DIABETES CARE:   Nurse to perform and educate diabetic management with blood glucose monitoring:        Fingerstick blood sugar AC and HS      Report CBG < 60 or > 350 to physician.         Insulin Sliding Scale         Glucose  Novolog Insulin Subcutaneous        0 - 60   Orange juice or glucose tablet      No insulin   201-250  2 units   251-300  4 units   301-350  6 units   351-400  8 units   >400   10 units then call physician      Future Orders:  Hospice Medical Director may dictate new orders for comfortable care measures & sign death certificate.        _________________________________  Jaz-Amina Zavala, DO  09/15/2024

## 2024-09-15 NOTE — DISCHARGE SUMMARY
[facility]  Discharge Summary      Patient Name: Nestor Garcia  MRN: [id number]  JACOBO: [id number]  Patient Class: IP- Inpatient  Admission Date: 9/12/2024  Hospital Length of Stay: 3 days  Discharge Date and Time:  09/15/2024 2:33 PM  Attending Physician: Caridad Zavala DO   Discharging Provider: Caridad Zavala DO  Primary Care Provider: Kashmir Conn MD    Primary Care Team: Networked reference to record PCT     HPI:   79-year-old male with a history of CKD 4, essential hypertension, anemia, secondary hyperparathyroidism, gout, CHF(grade 2 diastolic) and follows the Ciarra Aceves (s/p Pace-maker), HLD, NIDDM type 2 (on mounjaro), sleep apnea, and TIA presented to [facility] Emergency Department on September 12 with lower extremity swelling up to the abdomen along with confusion and dyspnea. He had no chest pain. States decreased UOP despite his diuretics (he states he is on lasix). He has been following with Nephrology and discussions about PD have been made recently.  NO fevers or chills.     Labs at the OSH-ED noted:  White blood cells 7.33, hemoglobin 8.9, hematocrit 27.1, platelets 217, sodium 142, potassium 4.4, chloride 112, CO2 18, , creatinine 5.6, glucose 129, AST 20, ALT 16, , troponin 0.024  -venous pH 7.323  Was given IV lasix 120mg x1   CXR: clear right lung. Left basilar opacity with associated pleural fluid. No pneumothorax. Enlarged cardiac silhouette. Left-sided cardiac defibrillator.  EKG showed atrial paced complexes, left axis deviation, left bundle-branch block.    They spoke with the Dr. Luis (patient's Nephrologist) who recommended transfer to [facility] for further eval of possible HD new start.             * No surgery found *      Hospital Course:   79-year-old male with a history of CKD 4, essential hypertension, HLD, DM, anemia, secondary hyperparathyroidism, gout, CHF(grade 2 diastolic) transfer  to [facility] and admitted on 09/12/24 for acute on chronic CKD4. Presented with worsening lower extremity swelling and some confusion.  Reported decreased urine output despite taking his diuretics.  Patient had ongoing discussion about PD with his nephrologist outpatient.  Labs significant for BUN of 114 and creatinine 5.6 on admission.  Also found to have hyperphosphatemia with phosphorus level of 5.4.  IR consulted- s/p tunneled HD line placement on 09/13. Nephrology following, initiated dialysis on 09/13/2024. Urine cx w/GNR, Given initial presentation of confusion and decreased urine output, will treat for acute cystitis.  /case management consulted HD needs    After second dialysis session on 09/14/2024, patient has decided to decline further hemodialysis.  Patient states that he would like his code status change to DNR and be enrolled in hospice. He declined to further proceed with dialysis nurse.  States he would like to live the rest of his life as is and would like to focus on quality of life. Nephrology made aware. IR consulted to have THDC removed but No IR available on site over the weekend. Discussed with surendra RICHTER to have THDC line removed outpatient. Family prefer to have line removal done in [facility] or [facility]. Ok per IR but if there are any issues (no IR availablilty there or general surgery unable to remove line) then [facility] or [facility] can assist with line removal. / consulted for discharge planning and for hospice.    Pt denies any fever, headaches, vision changes, chest pain, shortness of breath, palpitations, abdominal pain, nausea, vomiting, or any new weaknesses. Feels ready to go home with hospice. Patient's exam on discharge was as follow: Patient is alert and oriented, appears in no acute distress, heart with regular rate and rhythm, lungs clear to asculation with non-labored breathing, abdomen soft, and no new weaknesses or focal  deficits seen. Bilateral lower extremities with edema, but improved. THDC on right upper chest wall. Plan to have line removed on outpatient basis. Discussed with IR (). If facility in [facility] or [facility] unable to remove line, discussed with family to contact [facility] or [facility]. Dr. Sánchez can assist to have that scheduled if needed.     Patient was counseled regarding any abnormal labs, differential diagnosis, treatment options, risk-benefit, lifestyle changes, prognosis, current condition, and medications. Patient was interactive and attentive.  Patient's questions were answered in a respectful and timely manner. Patient was instructed to follow-up with home hospice and to continue taking medications as prescribed. Also, extensively discussed the risks, benefits, and side effects of patient's medications. Discussed with patient about any medication changes. Patient verbalized understanding and agrees to treatment plan.  Patient is stable for discharge.  Patient has no other questions or concerns at this time.  ED precautions discussed with the patient.    Vital signs are stable. Ambulating without any difficulty. Tolerating p.o. intake without any nausea or vomiting. Afebrile for over 24 hours. Patient is in stable condition and has no questions or concerns. Patient will be discharge to home with home hospice once that is set up and transportation secured . Patient accepted to [facility]. Prescriptions sent to pharmacy.  CM/SW to assist with discharge planning.     Vitals:    09/15/24 0744 09/15/24 0845 09/15/24 1121 09/15/24 1124   BP: 123/60 (!) 117/58 132/62    BP Location: Left arm Left arm Left arm    Patient Position: Lying Lying Lying    Pulse: 62 61 62 63   Resp: 18  18    Temp: 99.1 °F (37.3 °C)  98.3 °F (36.8 °C)    TempSrc: Oral  Oral    SpO2: (!) 91%  (!) 90%    Weight:       Height:                Goals of Care Treatment Preferences:  Code Status: DNR          What is most  important right now is to focus on spending time at home, avoiding the hospital, remaining as independent as possible, quality of life, even if it means sacrificing a little time.  Accordingly, we have decided that the best plan to meet the patient's goals includes enrolling in hospice care.         Consults:   Consults (From admission, onward)          Status Ordering Provider     Inpatient consult to Interventional Radiology  Once        Provider:  Kashmir Sánchez MD    Acknowledged ANA PAULA VARELA.     Inpatient consult to Social Work  Once        Provider:  (Not yet assigned)    Acknowledged ANA PAULA VARELA.     Inpatient consult to Social Work/Case Management  Once        Provider:  (Not yet assigned)    Completed ADE LUIS     Inpatient consult to Interventional Radiology  Once        Provider:  Shavon Ojeda MD    Completed HIRA RAPHAEL     Inpatient consult to Nephrology  Once        Provider:  Ade Luis MD    Completed HIRA RAPHAEL            No new Assessment & Plan notes have been filed under this hospital service since the last note was generated.  Service: Hospital Medicine    Final Active Diagnoses:    Diagnosis Date Noted POA    PRINCIPAL PROBLEM:  Acute worsening of stage 4 chronic kidney disease [N18.4] 09/13/2024 Yes    Uremia [N19] 09/13/2024 Yes    Hyperphosphatemia [E83.39] 07/29/2024 Yes    Chronic diastolic heart failure, NYHA class 2 [I50.32] 08/18/2022 Yes    Benign essential hypertension [I10] 08/04/2015 Yes    Type 2 diabetes mellitus, without long-term current use of insulin [E11.9] 08/04/2015 Yes    Dyslipidemia [E78.5] 08/04/2015 Yes    Anemia of chronic renal failure, stage 4 (severe) [N18.4, D63.1] 05/20/2024 Yes    Acute cystitis with hematuria [N30.01] 09/14/2024 Yes      Problems Resolved During this Admission:       Discharged Condition: stable    Disposition: Hospice/Home    Follow Up:   Follow-up Information       Narayan Hobbs  - Follow up.    Specialty: Hospice Services  Contact information:  [address]  [address]  [facility] [address]  [phone]               Kashmir Conn MD. Call in 1 week(s).    Specialty: Family Medicine  Contact information:  [address] [facility] [address]  [phone]                           Patient Instructions:      Ambulatory referral/consult to Interventional Radiology   Standing Status: Future   Referral Priority: Routine Referral Type: Consultation   Referral Reason: Specialty Services Required   Requested Specialty: Interventional Radiology   Number of Visits Requested: 1     Diet Cardiac     Diet renal     Notify your health care provider if you experience any of the following:  temperature >100.4     Notify your health care provider if you experience any of the following:  difficulty breathing or increased cough     Notify your health care provider if you experience any of the following:  increased confusion or weakness     Notify your health care provider if you experience any of the following:  redness, tenderness, or signs of infection (pain, swelling, redness, odor or green/yellow discharge around incision site)     Notify your health care provider if you experience any of the following:  persistent nausea and vomiting or diarrhea     Activity as tolerated       Significant Diagnostic Studies: Labs: All labs within the past 24 hours have been reviewed      Recent Results (from the past 100 hour(s))   EKG 12-lead    Collection Time: 09/12/24  2:59 PM   Result Value Ref Range    QRS Duration 128 ms    OHS QTC Calculation 431 ms   CBC auto differential    Collection Time: 09/12/24  3:23 PM   Result Value Ref Range    WBC 7.33 3.90 - 12.70 K/uL    RBC 2.93 (L) 4.60 - 6.20 M/uL    Hemoglobin 8.9 (L) 14.0 - 18.0 g/dL    Hematocrit 27.1 (L) 40.0 - 54.0 %    MCV 93 82 - 98 fL    MCH 30.4 27.0 - 31.0 pg    MCHC 32.8 32.0 - 36.0 g/dL    RDW 16.2 (H) 11.5 - 14.5 %    Platelets 217 150 -  450 K/uL    MPV 10.1 9.2 - 12.9 fL    Immature Granulocytes 1.4 (H) 0.0 - 0.5 %    Gran # (ANC) 5.4 1.8 - 7.7 K/uL    Immature Grans (Abs) 0.10 (H) 0.00 - 0.04 K/uL    Lymph # 0.8 (L) 1.0 - 4.8 K/uL    Mono # 0.9 0.3 - 1.0 K/uL    Eos # 0.2 0.0 - 0.5 K/uL    Baso # 0.05 0.00 - 0.20 K/uL    nRBC 0 0 /100 WBC    Gran % 73.7 (H) 38.0 - 73.0 %    Lymph % 11.5 (L) 18.0 - 48.0 %    Mono % 11.9 4.0 - 15.0 %    Eosinophil % 2.2 0.0 - 8.0 %    Basophil % 0.7 0.0 - 1.9 %    Differential Method Automated    Comprehensive metabolic panel    Collection Time: 09/12/24  3:23 PM   Result Value Ref Range    Sodium 142 136 - 145 mmol/L    Potassium 4.4 3.5 - 5.1 mmol/L    Chloride 112 (H) 95 - 110 mmol/L    CO2 18 (L) 23 - 29 mmol/L    Glucose 129 (H) 70 - 110 mg/dL     (H) 8 - 23 mg/dL    Creatinine 5.6 (H) 0.5 - 1.4 mg/dL    Calcium 8.4 (L) 8.7 - 10.5 mg/dL    Total Protein 5.5 (L) 6.0 - 8.4 g/dL    Albumin 2.7 (L) 3.5 - 5.2 g/dL    Total Bilirubin 0.4 0.1 - 1.0 mg/dL    Alkaline Phosphatase 146 (H) 55 - 135 U/L    AST 20 10 - 40 U/L    ALT 16 10 - 44 U/L    eGFR 9.6 (A) >60 mL/min/1.73 m^2    Anion Gap 12 8 - 16 mmol/L   Troponin I    Collection Time: 09/12/24  3:23 PM   Result Value Ref Range    Troponin I 0.024 0.000 - 0.026 ng/mL   BNP    Collection Time: 09/12/24  3:23 PM   Result Value Ref Range     (H) 0 - 99 pg/mL   ISTAT PROCEDURE    Collection Time: 09/12/24  3:24 PM   Result Value Ref Range    POC PH 7.323 (L) 7.35 - 7.45    POC PCO2 36.4 35 - 45 mmHg    POC PO2 44 40 - 60 mmHg    POC HCO3 18.9 (L) 24 - 28 mmol/L    POC BE -7 (L) -2 to 2 mmol/L    POC SATURATED O2 76 95 - 100 %    POC TCO2 20 (L) 24 - 29 mmol/L    Sample VENOUS     Site Other     Allens Test N/A     DelSys Room Air    Urinalysis, Reflex to Urine Culture Urine, Clean Catch    Collection Time: 09/12/24  6:16 PM    Specimen: Urine   Result Value Ref Range    Specimen UA Urine, Clean Catch     Color, UA Yellow Yellow, Straw, Charlee     Appearance, UA Clear Clear    pH, UA 6.0 5.0 - 8.0    Specific Gravity, UA 1.015 1.005 - 1.030    Protein, UA 2+ (A) Negative    Glucose, UA Negative Negative    Ketones, UA Negative Negative    Bilirubin (UA) Negative Negative    Occult Blood UA 3+ (A) Negative    Nitrite, UA Negative Negative    Urobilinogen, UA Negative <2.0 EU/dL    Leukocytes, UA 1+ (A) Negative   Urinalysis Microscopic    Collection Time: 09/12/24  6:16 PM   Result Value Ref Range    RBC, UA 5 (H) 0 - 4 /hpf    WBC, UA 20 (H) 0 - 5 /hpf    WBC Clumps, UA Rare None-Rare    Bacteria Few (A) None-Occ /hpf    Yeast, UA None None    Squam Epithel, UA 3 /hpf    Hyaline Casts, UA 0 0-1/lpf /lpf    Microscopic Comment SEE COMMENT    Urine culture    Collection Time: 09/12/24  6:16 PM    Specimen: Urine   Result Value Ref Range    Urine Culture, Routine (A)      PSEUDOMONAS AERUGINOSA  >100,000 cfu/ml  Susceptibility pending         Susceptibility    Pseudomonas aeruginosa - CULTURE, URINE     Ciprofloxacin <=0.25 Sensitive mcg/mL     Cefepime 4 Sensitive mcg/mL     Levofloxacin <=0.5 Sensitive mcg/mL     Meropenem <=1 Sensitive mcg/mL     Piperacillin/Tazo <=8 Sensitive mcg/mL     Tobramycin <=2 Sensitive mcg/mL   Comprehensive metabolic panel    Collection Time: 09/12/24  9:03 PM   Result Value Ref Range    Sodium 138 136 - 145 mmol/L    Potassium 4.2 3.5 - 5.1 mmol/L    Chloride 110 95 - 110 mmol/L    CO2 16 (L) 23 - 29 mmol/L    Glucose 106 70 - 110 mg/dL     (H) 8 - 23 mg/dL    Creatinine 5.6 (H) 0.5 - 1.4 mg/dL    Calcium 8.4 (L) 8.7 - 10.5 mg/dL    Total Protein 5.4 (L) 6.0 - 8.4 g/dL    Albumin 2.7 (L) 3.5 - 5.2 g/dL    Total Bilirubin 0.5 0.1 - 1.0 mg/dL    Alkaline Phosphatase 150 (H) 55 - 135 U/L    AST 17 10 - 40 U/L    ALT 16 10 - 44 U/L    eGFR 10 (A) >60 mL/min/1.73 m^2    Anion Gap 12 8 - 16 mmol/L   Magnesium    Collection Time: 09/12/24  9:03 PM   Result Value Ref Range    Magnesium 1.6 1.6 - 2.6 mg/dL   Phosphorus     Collection Time: 09/12/24  9:03 PM   Result Value Ref Range    Phosphorus 5.4 (H) 2.7 - 4.5 mg/dL   PTH, intact    Collection Time: 09/12/24  9:03 PM   Result Value Ref Range    PTH, Intact 79.4 (H) 9.0 - 77.0 pg/mL   Ferritin    Collection Time: 09/12/24  9:03 PM   Result Value Ref Range    Ferritin 197 20.0 - 300.0 ng/mL   Iron and TIBC    Collection Time: 09/12/24  9:03 PM   Result Value Ref Range    Iron 20 (L) 45 - 160 ug/dL    Transferrin 115 (L) 200 - 375 mg/dL    TIBC 170 (L) 250 - 450 ug/dL    Saturated Iron 12 (L) 20 - 50 %   Protime-INR    Collection Time: 09/12/24  9:03 PM   Result Value Ref Range    Prothrombin Time 12.1 9.0 - 12.5 sec    INR 1.1 0.8 - 1.2   APTT    Collection Time: 09/12/24  9:03 PM   Result Value Ref Range    aPTT 29.3 21.0 - 32.0 sec   CBC auto differential    Collection Time: 09/12/24  9:03 PM   Result Value Ref Range    WBC 6.25 3.90 - 12.70 K/uL    RBC 3.11 (L) 4.60 - 6.20 M/uL    Hemoglobin 9.2 (L) 14.0 - 18.0 g/dL    Hematocrit 29.6 (L) 40.0 - 54.0 %    MCV 95 82 - 98 fL    MCH 29.6 27.0 - 31.0 pg    MCHC 31.1 (L) 32.0 - 36.0 g/dL    RDW 16.7 (H) 11.5 - 14.5 %    Platelets 228 150 - 450 K/uL    MPV 10.8 9.2 - 12.9 fL    Immature Granulocytes 1.0 (H) 0.0 - 0.5 %    Gran # (ANC) 4.6 1.8 - 7.7 K/uL    Immature Grans (Abs) 0.06 (H) 0.00 - 0.04 K/uL    Lymph # 0.8 (L) 1.0 - 4.8 K/uL    Mono # 0.7 0.3 - 1.0 K/uL    Eos # 0.1 0.0 - 0.5 K/uL    Baso # 0.02 0.00 - 0.20 K/uL    nRBC 0 0 /100 WBC    Gran % 73.6 (H) 38.0 - 73.0 %    Lymph % 12.3 (L) 18.0 - 48.0 %    Mono % 10.6 4.0 - 15.0 %    Eosinophil % 2.2 0.0 - 8.0 %    Basophil % 0.3 0.0 - 1.9 %    Differential Method Automated    Type & Screen    Collection Time: 09/12/24  9:03 PM   Result Value Ref Range    Group & Rh O POS     Indirect Kamini NEG     Specimen Outdate 09/15/2024 23:59    PTH, intact    Collection Time: 09/12/24  9:03 PM   Result Value Ref Range    PTH, Intact 79.4 (H) 9.0 - 77.0 pg/mL   Calcium, ionized     Collection Time: 09/12/24  9:03 PM   Result Value Ref Range    Ionized Calcium 1.19 1.06 - 1.42 mmol/L   Uric acid    Collection Time: 09/12/24  9:03 PM   Result Value Ref Range    Uric Acid 7.3 (H) 3.4 - 7.0 mg/dL   Hemoglobin A1c if not done in past 3 months    Collection Time: 09/12/24  9:03 PM   Result Value Ref Range    Hemoglobin A1C 5.2 4.0 - 5.6 %    Estimated Avg Glucose 103 68 - 131 mg/dL   EKG 12-lead    Collection Time: 09/12/24  9:27 PM   Result Value Ref Range    QRS Duration 124 ms    OHS QTC Calculation 509 ms   EKG 12-lead    Collection Time: 09/12/24  9:28 PM   Result Value Ref Range    QRS Duration 134 ms    OHS QTC Calculation 492 ms   Basic metabolic panel    Collection Time: 09/13/24  5:03 AM   Result Value Ref Range    Sodium 140 136 - 145 mmol/L    Potassium 4.4 3.5 - 5.1 mmol/L    Chloride 111 (H) 95 - 110 mmol/L    CO2 18 (L) 23 - 29 mmol/L    Glucose 84 70 - 110 mg/dL     (H) 8 - 23 mg/dL    Creatinine 5.5 (H) 0.5 - 1.4 mg/dL    Calcium 8.2 (L) 8.7 - 10.5 mg/dL    Anion Gap 11 8 - 16 mmol/L    eGFR 10 (A) >60 mL/min/1.73 m^2   Hemoglobin A1c    Collection Time: 09/13/24  5:03 AM   Result Value Ref Range    Hemoglobin A1C 5.1 4.0 - 5.6 %    Estimated Avg Glucose 100 68 - 131 mg/dL   CBC Auto Differential    Collection Time: 09/13/24  5:03 AM   Result Value Ref Range    WBC 5.92 3.90 - 12.70 K/uL    RBC 2.92 (L) 4.60 - 6.20 M/uL    Hemoglobin 8.9 (L) 14.0 - 18.0 g/dL    Hematocrit 28.0 (L) 40.0 - 54.0 %    MCV 96 82 - 98 fL    MCH 30.5 27.0 - 31.0 pg    MCHC 31.8 (L) 32.0 - 36.0 g/dL    RDW 16.8 (H) 11.5 - 14.5 %    Platelets 219 150 - 450 K/uL    MPV 10.7 9.2 - 12.9 fL    Immature Granulocytes 1.0 (H) 0.0 - 0.5 %    Gran # (ANC) 3.9 1.8 - 7.7 K/uL    Immature Grans (Abs) 0.06 (H) 0.00 - 0.04 K/uL    Lymph # 1.0 1.0 - 4.8 K/uL    Mono # 0.8 0.3 - 1.0 K/uL    Eos # 0.2 0.0 - 0.5 K/uL    Baso # 0.03 0.00 - 0.20 K/uL    nRBC 0 0 /100 WBC    Gran % 66.0 38.0 - 73.0 %    Lymph % 16.0 (L)  18.0 - 48.0 %    Mono % 13.3 4.0 - 15.0 %    Eosinophil % 3.2 0.0 - 8.0 %    Basophil % 0.5 0.0 - 1.9 %    Differential Method Automated    POCT glucose    Collection Time: 09/13/24  7:08 AM   Result Value Ref Range    POCT Glucose 97 70 - 110 mg/dL   EKG 12-lead    Collection Time: 09/13/24  7:58 AM   Result Value Ref Range    QRS Duration 124 ms    OHS QTC Calculation 436 ms   POCT glucose    Collection Time: 09/13/24 11:07 AM   Result Value Ref Range    POCT Glucose 86 70 - 110 mg/dL   Hepatitis B surface antigen    Collection Time: 09/13/24  1:00 PM   Result Value Ref Range    Hepatitis B Surface Ag Non-reactive Non-reactive   Hepatitis B surface antibody    Collection Time: 09/13/24  1:00 PM   Result Value Ref Range    Hep B S Ab <3.00 mIU/mL    Hep B S Ab Non-reactive    POCT glucose    Collection Time: 09/13/24  6:04 PM   Result Value Ref Range    POCT Glucose 80 70 - 110 mg/dL   POCT glucose    Collection Time: 09/13/24  8:55 PM   Result Value Ref Range    POCT Glucose 139 (H) 70 - 110 mg/dL   Hepatitis B Core Antibody, Total    Collection Time: 09/14/24  4:59 AM   Result Value Ref Range    Hep B Core Total Ab Non-reactive Non-reactive   Hepatitis B Surface Ab, Qualitative    Collection Time: 09/14/24  4:59 AM   Result Value Ref Range    Hep B S Ab <3.00 mIU/mL    Hep B S Ab Non-reactive    Hepatitis Panel, Acute    Collection Time: 09/14/24  4:59 AM   Result Value Ref Range    Hepatitis B Surface Ag Non-reactive Non-reactive    Hep B C IgM Non-reactive Non-reactive    Hep A IgM Non-reactive Non-reactive    Hepatitis C Ab Non-reactive Non-reactive   Basic Metabolic Panel    Collection Time: 09/14/24  4:59 AM   Result Value Ref Range    Sodium 143 136 - 145 mmol/L    Potassium 3.8 3.5 - 5.1 mmol/L    Chloride 111 (H) 95 - 110 mmol/L    CO2 20 (L) 23 - 29 mmol/L    Glucose 95 70 - 110 mg/dL    BUN 84 (H) 8 - 23 mg/dL    Creatinine 4.4 (H) 0.5 - 1.4 mg/dL    Calcium 8.0 (L) 8.7 - 10.5 mg/dL    Anion Gap 12 8 -  16 mmol/L    eGFR 13 (A) >60 mL/min/1.73 m^2   Magnesium    Collection Time: 09/14/24  4:59 AM   Result Value Ref Range    Magnesium 1.7 1.6 - 2.6 mg/dL   Phosphorus    Collection Time: 09/14/24  4:59 AM   Result Value Ref Range    Phosphorus 5.3 (H) 2.7 - 4.5 mg/dL   CBC Auto Differential    Collection Time: 09/14/24  4:59 AM   Result Value Ref Range    WBC 5.97 3.90 - 12.70 K/uL    RBC 3.24 (L) 4.60 - 6.20 M/uL    Hemoglobin 9.5 (L) 14.0 - 18.0 g/dL    Hematocrit 30.6 (L) 40.0 - 54.0 %    MCV 94 82 - 98 fL    MCH 29.3 27.0 - 31.0 pg    MCHC 31.0 (L) 32.0 - 36.0 g/dL    RDW 16.8 (H) 11.5 - 14.5 %    Platelets 212 150 - 450 K/uL    MPV 11.2 9.2 - 12.9 fL    Immature Granulocytes 0.8 (H) 0.0 - 0.5 %    Gran # (ANC) 4.3 1.8 - 7.7 K/uL    Immature Grans (Abs) 0.05 (H) 0.00 - 0.04 K/uL    Lymph # 0.7 (L) 1.0 - 4.8 K/uL    Mono # 0.7 0.3 - 1.0 K/uL    Eos # 0.2 0.0 - 0.5 K/uL    Baso # 0.03 0.00 - 0.20 K/uL    nRBC 0 0 /100 WBC    Gran % 71.7 38.0 - 73.0 %    Lymph % 12.2 (L) 18.0 - 48.0 %    Mono % 12.1 4.0 - 15.0 %    Eosinophil % 2.7 0.0 - 8.0 %    Basophil % 0.5 0.0 - 1.9 %    Differential Method Automated    POCT glucose    Collection Time: 09/14/24  8:44 AM   Result Value Ref Range    POCT Glucose 152 (H) 70 - 110 mg/dL   POCT glucose    Collection Time: 09/14/24 12:04 PM   Result Value Ref Range    POCT Glucose 142 (H) 70 - 110 mg/dL   POCT glucose    Collection Time: 09/14/24  6:05 PM   Result Value Ref Range    POCT Glucose 125 (H) 70 - 110 mg/dL   POCT glucose    Collection Time: 09/14/24  7:31 PM   Result Value Ref Range    POCT Glucose 132 (H) 70 - 110 mg/dL   Basic Metabolic Panel    Collection Time: 09/15/24  4:22 AM   Result Value Ref Range    Sodium 139 136 - 145 mmol/L    Potassium 3.3 (L) 3.5 - 5.1 mmol/L    Chloride 106 95 - 110 mmol/L    CO2 23 23 - 29 mmol/L    Glucose 102 70 - 110 mg/dL    BUN 51 (H) 8 - 23 mg/dL    Creatinine 3.3 (H) 0.5 - 1.4 mg/dL    Calcium 7.7 (L) 8.7 - 10.5 mg/dL    Anion Gap  10 8 - 16 mmol/L    eGFR 18 (A) >60 mL/min/1.73 m^2   Magnesium    Collection Time: 09/15/24  4:22 AM   Result Value Ref Range    Magnesium 1.6 1.6 - 2.6 mg/dL   Phosphorus    Collection Time: 09/15/24  4:22 AM   Result Value Ref Range    Phosphorus 3.4 2.7 - 4.5 mg/dL   CBC Auto Differential    Collection Time: 09/15/24  4:22 AM   Result Value Ref Range    WBC 6.28 3.90 - 12.70 K/uL    RBC 3.03 (L) 4.60 - 6.20 M/uL    Hemoglobin 9.0 (L) 14.0 - 18.0 g/dL    Hematocrit 28.4 (L) 40.0 - 54.0 %    MCV 94 82 - 98 fL    MCH 29.7 27.0 - 31.0 pg    MCHC 31.7 (L) 32.0 - 36.0 g/dL    RDW 16.5 (H) 11.5 - 14.5 %    Platelets 193 150 - 450 K/uL    MPV 11.3 9.2 - 12.9 fL    Immature Granulocytes 0.5 0.0 - 0.5 %    Gran # (ANC) 4.4 1.8 - 7.7 K/uL    Immature Grans (Abs) 0.03 0.00 - 0.04 K/uL    Lymph # 0.8 (L) 1.0 - 4.8 K/uL    Mono # 0.9 0.3 - 1.0 K/uL    Eos # 0.1 0.0 - 0.5 K/uL    Baso # 0.02 0.00 - 0.20 K/uL    nRBC 0 0 /100 WBC    Gran % 70.3 38.0 - 73.0 %    Lymph % 12.1 (L) 18.0 - 48.0 %    Mono % 15.0 4.0 - 15.0 %    Eosinophil % 1.8 0.0 - 8.0 %    Basophil % 0.3 0.0 - 1.9 %    Differential Method Automated    Iron and TIBC    Collection Time: 09/15/24  4:22 AM   Result Value Ref Range    Iron 72 45 - 160 ug/dL    Transferrin 103 (L) 200 - 375 mg/dL    TIBC 152 (L) 250 - 450 ug/dL    Saturated Iron 47 20 - 50 %   Ferritin    Collection Time: 09/15/24  4:22 AM   Result Value Ref Range    Ferritin 200 20.0 - 300.0 ng/mL   POCT glucose    Collection Time: 09/15/24  8:39 AM   Result Value Ref Range    POCT Glucose 138 (H) 70 - 110 mg/dL       Microbiology Results (last 7 days)       ** No results found for the last 168 hours. **                    Pending Diagnostic Studies:       Procedure Component Value Units Date/Time    Hepatitis B Surface Antibody, Qual/Quant [[id number]] Collected: 09/14/24 1440    Order Status: Sent Lab Status: In process Updated: 09/14/24 1519    Specimen: Blood     Hepatitis B surface antigen  "[[id number]] Collected: 09/14/24 1440    Order Status: Sent Lab Status: No result     Specimen: Blood            Medications:  Reconciled Home Medications:      Medication List        START taking these medications      cefUROXime 500 MG tablet  Commonly known as: CEFTIN  Take 1 tablet (500 mg total) by mouth every 12 (twelve) hours. for 3 days  Start taking on: September 16, 2024            CHANGE how you take these medications      furosemide 80 MG tablet  Commonly known as: LASIX  Take 1 tablet (80 mg total) by mouth once daily.  What changed:   medication strength  how much to take     MOUNJARO 2.5 mg/0.5 mL Pnij  Generic drug: tirzepatide  Inject 2.5 mg into the skin every 7 days.  What changed: when to take this     NIFEdipine 30 MG Tbsr  Commonly known as: ADALAT CC  Take 1 tablet (30 mg total) by mouth once daily.  What changed: when to take this  Notes to patient: HOLD DOSE IF YOUR SYSTOLIC BLOOD PRESSURE(TOP #) IS LESS THAN 130            CONTINUE taking these medications      acetaminophen 325 MG tablet  Commonly known as: TYLENOL  Take 2 tablets (650 mg total) by mouth every 6 (six) hours as needed for Pain.     allopurinoL 100 MG tablet  Commonly known as: ZYLOPRIM  Take 1 tablet (100 mg total) by mouth once daily.     aspirin 81 MG EC tablet  Commonly known as: ECOTRIN  Take 81 mg by mouth once daily.     BD INSULIN SYRINGE ULTRA-FINE 1/2 mL 31 gauge x 15/64" Syrg  Generic drug: insulin syringe-needle U-100     BD VEO INSULIN SYRINGE UF 1 mL 31 gauge x 15/64" Syrg  Generic drug: insulin syringe-needle U-100  USE SYRINGE FIVE TIMES DAILY SINGLE USE     calcitRIOL 0.25 MCG Cap  Commonly known as: ROCALTROL  Take 1 capsule (0.25 mcg total) by mouth once daily.     CALCIUM ORAL  Take 1 tablet by mouth Daily.     carvediloL 25 MG tablet  Commonly known as: COREG  Take 25 mg by mouth 2 (two) times daily with meals.     EScitalopram oxalate 10 MG tablet  Commonly known as: LEXAPRO  Take 1 tablet (10 mg total) " by mouth once daily.     ezetimibe 10 mg tablet  Commonly known as: ZETIA  Take 10 mg by mouth every evening.     hydrALAZINE 100 MG tablet  Commonly known as: APRESOLINE  Take 1 tablet (100 mg total) by mouth 3 (three) times daily.     insulin lispro 100 unit/mL injection  Inject 0-10 Units into the skin 3 (three) times daily before meals. Sliding scale with meals based on glucose as follows  - 0 units; 131-180 - 2 units; 181- 240 - 4 units; 241-300 - 6 units; 301-350 - 8 units; 351-400 - 10 units     isosorbide mononitrate 60 MG 24 hr tablet  Commonly known as: IMDUR  Take 1 tablet (60 mg total) by mouth once daily.     traZODone 50 MG tablet  Commonly known as: DESYREL  Take 1 tablet (50 mg total) by mouth nightly as needed for Insomnia.     vitamin D 1000 units Tab  Commonly known as: VITAMIN D3  Take 2,000 Units by mouth once daily.            STOP taking these medications      doxazosin 4 MG tablet  Commonly known as: CARDURA              Indwelling Lines/Drains at time of discharge:   Lines/Drains/Airways       Central Venous Catheter Line  Duration                  Hemodialysis Catheter 09/13/24 1331 right internal jugular 2 days                    Time spent on the discharge of patient: Greater than 35 minutes         Caridad Zavala DO  Department of Hospital Medicine  [facility]

## 2024-09-15 NOTE — PLAN OF CARE
Problem: Adult Inpatient Plan of Care  Goal: Plan of Care Review  Outcome: Met  Goal: Patient-Specific Goal (Individualized)  Outcome: Met  Goal: Absence of Hospital-Acquired Illness or Injury  Outcome: Met  Goal: Optimal Comfort and Wellbeing  Outcome: Met  Goal: Readiness for Transition of Care  Outcome: Met     Problem: Diabetes Comorbidity  Goal: Blood Glucose Level Within Targeted Range  Outcome: Met     Problem: Infection  Goal: Absence of Infection Signs and Symptoms  Outcome: Met     Problem: Skin Injury Risk Increased  Goal: Skin Health and Integrity  Outcome: Met     Problem: Hemodialysis  Goal: Safe, Effective Therapy Delivery  Outcome: Met  Goal: Effective Tissue Perfusion  Outcome: Met  Goal: Absence of Infection Signs and Symptoms  Outcome: Met     Problem: Wound  Goal: Optimal Coping  Outcome: Met  Goal: Optimal Functional Ability  Outcome: Met  Goal: Absence of Infection Signs and Symptoms  Outcome: Met  Goal: Improved Oral Intake  Outcome: Met  Goal: Optimal Pain Control and Function  Outcome: Met  Goal: Skin Health and Integrity  Outcome: Met  Goal: Optimal Wound Healing  Outcome: Met     Problem: Physical Therapy  Goal: Physical Therapy Goal  Description: Goals to be met by: 24     Patient will increase functional independence with mobility by performin. Supine to sit with Modified [place]  2. Sit to supine with Modified [place]  3. Sit to stand transfer with Modified [place]  4. Gait  x 250 feet with [place] and Modified [place] using Rolling Walker.   5. Lower extremity exercise program x30 reps per handout, with independence    Outcome: Met

## 2024-09-15 NOTE — DISCHARGE INSTRUCTIONS
Insulin Lispro/Humalog -RAPID/short acting- inject before eating breakfast, lunch and dinner    Blood Glucose PRE-Meal                             -------Inject----> NO INSULIN    131-180-----Inject----->2 units                      181-240----Inject-----> 4 units                        241-300----Inject-----> 6 units                        301-350----Inject-----> 8 units                         351-400----Inject---->10 units                             Our goal at [facility] is to always give you outstanding care and exceptional service. You may receive a survey by mail, text or e-mail in the next 7-10 days from Elder Strauss and our leadership team asking about the care you received with us. The survey should only take 5-10 minutes to complete and is very important to us.     Your feedback provides us with a way to recognize our staff who work tirelessly to provide the best care! Also, your responses help us learn how to improve when your experience was below our aspiration of excellence. We WILL use your feedback to continue making improvements to help us provide the highest quality care. We keep your personal information and feedback confidential. We appreciate your time completing this survey and can't wait to hear from you!!!     We want you to leave us today feeling like you can DEFINITELY recommend us to others! We look forward to your continued care with us! Thanks so much for choosing [facility] for your healthcare needs!

## 2024-09-15 NOTE — PLAN OF CARE
Case Management Final Discharge Note    *Discharge Disposition: Home with Home Hospice    *New DME ordered/ Company name: None    *Relevant SDOH/ Transition of Care Barriers: None    *Primary Caretaker and contact information: Candace (daughter) [phone]    *Scheduled Followup Appointment: Refer to AVS    *Referrals Placed: [facility]     *Transportation: Private Vehicle       TN educated patient and family on discharge plan and services. Bedside nurse notified. Patient clear to discharge from Case Management standpoint.        09/15/24 1417   Final Note   Assessment Type Final Discharge Note   Anticipated Discharge Disposition [facility] Resources/Appts/Education Provided Appointments scheduled and added to AVS   Post-Acute Status   Post-Acute Authorization Hospice   Hospice Status Set-up Complete/Auth obtained

## 2024-09-15 NOTE — ASSESSMENT & PLAN NOTE
-Mild confusion noted on admission.   -Nephrology following:  Initiated dialysis on 09/13/24  -Improving  -09/15/24, patient no longer want to proceed with dialysis.  Request for home hospice  -continue Lasix 80 mg p.o. daily to preserve residual renal function per nephrology

## 2024-09-15 NOTE — PROGRESS NOTES
TN met with patient and family at bedside to discuss patient's hospice provider preference. Patient would like [facility].    TN sent DC packet and hospice orders to [facility] via careport. Awaiting feedback.     1303- TN contacted [facility] at [phone] to inquire of patient's acceptance, spoke with Catarino. [facility] is not able to accept patient.    1317- TN sent patient's dc packet and orders to [facility]. Awaiting Feedback.

## 2024-09-15 NOTE — PROGRESS NOTES
.AVS virtually reviewed with Mr Garcia and his family in its entirety with emphasis on diet and medications. Hospice care also discussed. Patient also encouraged to utilize their patient portal. Ease and convenience of use reiterated. Education complete and patient voiced understanding. All questions answered. Discharge teaching complete. Transport requested.

## 2024-09-15 NOTE — SUBJECTIVE & OBJECTIVE
Interval History:  No acute overnight events.  Patient remained afebrile.  Daughter and son at bedside.  Patient completed HD yesterday and states he do not want to further proceed with it. After discussing with his family, he request to become DNR and enrolled in hospice. He wants to focus on his quality of life.     Review of Systems   Constitutional:  Negative for chills, fatigue and fever.   HENT:          Hard of hearing   Respiratory:  Negative for cough and shortness of breath.    Cardiovascular:  Positive for leg swelling (improving). Negative for chest pain and palpitations.   Gastrointestinal:  Negative for abdominal pain, nausea and vomiting.   Genitourinary:  Positive for decreased urine volume. Negative for difficulty urinating, dysuria and urgency.   Musculoskeletal:  Negative for back pain.   Neurological:  Negative for dizziness, weakness and headaches.   Psychiatric/Behavioral:  Negative for confusion and decreased concentration.      Objective:     Vital Signs (Most Recent):  Temp: 98.3 °F (36.8 °C) (09/15/24 1121)  Pulse: 63 (09/15/24 1124)  Resp: 18 (09/15/24 1121)  BP: 132/62 (09/15/24 1121)  SpO2: (!) 90 % (09/15/24 1121) Vital Signs (24h Range):  Temp:  [97.7 °F (36.5 °C)-99.1 °F (37.3 °C)] 98.3 °F (36.8 °C)  Pulse:  [59-85] 63  Resp:  [12-18] 18  SpO2:  [90 %-95 %] 90 %  BP: (108-180)/(54-94) 132/62     Weight: 93 kg (205 lb 0.4 oz)  Body mass index is 29.42 kg/m².    Intake/Output Summary (Last 24 hours) at 9/15/2024 1222  Last data filed at 9/14/2024 1725  Gross per 24 hour   Intake --   Output 2500 ml   Net -2500 ml         Physical Exam  Vitals and nursing note reviewed.   Constitutional:       General: He is not in acute distress.     Appearance: He is not ill-appearing.   HENT:      Head: Atraumatic.      Nose: Nose normal.      Mouth/Throat:      Mouth: Mucous membranes are moist.   Eyes:      Extraocular Movements: Extraocular movements intact.   Neck:      Comments: Noted elevated  JVD  Cardiovascular:      Rate and Rhythm: Normal rate and regular rhythm.      Pulses: Normal pulses.      Heart sounds: Murmur heard.      No friction rub. No gallop.      Comments: Pacemaker noted left upper chest   Pulmonary:      Effort: Pulmonary effort is normal. No respiratory distress.      Breath sounds: Normal breath sounds. No wheezing, rhonchi or rales.   Abdominal:      General: There is no distension.      Palpations: Abdomen is soft.      Tenderness: There is no abdominal tenderness.   Musculoskeletal:         General: Swelling present.      Right lower leg: Edema present.      Left lower leg: Edema present.   Skin:     General: Skin is warm.      Capillary Refill: Capillary refill takes less than 2 seconds.      Findings: No erythema or rash.      Comments: Tunneled HD cath on right upper chest wall   Neurological:      Mental Status: He is alert and oriented to person, place, and time. Mental status is at baseline.   Psychiatric:         Mood and Affect: Mood normal.         Behavior: Behavior normal.         Thought Content: Thought content normal.             Significant Labs: All pertinent labs within the past 24 hours have been reviewed.    Significant Imaging: I have reviewed all pertinent imaging results/findings within the past 24 hours.

## 2024-09-15 NOTE — NURSING
Patient states he would like to change code status to DNR. Also, spoke with pt's daughter Dena at bedside and she is in agreement with her father's decision and went on to further state that he would like to go home with hospice. Notified Dr. Zavala.

## 2024-09-15 NOTE — NURSING
"Per pt daughter, Dena, at bedside, pt would like to changed code status to DNR. Notified Dr Garza; "Please discuss with the morning team, they may want to consult palliative to discuss detailed advance care planning".   "

## 2024-09-15 NOTE — ASSESSMENT & PLAN NOTE
-now progression to ESRD  -IR consulted: s/p THDC placement on 09/13/2024  -Nephrology consulted:  Initiated hemodialysis on 09/13/2024    -- nephrology plan for HD again on Sunday x3hrs, and HD again Monday x4hrs  -consult CM for outpatient HD placement   -09/15/24, patient no longer want to proceed with dialysis.  Request for home hospice  -continue Lasix 80 mg p.o. daily to preserve residual renal function per nephrology   -renal dose all meds.   -Monitor Is/Os

## 2024-09-15 NOTE — PROGRESS NOTES
[facility]  Nephrology  Progress Note    Patient Name: Nestor Garcia  MRN: [id number]  Admission Date: 9/12/2024  Hospital Length of Stay: 3 days  Attending Provider: Caridad Zavala DO   Primary Care Physician: Kashmir Conn MD  Principal Problem:Acute worsening of stage 4 chronic kidney disease  Date of service 9/15/2024    Subjective:     HPI: pt status changed to DNR, declined to further proceed with HD.     Past Medical History:   Diagnosis Date    Acute respiratory failure with hypoxia 08/18/2022    daughter denies    Anxiety disorder, unspecified     CHF (congestive heart failure)     COVID 11/23/2022    Dehydration 12/22/2021    medication induced    Depression     Diabetes mellitus type II     Gout, unspecified     Hypertension     MDD (major depressive disorder), recurrent episode, mild 04/15/2023    Documented since 2022   Chronic medical conditions  Lexapro 10mg       Melanoma in situ 10/23/2023    left ear    Melanoma in situ of left ear 11/13/2023    left ear    Positive D dimer 12/22/2021    Sick sinus syndrome     Sleep apnea     cpap machine    TIA (transient ischemic attack) 08/18/2022    daughter denies       Past Surgical History:   Procedure Laterality Date    APPENDECTOMY      BACK SURGERY      CHOLECYSTECTOMY      EXTRACTION OF CATARACT Left 08/23/2023    Procedure: EXTRACTION, CATARACT;  Surgeon: Alonso Rodriguez MD;  Location: Counts include 234 beds at the [facility] OR;  Service: Ophthalmology;  Laterality: Left;  DiB00 +22.5D    EXTRACTION OF CATARACT Right 11/08/2023    Procedure: EXTRACTION, CATARACT;  Surgeon: Alonso Rodriguez MD;  Location: Counts include 234 beds at the [facility] OR;  Service: Ophthalmology;  Laterality: Right;  DiB00 +22.5D    INNER EAR SURGERY      unilateral    INSERTION OF PACEMAKER      INSERTION OF PACEMAKER  2023    KNEE SURGERY      unilateral    PHACOEMULSIFICATION, CATARACT, WITH IOL INSERTION Left 08/23/2023    Procedure: PHACOEMULSIFICATION, CATARACT, WITH IOL INSERTION;  Surgeon: Alonso Rodriguez MD;  [facility]  Service: Ophthalmology;  Laterality: Left;    PHACOEMULSIFICATION, CATARACT, WITH IOL INSERTION Right 11/08/2023    Procedure: PHACOEMULSIFICATION, CATARACT, WITH IOL INSERTION;  Surgeon: Alonso Rodriguez MD;  Location: [facility];  Service: Ophthalmology;  Laterality: Right;       Review of patient's allergies indicates:   Allergen Reactions    Adhesive Other (See Comments)     Skin irritation    Lisinopril Other (See Comments)     Kidney failure    Statins-hmg-coa reductase inhibitors Other (See Comments)     pain     Current Facility-Administered Medications   Medication Frequency    0.9%  NaCl infusion PRN    0.9%  NaCl infusion Once    acetaminophen tablet 650 mg Q4H PRN    allopurinoL tablet 100 mg Daily    aspirin EC tablet 81 mg Daily    carvediloL tablet 25 mg BID WM    cefTRIAXone (ROCEPHIN) 2 g in D5W 100 mL IVPB (MB+) Q24H    dextrose 10% bolus 125 mL 125 mL PRN    dextrose 10% bolus 250 mL 250 mL PRN    docusate sodium capsule 100 mg Daily    epoetin jim-epbx injection 20,000 Units Every Tues, Thurs, Sat    EScitalopram oxalate tablet 10 mg Daily    ezetimibe tablet 10 mg QHS    furosemide tablet 80 mg BID    glucagon (human recombinant) injection 1 mg PRN    glucose chewable tablet 16 g PRN    glucose chewable tablet 24 g PRN    heparin (porcine) injection 5,000 Units Q8H    hydrALAZINE injection 10 mg Q6H PRN    hydrALAZINE tablet 100 mg Q8H    insulin aspart U-100 pen 0-5 Units QID (AC + HS) PRN    isosorbide mononitrate 24 hr tablet 60 mg Daily    melatonin tablet 6 mg Nightly PRN    mupirocin 2 % ointment BID    NIFEdipine 24 hr tablet 30 mg Daily    ondansetron injection 4 mg Q8H PRN    polyethylene glycol packet 17 g BID PRN    simethicone chewable tablet 80 mg QID PRN    sodium chloride 0.9% bolus 250 mL 250 mL PRN    sodium chloride 0.9% flush 10 mL Q12H PRN    traZODone tablet 50 mg Nightly PRN    vitamin renal formula (B-complex-vitamin c-folic acid) 1 mg per capsule 1 capsule Daily      Facility-Administered Medications Ordered in Other Encounters   Medication Frequency    LIDOcaine (PF) 10 mg/ml (1%) injection 10 mg Once    LIDOcaine (PF) 10 mg/ml (1%) injection 10 mg Once    sodium chloride 0.9% flush 10 mL PRN    sodium chloride 0.9% flush 10 mL PRN     Family History       Problem Relation (Age of Onset)    Alzheimer's disease Mother    Hearing loss Sister, Daughter    Heart disease Father          Tobacco Use    Smoking status: Never    Smokeless tobacco: Never   Substance and Sexual Activity    Alcohol use: No    Drug use: No    Sexual activity: Not Currently     Partners: Female     Birth control/protection: None     Review of Systems   Constitutional:  Positive for activity change and fatigue.   HENT:  Positive for hearing loss.    Eyes: Negative.    Cardiovascular:  Positive for leg swelling.   Gastrointestinal: Negative.    Endocrine: Negative.    Genitourinary:  Positive for decreased urine volume.   Allergic/Immunologic: Negative.    Neurological:  Positive for weakness.   Hematological:  Bruises/bleeds easily.   Psychiatric/Behavioral: Negative.     All other systems reviewed and are negative.    Objective:     Vital Signs (Most Recent):  Temp: 99.1 °F (37.3 °C) (09/15/24 0744)  Pulse: 61 (09/15/24 0845)  Resp: 18 (09/15/24 0744)  BP: (!) 117/58 (09/15/24 0845)  SpO2: (!) 91 % (09/15/24 0744) Vital Signs (24h Range):  Temp:  [97.7 °F (36.5 °C)-99.1 °F (37.3 °C)] 99.1 °F (37.3 °C)  Pulse:  [59-85] 61  Resp:  [12-18] 18  SpO2:  [91 %-95 %] 91 %  BP: (108-187)/(54-94) 117/58     Weight: 93 kg (205 lb 0.4 oz) (09/12/24 2030)  Body mass index is 29.42 kg/m².  Body surface area is 2.14 meters squared.    I/O last 3 completed shifts:  In: 100.9 [IV Piggyback:100.9]  Out: 2750 [Urine:250; Other:2500]    Physical Exam  Vitals and nursing note reviewed.   Constitutional:       General: He is not in acute distress.     Appearance: Normal appearance. He is well-developed. He is not  ill-appearing or diaphoretic.   HENT:      Head: Normocephalic and atraumatic.   Eyes:      General: No scleral icterus.        Right eye: No discharge.         Left eye: No discharge.   Cardiovascular:      Rate and Rhythm: Normal rate and regular rhythm.      Heart sounds: Murmur heard.      No friction rub.   Pulmonary:      Effort: Pulmonary effort is normal. No respiratory distress.      Breath sounds: Normal breath sounds. No wheezing or rales.   Chest:      Comments: RIJ permcath  Abdominal:      General: There is no distension.      Palpations: Abdomen is soft. There is no mass.      Tenderness: There is no abdominal tenderness.   Musculoskeletal:         General: No deformity.      Right lower leg: Edema present.      Left lower leg: Edema present.   Skin:     General: Skin is warm and dry.      Findings: No erythema or rash.   Neurological:      Mental Status: He is alert and oriented to person, place, and time.   Psychiatric:         Mood and Affect: Mood normal.         Behavior: Behavior normal.         Thought Content: Thought content normal.         Significant Labs:  CBC:   Recent Labs   Lab 09/15/24  0422   WBC 6.28   RBC 3.03*   HGB 9.0*   HCT 28.4*      MCV 94   MCH 29.7   MCHC 31.7*     CMP:   Recent Labs   Lab 09/12/24  2103 09/13/24  0503 09/15/24  0422      < > 102   CALCIUM 8.4*   < > 7.7*   ALBUMIN 2.7*  --   --    PROT 5.4*  --   --       < > 139   K 4.2   < > 3.3*   CO2 16*   < > 23      < > 106   *   < > 51*   CREATININE 5.6*   < > 3.3*   ALKPHOS 150*  --   --    ALT 16  --   --    AST 17  --   --    BILITOT 0.5  --   --     < > = values in this interval not displayed.     All labs within the past 24 hours have been reviewed.    Significant Imaging:  X-Ray: Reviewed      PTH 79 (9/12/24)        Assessment/Plan:     Active Diagnoses:    Diagnosis Date Noted POA    PRINCIPAL PROBLEM:  Acute worsening of stage 4 chronic kidney disease [N18.4] 09/13/2024 Yes     Acute cystitis with hematuria [N30.01] 09/14/2024 Yes    Uremia [N19] 09/13/2024 Yes    Hyperphosphatemia [E83.39] 07/29/2024 Yes    Anemia of chronic renal failure, stage 4 (severe) [N18.4, D63.1] 05/20/2024 Yes    Chronic diastolic heart failure, NYHA class 2 [I50.32] 08/18/2022 Yes    Benign essential hypertension [I10] 08/04/2015 Yes    Type 2 diabetes mellitus, without long-term current use of insulin [E11.9] 08/04/2015 Yes    Dyslipidemia [E78.5] 08/04/2015 Yes      Problems Resolved During this Admission:     Pt's status changed to DNR, declined to further proceed with HD. Advised to continue to use Lasix 80 mg daily to preserve residual renal function.     Nephrology will sign off    Case discussed with Dr. Luis    Thank you for your consult. I will follow-up with patient. Please contact us if you have any additional questions.    Mary Anne Simon, NP  Nephrology  [facility] - Med Surg

## 2024-09-15 NOTE — NURSING
[facility] [place]  Nurses Note -- 4 Eyes      9/15/2024       Skin assessed on: Q Shift      [x] No Pressure Injuries Present    [x]Prevention Measures Documented    [] Yes LDA  for Pressure Injury Previously documented     [] Yes New Pressure Injury Discovered   [] LDA for New Pressure Injury Added      Attending RN:  Alexandra Ruiz RN     Second RN:  JITENDRA Gordillo

## 2024-09-15 NOTE — ACP (ADVANCE CARE PLANNING)
Advance Care Planning     Date: 09/15/2024    Code Status  I engaged the the patient and family in a voluntary conversation about the patient's preferences for care  at the very end of life. The patient wishes to have a natural, peaceful death.  Along those lines, the patient does not wish to have CPR or other invasive treatments performed when his heart and/or breathing stops. I communicated to the patient and family that a DNR order would be placed in his medical record to reflect this preference.    A total of 18 min was spent on advance care planning, goals of care discussion, emotional support, formulating and communicating prognosis and exploring burden/benefit of various approaches of treatment. This discussion occurred on a fully voluntary basis with the verbal consent of the patient and/or family.     [facility]  I engaged the patient and family in a voluntary conversation about advance care planning and we specifically addressed what the goals of care would be moving forward, in light of the patient's change in clinical status, specifically CKD4 now progressing to ESRD, requiring dialysis.  Patient states after 2 sessions of hemodialysis he feels worn out.  States the dialysis takes a toll on him causing him discomfort. He states he had a good life and does not want to live the rest of his life on dialysis.  We explored the patient's values and preferences for future care.  The patient and family endorses that what is most important right now is to focus on spending time at home, avoiding the hospital, remaining as independent as possible, and quality of life, even if it means sacrificing a little time    Accordingly, we have decided that the best plan to meet the patient's goals includes enrolling in hospice care    A total of 18 min was spent on advance care planning, goals of care discussion, emotional support, formulating and communicating prognosis and exploring burden/benefit of various approaches of  treatment. This discussion occurred on a fully voluntary basis with the verbal consent of the patient and/or family.

## 2024-09-15 NOTE — PROGRESS NOTES
[facility] Medicine  Progress Note    Patient Name: Nestor Garcia  MRN: [id number]  Patient Class: IP- Inpatient   Admission Date: 9/12/2024  Length of Stay: 3 days  Attending Physician: Caridad Zavala DO  Primary Care Provider: Kashmir Conn MD        Subjective:     Principal Problem:Acute worsening of stage 4 chronic kidney disease        HPI:  79-year-old male with a history of CKD 4, essential hypertension, anemia, secondary hyperparathyroidism, gout, CHF(grade 2 diastolic) and follows the [facility]iar[facility] Aceves (s/p Pace-maker), HLD, NIDDM type 2 (on mounjaro), sleep apnea, and TIA presented to [facility] Emergency Department on September 12 with lower extremity swelling up to the abdomen along with confusion and dyspnea. He had no chest pain. States decreased UOP despite his diuretics (he states he is on lasix). He has been following with Nephrology and discussions about PD have been made recently.  NO fevers or chills.     Labs at the OSH-ED noted:  White blood cells 7.33, hemoglobin 8.9, hematocrit 27.1, platelets 217, sodium 142, potassium 4.4, chloride 112, CO2 18, , creatinine 5.6, glucose 129, AST 20, ALT 16, , troponin 0.024  -venous pH 7.323  Was given IV lasix 120mg x1   CXR: clear right lung. Left basilar opacity with associated pleural fluid. No pneumothorax. Enlarged cardiac silhouette. Left-sided cardiac defibrillator.  EKG showed atrial paced complexes, left axis deviation, left bundle-branch block.    They spoke with the Dr. Luis (patient's Nephrologist) who recommended transfer to [facility] for further eval of possible HD new start.             Overview/Hospital Course:  79-year-old male with a history of CKD 4, essential hypertension, HLD, DM, anemia, secondary hyperparathyroidism, gout, CHF(grade 2 diastolic) transfer to [facility] and admitted on 09/12/24 for acute on chronic CKD4. Presented with worsening  lower extremity swelling and some confusion.  Reported decreased urine output despite taking his diuretics.  Patient had ongoing discussion about PD with his nephrologist outpatient.  Labs significant for BUN of 114 and creatinine 5.6 on admission.  Also found to have hyperphosphatemia with phosphorus level of 5.4.  IR consulted- s/p tunneled HD line placement on 09/13. Nephrology following, initiated dialysis on 09/13/2024.  /case management consulted HD needs    After second dialysis session on 09/14/2024, patient has decided to decline further hemodialysis.  Patient states that he would like his code status change to DNR and be enrolled in hospice. He declined to further proceed with dialysis nurse.  States he would like to live the rest of his life as is and would like to focus on quality of life. Nephrology made aware. IR consulted to have THDC removed but No IR available on site over the weekend. Discussed with IRsurendra to have THDC line removed outpatient. Family prefer to have line removal done in [facility] or [facility]. Okay per IR but if there are any issues (no IR availablilty there or general surgery unable to remove line) then [facility] [place]. / consulted for discharge planning and for hospice    Interval History:  No acute overnight events.  Patient remained afebrile.  Daughter and son at bedside.  Patient completed HD yesterday and states he do not want to further proceed with it. After discussing with his family, he request to become DNR and enrolled in hospice. He wants to focus on his quality of life.     Review of Systems   Constitutional:  Negative for chills, fatigue and fever.   HENT:          Hard of hearing   Respiratory:  Negative for cough and shortness of breath.    Cardiovascular:  Positive for leg swelling (improving). Negative for chest pain and palpitations.   Gastrointestinal:  Negative for abdominal pain, nausea  and vomiting.   Genitourinary:  Positive for decreased urine volume. Negative for difficulty urinating, dysuria and urgency.   Musculoskeletal:  Negative for back pain.   Neurological:  Negative for dizziness, weakness and headaches.   Psychiatric/Behavioral:  Negative for confusion and decreased concentration.      Objective:     Vital Signs (Most Recent):  Temp: 98.3 °F (36.8 °C) (09/15/24 1121)  Pulse: 63 (09/15/24 1124)  Resp: 18 (09/15/24 1121)  BP: 132/62 (09/15/24 1121)  SpO2: (!) 90 % (09/15/24 1121) Vital Signs (24h Range):  Temp:  [97.7 °F (36.5 °C)-99.1 °F (37.3 °C)] 98.3 °F (36.8 °C)  Pulse:  [59-85] 63  Resp:  [12-18] 18  SpO2:  [90 %-95 %] 90 %  BP: (108-180)/(54-94) 132/62     Weight: 93 kg (205 lb 0.4 oz)  Body mass index is 29.42 kg/m².    Intake/Output Summary (Last 24 hours) at 9/15/2024 1222  Last data filed at 9/14/2024 1725  Gross per 24 hour   Intake --   Output 2500 ml   Net -2500 ml         Physical Exam  Vitals and nursing note reviewed.   Constitutional:       General: He is not in acute distress.     Appearance: He is not ill-appearing.   HENT:      Head: Atraumatic.      Nose: Nose normal.      Mouth/Throat:      Mouth: Mucous membranes are moist.   Eyes:      Extraocular Movements: Extraocular movements intact.   Neck:      Comments: Noted elevated JVD  Cardiovascular:      Rate and Rhythm: Normal rate and regular rhythm.      Pulses: Normal pulses.      Heart sounds: Murmur heard.      No friction rub. No gallop.      Comments: Pacemaker noted left upper chest   Pulmonary:      Effort: Pulmonary effort is normal. No respiratory distress.      Breath sounds: Normal breath sounds. No wheezing, rhonchi or rales.   Abdominal:      General: There is no distension.      Palpations: Abdomen is soft.      Tenderness: There is no abdominal tenderness.   Musculoskeletal:         General: Swelling present.      Right lower leg: Edema present.      Left lower leg: Edema present.   Skin:      General: Skin is warm.      Capillary Refill: Capillary refill takes less than 2 seconds.      Findings: No erythema or rash.      Comments: Tunneled HD cath on right upper chest wall   Neurological:      Mental Status: He is alert and oriented to person, place, and time. Mental status is at baseline.   Psychiatric:         Mood and Affect: Mood normal.         Behavior: Behavior normal.         Thought Content: Thought content normal.             Significant Labs: All pertinent labs within the past 24 hours have been reviewed.    Significant Imaging: I have reviewed all pertinent imaging results/findings within the past 24 hours.    Assessment/Plan:      * Acute worsening of stage 4 chronic kidney disease  -now progression to ESRD  -IR consulted: s/p THDC placement on 09/13/2024  -Nephrology consulted:  Initiated hemodialysis on 09/13/2024    -- nephrology plan for HD again on Sunday x3hrs, and HD again Monday x4hrs  -consult CM for outpatient HD placement   -09/15/24, patient no longer want to proceed with dialysis.  Request for home hospice  -continue Lasix 80 mg p.o. daily to preserve residual renal function per nephrology   -renal dose all meds.   -Monitor Is/Os      Uremia  -Mild confusion noted on admission.   -Nephrology following:  Initiated dialysis on 09/13/24  -Improving  -09/15/24, patient no longer want to proceed with dialysis.  Request for home hospice  -continue Lasix 80 mg p.o. daily to preserve residual renal function per nephrology       Hyperphosphatemia  -Phos elevated at 5.4 on admission  -secondary to progression to ESRD  -Nephrology following  -improved with HD  -09/15/24, patient no longer want to proceed with dialysis.  Request for home hospice  -continue Lasix 80 mg p.o. daily to preserve residual renal function per nephrology       Chronic diastolic heart failure, NYHA class 2  -No signs of acute exacerbation but he was not responding to the home Lasix with worsening lower extremity  edema/anasarca extending to his hips and back.    -IV lasix given in the ED. Bladder scan in place. Monitor I/O.   -Nephrology following. Pt decline to proceed with HD 9/15/24      Results for orders placed during the hospital encounter of 08/18/22    Echo    Interpretation Summary  · Eccentric hypertrophy and  · The estimated ejection fraction is 60%.  · Grade I left ventricular diastolic dysfunction with inconclusive left atrial pressure.  · Normal right ventricular size with normal right ventricular systolic function.      Benign essential hypertension  Chronic, uncontrolled.   -resume home hydralazine, isosorbide mononitrate, carvedilol, and nifedipine with hold parameters   -titrate medication as needed  -resume furosemide her increased dose of 80 mg daily per Nephrology    Latest blood pressure and vitals reviewed-     Temp:  [97.7 °F (36.5 °C)-99.1 °F (37.3 °C)]   Pulse:  [59-85]   Resp:  [12-18]   BP: (108-180)/(54-94)   SpO2:  [90 %-95 %] .   Home meds for hypertension were reviewed and noted below.   Hypertension Medications               carvediloL (COREG) 25 MG tablet Take 25 mg by mouth 2 (two) times daily with meals.    doxazosin (CARDURA) 4 MG tablet Take 4 mg by mouth every 12 (twelve) hours.    furosemide (LASIX) 20 MG tablet Take 1 tablet (20 mg total) by mouth once daily.    hydrALAZINE (APRESOLINE) 100 MG tablet Take 100 mg by mouth 3 (three) times daily.    isosorbide mononitrate (IMDUR) 60 MG 24 hr tablet Take 60 mg by mouth once daily.    NIFEdipine (ADALAT CC) 30 MG TbSR Take 30 mg by mouth 2 (two) times daily.            While in the hospital, will manage blood pressure as follows; Adjust home antihypertensive regimen as follows- Appears to be low/normal now and will HOLD BP meds and decreased he coreg to 6.25mg PO BID and see how he trends. Resume other BP meds as able pending repeat BP checks.     Will utilize p.r.n. blood pressure medication only if patient's blood pressure greater than   180/90  and he develops symptoms such as worsening chest pain or shortness of breath.    Type 2 diabetes mellitus, without long-term current use of insulin  Patient's FSGs are controlled on current medication regimen.  Last A1c reviewed-   Lab Results   Component Value Date    HGBA1C 5.1 09/13/2024     Most recent fingerstick glucose reviewed-   Recent Labs   Lab 09/14/24  1805 09/14/24  1931 09/15/24  0839   POCTGLUCOSE 125* 132* 138*       Current correctional scale  Low  Maintain anti-hyperglycemic dose as follows-   Antihyperglycemics (From admission, onward)      Start     Stop Route Frequency Ordered    09/12/24 2309  insulin aspart U-100 pen 0-5 Units         -- SubQ Before meals & nightly PRN 09/12/24 2209          Hold Mounjaro for now.   Need to discuss with nephrology if Mounjaro should be discontinued on DC given HD needs    Dyslipidemia  -continue home exetimibe nightly    Anemia of chronic renal failure, stage 4 (severe)  Anemia is likely due to chronic disease due to Chronic Kidney Disease. Most recent hemoglobin and hematocrit are listed below.  Recent Labs     09/12/24  2103 09/13/24  0503 09/14/24  0459   HGB 9.2* 8.9* 9.5*   HCT 29.6* 28.0* 30.6*     Plan  - Monitor serial CBC: Daily  - Transfuse PRBC if patient becomes hemodynamically unstable, symptomatic or H/H drops below 7/21.  - Patient has not received any PRBC transfusions to date  - Patient's anemia is currently stable  - EPO per nephrology     Acute cystitis with hematuria  -presented with decreased urine output and mild confusion  -UA with few bacteria and +3 blood  -urine cx with GNR  -start ceftriaxone      VTE Risk Mitigation (From admission, onward)           Ordered     heparin (porcine) injection 5,000 Units  Every 8 hours         09/12/24 2028     IP VTE HIGH RISK PATIENT  Once         09/12/24 2028     Place sequential compression device  Until discontinued         09/12/24 2028                    Discharge Planning   LESLIE:  9/15/2024     Code Status: DNR   Is the patient medically ready for discharge?:     Reason for patient still in hospital (select all that apply): Patient trending condition, Treatment, and Consult recommendations  Discharge Plan A: Home with family                  Caridad Zavala DO  Department of Hospital Medicine   [facility]

## 2024-09-15 NOTE — ASSESSMENT & PLAN NOTE
Chronic, uncontrolled.   -resume home hydralazine, isosorbide mononitrate, carvedilol, and nifedipine with hold parameters   -titrate medication as needed  -resume furosemide her increased dose of 80 mg daily per Nephrology    Latest blood pressure and vitals reviewed-     Temp:  [97.7 °F (36.5 °C)-99.1 °F (37.3 °C)]   Pulse:  [59-85]   Resp:  [12-18]   BP: (108-180)/(54-94)   SpO2:  [90 %-95 %] .   Home meds for hypertension were reviewed and noted below.   Hypertension Medications               carvediloL (COREG) 25 MG tablet Take 25 mg by mouth 2 (two) times daily with meals.    doxazosin (CARDURA) 4 MG tablet Take 4 mg by mouth every 12 (twelve) hours.    furosemide (LASIX) 20 MG tablet Take 1 tablet (20 mg total) by mouth once daily.    hydrALAZINE (APRESOLINE) 100 MG tablet Take 100 mg by mouth 3 (three) times daily.    isosorbide mononitrate (IMDUR) 60 MG 24 hr tablet Take 60 mg by mouth once daily.    NIFEdipine (ADALAT CC) 30 MG TbSR Take 30 mg by mouth 2 (two) times daily.            While in the hospital, will manage blood pressure as follows; Adjust home antihypertensive regimen as follows- Appears to be low/normal now and will HOLD BP meds and decreased he coreg to 6.25mg PO BID and see how he trends. Resume other BP meds as able pending repeat BP checks.     Will utilize p.r.n. blood pressure medication only if patient's blood pressure greater than  180/90  and he develops symptoms such as worsening chest pain or shortness of breath.

## 2024-09-15 NOTE — NURSING
Scheduled medications given. Pt complaint of 5/10 should pain, prn tylenol given. Pt has cpap in place. Daughter remains at bedside. Vitals assessed. Accu check with no insulin coverage. IV saline lock. Tele #[id number]. Safety measures maintained. Will cont to monitor

## 2024-09-15 NOTE — PROGRESS NOTES
TN spoke with Martha at [facility]. Martha will review and contact TN. Awaiting feedback.     Call received from Sharon at [facility]. Sharon will contact patient's daughter. Patient accepted.

## 2024-09-15 NOTE — PLAN OF CARE
TN met with patient and family and bedside and explained IMM. Patient verbalized understanding and signed and dated IMM.       09/15/24 1158   Medicare Message   Important Message from Medicare regarding Discharge Appeal Rights Given to patient/caregiver;Explained to patient/caregiver;Signed/date by patient/caregiver   Date IMM was signed 09/15/24   Time IMM was signed 1031

## 2024-09-18 LAB
HBV SURFACE AB SER QL IA: NEGATIVE
HBV SURFACE AB SERPL IA-ACNC: <3 MIU/ML

## 2024-09-20 ENCOUNTER — TELEPHONE (OUTPATIENT)
Dept: INTERVENTIONAL RADIOLOGY/VASCULAR | Facility: CLINIC | Age: 79
End: 2024-09-20
Payer: MEDICARE

## 2024-09-21 DIAGNOSIS — F41.9 ANXIETY: ICD-10-CM

## 2024-09-21 NOTE — TELEPHONE ENCOUNTER
No care due was identified.  [facility] Embedded Care Due Messages. Reference number: [id number].   9/21/2024 7:01:19 AM CDT

## 2024-09-23 ENCOUNTER — PATIENT MESSAGE (OUTPATIENT)
Dept: FAMILY MEDICINE | Facility: CLINIC | Age: 79
End: 2024-09-23
Payer: MEDICARE

## 2024-09-24 RX ORDER — ESCITALOPRAM OXALATE 10 MG/1
10 TABLET ORAL
Qty: 90 TABLET | Refills: 1 | Status: SHIPPED | OUTPATIENT
Start: 2024-09-24

## 2024-10-06 ENCOUNTER — PATIENT MESSAGE (OUTPATIENT)
Dept: FAMILY MEDICINE | Facility: CLINIC | Age: 79
End: 2024-10-06
Payer: MEDICARE

## 2024-10-08 ENCOUNTER — TELEPHONE (OUTPATIENT)
Dept: FAMILY MEDICINE | Facility: CLINIC | Age: 79
End: 2024-10-08
Payer: MEDICARE

## 2024-10-08 ENCOUNTER — HOSPITAL ENCOUNTER (EMERGENCY)
Facility: HOSPITAL | Age: 79
Discharge: HOME | End: 2024-10-08
Attending: EMERGENCY MEDICINE
Payer: MEDICARE

## 2024-10-08 VITALS
RESPIRATION RATE: 21 BRPM | TEMPERATURE: 98 F | WEIGHT: 191.31 LBS | DIASTOLIC BLOOD PRESSURE: 80 MMHG | HEIGHT: 70 IN | BODY MASS INDEX: 27.39 KG/M2 | SYSTOLIC BLOOD PRESSURE: 143 MMHG | HEART RATE: 95 BPM | OXYGEN SATURATION: 97 %

## 2024-10-08 DIAGNOSIS — G47.9 SLEEPING DIFFICULTY: Primary | ICD-10-CM

## 2024-10-08 LAB
ALBUMIN SERPL BCP-MCNC: 2.5 G/DL (ref 3.5–5.2)
ALP SERPL-CCNC: 174 U/L (ref 55–135)
ALT SERPL W/O P-5'-P-CCNC: 14 U/L (ref 10–44)
ANION GAP SERPL CALC-SCNC: 9 MMOL/L (ref 8–16)
AST SERPL-CCNC: 20 U/L (ref 10–40)
BACTERIA #/AREA URNS HPF: ABNORMAL /HPF
BASOPHILS # BLD AUTO: 0.03 K/UL (ref 0–0.2)
BASOPHILS NFR BLD: 0.3 % (ref 0–1.9)
BILIRUB SERPL-MCNC: 0.4 MG/DL (ref 0.1–1)
BILIRUB UR QL STRIP: NEGATIVE
BNP SERPL-MCNC: 645 PG/ML (ref 0–99)
BUN SERPL-MCNC: 34 MG/DL (ref 8–23)
CALCIUM SERPL-MCNC: 8.3 MG/DL (ref 8.7–10.5)
CHLORIDE SERPL-SCNC: 102 MMOL/L (ref 95–110)
CLARITY UR: ABNORMAL
CO2 SERPL-SCNC: 28 MMOL/L (ref 23–29)
COLOR UR: YELLOW
CREAT SERPL-MCNC: 3.2 MG/DL (ref 0.5–1.4)
DIFFERENTIAL METHOD BLD: ABNORMAL
EOSINOPHIL # BLD AUTO: 0.1 K/UL (ref 0–0.5)
EOSINOPHIL NFR BLD: 0.8 % (ref 0–8)
ERYTHROCYTE [DISTWIDTH] IN BLOOD BY AUTOMATED COUNT: 16.4 % (ref 11.5–14.5)
EST. GFR  (NO RACE VARIABLE): 19 ML/MIN/1.73 M^2
GLUCOSE SERPL-MCNC: 144 MG/DL (ref 70–110)
GLUCOSE UR QL STRIP: NEGATIVE
HCT VFR BLD AUTO: 27.3 % (ref 40–54)
HGB BLD-MCNC: 8.8 G/DL (ref 14–18)
HGB UR QL STRIP: ABNORMAL
HYALINE CASTS #/AREA URNS LPF: 1 /LPF
IMM GRANULOCYTES # BLD AUTO: 0.05 K/UL (ref 0–0.04)
IMM GRANULOCYTES NFR BLD AUTO: 0.5 % (ref 0–0.5)
KETONES UR QL STRIP: NEGATIVE
LEUKOCYTE ESTERASE UR QL STRIP: NEGATIVE
LYMPHOCYTES # BLD AUTO: 0.9 K/UL (ref 1–4.8)
LYMPHOCYTES NFR BLD: 10.1 % (ref 18–48)
MCH RBC QN AUTO: 30.4 PG (ref 27–31)
MCHC RBC AUTO-ENTMCNC: 32.2 G/DL (ref 32–36)
MCV RBC AUTO: 95 FL (ref 82–98)
MICROSCOPIC COMMENT: ABNORMAL
MONOCYTES # BLD AUTO: 1 K/UL (ref 0.3–1)
MONOCYTES NFR BLD: 10.9 % (ref 4–15)
NEUTROPHILS # BLD AUTO: 7.2 K/UL (ref 1.8–7.7)
NEUTROPHILS NFR BLD: 77.4 % (ref 38–73)
NITRITE UR QL STRIP: NEGATIVE
NRBC BLD-RTO: 0 /100 WBC
PH UR STRIP: 5 [PH] (ref 5–8)
PLATELET # BLD AUTO: 262 K/UL (ref 150–450)
PMV BLD AUTO: 11.6 FL (ref 9.2–12.9)
POCT GLUCOSE: 142 MG/DL (ref 70–110)
POTASSIUM SERPL-SCNC: 3.7 MMOL/L (ref 3.5–5.1)
PROT SERPL-MCNC: 5.4 G/DL (ref 6–8.4)
PROT UR QL STRIP: ABNORMAL
RBC # BLD AUTO: 2.89 M/UL (ref 4.6–6.2)
RBC #/AREA URNS HPF: 10 /HPF (ref 0–4)
SODIUM SERPL-SCNC: 139 MMOL/L (ref 136–145)
SP GR UR STRIP: 1.02 (ref 1–1.03)
URN SPEC COLLECT METH UR: ABNORMAL
UROBILINOGEN UR STRIP-ACNC: NEGATIVE EU/DL
WBC # BLD AUTO: 9.3 K/UL (ref 3.9–12.7)
WBC #/AREA URNS HPF: 3 /HPF (ref 0–5)

## 2024-10-08 PROCEDURE — 85025 COMPLETE CBC W/AUTO DIFF WBC: CPT | Mod: HCNC | Performed by: EMERGENCY MEDICINE

## 2024-10-08 PROCEDURE — 36415 COLL VENOUS BLD VENIPUNCTURE: CPT | Mod: HCNC | Performed by: EMERGENCY MEDICINE

## 2024-10-08 PROCEDURE — 81000 URINALYSIS NONAUTO W/SCOPE: CPT | Mod: HCNC | Performed by: EMERGENCY MEDICINE

## 2024-10-08 PROCEDURE — 93005 ELECTROCARDIOGRAM TRACING: CPT | Mod: HCNC

## 2024-10-08 PROCEDURE — 82962 GLUCOSE BLOOD TEST: CPT | Mod: HCNC

## 2024-10-08 PROCEDURE — 99285 EMERGENCY DEPT VISIT HI MDM: CPT | Mod: 25,HCNC

## 2024-10-08 PROCEDURE — 80053 COMPREHEN METABOLIC PANEL: CPT | Mod: HCNC | Performed by: EMERGENCY MEDICINE

## 2024-10-08 PROCEDURE — 93010 ELECTROCARDIOGRAM REPORT: CPT | Mod: HCNC,,, | Performed by: INTERNAL MEDICINE

## 2024-10-08 PROCEDURE — 83880 ASSAY OF NATRIURETIC PEPTIDE: CPT | Mod: HCNC | Performed by: EMERGENCY MEDICINE

## 2024-10-08 PROCEDURE — 25000003 PHARM REV CODE 250: Mod: HCNC | Performed by: EMERGENCY MEDICINE

## 2024-10-08 RX ORDER — TRAZODONE HYDROCHLORIDE 50 MG/1
100 TABLET ORAL NIGHTLY PRN
Qty: 30 TABLET | Refills: 5 | Status: SHIPPED | OUTPATIENT
Start: 2024-10-08 | End: 2024-10-17 | Stop reason: DRUGHIGH

## 2024-10-08 RX ORDER — FUROSEMIDE 10 MG/ML
40 INJECTION INTRAMUSCULAR; INTRAVENOUS
Status: DISCONTINUED | OUTPATIENT
Start: 2024-10-08 | End: 2024-10-08

## 2024-10-08 RX ORDER — FUROSEMIDE 40 MG/1
40 TABLET ORAL
Status: DISCONTINUED | OUTPATIENT
Start: 2024-10-08 | End: 2024-10-08 | Stop reason: HOSPADM

## 2024-10-08 RX ORDER — LIDOCAINE HYDROCHLORIDE 20 MG/ML
JELLY TOPICAL
Status: COMPLETED | OUTPATIENT
Start: 2024-10-08 | End: 2024-10-08

## 2024-10-08 RX ADMIN — LIDOCAINE HYDROCHLORIDE 10 ML: 20 JELLY TOPICAL at 03:10

## 2024-10-08 NOTE — ED PROVIDER NOTES
Encounter Date: 10/8/2024       History     Chief Complaint   Patient presents with    Altered Mental Status     HPI    Patient is a 79y WM hx CHF, HTN, TIA BIB family with AMS.  Today he told the son his prescription was ready at the pharmacy.  There was no prescription.  He was acting oddly with family earlier today.  Patient states he has not been sleeping well and the trazadone is not working.  Family states in our facility patient is back to baseline.    Review of patient's allergies indicates:   Allergen Reactions    Adhesive Other (See Comments)     Skin irritation    Lisinopril Other (See Comments)     Kidney failure    Statins-hmg-coa reductase inhibitors Other (See Comments)     pain     Past Medical History:   Diagnosis Date    Acute respiratory failure with hypoxia 08/18/2022    daughter denies    Anxiety disorder, unspecified     CHF (congestive heart failure)     COVID 11/23/2022    Dehydration 12/22/2021    medication induced    Depression     Diabetes mellitus type II     Gout, unspecified     Hypertension     MDD (major depressive disorder), recurrent episode, mild 04/15/2023    Documented since 2022   Chronic medical conditions  Lexapro 10mg       Melanoma in situ 10/23/2023    left ear    Melanoma in situ of left ear 11/13/2023    left ear    Positive D dimer 12/22/2021    Sick sinus syndrome     Sleep apnea     cpap machine    TIA (transient ischemic attack) 08/18/2022    daughter denies     Past Surgical History:   Procedure Laterality Date    APPENDECTOMY      BACK SURGERY      CHOLECYSTECTOMY      EXTRACTION OF CATARACT Left 08/23/2023    Procedure: EXTRACTION, CATARACT;  Surgeon: Alonso Rodriguez MD;  Location: [facility] OR;  Service: Ophthalmology;  Laterality: Left;  DiB00 +22.5D    EXTRACTION OF CATARACT Right 11/08/2023    Procedure: EXTRACTION, CATARACT;  Surgeon: Alonso Rodriguez MD;  Location: [facility] OR;  Service: Ophthalmology;  Laterality: Right;  DiB00 +22.5D    INNER EAR SURGERY       unilateral    INSERTION OF PACEMAKER      INSERTION OF PACEMAKER      KNEE SURGERY      unilateral    PHACOEMULSIFICATION, CATARACT, WITH IOL INSERTION Left 2023    Procedure: PHACOEMULSIFICATION, CATARACT, WITH IOL INSERTION;  Surgeon: Alonso Rodriguez MD;  Location: [facility] OR;  Service: Ophthalmology;  Laterality: Left;    PHACOEMULSIFICATION, CATARACT, WITH IOL INSERTION Right 2023    Procedure: PHACOEMULSIFICATION, CATARACT, WITH IOL INSERTION;  Surgeon: Alonso Rodriguez MD;  Location: [facility] OR;  Service: Ophthalmology;  Laterality: Right;     Family History   Problem Relation Name Age of Onset    Alzheimer's disease Mother      Heart disease Father Nestor Garcia          at 64 with a heart attack    Hearing loss Sister Jennifer Wynn         Otosclerosis    Hearing loss Daughter Candace Palomino         Otosclerosis     Social History     Tobacco Use    Smoking status: Never    Smokeless tobacco: Never   Substance Use Topics    Alcohol use: No    Drug use: No     Review of Systems   Constitutional:  Negative for chills and fever.   Respiratory:  Negative for cough.    Gastrointestinal:  Negative for abdominal pain.   Skin:  Negative for wound.   Neurological:  Negative for dizziness and weakness.   All other systems reviewed and are negative.    Social Drivers of Health     Tobacco Use: Low Risk  (10/8/2024)    Patient History     Smoking Tobacco Use: Never     Smokeless Tobacco Use: Never     Passive Exposure: Not on file   Alcohol Use: Not At Risk (2024)    AUDIT-C     Frequency of Alcohol Consumption: Never     Average Number of Drinks: Patient does not drink     Frequency of Binge Drinking: Never   Financial Resource Strain: Low Risk  (2024)    Overall Financial Resource Strain (CARDIA)     Difficulty of Paying Living Expenses: Not hard at all   Food Insecurity: No Food Insecurity (2024)    Hunger Vital Sign     Worried About Running Out of Food in the Last Year: Never true      Ran Out of Food in the Last Year: Never true   Transportation Needs: No Transportation Needs (6/28/2024)    PRAPARE - Transportation     Lack of Transportation (Medical): No     Lack of Transportation (Non-Medical): No   Physical Activity: Insufficiently Active (6/28/2024)    Exercise Vital Sign     Days of Exercise per Week: 5 days     Minutes of Exercise per Session: 10 min   Stress: No Stress Concern Present (6/28/2024)    [employer] [place] [employer] - Occupational Stress Questionnaire     Feeling of Stress : Not at all   Housing Stability: Low Risk  (6/28/2024)    Housing Stability Vital Sign     Unable to Pay for Housing in the Last Year: No     Homeless in the Last Year: No   Depression: Low Risk  (6/28/2024)    Depression     Last PHQ-4: Flowsheet Data: 0   Utilities: Not At Risk (6/28/2024)    [facility] Utilities     Threatened with loss of utilities: No   Health Literacy: Adequate Health Literacy (6/28/2024)     Health Literacy     Frequency of need for help with medical instructions: Rarely   Social Isolation: Not on file       Physical Exam     Initial Vitals   BP Pulse Resp Temp SpO2   10/08/24 1426 10/08/24 1426 10/08/24 1429 10/08/24 1426 10/08/24 1426   (!) 119/50 64 (!) 24 98.1 °F (36.7 °C) 97 %      MAP       --                Physical Exam    Nursing note and vitals reviewed.  Constitutional: He appears well-developed and well-nourished.   HENT:   Head: Normocephalic and atraumatic. Mouth/Throat: Oropharynx is clear and moist.   Eyes: EOM are normal. Pupils are equal, round, and reactive to light.   Neck:   Normal range of motion.  Cardiovascular:  Normal rate and intact distal pulses.           Pulmonary/Chest: Breath sounds normal.   Abdominal: Abdomen is soft.   Musculoskeletal:         General: Normal range of motion.      Cervical back: Normal range of motion.     Neurological: He is alert and oriented to person, place, and time.   Skin: Skin is warm. Capillary refill takes less  than 2 seconds.         ED Course   Procedures  Labs Reviewed   CBC W/ AUTO DIFFERENTIAL - Abnormal       Result Value    WBC 9.30      RBC 2.89 (*)     Hemoglobin 8.8 (*)     Hematocrit 27.3 (*)     MCV 95      MCH 30.4      MCHC 32.2      RDW 16.4 (*)     Platelets 262      MPV 11.6      Immature Granulocytes 0.5      Gran # (ANC) 7.2      Immature Grans (Abs) 0.05 (*)     Lymph # 0.9 (*)     Mono # 1.0      Eos # 0.1      Baso # 0.03      nRBC 0      Gran % 77.4 (*)     Lymph % 10.1 (*)     Mono % 10.9      Eosinophil % 0.8      Basophil % 0.3      Differential Method Automated     COMPREHENSIVE METABOLIC PANEL - Abnormal    Sodium 139      Potassium 3.7      Chloride 102      CO2 28      Glucose 144 (*)     BUN 34 (*)     Creatinine 3.2 (*)     Calcium 8.3 (*)     Total Protein 5.4 (*)     Albumin 2.5 (*)     Total Bilirubin 0.4      Alkaline Phosphatase 174 (*)     AST 20      ALT 14      eGFR 19 (*)     Anion Gap 9     URINALYSIS, REFLEX TO URINE CULTURE - Abnormal    Specimen UA Urine, Catheterized      Color, UA Yellow      Appearance, UA Hazy (*)     pH, UA 5.0      Specific Gravity, UA 1.025      Protein, UA 3+ (*)     Glucose, UA Negative      Ketones, UA Negative      Bilirubin (UA) Negative      Occult Blood UA 3+ (*)     Nitrite, UA Negative      Urobilinogen, UA Negative      Leukocytes, UA Negative      Narrative:     Specimen Source->Urine   URINALYSIS MICROSCOPIC - Abnormal    RBC, UA 10 (*)     WBC, UA 3      Bacteria Occasional      Hyaline Casts, UA 1      Microscopic Comment SEE COMMENT      Narrative:     Specimen Source->Urine   POCT GLUCOSE - Abnormal    POCT Glucose 142 (*)    B-TYPE NATRIURETIC PEPTIDE   B-TYPE NATRIURETIC PEPTIDE   POCT GLUCOSE MONITORING CONTINUOUS          Imaging Results              CT Head Without Contrast (Final result)  Result time 10/08/24 15:06:30      Final result by Evy Balderrama MD (10/08/24 15:06:30)                   Impression:      No acute  intracranial findings as visualized.  Note is made that the frontal and parietal bone and posterior most aspect of the right parietal lobe are not included on images and there is image degradation from motion.      Electronically signed by: Evy Balderrama MD  Date:    10/08/2024  Time:    15:06               Narrative:    EXAMINATION:  CT HEAD WITHOUT CONTRAST    CLINICAL HISTORY:  AMS;    TECHNIQUE:  Low dose axial images were obtained through the head.  Coronal and sagittal reformations were also performed. Contrast was not administered.    COMPARISON:  05/11/2024    FINDINGS:  There is image degradation from motion.  There is mild dilatation of ventricles, sulci, fissures. There is decreased density in white matter suggesting microvascular ischemic change. There is no acute intracranial hemorrhage.  There is no intracranial mass effect.  There is no acute major vascular territory infarct. Note is made that MRI is typically more sensitive than CT particularly for detection of early or small nonhemorrhagic infarcts.  The calvarium is visualized appears intact without depressed skull fracture.  Calvarium, frontal and parietal bones are not completely imaged.  Also right posterior parietal lobe although mostly is not completely imaged.  4 mm exostosis low frontal region for example series 6, image 20 possibly small calcified meningiomas, incidental finding or hyperostosis and stable and not significantly changed compared to at least 08/18/2022.  Visualized paranasal sinuses very slight opacification in the right side of the sphenoid sinus suggesting trace fluid.  Similar in appearance to the prior study..  Mastoids  well pneumatized.  There are calcifications in parasellar portions of internal carotid arteries vertebral arteries..                                        X-Ray Chest AP Portable (Final result)  Result time 10/08/24 15:03:46      Final result by Jeffrey Perry MD (10/08/24 15:03:46)                    Impression:      Congestive heart failure with a small left pleural effusion and dependent left lower lobe atelectasis.    Close interval follow-up is recommended.  This report was flagged in Epic as abnormal.      Electronically signed by: Jeffrey Perry  Date:    10/08/2024  Time:    15:03               Narrative:    EXAMINATION:  XR CHEST AP PORTABLE    CLINICAL HISTORY:  Altered mental status, unspecified    TECHNIQUE:  Portable view of the chest was performed.    COMPARISON:  09/12/2024.    FINDINGS:  Pulmonary hypoinflation crowding of the bronchopulmonary vessels.  There are bilateral pulmonary infiltrates consistent with pulmonary edema.  Retrocardiac density consistent with left lower lobe atelectasis.  Suspected small left pleural effusion.  Heart size is enlarged.    Bony thorax intact.  There is a dual lumen right subclavian catheter in place with the tip terminating the level of the distal SVC.  Left axillary pacemaker in place.                                       Medications   furosemide tablet 40 mg (40 mg Oral Not Given 10/8/24 1630)   LIDOcaine HCl 2% urojet (10 mLs Mucous Membrane Given 10/8/24 1553)     Medical Decision Making  This is an emergent evaluation of a 79y WM hx CHF, HTN, TIA presenting with sleeping difficulty.    Exam he is non toxic, afebrile with no focal neurological deficits.  UA negative for infection   CXR with evidence of pulmonary edema.  Patient refused lasix and stated he is aware of the fluid on his lungs and does not want the medication.  Will discharge with increased trazadon to 100 mg with 5 mg melatonin.    DDX: electrolyte abnormality, metabolic derangement, UTI    Amount and/or Complexity of Data Reviewed  Labs: ordered.  Radiology: ordered.    Risk  Prescription drug management.                                      Clinical Impression:  Final diagnoses:  [G47.9] Sleeping difficulty (Primary)          ED Disposition Condition    Discharge Stable          ED  Prescriptions       Medication Sig Dispense Start Date End Date Auth. Provider    traZODone (DESYREL) 50 MG tablet Take 2 tablets (100 mg total) by mouth nightly as needed for Insomnia. 30 tablet 10/8/2024 10/8/2025 Kira Mendieta MD          Follow-up Information       Follow up With Specialties Details Why Contact Info    Kashmir Conn MD Family Medicine Schedule an appointment as soon as possible for a visit in 1 day As needed [address] [place] [address]  [phone]               Kira Mendieta MD  10/08/24 1508

## 2024-10-08 NOTE — ED TRIAGE NOTES
Patients son reports patient was confused pta. Patietn reports has not slept in 30 hours. Patient reports last dialysis appointment was yesterday.

## 2024-10-08 NOTE — TELEPHONE ENCOUNTER
----- Message from Madeline sent at 10/8/2024  2:08 PM CDT -----  Contact: sunday-daughter n law Nestor Garcia  MRN: [id number]  : 1945  PCP: Kashmir Conn  Home Phone      [phone]  Work Phone      Not on file.  Mobile          [phone]      MESSAGE:   Pt daughter states he didn't sleep because his neck was hurting and doctor said he put a prescription in but pharmacy doesn't have any.    [phone]

## 2024-10-09 ENCOUNTER — PATIENT OUTREACH (OUTPATIENT)
Dept: EMERGENCY MEDICINE | Facility: HOSPITAL | Age: 79
End: 2024-10-09
Payer: MEDICARE

## 2024-10-09 LAB
OHS QRS DURATION: 132 MS
OHS QTC CALCULATION: 463 MS

## 2024-10-15 ENCOUNTER — E-VISIT (OUTPATIENT)
Dept: FAMILY MEDICINE | Facility: CLINIC | Age: 79
End: 2024-10-15
Payer: MEDICARE

## 2024-10-15 DIAGNOSIS — Z79.4 TYPE 2 DIABETES MELLITUS WITH DIABETIC POLYNEUROPATHY, WITH LONG-TERM CURRENT USE OF INSULIN: Primary | ICD-10-CM

## 2024-10-15 DIAGNOSIS — E11.42 TYPE 2 DIABETES MELLITUS WITH DIABETIC POLYNEUROPATHY, WITH LONG-TERM CURRENT USE OF INSULIN: Primary | ICD-10-CM

## 2024-10-15 DIAGNOSIS — N18.6 ESRD (END STAGE RENAL DISEASE): ICD-10-CM

## 2024-10-17 PROBLEM — I95.2 HYPOTENSION DUE TO DRUGS: Status: ACTIVE | Noted: 2024-10-17

## 2024-10-17 PROBLEM — I95.9 HYPOTENSION: Status: ACTIVE | Noted: 2024-10-17

## 2024-10-17 PROBLEM — N18.6 ESRD (END STAGE RENAL DISEASE): Status: ACTIVE | Noted: 2024-10-17

## 2024-10-17 NOTE — PROGRESS NOTES
Patient ID: Nestor Garcia is a 79 y.o. male.    Chief Complaint: General Illness (Entered automatically based on patient selection in Curbside.)    The patient initiated a request through [employer] on 10/15/2024 for evaluation and management with a chief complaint of General Illness (Entered automatically based on patient selection in [employer]bside.)     I evaluated the questionnaire submission on 10/17/2024.    [facility] Evisit Supergroup-Chronic Conditions    10/15/2024  7:14 AM CDT - Filed by Patient   What do you need help with? Diabetes   Do you agree to participate in an E-Visit? Yes   If you have any of the following symptoms, please do not complete an E-Visit. Instead, schedule an appointment with your provider. I acknowledge   What is the main issue you would like addressed today? Weight loss/appetite   Do you have a new concern related to your diabetes or are you following up on a recent treatment change? New concern with my diabetes   What is your diabetes-related concern? Medication Side Effect   List the side effects you had on the medication No appetite with monjouro. Weight loss, but needs to eat with dialysis which began a vouple of weeks ago.   Have you stopped taking the medication? Yes   Are you still having the side effects? Yes   Do you feel you need treatment for the side effects you have had? No   Have you tried other diabetes medications that you had to stop? No, I have not had to stop any medications   Have you noticed trends in your blood sugar readings? Have not noticed trends   How are you monitoring your glucose? Self monitoring blood glucose checks (Such as finger sticks)   Please enter up to 5 of your most recent blood sugar readings.    Reading 1 87   Reading 2 88   Reading 3 84   Reading 4 83   Reading 5 68   If you have an image of your blood sugar readings, upload it here.    Provide any additional information you feel is important. What is the best way to manage my diabetes? I have the  sliding scale prescribed a few weeks ago. Although I rarely need (if ever need) coverage.   Please attach any relevant images or files    Are you able to take your vital signs? Yes   Systolic Blood Pressure: 129   Diastolic Blood Pressure: 79   Pulse: 61   Temperature:    Weight: 178   Height: 69   Respiration rate:    Pulse Oxygen:          Encounter Diagnoses   Name Primary?    Type 2 diabetes mellitus with diabetic polyneuropathy, with long-term current use of insulin Yes    ESRD (end stage renal disease)         Orders Placed This Encounter   Procedures    Ambulatory referral/consult to Diabetes Education     Standing Status:   Future     Standing Expiration Date:   10/17/2025     Referral Priority:   Routine     Referral Type:   Consultation     Referral Reason:   Specialty Services Required     Requested Specialty:   Diabetes     Number of Visits Requested:   1     Expiration Date:   10/17/2025            Follow up if symptoms worsen or fail to improve or as scheduled.      E-Visit Time Tracking:

## 2024-10-18 PROBLEM — E63.9 INADEQUATE DIETARY ENERGY INTAKE: Status: ACTIVE | Noted: 2024-10-18

## 2024-10-22 ENCOUNTER — OFFICE VISIT (OUTPATIENT)
Dept: FAMILY MEDICINE | Facility: CLINIC | Age: 79
End: 2024-10-22
Payer: MEDICARE

## 2024-10-22 VITALS
SYSTOLIC BLOOD PRESSURE: 132 MMHG | DIASTOLIC BLOOD PRESSURE: 70 MMHG | HEIGHT: 70 IN | BODY MASS INDEX: 27.38 KG/M2 | OXYGEN SATURATION: 100 % | WEIGHT: 191.25 LBS | RESPIRATION RATE: 20 BRPM | HEART RATE: 49 BPM

## 2024-10-22 DIAGNOSIS — Z79.4 TYPE 2 DIABETES MELLITUS WITH DIABETIC POLYNEUROPATHY, WITH LONG-TERM CURRENT USE OF INSULIN: ICD-10-CM

## 2024-10-22 DIAGNOSIS — G47.34 NOCTURNAL OXYGEN DESATURATION: ICD-10-CM

## 2024-10-22 DIAGNOSIS — E11.42 TYPE 2 DIABETES MELLITUS WITH DIABETIC POLYNEUROPATHY, WITH LONG-TERM CURRENT USE OF INSULIN: ICD-10-CM

## 2024-10-22 DIAGNOSIS — I10 BENIGN ESSENTIAL HYPERTENSION: ICD-10-CM

## 2024-10-22 DIAGNOSIS — N18.6 ESRD (END STAGE RENAL DISEASE): ICD-10-CM

## 2024-10-22 DIAGNOSIS — G47.00 INSOMNIA WITH SLEEP APNEA: ICD-10-CM

## 2024-10-22 DIAGNOSIS — G47.30 INSOMNIA WITH SLEEP APNEA: ICD-10-CM

## 2024-10-22 DIAGNOSIS — Z09 HOSPITAL DISCHARGE FOLLOW-UP: Primary | ICD-10-CM

## 2024-10-22 PROCEDURE — 99999 PR PBB SHADOW E&M-EST. PATIENT-LVL IV: CPT | Mod: PBBFAC,HCNC,, | Performed by: STUDENT IN AN ORGANIZED HEALTH CARE EDUCATION/TRAINING PROGRAM

## 2024-10-22 RX ORDER — RAMELTEON 8 MG/1
8 TABLET ORAL NIGHTLY
Qty: 30 TABLET | Refills: 5 | Status: SHIPPED | OUTPATIENT
Start: 2024-10-22

## 2024-10-22 RX ORDER — HYDRALAZINE HYDROCHLORIDE 100 MG/1
100 TABLET, FILM COATED ORAL 2 TIMES DAILY
COMMUNITY

## 2024-10-22 NOTE — PROGRESS NOTES
Subjective:       Patient ID: Nestor Garcia is a 79 y.o. male.    Chief Complaint: Follow-up (Patient is here today for a hospital follow up visit. Patient was hospitalized at [facility] on 10/17/2024 for Low blood and low pulse. )    Pt here for hospital follow-up    Transitional Care Note    Family and/or Caretaker present at visit?  Yes.  Diagnostic tests reviewed/disposition: I have reviewed all completed as well as pending diagnostic tests at the time of discharge.  Disease/illness education: ESRD, hypotension, DM2, TARA, nocturnal desaturation  Home health/community services discussion/referrals: Patient does not have home health established from hospital visit.  They do not need home health.  If needed, we will set up home health for the patient.   Establishment or re-establishment of referral orders for community resources: No other necessary community resources.   Discussion with other health care providers: No discussion with other health care providers necessary.     Per DC summary:  Hospital Course:   10/18-cd-Patient seen and examined, with daughter present. She states blood pressures have been dropping low at home. Monitors his blood pressures at home and was told to hold hydralazine at home if SBP less than 130.  He had recently resumed home bp meds and started having near syncopal episodes. Instructed patient and daughter to monitor BP at home and give Hydralazine prn for SBP greater than 150. Blood pressures are now trending up. Will resume Metoprolol 25 mg daily for now and monitor. Receives dialysis treatments on MWF.       Goals of Care Treatment Preferences:  Code Status: Full Code     What is most important right now is to focus on spending time at home, avoiding the hospital, remaining as independent as possible, quality of life, even if it means sacrificing a little time.  Accordingly, we have decided that the best plan to meet the patient's goals includes enrolling in hospice  care.    Review of Systems   Constitutional:  Negative for chills and fever.   HENT:  Negative for congestion, rhinorrhea and sore throat.    Eyes:  Negative for visual disturbance.   Respiratory:  Negative for cough, chest tightness, shortness of breath and wheezing.    Cardiovascular:  Negative for chest pain.   Gastrointestinal:  Negative for abdominal pain, constipation, diarrhea, nausea and vomiting.   Genitourinary:  Negative for dysuria and hematuria.   Musculoskeletal:  Negative for arthralgias and joint swelling.   Neurological:  Negative for dizziness, weakness and light-headedness.   Psychiatric/Behavioral:  Positive for sleep disturbance. Negative for confusion.        Objective:      Physical Exam  Vitals reviewed.   Constitutional:       General: He is not in acute distress.  HENT:      Head: Normocephalic and atraumatic.      Mouth/Throat:      Mouth: Mucous membranes are moist.   Eyes:      Conjunctiva/sclera: Conjunctivae normal.   Cardiovascular:      Rate and Rhythm: Normal rate. Rhythm irregular.      Heart sounds: Normal heart sounds. No murmur heard.  Pulmonary:      Effort: Pulmonary effort is normal. No respiratory distress.      Breath sounds: Normal breath sounds.   Musculoskeletal:      Cervical back: Neck supple.   Skin:     Comments: Skin abrasion right arm   Neurological:      Mental Status: He is alert and oriented to person, place, and time.   Psychiatric:         Mood and Affect: Mood normal.         Behavior: Behavior normal.       Assessment:       1. Hospital discharge follow-up    2. Insomnia with sleep apnea    3. Nocturnal oxygen desaturation    4. Benign essential hypertension    5. ESRD (end stage renal disease)    6. Type 2 diabetes mellitus with diabetic polyneuropathy, with long-term current use of insulin        Plan:           1. Hospital discharge follow-up    2. Insomnia with sleep apnea  -     ramelteon (ROZEREM) 8 mg tablet; Take 1 tablet (8 mg total) by mouth every  evening.  Dispense: 30 tablet; Refill: 5    3. Nocturnal oxygen desaturation  -     OXYGEN FOR HOME USE    4. Benign essential hypertension  Overview:  Chronic   Left ventricular ejection fraction 60% May 2024 1+ MR, small pericardial effusion PA pressure 32   4/14/2023 ECHO Left ventricular diastolic function is abnormal (stage II)     Stable on procardia XL 30mg daily, coreg 25mg bid , imdur 60mg daily, and hydralazine 100mg tid   Doxazosin 4mg q 12         5. ESRD (end stage renal disease)  -     Ambulatory referral/consult to Outpatient Case Management    6. Type 2 diabetes mellitus with diabetic polyneuropathy, with long-term current use of insulin  Overview:  Chronic    insulin glargine U-100 (Lantus) - 100 unit/mL  Tirzepatide       Cont lantus  Lab Results   Component Value Date    HGBA1C 7.5 (H) 08/19/2022     DM polyneuropathy- documented since around 2015 - initially tx with Gabapentin; 2021 : Pt did well with lyrica 25mg 11/8/22  switched his neuropathy meds from Lyrica to Cymbalta. The Cymbalta helped with both his feet and the anxiety. However, a few months ago my dad was hospitalized for HTN event and the Cymbalta was discontinued bc of his kidneys. He was switched to Zoloft while in the hospital but he was hallucinating with that and he said it made him feel funny.      Orders:  -     Ambulatory referral/consult to Outpatient Case Management       Cont current meds  Add rozerem for insomnia; cont trazodone  Norturnal oxygen 2L for home use  RTC for worsening symptoms or failure to improve, or RTC PRN/as scheduled

## 2024-10-24 ENCOUNTER — OUTPATIENT CASE MANAGEMENT (OUTPATIENT)
Dept: ADMINISTRATIVE | Facility: OTHER | Age: 79
End: 2024-10-24
Payer: MEDICARE

## 2024-10-24 NOTE — PROGRESS NOTES
Outpatient Care Management  Patient Does Not Consent    Patient: Nestor Garcia  MRN:  [id number]  Date of Service:  10/24/2024  Completed by:  Sabrina Chavez RN    Chief Complaint   Patient presents with    Case Closure       Patient Summary           Consent Received:  Decline  Decline Reason:  Needs met

## 2024-10-29 DIAGNOSIS — G47.34 NOCTURNAL OXYGEN DESATURATION: Primary | ICD-10-CM

## 2024-11-01 PROBLEM — N30.01 ACUTE CYSTITIS WITH HEMATURIA: Status: RESOLVED | Noted: 2024-09-14 | Resolved: 2024-11-01

## 2024-11-01 PROBLEM — I95.2 HYPOTENSION DUE TO DRUGS: Status: RESOLVED | Noted: 2024-10-17 | Resolved: 2024-11-01

## 2024-11-01 PROBLEM — I48.3 TYPICAL ATRIAL FLUTTER: Status: ACTIVE | Noted: 2024-11-01

## 2024-11-01 PROBLEM — R55 SYNCOPE: Status: ACTIVE | Noted: 2024-11-01

## 2024-11-01 PROBLEM — I50.22 CHRONIC SYSTOLIC HEART FAILURE: Status: ACTIVE | Noted: 2024-11-01

## 2024-11-02 PROBLEM — N39.0 URINARY TRACT INFECTION WITHOUT HEMATURIA: Status: ACTIVE | Noted: 2024-11-02

## 2024-11-04 ENCOUNTER — PATIENT OUTREACH (OUTPATIENT)
Dept: ADMINISTRATIVE | Facility: CLINIC | Age: 79
End: 2024-11-04
Payer: MEDICARE

## 2024-11-04 NOTE — PROGRESS NOTES
C3 nurse spoke with Nestor Garcia, patient's daughter, Candace, for a TCC post hospital discharge follow up call. The patient has a scheduled HOSFU appointment with Kashmir Conn MD  on 11/12/2024 @ 12:30 PM.

## 2024-11-04 NOTE — PROGRESS NOTES
C3 nurse attempted to contact Nestor Garcia, and patient's daughters Candace and Sonia, for a TCC post hospital discharge follow up call. No answer. Left voicemail with callback information. The patient does not have a scheduled HOSFU appointment. Message sent to PCP staff for assistance with scheduling visit with patient.

## 2024-11-07 ENCOUNTER — HOSPITAL ENCOUNTER (OUTPATIENT)
Dept: RADIOLOGY | Facility: HOSPITAL | Age: 79
Discharge: HOME | End: 2024-11-07
Attending: INTERNAL MEDICINE
Payer: MEDICARE

## 2024-11-07 DIAGNOSIS — N18.6 ESRD (END STAGE RENAL DISEASE): ICD-10-CM

## 2024-11-07 PROCEDURE — 93985 DUP-SCAN HEMO COMPL BI STD: CPT | Mod: TC

## 2024-11-07 PROCEDURE — 93985 DUP-SCAN HEMO COMPL BI STD: CPT | Mod: 26,,, | Performed by: RADIOLOGY

## 2024-11-12 ENCOUNTER — OFFICE VISIT (OUTPATIENT)
Dept: FAMILY MEDICINE | Facility: CLINIC | Age: 79
End: 2024-11-12
Payer: MEDICARE

## 2024-11-12 ENCOUNTER — CLINICAL SUPPORT (OUTPATIENT)
Dept: FAMILY MEDICINE | Facility: CLINIC | Age: 79
End: 2024-11-12
Payer: MEDICARE

## 2024-11-12 ENCOUNTER — TELEPHONE (OUTPATIENT)
Dept: FAMILY MEDICINE | Facility: CLINIC | Age: 79
End: 2024-11-12
Payer: MEDICARE

## 2024-11-12 VITALS
OXYGEN SATURATION: 88 % | DIASTOLIC BLOOD PRESSURE: 70 MMHG | RESPIRATION RATE: 16 BRPM | HEIGHT: 70 IN | HEART RATE: 72 BPM | BODY MASS INDEX: 26.82 KG/M2 | WEIGHT: 187.38 LBS | SYSTOLIC BLOOD PRESSURE: 110 MMHG

## 2024-11-12 DIAGNOSIS — G47.00 INSOMNIA WITH SLEEP APNEA: ICD-10-CM

## 2024-11-12 DIAGNOSIS — I50.22 CHRONIC SYSTOLIC HEART FAILURE: ICD-10-CM

## 2024-11-12 DIAGNOSIS — R30.0 DYSURIA: ICD-10-CM

## 2024-11-12 DIAGNOSIS — Z09 HOSPITAL DISCHARGE FOLLOW-UP: Primary | ICD-10-CM

## 2024-11-12 DIAGNOSIS — G47.30 INSOMNIA WITH SLEEP APNEA: ICD-10-CM

## 2024-11-12 DIAGNOSIS — I50.32 CHRONIC DIASTOLIC HEART FAILURE, NYHA CLASS 2: ICD-10-CM

## 2024-11-12 PROCEDURE — 99999 PR PBB SHADOW E&M-EST. PATIENT-LVL IV: CPT | Mod: PBBFAC,,, | Performed by: STUDENT IN AN ORGANIZED HEALTH CARE EDUCATION/TRAINING PROGRAM

## 2024-11-12 RX ORDER — FUROSEMIDE 40 MG/1
40 TABLET ORAL DAILY
Qty: 30 TABLET | Refills: 5 | Status: SHIPPED | OUTPATIENT
Start: 2024-11-12 | End: 2025-05-11

## 2024-11-12 RX ORDER — SUVOREXANT 5 MG/1
1 TABLET, FILM COATED ORAL NIGHTLY PRN
Qty: 30 TABLET | Refills: 1 | Status: SHIPPED | OUTPATIENT
Start: 2024-11-12

## 2024-11-12 NOTE — PROGRESS NOTES
Subjective:       Patient ID: Nestor Garcia is a 79 y.o. male.    Chief Complaint: Hospital Follow Up (Pt is here today for a hospital f/u. Pt also states he needs stiches removed )    Pt here for hospital follow-up visit. He was admitted 11/01/2024-11/02/2024 following a trip and fall after his dialysis session. He had a laceration that was repaired in ED and he is requesting suture removal.    He also reports his insurance did not cover the ramelteon for insomnia.     Per DC summary:  No overnight events. Converted to NSR overnight. Rate controlled with home dose Toprol. Anti coagulation not started sec to high fall risk. Family agrees with this. Fall mostly sec to moving too fast after H.D. U.A showed possible UTI. Started on Cipro as per recent sensitivities. Will f/u urine culture. D/hansel home in a stable condition today. D/hansel with Pepcid and Sucralfate for GERD.           Echo showed LVEF of 35-40%, global hypokinesis, mild right ventricular systolic reduction and a small circumferential effusion. No indication of cardiac tamponade. Bilateral pleural effusions. As per family, he has known to have low EF. Not able to see Echo done at his Cardiologist's office(Echo until 04/23 showing normal LVEF). Family to arrange for close f/u with his Cardiologist for further management. Could not order ARB as had hypotension recently    Transitional Care Note    Family and/or Caretaker present at visit?  Yes.  Diagnostic tests reviewed/disposition: I have reviewed all completed as well as pending diagnostic tests at the time of discharge.  Disease/illness education: CHF, ESRD  Home health/community services discussion/referrals: Patient does not have home health established from hospital visit.  They do not need home health.  If needed, we will set up home health for the patient. His daughter is a nurse and they care for him at home.  Establishment or re-establishment of referral orders for community resources: No other  necessary community resources.   Discussion with other health care providers: No discussion with other health care providers necessary.     Results for orders placed during the hospital encounter of 11/01/24    Echo    Interpretation Summary    Left Ventricle: The left ventricle is normal in size. There is concentric hypertrophy. Moderate global hypokinesis present. There is moderately reduced systolic function with a visually estimated ejection fraction of 35 - 40%.    Right Ventricle: Normal right ventricular cavity size. Systolic function is mildly reduced.    Aortic Valve: There is moderate aortic valve sclerosis. Mildly restricted motion. There is mild stenosis. Aortic valve area by VTI is 1.4 cm². Aortic valve peak velocity is 1.8 m/s. Mean gradient is 5.9 mmHg. The dimensionless index is 0.42.    Mitral Valve: There is moderate posterior mitral annular calcification present. There is mild regurgitation.    Pulmonary Artery: The estimated pulmonary artery systolic pressure is 27 mmHg.    IVC/SVC: Normal venous pressure at 3 mmHg.    Pericardium: There is a small circumferential effusion. No indication of cardiac tamponade. Bilateral pleural effusions.    Review of Systems   Constitutional:  Negative for chills and fever.   HENT:  Negative for congestion, rhinorrhea and sore throat.    Eyes:  Negative for visual disturbance.   Respiratory:  Negative for cough, chest tightness, shortness of breath and wheezing.    Cardiovascular:  Negative for chest pain.   Gastrointestinal:  Negative for abdominal pain, constipation, diarrhea, nausea and vomiting.   Genitourinary:  Negative for dysuria and hematuria.   Musculoskeletal:  Negative for arthralgias and joint swelling.   Neurological:  Negative for dizziness, weakness and light-headedness.   Psychiatric/Behavioral:  Positive for sleep disturbance. Negative for confusion.        Objective:      Physical Exam  Vitals reviewed.   Constitutional:       General: He is  not in acute distress.  HENT:      Head: Normocephalic and atraumatic.      Mouth/Throat:      Mouth: Mucous membranes are moist.   Eyes:      Conjunctiva/sclera: Conjunctivae normal.   Cardiovascular:      Rate and Rhythm: Normal rate. Rhythm irregular.      Heart sounds: Normal heart sounds. No murmur heard.  Pulmonary:      Effort: Pulmonary effort is normal. No respiratory distress.      Breath sounds: Normal breath sounds.   Musculoskeletal:      Cervical back: Neck supple.      Right lower leg: Edema (2+ pitting) present.      Left lower leg: Left lower leg edema: pitting.      Comments: Pitting edema of bilateral LEs with venous stasis changes and some oozing   Skin:     Comments: Sutures in place to right eyebrow and nose   Neurological:      Mental Status: He is alert and oriented to person, place, and time.   Psychiatric:         Mood and Affect: Mood normal.         Behavior: Behavior normal.         Assessment:       1. Hospital discharge follow-up    2. Chronic systolic heart failure    3. Chronic diastolic heart failure, NYHA class 2    4. Dysuria    5. Insomnia with sleep apnea        Plan:           1. Hospital discharge follow-up    2. Chronic systolic heart failure  -     OXYGEN FOR HOME USE  -     furosemide (LASIX) 40 MG tablet; Take 1 tablet (40 mg total) by mouth once daily.  Dispense: 30 tablet; Refill: 5    3. Chronic diastolic heart failure, NYHA class 2  Overview:  Dx Per cardiology   Results for orders placed during the hospital encounter of 11/01/24    Echo    Interpretation Summary    Left Ventricle: The left ventricle is normal in size. There is concentric hypertrophy. Moderate global hypokinesis present. There is moderately reduced systolic function with a visually estimated ejection fraction of 35 - 40%.    Right Ventricle: Normal right ventricular cavity size. Systolic function is mildly reduced.    Aortic Valve: There is moderate aortic valve sclerosis. Mildly restricted motion.  There is mild stenosis. Aortic valve area by VTI is 1.4 cm². Aortic valve peak velocity is 1.8 m/s. Mean gradient is 5.9 mmHg. The dimensionless index is 0.42.    Mitral Valve: There is moderate posterior mitral annular calcification present. There is mild regurgitation.    Pulmonary Artery: The estimated pulmonary artery systolic pressure is 27 mmHg.    IVC/SVC: Normal venous pressure at 3 mmHg.    Pericardium: There is a small circumferential effusion. No indication of cardiac tamponade. Bilateral pleural effusions.      Assessment & Plan:  [place]     Orders:  -     OXYGEN FOR HOME USE  -     furosemide (LASIX) 40 MG tablet; Take 1 tablet (40 mg total) by mouth once daily.  Dispense: 30 tablet; Refill: 5    4. Dysuria  -     Cancel: POCT URINE DIPSTICK WITH MICROSCOPE, AUTOMATED; Future; Expected date: 11/12/2024  -     Cancel: POCT URINE SEDIMENT EXAM; Future; Expected date: 11/12/2024  -     Urinalysis, Reflex to Urine Culture Urine, Clean Catch; Future; Expected date: 11/12/2024    5. Insomnia with sleep apnea  -     suvorexant (BELSOMRA) 5 mg Tab; Take 1 tablet by mouth nightly as needed (insomnia).  Dispense: 30 tablet; Refill: 1    Check UA; recent UTI and he completed cipro. He was unable to provide a urine sample in clinic, cup provided to return    Need for home oxygen, RA sat today is 87%. He has something he can use temporarily  Resume lasix but at lower dose of 40mg daily  Insurance did not cover ramelteon for insomnia; trial of belsomra. Not a good candidate for ambien  RTC for worsening symptoms or failure to improve, or RTC PRN/as scheduled

## 2024-11-13 ENCOUNTER — PATIENT MESSAGE (OUTPATIENT)
Dept: FAMILY MEDICINE | Facility: CLINIC | Age: 79
End: 2024-11-13
Payer: MEDICARE

## 2024-11-13 DIAGNOSIS — N30.00 ACUTE CYSTITIS WITHOUT HEMATURIA: Primary | ICD-10-CM

## 2024-11-13 RX ORDER — CEFDINIR 300 MG/1
300 CAPSULE ORAL 2 TIMES DAILY
Qty: 14 CAPSULE | Refills: 0 | Status: SHIPPED | OUTPATIENT
Start: 2024-11-13 | End: 2024-11-20

## 2024-11-14 ENCOUNTER — TELEPHONE (OUTPATIENT)
Dept: FAMILY MEDICINE | Facility: CLINIC | Age: 79
End: 2024-11-14
Payer: MEDICARE

## 2024-11-14 NOTE — TELEPHONE ENCOUNTER
----- Message from Davonte sent at 2024  8:34 AM CST -----  Contact: Rush Garcia  MRN: [id number]  : 1945  PCP: Kashmir Conn  Home Phone      [phone]  Work Phone      Not on file.  Mobile          [phone]      MESSAGE:     [facility] called wanting to speak with nurse about orders for oxygen. Rush stated they don't take pts insurance.          Please advise  Rush  [phone]

## 2024-11-21 ENCOUNTER — PATIENT MESSAGE (OUTPATIENT)
Dept: FAMILY MEDICINE | Facility: CLINIC | Age: 79
End: 2024-11-21
Payer: MEDICARE

## 2024-11-21 DIAGNOSIS — R30.0 BURNING WITH URINATION: Primary | ICD-10-CM

## 2024-11-24 ENCOUNTER — LAB VISIT (OUTPATIENT)
Dept: LAB | Facility: HOSPITAL | Age: 79
End: 2024-11-24
Attending: STUDENT IN AN ORGANIZED HEALTH CARE EDUCATION/TRAINING PROGRAM
Payer: MEDICARE

## 2024-11-24 DIAGNOSIS — R80.1 PERSISTENT PROTEINURIA: ICD-10-CM

## 2024-11-24 DIAGNOSIS — D63.1 ANEMIA OF CHRONIC RENAL FAILURE, STAGE 4 (SEVERE): ICD-10-CM

## 2024-11-24 DIAGNOSIS — R60.0 LOCALIZED EDEMA: ICD-10-CM

## 2024-11-24 DIAGNOSIS — R60.9 EDEMA, UNSPECIFIED TYPE: ICD-10-CM

## 2024-11-24 DIAGNOSIS — R31.29 MICROHEMATURIA: ICD-10-CM

## 2024-11-24 DIAGNOSIS — R73.09 ELEVATED GLUCOSE: ICD-10-CM

## 2024-11-24 DIAGNOSIS — N18.4 CHRONIC KIDNEY DISEASE, STAGE IV (SEVERE): ICD-10-CM

## 2024-11-24 DIAGNOSIS — N18.4 ANEMIA OF CHRONIC RENAL FAILURE, STAGE 4 (SEVERE): ICD-10-CM

## 2024-11-24 DIAGNOSIS — E79.0 HYPERURICEMIA: ICD-10-CM

## 2024-11-24 DIAGNOSIS — E83.39 HYPERPHOSPHATEMIA: ICD-10-CM

## 2024-11-24 DIAGNOSIS — I10 HYPERTENSION, ESSENTIAL: ICD-10-CM

## 2024-11-24 DIAGNOSIS — N25.81 SECONDARY HYPERPARATHYROIDISM OF RENAL ORIGIN: ICD-10-CM

## 2024-11-24 DIAGNOSIS — D50.9 IRON DEFICIENCY ANEMIA, UNSPECIFIED IRON DEFICIENCY ANEMIA TYPE: ICD-10-CM

## 2024-11-24 LAB
BACTERIA #/AREA URNS HPF: ABNORMAL /HPF
BILIRUB UR QL STRIP: ABNORMAL
CLARITY UR: ABNORMAL
COLOR UR: ABNORMAL
GLUCOSE UR QL STRIP: NEGATIVE
HGB UR QL STRIP: ABNORMAL
HYALINE CASTS #/AREA URNS LPF: 5 /LPF
KETONES UR QL STRIP: ABNORMAL
LEUKOCYTE ESTERASE UR QL STRIP: ABNORMAL
MICROSCOPIC COMMENT: ABNORMAL
NITRITE UR QL STRIP: NEGATIVE
PH UR STRIP: 5 [PH] (ref 5–8)
PROT UR QL STRIP: ABNORMAL
RBC #/AREA URNS HPF: >100 /HPF (ref 0–4)
SP GR UR STRIP: 1.02 (ref 1–1.03)
URN SPEC COLLECT METH UR: ABNORMAL
UROBILINOGEN UR STRIP-ACNC: NEGATIVE EU/DL
WAXY CASTS #/AREA URNS LPF: 10 /LPF
WBC #/AREA URNS HPF: 25 /HPF (ref 0–5)
WBC CLUMPS URNS QL MICRO: ABNORMAL
YEAST URNS QL MICRO: ABNORMAL

## 2024-11-24 PROCEDURE — 81000 URINALYSIS NONAUTO W/SCOPE: CPT | Mod: HCNC | Performed by: INTERNAL MEDICINE

## 2024-11-29 ENCOUNTER — PATIENT MESSAGE (OUTPATIENT)
Dept: FAMILY MEDICINE | Facility: CLINIC | Age: 79
End: 2024-11-29
Payer: MEDICARE

## 2024-12-09 ENCOUNTER — PATIENT MESSAGE (OUTPATIENT)
Dept: FAMILY MEDICINE | Facility: CLINIC | Age: 79
End: 2024-12-09
Payer: MEDICARE

## 2024-12-09 PROBLEM — Z66 DNR (DO NOT RESUSCITATE): Status: ACTIVE | Noted: 2024-12-09

## 2025-04-14 NOTE — PROGRESS NOTES
Subjective:       Patient ID: Nestor Garcia is a 77 y.o. male.    Chief Complaint: Hospital Follow Up (Pt states that he went to the hospital due to low blood pressure, pt states that his blood pressure is fine now. )    Pt here for hospital follow-up    He was recently admitted 8/18/22-8/23/22 for HTN emergency.   Medications were adjusted and he was discharged on the following: bumex 2mg daily, imdur 40mg TID, lisinopril 10mg daily, nifedipine 60mg daily, and cardura 8mg BID. Since discharge he has followed-up with Dr. Charles who changed the imdur to 60mg daily. He also added coreg 3.125mg BID.    Renal function decreased slightly.     He also noticed that he has been having issues with his blood sugar dropping and he was symptomatic. Per log, he had a 60 and 69. He also has been more active since he stopped the metoprolol. He has more energy.     Review of Systems   HENT:  Negative for congestion, rhinorrhea and sore throat.    Respiratory:  Negative for cough and shortness of breath.    Cardiovascular:  Positive for leg swelling. Negative for chest pain.   Gastrointestinal:  Negative for abdominal pain, diarrhea, nausea and vomiting.   Genitourinary:  Negative for dysuria and hematuria.   Neurological:  Negative for dizziness, syncope and light-headedness.   Psychiatric/Behavioral:  Negative for confusion.      Objective:      Physical Exam   Constitutional: He is oriented to person, place, and time. No distress.   HENT:   Head: Normocephalic and atraumatic.   Mouth/Throat: Mucous membranes are moist.   Eyes: Conjunctivae are normal.   Cardiovascular: Normal rate, regular rhythm and normal heart sounds.   No murmur heard.  Pulmonary/Chest: Effort normal and breath sounds normal. No respiratory distress.   Musculoskeletal:      Cervical back: Neck supple.      Right lower leg: Edema (trace) present.      Left lower leg: Edema (trace) present.   Neurological: He is alert and oriented to person, place, and time.         Psychiatric: His behavior is normal. Mood normal.   Vitals reviewed.    Assessment:       1. Essential hypertension    2. Type 2 diabetes mellitus with diabetic polyneuropathy, with long-term current use of insulin    3. Hospital discharge follow-up    4. CKD (chronic kidney disease) stage 4, GFR 15-29 ml/min    5. Diabetic polyneuropathy associated with type 2 diabetes mellitus    6. Dyslipidemia        Plan:           1. Essential hypertension  Assessment & Plan:  Cont bumex 2mg daily, imdur 60mg daily, lisinopril 10mg daily, nifedipine 60mg daily, coreg 3.125mg BID and cardura 8mg BID.       2. Type 2 diabetes mellitus with diabetic polyneuropathy, with long-term current use of insulin  -     Continuous Glucose Monitoring for Home Use ([employer] vendor/non-pharmacy)    3. Hospital discharge follow-up    4. CKD (chronic kidney disease) stage 4, GFR 15-29 ml/min  -     Ambulatory referral/consult to Nephrology    5. Diabetic polyneuropathy associated with type 2 diabetes mellitus  Overview:  Was on cymbalta but stopped due to CKD    Assessment & Plan:  Symptoms tolerable per patient  Cont compression socks      6. Dyslipidemia  Assessment & Plan:  Statin intolerance; Cont fish oil       RTC 3 months or sooner if needed     No

## 2025-08-04 ENCOUNTER — PATIENT MESSAGE (OUTPATIENT)
Dept: ADMINISTRATIVE | Facility: HOSPITAL | Age: 80
End: 2025-08-04
Payer: MEDICARE